# Patient Record
Sex: FEMALE | Race: WHITE | NOT HISPANIC OR LATINO | Employment: OTHER | URBAN - METROPOLITAN AREA
[De-identification: names, ages, dates, MRNs, and addresses within clinical notes are randomized per-mention and may not be internally consistent; named-entity substitution may affect disease eponyms.]

---

## 2017-01-04 ENCOUNTER — TELEPHONE (OUTPATIENT)
Dept: OBSTETRICS AND GYNECOLOGY | Facility: CLINIC | Age: 52
End: 2017-01-04

## 2017-01-04 NOTE — TELEPHONE ENCOUNTER
Please inform pt that her pap smear came back abnormal (ASC-US) and her HPV testing was positive. According to the guidelines and these results she needs to come in for a colposcopy. Please schedule with Dr. Mejias.     Thanks!

## 2017-01-17 ENCOUNTER — OFFICE VISIT (OUTPATIENT)
Dept: OBSTETRICS AND GYNECOLOGY | Facility: CLINIC | Age: 52
End: 2017-01-17
Payer: COMMERCIAL

## 2017-01-17 ENCOUNTER — APPOINTMENT (OUTPATIENT)
Dept: LAB | Facility: HOSPITAL | Age: 52
End: 2017-01-17
Attending: OBSTETRICS & GYNECOLOGY
Payer: COMMERCIAL

## 2017-01-17 VITALS
HEART RATE: 100 BPM | SYSTOLIC BLOOD PRESSURE: 133 MMHG | HEIGHT: 63 IN | BODY MASS INDEX: 25.82 KG/M2 | DIASTOLIC BLOOD PRESSURE: 96 MMHG | WEIGHT: 145.75 LBS

## 2017-01-17 DIAGNOSIS — R87.811 ASCUS WITH POSITIVE HIGH RISK HUMAN PAPILLOMAVIRUS OF VAGINA: Primary | ICD-10-CM

## 2017-01-17 DIAGNOSIS — N94.9 VAGINAL BURNING: ICD-10-CM

## 2017-01-17 DIAGNOSIS — R87.620 ASCUS WITH POSITIVE HIGH RISK HUMAN PAPILLOMAVIRUS OF VAGINA: Primary | ICD-10-CM

## 2017-01-17 PROCEDURE — 99213 OFFICE O/P EST LOW 20 MIN: CPT | Mod: 25,S$GLB,, | Performed by: OBSTETRICS & GYNECOLOGY

## 2017-01-17 PROCEDURE — 99999 PR PBB SHADOW E&M-EST. PATIENT-LVL III: CPT | Mod: PBBFAC,,, | Performed by: OBSTETRICS & GYNECOLOGY

## 2017-01-17 PROCEDURE — 57421 EXAM/BIOPSY OF VAG W/SCOPE: CPT | Mod: S$GLB,,, | Performed by: OBSTETRICS & GYNECOLOGY

## 2017-01-17 PROCEDURE — 88312 SPECIAL STAINS GROUP 1: CPT | Mod: 26,,, | Performed by: PATHOLOGY

## 2017-01-17 PROCEDURE — 88305 TISSUE EXAM BY PATHOLOGIST: CPT | Mod: 26,,, | Performed by: PATHOLOGY

## 2017-01-17 PROCEDURE — 88305 TISSUE EXAM BY PATHOLOGIST: CPT | Performed by: PATHOLOGY

## 2017-01-17 PROCEDURE — 3080F DIAST BP >= 90 MM HG: CPT | Mod: S$GLB,,, | Performed by: OBSTETRICS & GYNECOLOGY

## 2017-01-17 PROCEDURE — 1159F MED LIST DOCD IN RCRD: CPT | Mod: S$GLB,,, | Performed by: OBSTETRICS & GYNECOLOGY

## 2017-01-17 PROCEDURE — 3075F SYST BP GE 130 - 139MM HG: CPT | Mod: S$GLB,,, | Performed by: OBSTETRICS & GYNECOLOGY

## 2017-01-17 RX ORDER — DULOXETIN HYDROCHLORIDE 60 MG/1
60 CAPSULE, DELAYED RELEASE ORAL
Refills: 6 | COMMUNITY
Start: 2016-12-29 | End: 2017-07-18

## 2017-01-17 RX ORDER — HYDROCODONE BITARTRATE AND ACETAMINOPHEN 7.5; 325 MG/1; MG/1
TABLET ORAL
COMMUNITY
Start: 2017-01-14 | End: 2017-07-18

## 2017-01-17 RX ORDER — DICLOFENAC SODIUM 20 MG/G
SOLUTION TOPICAL
Refills: 2 | COMMUNITY
Start: 2017-01-09 | End: 2017-06-05 | Stop reason: SDUPTHER

## 2017-01-17 RX ORDER — TOPIRAMATE 200 MG/1
400 TABLET ORAL DAILY
COMMUNITY
Start: 2017-01-13 | End: 2023-09-18

## 2017-01-17 RX ORDER — TRAMADOL HYDROCHLORIDE 50 MG/1
50 TABLET ORAL EVERY 6 HOURS PRN
COMMUNITY
Start: 2017-01-16 | End: 2017-12-06

## 2017-01-18 NOTE — PROGRESS NOTES
COLPOSCOPY:    Faith Bernard is a 51 y.o. female   presents for colposcopy.  No LMP recorded (lmp unknown). Patient has had a hysterectomy..  Her most recent pap smear shows ASCUS with POSITIVE high risk HPV.      The abnormal test findings were discussed, as well as HPV infection, need for colposcopy and possible biopsies to determine the plan of care, treatments available, the minimal risk of bleeding and infection with colposcopy, and alternatives to colposcopy and she agrees to proceed.      UPT is negative    COLPOSCOPY EXAM:   TIME OUT PERFORMED.     visible lesion(s) at 9 o'clock    Biopsy was taken at 9 o'clock.  ECC was not performed    Hemostasis was adequate with application of silver nitrate.  The speculum was removed.  The patient did tolerate the procedure well.    All collected specimens sent to pathology for histologic analysis.    Post-colposcopy counseling:  The patient was instructed to manage post-colposcopy cramping with NSAIDs or Tylenol, or with a prescription per the medication card.  Avoid intercourse, douching, or tampons in the vagina for at least 2-3 days.  Expect a clumpy blackish discharge due to Monsel's solution application for several days.  Report heavy bleeding, worsening pain or pain that does not respond to above medications, or foul-smelling vaginal discharge. HPV vaccine recommended according to FDA age guidelines.  Importance of follow-up stressed.      Follow up based on colposcopy results.

## 2017-01-18 NOTE — PROGRESS NOTES
Subjective:       Patient ID: Faith Bernard is a 51 y.o. female.    Chief Complaint:  Colposcopy      History of Present Illness  HPI  Pt complains of vaginal burning that happens a few times a week.  Pt reports taking a vaginal temperature of 103 and using ice in vaginal to relieve pain    GYN & OB History  No LMP recorded (lmp unknown). Patient has had a hysterectomy.   Date of Last Pap: 2016    OB History    Para Term  AB SAB TAB Ectopic Multiple Living   3 1  1 1 1    2      # Outcome Date GA Lbr Cipriano/2nd Weight Sex Delivery Anes PTL Lv   3       CS-Unspec   Y   2 SAB            1       Vag-Spont   Y          Review of Systems  Review of Systems   Constitutional: Negative.    Respiratory: Negative.    Cardiovascular: Negative.    Gastrointestinal: Negative.    Genitourinary: Positive for vaginal pain.   Musculoskeletal: Negative.    Skin:  Negative.   Neurological: Negative.    Psychiatric/Behavioral: Negative.            Objective:    Physical Exam:   Constitutional: She is oriented to person, place, and time. She appears well-developed and well-nourished.    HENT:   Head: Normocephalic and atraumatic.    Eyes: EOM are normal.    Neck: Normal range of motion.    Cardiovascular: Normal rate.     Pulmonary/Chest: Effort normal.          Genitourinary:       There is no rash, tenderness, lesion or injury on the right labia. There is no rash, tenderness, lesion or injury on the left labia.           Musculoskeletal: Normal range of motion and moves all extremeties.       Neurological: She is alert and oriented to person, place, and time.    Skin: Skin is warm and dry.    Psychiatric: She has a normal mood and affect. Her behavior is normal. Judgment and thought content normal.          Assessment:        1. ASCUS with positive high risk human papillomavirus of vagina    2. Vaginal burning                Plan:      Will follow biopsy results  May be manifestation of  fibromyalgia

## 2017-01-19 ENCOUNTER — TELEPHONE (OUTPATIENT)
Dept: OBSTETRICS AND GYNECOLOGY | Facility: CLINIC | Age: 52
End: 2017-01-19

## 2017-01-20 NOTE — TELEPHONE ENCOUNTER
Spoke with patient and informed her about the negative result. I also told her that it was recommended to repeat her pap in a year. Patient voiced understanding.

## 2017-03-21 ENCOUNTER — TELEPHONE (OUTPATIENT)
Dept: OBSTETRICS AND GYNECOLOGY | Facility: CLINIC | Age: 52
End: 2017-03-21

## 2017-03-21 NOTE — TELEPHONE ENCOUNTER
----- Message from Ilene Barton sent at 3/21/2017  3:08 PM CDT -----  Contact: Faith  Patient is asking for Rx Vagifem 20 mg twice a week as the Rx given is too messy and also is not working. Asking to speak to nurse [phone]. Thanks!

## 2017-03-21 NOTE — TELEPHONE ENCOUNTER
She had asked you about the  Cream , but she hates it , would like to go back on the vagifem 10 mg was using 2 (20 mg) inserts twice a week . Was wanting to see if you would call this into [facility] and she will stop using the cream.

## 2017-03-23 ENCOUNTER — TELEPHONE (OUTPATIENT)
Dept: OBSTETRICS AND GYNECOLOGY | Facility: CLINIC | Age: 52
End: 2017-03-23

## 2017-03-23 RX ORDER — ESTRADIOL 10 UG/1
20 INSERT VAGINAL
Qty: 16 TABLET | Refills: 11 | Status: SHIPPED | OUTPATIENT
Start: 2017-03-23 | End: 2017-12-06 | Stop reason: ALTCHOICE

## 2017-03-23 NOTE — TELEPHONE ENCOUNTER
----- Message from Roverto Mccain sent at 3/22/2017  4:47 PM CDT -----  Contact: Patient  Patient called regarding Rx ([email or web address]) wanted to know if script has been sent to [facility]? Please call back to confirm at [phone].thanks,

## 2017-04-25 ENCOUNTER — TELEPHONE (OUTPATIENT)
Dept: OBSTETRICS AND GYNECOLOGY | Facility: CLINIC | Age: 52
End: 2017-04-25

## 2017-04-25 NOTE — TELEPHONE ENCOUNTER
----- Message from Nolvia Londono sent at 4/24/2017 12:19 PM CDT -----  Pt is requesting 3 month 90 supply of Estradiol call pharmacy  [phone]   [facility] [place], - [place] - [address]  [address] [place] [address]  Phone: [phone] Fax: [phone]

## 2017-06-05 RX ORDER — DICLOFENAC SODIUM 20 MG/G
SOLUTION TOPICAL
Qty: 1 BOTTLE | Refills: 5 | Status: SHIPPED | OUTPATIENT
Start: 2017-06-05 | End: 2017-07-18 | Stop reason: SDUPTHER

## 2017-07-18 ENCOUNTER — OFFICE VISIT (OUTPATIENT)
Dept: RHEUMATOLOGY | Facility: CLINIC | Age: 52
End: 2017-07-18
Payer: COMMERCIAL

## 2017-07-18 VITALS
WEIGHT: 144.31 LBS | DIASTOLIC BLOOD PRESSURE: 82 MMHG | SYSTOLIC BLOOD PRESSURE: 126 MMHG | BODY MASS INDEX: 25.57 KG/M2 | HEIGHT: 63 IN

## 2017-07-18 DIAGNOSIS — M79.7 FIBROMYALGIA: Primary | ICD-10-CM

## 2017-07-18 DIAGNOSIS — M15.9 OSTEOARTHRITIS OF MULTIPLE JOINTS, UNSPECIFIED OSTEOARTHRITIS TYPE: ICD-10-CM

## 2017-07-18 PROCEDURE — 99213 OFFICE O/P EST LOW 20 MIN: CPT | Mod: ,,, | Performed by: INTERNAL MEDICINE

## 2017-07-18 RX ORDER — CYCLOBENZAPRINE HCL 10 MG
10 TABLET ORAL 3 TIMES DAILY PRN
Qty: 30 TABLET | Refills: 2 | Status: SHIPPED | OUTPATIENT
Start: 2017-07-18 | End: 2017-07-28

## 2017-07-18 RX ORDER — VENLAFAXINE HYDROCHLORIDE 75 MG/1
150 CAPSULE, EXTENDED RELEASE ORAL
COMMUNITY
End: 2022-12-12

## 2017-07-18 RX ORDER — DICLOFENAC SODIUM 20 MG/G
SOLUTION TOPICAL
Qty: 1 BOTTLE | Refills: 5 | Status: SHIPPED | OUTPATIENT
Start: 2017-07-18 | End: 2017-10-31

## 2017-07-18 NOTE — PROGRESS NOTES
[facility]            PROGRESS NOTE      Subjective:       Patient ID:   NAME: Faith Bernard : 1965     52 y.o. female    Referring Doc: No ref. provider found  Other Physicians:    Chief Complaint:  Osteoarthritis      History of Present Illness:     Patient returns today for a regularly scheduled follow-up visit for Fibromyalgia and osteoarthritis. Patient's last visit with us in 2016.      The patient has been under the care of a pain management physician.  She is complaining of arthralgias on her right ankle , right knee, right shoulder and right wrist without swelling. Symptoms are relieved with Pennsaid.            ROS:   GEN:  No  fever, night sweats . weight is stable   + fatigue  SKIN: no rashes, no bruising, no ulcerations, no Raynaud's  HEENT: no HA's, No visual changes, no mucosal ulcers, no sicca symptoms,  CV:   no CP, SOB, PND, TRIANA, no orthopnea, no palpitations  PULM: normal with no SOB, cough, hemoptysis, sputum or pleuritic pain  GI:  no abdominal pain, nausea, vomiting, constipation, diarrhea, melanotic stools, BRBPR, hematemesis, no dysphagia  :   no dysuria  NEURO: no paresthesias, headaches, visual disturbances, muscle weakness  MUSCULOSKELETAL:no joint swelling, prolonged AM stiffness, + back pain, + muscle pain  Allergies:  Review of patient's allergies indicates:   Allergen Reactions    Cephalexin      Other reaction(s): Unknown    Codeine Itching     Other reaction(s): Unknown    Nsaids (non-steroidal anti-inflammatory drug)        Medications:    Current Outpatient Prescriptions:     aripiprazole (ABILIFY) 10 MG Tab, Take 10 mg by mouth once daily., Disp: , Rfl:     butalbital-acetaminop-caf-cod -36-30 mg Cap, 1-2 capsules 2 (two) times daily as needed (h/a). , Disp: , Rfl: 3    cholecalciferol, vitamin D3, 50,000 unit capsule, Take 50,000 Units by mouth every 7 days., Disp: , Rfl: 99    CYANOCOBALAMIN, VITAMIN B-12, (VITAMIN B-12 INJ),  Inject 1 mL as directed every 30 days. , Disp: , Rfl:     estradiol (ESTRACE) 2 MG tablet, Take 1 tablet (2 mg total) by mouth once daily., Disp: 30 tablet, Rfl: 11    estradiol (VAGIFEM) 10 mcg Tab, Place 2 tablets (20 mcg total) vaginally twice a week., Disp: 16 tablet, Rfl: 11    IRON, FERROUS SULFATE, ORAL, Take by mouth once daily. , Disp: , Rfl:     levocetirizine (XYZAL) 5 MG tablet, 5 mg once daily. , Disp: , Rfl:     levothyroxine (SYNTHROID) 88 MCG tablet, Take 88 mcg by mouth once daily., Disp: , Rfl:     linaclotide (LINZESS) 145 mcg Cap capsule, Take 145 mcg by mouth once daily., Disp: , Rfl:     oxycodone myristate 18 mg CSpT, Take 18 mg by mouth every 12 (twelve) hours., Disp: , Rfl:     oxycodone-acetaminophen (PERCOCET)  mg per tablet, Take 1 tablet by mouth every 8 (eight) hours as needed., Disp: , Rfl: 0    PENNSAID 20 mg/gram /actuation(2 %) sopm, APPLY 2 PUMpS ON AFFECTED KNEE TWICE DAILY AS NEEDED, Disp: 1 Bottle, Rfl: 5    rizatriptan (MAXALT) 10 MG tablet, Take 10 mg by mouth as needed for Migraine., Disp: , Rfl:     spironolactone (ALDACTONE) 50 MG tablet, Take 50 mg by mouth once daily., Disp: , Rfl:     tizanidine (ZANAFLEX) 4 MG tablet, Take 4 mg by mouth every evening. May take up to 12mg qhs, Disp: , Rfl: 4    topiramate (TOPAMAX) 200 MG Tab, 100 mg 4 (four) times daily. , Disp: , Rfl:     tramadol (ULTRAM) 50 mg tablet, Take 50 mg by mouth every 6 (six) hours as needed. , Disp: , Rfl:     trazodone (DESYREL) 100 MG tablet, Take 1 tablet (100 mg total) by mouth nightly as needed for Insomnia. (Patient taking differently: Take 150 mg by mouth every evening. ), Disp: 90 tablet, Rfl: 3    valsartan-hydrochlorothiazide (DIOVAN-HCT) 160-25 mg per tablet, Take 1 tablet by mouth once daily., Disp: , Rfl:     venlafaxine (EFFEXOR XR) 75 MG 24 hr capsule, Take 75 mg by mouth 3 (three) times daily., Disp: , Rfl:     clobetasol (TEMOVATE) 0.05 % cream, Apply topically once  "daily. Use daily for 10 days then go to twice weekly, Disp: 30 g, Rfl: 1    cyclobenzaprine (FLEXERIL) 10 MG tablet, Take 1 tablet (10 mg total) by mouth 3 (three) times daily as needed for Muscle spasms., Disp: 30 tablet, Rfl: 2    PMHx/PSHx Updates:      Objective:     Vitals:  Blood pressure 126/82, height 5' 3" (1.6 m), weight 65.5 kg (144 lb 4.8 oz).    Physical Examination:   GEN: no apparent distress, comfortable; AAOx3  SKIN: no rashes,no ulceration, no Raynaud's, no petechiae, no SQ nodules,  HEAD: normal  EYES: no pallor, no icterus,ENT:  ,no mucosal dryness or ulcerations  NECK: no masses, thyroid normal, trachea midline, no LAD/LN's, supple  CV: RRR with no murmur; l S1 and S2 reg. ,no gallop no rubs,   CHEST: Normal respiratory effort; CTAB; normal breath sounds; no wheeze or crackles  ABDOM: nontender and nondistended; soft; no masses; no rebound/guarding  MUSC/Skeletal: ROM normal; no crepitus; joints without synovitis,  no deformities  No joint swelling or tenderness of PIP, MCP, wrist, elbow, shoulder, or knee joints, widespread trigger points   EXTREM: no clubbing, cyanosis, no edema,normal  pulses   NEURO: grossly intact; motor WNL; AAOx3; , LYMPH: normal cervical, supraclavicular          Labs:   Lab Results   Component Value Date    WBC 8.90 10/04/2016    HGB 14.3 10/04/2016    HCT 43.1 10/04/2016    MCV 93 10/04/2016     10/04/2016    CMP  @LAST[facility](NA,K,CL,CO2,GLU,BUN,Creatinine,Calcium,PROT,Albumin,Bilitot,Alkphos,AST,ALT,CRP,ESR,RF,CCP,MARY JO,SSA,CPK,uric acid) )@  I have reviewed all available lab results and radiology reports.    Radiology/Diagnostic Studies:        Assessment/Plan:   (1) 52 y.o. female with diagnosis of Fibromyalgia and osteoarthritis.  She is under the care of the pain management specialist.  Patient requests prescription for Pennsaid liquid and Flexeril. She is taking Zanaflex but she will not take Zanaflex when she takes Flexeril 5-10 mg twice a day only when " needed.  She had recent blood work done with her primary care physician and we will request results be faxed to me              Discussion:     I have explained all of the above in detail and the patient understands all of the current recommendation(s). I have answered all questions to the best of my ability and to their complete satisfaction.       The patient is to continue with the current management plan         RTC in   4 months      Electronically signed by Murtaza Ramos MD

## 2017-10-31 RX ORDER — DICLOFENAC SODIUM 10 MG/G
GEL TOPICAL
Qty: 1 TUBE | Refills: 2 | Status: SHIPPED | OUTPATIENT
Start: 2017-10-31 | End: 2018-02-06 | Stop reason: SDUPTHER

## 2017-12-06 ENCOUNTER — OFFICE VISIT (OUTPATIENT)
Dept: OBSTETRICS AND GYNECOLOGY | Facility: CLINIC | Age: 52
End: 2017-12-06
Payer: COMMERCIAL

## 2017-12-06 VITALS
SYSTOLIC BLOOD PRESSURE: 124 MMHG | HEIGHT: 63 IN | WEIGHT: 140.44 LBS | DIASTOLIC BLOOD PRESSURE: 88 MMHG | BODY MASS INDEX: 24.88 KG/M2 | HEART RATE: 86 BPM

## 2017-12-06 DIAGNOSIS — N89.8 VAGINAL DRYNESS: Primary | ICD-10-CM

## 2017-12-06 DIAGNOSIS — N94.10 DYSPAREUNIA IN FEMALE: ICD-10-CM

## 2017-12-06 PROCEDURE — 99213 OFFICE O/P EST LOW 20 MIN: CPT | Mod: S$GLB,,, | Performed by: NURSE PRACTITIONER

## 2017-12-06 PROCEDURE — 99999 PR PBB SHADOW E&M-EST. PATIENT-LVL IV: CPT | Mod: PBBFAC,,, | Performed by: NURSE PRACTITIONER

## 2017-12-06 RX ORDER — DEXTROAMPHETAMINE SACCHARATE, AMPHETAMINE ASPARTATE, DEXTROAMPHETAMINE SULFATE AND AMPHETAMINE SULFATE 7.5; 7.5; 7.5; 7.5 MG/1; MG/1; MG/1; MG/1
TABLET ORAL
Refills: 0 | COMMUNITY
Start: 2017-10-23 | End: 2020-09-17 | Stop reason: CLARIF

## 2017-12-06 RX ORDER — LISDEXAMFETAMINE DIMESYLATE 70 MG/1
70 CAPSULE ORAL DAILY
Refills: 0 | COMMUNITY
Start: 2017-09-13 | End: 2023-10-10

## 2017-12-06 RX ORDER — CYCLOBENZAPRINE HCL 10 MG
10 TABLET ORAL 3 TIMES DAILY PRN
Refills: 2 | COMMUNITY
Start: 2017-09-14 | End: 2018-02-06

## 2017-12-06 RX ORDER — TRAZODONE HYDROCHLORIDE 150 MG/1
TABLET ORAL
Status: ON HOLD | COMMUNITY
Start: 2017-11-02 | End: 2020-09-07 | Stop reason: HOSPADM

## 2017-12-11 ENCOUNTER — TELEPHONE (OUTPATIENT)
Dept: OBSTETRICS AND GYNECOLOGY | Facility: CLINIC | Age: 52
End: 2017-12-11

## 2017-12-11 NOTE — TELEPHONE ENCOUNTER
Please call and inform pt that it is okay for her to use the estring vaginal insert and continue to take her estrace tablets.     Thanks!

## 2017-12-11 NOTE — TELEPHONE ENCOUNTER
Patient informed of results and recommendations as noted by LESTER Stone NP. Patient verbalized understanding.

## 2017-12-12 NOTE — PROGRESS NOTES
"Chief Complaint   Patient presents with    Painful [place]     did not know if it the same problem as last time or hormones, wants to try estring, wants blood work       History of Present Illness: Faith Bernard is a 52 y.o. female that presents today 12/6/2017 for   Pt here c/o vaginal dryness and painful intercourse. She has been using the vagifem vaginal tablets and does not feel as if they are working anymore. She states that in the past few weeks her and her  have tried to engage in intercourse and she feel like "there are razors inside" of her and that she is "being split open". She has looked into other forms of vaginal estrogen and would like to try the estring vaginal insert. Pt denies any other complaints or concerns at this time.         Chief Complaint   Patient presents with    Painful [place]     did not know if it the same problem as last time or hormones, wants to try estring, wants blood work         Past Medical History:   Diagnosis Date    Anxiety     Depression     Encounter for blood transfusion     Esophageal dysphagia     Fibromyalgia     Gastric bypass status for obesity     H/O dental abscess 4/14/2014    drained    Headache(784.0)     migraines.  Treated at [facility] (Dr. 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  Pt states took too many then realized her mistake    Polycystic ovaries     S/P JUHI-BSO     Thyroid disease     hypothyroidism       Past Surgical History:   Procedure Laterality Date    ABDOMINAL SURGERY      BREAST SURGERY  " 2004    Breast Reduction    CARDIAC CATHETERIZATION      COLONOSCOPY N/A 11/3/2015    Procedure: COLONOSCOPY;  Surgeon: Timothy Barber MD;  Location: [facility];  Service: Endoscopy;  Laterality: N/A;    DENTAL SURGERY      4 teeth removed    GASTRIC BYPASS      HERNIA REPAIR      HYSTERECTOMY      OVARIAN CYST REMOVAL      aprox 5 times    UPPER GASTROINTESTINAL ENDOSCOPY         Current Outpatient Prescriptions   Medication Sig Dispense Refill    aripiprazole (ABILIFY) 10 MG Tab Take 10 mg by mouth once daily.      butalbital-acetaminop-caf-cod -86-30 mg Cap 1-2 capsules 2 (two) times daily as needed (h/a).   3    cholecalciferol, vitamin D3, 50,000 unit capsule Take 50,000 Units by mouth every 7 days.  99    CYANOCOBALAMIN, VITAMIN B-12, (VITAMIN B-12 INJ) Inject 1 mL as directed every 30 days.       cyclobenzaprine (FLEXERIL) 10 MG tablet Take 10 mg by mouth 3 (three) times daily as needed.  2    dextroamphetamine-amphetamine 30 mg Tab TK 1/2 T PO TID  0    diclofenac sodium 1 % Gel 2-4 gm on affected joint bid-tid prn 1 Tube 2    estradiol (ESTRACE) 2 MG tablet Take 1 tablet (2 mg total) by mouth once daily. 30 tablet 11    levothyroxine (SYNTHROID) 88 MCG tablet Take 88 mcg by mouth once daily.      oxycodone myristate 18 mg CSpT Take 13.5 mg by mouth every 12 (twelve) hours.       oxycodone-acetaminophen (PERCOCET)  mg per tablet Take 1 tablet by mouth every 8 (eight) hours as needed.  0    rizatriptan (MAXALT) 10 MG tablet Take 10 mg by mouth as needed for Migraine.      tizanidine (ZANAFLEX) 4 MG tablet Take 4 mg by mouth every evening. May take up to 12mg qhs  4    topiramate (TOPAMAX) 200 MG Tab 100 mg 4 (four) times daily.       trazodone (DESYREL) 100 MG tablet Take 1 tablet (100 mg total) by mouth nightly as needed for Insomnia. (Patient taking differently: Take 150 mg by mouth every evening. ) 90 tablet 3    valsartan-hydrochlorothiazide (DIOVAN-HCT) 160-25 mg per  "tablet Take 1 tablet by mouth once daily.      venlafaxine (EFFEXOR XR) 75 MG 24 hr capsule Take 75 mg by mouth 3 (three) times daily.      VYVANSE 70 mg capsule Take 70 mg by mouth once daily.  0    clobetasol (TEMOVATE) 0.05 % cream Apply topically once daily. Use daily for 10 days then go to twice weekly 30 g 1    traZODone (DESYREL) 150 MG tablet        No current facility-administered medications for this visit.        Review of patient's allergies indicates:   Allergen Reactions    Cephalexin      Other reaction(s): Unknown    Codeine Itching     Other reaction(s): Unknown    Nsaids (non-steroidal anti-inflammatory drug)        Family History   Problem Relation Age of Onset    Anxiety disorder Mother     Depression Mother     Suicide Mother     Seizures Mother     Drug abuse Mother     Ovarian cancer Mother     Aortic aneurysm Father     Colon cancer Neg Hx     Breast cancer Neg Hx     Diabetes Neg Hx     Hypertension Neg Hx        Social History   Substance Use Topics    Smoking status: Never Smoker    Smokeless tobacco: Never Used    Alcohol use Yes      Comment: 2 per month       OB History    Para Term  AB Living   4 1 0 1 2 2   SAB TAB Ectopic Multiple Live Births   2 0 0 0 2      # Outcome Date GA Lbr Cipriano/2nd Weight Sex Delivery Anes PTL Lv   4 SAB            3       CS-Unspec   SERGIO   2 SAB            1       Vag-Spont   SERGIO          Review of Symptoms:  GENERAL: Denies weight gain or weight loss. Feeling well overall.   SKIN: Denies rash or lesions.   ABDOMEN: No abdominal pain, constipation, diarrhea, nausea, vomiting or rectal bleeding.   URINARY: No frequency, dysuria, hematuria, or burning on urination.    /88   Pulse 86   Ht 5' 3" (1.6 m)   Wt 63.7 kg (140 lb 6.9 oz)   LMP  (LMP Unknown)   Physical Exam:  APPEARANCE: Well nourished, well developed, in no acute distress.  SKIN: Normal skin turgor, no lesions.  RESPIRATORY: Normal respiratory " effort with no retractions or use of accessory muscles  PELVIC: Normal external female genitalia without lesions. Normal hair distribution. Adequate perineal body. Vagina dry with minimal rugae; without lesions or discharge. No significant cystocele or rectocele.     ASSESSMENT/PLAN:  Vaginal dryness    Dyspareunia in female    Other orders  -     Cancel: estradiol (ESTRING) 2 mg (7.5 mcg /24 hour) vaginal ring; Place 2 mg vaginally once.  Dispense: 1 each; Refill: 3  -     estradiol (ESTRING) 2 mg (7.5 mcg /24 hour) vaginal ring; Place 2 mg vaginally once.  Dispense: 1 each; Refill: 3        -Ordered estring for pt in place of vagifem vaginal tablets. Also instructed pt to use water-based lubricants with intercourse such as astroglide and not KY-jellies. Instructed pt to follow-up in 3 months if she is not seeing an improvement with the new medication. Pt verbalized understanding.     Follow-up:  RTC if symptoms worsen or do not improve  RTC as needed

## 2018-02-06 ENCOUNTER — OFFICE VISIT (OUTPATIENT)
Dept: OBSTETRICS AND GYNECOLOGY | Facility: CLINIC | Age: 53
End: 2018-02-06
Payer: COMMERCIAL

## 2018-02-06 VITALS
DIASTOLIC BLOOD PRESSURE: 76 MMHG | HEART RATE: 91 BPM | HEIGHT: 63 IN | BODY MASS INDEX: 24.72 KG/M2 | SYSTOLIC BLOOD PRESSURE: 110 MMHG | WEIGHT: 139.5 LBS

## 2018-02-06 DIAGNOSIS — N94.10 DYSPAREUNIA, FEMALE: ICD-10-CM

## 2018-02-06 DIAGNOSIS — Z01.419 ENCOUNTER FOR WELL WOMAN EXAM WITH ROUTINE GYNECOLOGICAL EXAM: Primary | ICD-10-CM

## 2018-02-06 PROCEDURE — 99999 PR PBB SHADOW E&M-EST. PATIENT-LVL III: CPT | Mod: PBBFAC,,, | Performed by: OBSTETRICS & GYNECOLOGY

## 2018-02-06 PROCEDURE — 99396 PREV VISIT EST AGE 40-64: CPT | Mod: S$GLB,,, | Performed by: OBSTETRICS & GYNECOLOGY

## 2018-02-06 PROCEDURE — 88175 CYTOPATH C/V AUTO FLUID REDO: CPT

## 2018-02-06 RX ORDER — OXYCODONE 13.5 MG/1
CAPSULE, EXTENDED RELEASE ORAL
Status: ON HOLD | COMMUNITY
Start: 2018-01-18 | End: 2020-09-07 | Stop reason: HOSPADM

## 2018-02-06 RX ORDER — ESTRADIOL 2 MG/1
SYSTEM VAGINAL
Refills: 3 | COMMUNITY
Start: 2017-12-07 | End: 2020-09-17 | Stop reason: CLARIF

## 2018-02-06 RX ORDER — RIZATRIPTAN BENZOATE 10 MG/1
TABLET, ORALLY DISINTEGRATING ORAL
Refills: 2 | COMMUNITY
Start: 2017-12-15 | End: 2023-09-18

## 2018-02-06 RX ORDER — DICLOFENAC SODIUM 20 MG/G
SOLUTION TOPICAL
COMMUNITY
Start: 2018-01-15 | End: 2020-09-17 | Stop reason: CLARIF

## 2018-02-06 RX ORDER — ESTRADIOL 2 MG/1
TABLET ORAL
Status: ON HOLD | COMMUNITY
Start: 2018-01-04 | End: 2020-09-07

## 2018-02-06 NOTE — PROGRESS NOTES
Subjective:       Patient ID: Faith eBrnard is a 52 y.o. female.    Chief Complaint:  Well Woman (pain in intercourse, pain since the last biopsy, )      History of Present Illness  HPI  Annual Exam-Postmenopausal  Patient presents for annual exam. The patient has no complaints today. The patient is sexually active. Patient reports that she experiences severe deep dyspareunia every time she has intercourse since colposcopy last year. GYN screening history: last pap: was abnormal: ASCUS with +HPV. The patient is taking hormone replacement therapy. Patient denies post-menopausal vaginal bleeding. The patient wears seatbelts: yes. The patient participates in regular exercise: not asked. Has the patient ever been transfused or tattooed?: not asked. The patient reports that there is not domestic violence in her life.    GYN & OB History  No LMP recorded (lmp unknown). Patient has had a hysterectomy.   Date of Last Pap: 2016    OB History    Para Term  AB Living   4 1 0 1 2 2   SAB TAB Ectopic Multiple Live Births   2 0 0 0 2      # Outcome Date GA Lbr Cipriano/2nd Weight Sex Delivery Anes PTL Lv   4 SAB            3       CS-Unspec   SERGIO   2 SAB            1       Vag-Spont   SERGIO          Review of Systems  Review of Systems   Constitutional: Negative for activity change, appetite change, chills, diaphoresis, fatigue, fever and unexpected weight change.   HENT: Negative for mouth sores and tinnitus.    Eyes: Negative for discharge and visual disturbance.   Respiratory: Negative for cough, shortness of breath and wheezing.    Cardiovascular: Negative for chest pain, palpitations and leg swelling.   Gastrointestinal: Negative for abdominal pain, bloating, blood in stool, constipation, diarrhea, nausea and vomiting.   Endocrine: Negative for diabetes, hair loss, hot flashes, hyperthyroidism and hypothyroidism.   Genitourinary: Positive for dyspareunia and vaginal pain. Negative for  decreased libido, dysuria, flank pain, frequency, genital sores, hematuria, menorrhagia, menstrual problem, pelvic pain, urgency, vaginal bleeding, vaginal discharge, dysmenorrhea, urinary incontinence, postcoital bleeding, postmenopausal bleeding and vaginal odor.   Musculoskeletal: Negative for back pain, joint swelling and myalgias.   Skin:  Negative for rash, no acne and hair changes.   Neurological: Negative for seizures, syncope, numbness and headaches.   Hematological: Negative for adenopathy. Does not bruise/bleed easily.   Psychiatric/Behavioral: Positive for depression. Negative for sleep disturbance. The patient is nervous/anxious.    Breast: Negative for breast mass, breast pain, nipple discharge and skin changes          Objective:    Physical Exam:   Constitutional: She is oriented to person, place, and time. She appears well-developed and well-nourished. No distress.    HENT:   Head: Normocephalic.    Eyes: Pupils are equal, round, and reactive to light.    Neck: Normal range of motion.    Cardiovascular: Normal rate.     Pulmonary/Chest: Effort normal.        Abdominal: Soft.     Genitourinary: Vagina normal. No vaginal discharge found.   Genitourinary Comments: Pap smear done of vaginal cuff. No lesions noted in vaginal canal and no tenderness with speculum exam. No palpable pelvic masses or tenderness.           Musculoskeletal: Normal range of motion.       Neurological: She is alert and oriented to person, place, and time.    Skin: Skin is warm and dry.           Assessment:        1. Encounter for well woman exam with routine gynecological exam    2. Dyspareunia, female                Plan:      Continue with Estring and Estrace pills  Mammogram  Patient has Premarin vaginal cream and may add to mix

## 2018-08-28 DIAGNOSIS — Z90.710 S/P TOTAL HYSTERECTOMY: Primary | ICD-10-CM

## 2018-08-29 RX ORDER — ESTRADIOL 2 MG/1
SYSTEM VAGINAL
Qty: 1 EACH | Refills: 3 | OUTPATIENT
Start: 2018-08-29

## 2018-08-29 NOTE — TELEPHONE ENCOUNTER
Please inform pt that her last well visit was done by Dr. Sánchez. Prescription should now be requested from him.     Thanks

## 2018-08-30 NOTE — TELEPHONE ENCOUNTER
Advised patient that Dr. Sánchez would be the provider to fill her prescriptions since she saw him in February. She said she no longer will be coming to [facility] for her healthcare.

## 2019-12-16 ENCOUNTER — OFFICE VISIT (OUTPATIENT)
Dept: URGENT CARE | Facility: CLINIC | Age: 54
End: 2019-12-16
Payer: COMMERCIAL

## 2019-12-16 VITALS
WEIGHT: 125 LBS | HEIGHT: 63 IN | DIASTOLIC BLOOD PRESSURE: 79 MMHG | OXYGEN SATURATION: 100 % | BODY MASS INDEX: 22.15 KG/M2 | RESPIRATION RATE: 16 BRPM | HEART RATE: 87 BPM | SYSTOLIC BLOOD PRESSURE: 127 MMHG | TEMPERATURE: 99 F

## 2019-12-16 DIAGNOSIS — S61.412A LACERATION OF LEFT HAND WITHOUT FOREIGN BODY, INITIAL ENCOUNTER: Primary | ICD-10-CM

## 2019-12-16 PROCEDURE — 99214 OFFICE O/P EST MOD 30 MIN: CPT | Mod: 25,S$GLB,, | Performed by: NURSE PRACTITIONER

## 2019-12-16 PROCEDURE — 12001 LACERATION REPAIR: ICD-10-PCS | Mod: S$GLB,,, | Performed by: NURSE PRACTITIONER

## 2019-12-16 PROCEDURE — 12001 RPR S/N/AX/GEN/TRNK 2.5CM/<: CPT | Mod: S$GLB,,, | Performed by: NURSE PRACTITIONER

## 2019-12-16 PROCEDURE — 99214 PR OFFICE/OUTPT VISIT, EST, LEVL IV, 30-39 MIN: ICD-10-PCS | Mod: 25,S$GLB,, | Performed by: NURSE PRACTITIONER

## 2019-12-17 NOTE — PATIENT INSTRUCTIONS
Laceration: All Closures  A laceration is a cut through the skin. This will usually require stitches (sutures) or staples if it is deep. Minor cuts may be treated with a surgical tape closure or skin glue.    Home care  · Your healthcare provider may prescribe an antibiotic. This is to help prevent infection. Follow all instructions for taking this medicine. Take the medicine every day until it is gone or you are told to stop. You should not have any left over.  · The healthcare provider may prescribe medicines for pain. Follow instructions for taking them.  · Follow the healthcare providers instructions on how to care for the cut.  · Keep the wound clean and dry. Do not get the wound wet until you are told it is okay to do so. If the area gets wet, gently pat it dry with a clean cloth. Replace the wet bandage with a dry one.  · If a bandage was applied and it becomes wet or dirty, replace it. Otherwise, leave it in place for the first 24 hours.  · Caring for sutures or staples: Once you no longer need to keep them dry, clean the wound daily. First, remove the bandage. Then wash the area gently with soap and warm water, or as directed by the health care provider. Use a wet cotton swab to loosen and remove any blood or crust that forms. After cleaning, apply a thin layer of antibiotic ointment if advised. Then put on a new bandage unless you are told not to.  · Caring for skin glue: Dont put apply liquid, ointment, or cream on the wound while the glue is in place. Avoid activities that cause heavy sweating. Protect the wound from sunlight. Do not scratch, rub, or pick at the adhesive film. Do not place tape directly over the film. The glue should peel off within 5 to 10 days.   · Caring for surgical tape: Keep the area dry. If it gets wet, blot it dry with a clean towel. Surgical tape usually falls off within 7 to 10 days. If it has not fallen off after 10 days, you can take it off yourself. Put mineral oil or  petroleum jelly on a cotton ball and gently rub the tape until it is removed.  · Once you can get the wound wet, you may shower as usual but do not soak the wound in water (no tub baths or swimming)  · Even with proper treatment, a wound infection may sometimes occur. Check the wound daily for signs of infection listed below.  Scalp wounds  During the first two days, you may carefully rinse your hair in the shower to remove blood, glass or dirt particles. After two days, you may shower and shampoo your hair normally. Do not soak your scalp in the tub or go swimming until the stitches or staples have been removed. Talk with your healthcare provider before applying any antibiotic ointment to the wound.  Mouth wounds  Eat soft foods to reduce pain. If the cut is inside of your mouth, clean by rinsing after each meal and at bedtime with a mixture of equal parts water and hydrogen peroxide (do not swallow!). Or, you can use a cotton swab to directly apply hydrogen peroxide onto the cut. Mouth wounds can be painful when eating. You may use an over-the-counter local numbing solution for pain relief. If this is not available, you may use any numbing solution intended for teething babies. You may apply this directly to the sores with a cotton-tip swab or with your finger.  Follow-up care  Follow up with your healthcare provider as advised. Ask your healthcare provider how long sutures should be left in place. Be sure to return for suture removal as directed. If dissolving stitches were used in the mouth, these should fall out or dissolve without the need for removal. If tape closures were used, remove them yourself when your provider recommends if they have not fallen off on their own. If skin glue was used, the film will wear off by itself.  When to seek medical advice  Call your healthcare provider right away if any of these occur:  · Wound bleeding not controlled by direct pressure  · Signs of infection, including  increasing pain in the wound, increasing wound redness or swelling, or pus or bad odor coming from the wound  · Fever of 100.4°F (38.ºC) or higher or as directed by your healthcare provider  · Stitches or staples come apart or fall out or surgical tape falls off before 7 days  · Wound edges re-open  · Wound changes colors  · Numbness around the wound   · Decreased movement around the injured area  Date Last Reviewed: 6/14/2015 © 2000-2017 [facility]. [address] [place] [address]. All rights reserved. This information is not intended as a substitute for professional medical care. Always follow your healthcare professional's instructions.        Hand Laceration: All Closures  A laceration is a cut through the skin. You have a cut on the hand. Deep cuts usually require stitches (sutures) or staples. Minor cuts may be closed with surgical tape or skin adhesive.   X-rays may be done if something may have entered the skin through the cut. Your may also be given a tetanus shot. This may be given if you are not updated on this vaccination and the object that cut you may carry tetanus.    Home care  · Your healthcare provider may prescribe an antibiotic. This is to help prevent infection. Follow all instructions for taking this medicine. Take the medicine every day until it is gone or you are told to stop. You should not have any left over.  · The healthcare provider may prescribe medicines for pain. Follow instructions for taking them.  · Follow the healthcare providers instructions on how to care for the cut.  · Keep the wound clean and dry. Do not get the wound wet until you are told it is okay to do so. If the bandage gets wet, remove it. Gently pat the wound dry with a clean cloth. Then put on a clean, dry bandage..  · To help prevent infection, wash your hands with soap and water before and after caring for the wound.   · Caring for sutures or staples: Once you no longer need to keep them  dry, clean the wound daily. First, remove the bandage. Then wash the area gently with soap and warm water, or as directed by the health care provider. Use a wet cotton swab to loosen and remove any blood or crust that forms. After cleaning, apply a thin layer of antibiotic ointment if advised. Then put on a new bandage unless you are told not to.  · Caring for skin glue: Dont put apply liquid, ointment, or cream on the wound while the glue is in place. Avoid activities that cause heavy sweating. Protect the wound from sunlight. Do not scratch, rub, or pick at the adhesive film. Do not place tape directly over the film. The glue should peel off within 5 to 10 days.   · Caring for surgical tape: Keep the area dry. If it gets wet, blot it dry with a clean towel. Surgical tape usually falls off within 7 to 10 days. If it has not fallen off after 10 days, you can take it off yourself. Put mineral oil or petroleum jelly on a cotton ball and gently rub the tape until it is removed.  · Once you can get the wound wet, you may shower as usual but do not soak the wound in water (no tub baths or swimming)  · Even with proper treatment, a wound infection may sometimes occur. Check the wound daily for signs of infection listed below.  Follow-up care  Follow up with your healthcare provider as advised. If you have stitches or staples, be sure to return as directed to have them removed.  When to seek medical advice  Call your healthcare provider right away if any of these occur:  · Wound bleeding not controlled by direct pressure  · Signs of infection, including increasing pain in the wound, increasing wound redness or swelling, or pus or bad odor coming from the wound  · Fever of 100.4°F (38.ºC) or as directed by your health care provider  · Stitches or staples come apart or fall out or surgical tape falls off before 7 days  · Wound edges re-open  · Wound changes colors  · Numbness or weakness in the affected hand   · Decreased  movement of the hand  Date Last Reviewed: 6/10/2015  © 0912-6956 [employer]. [address] [place] [address], [place] [address]. All rights reserved. This information is not intended as a substitute for professional medical care. Always follow your healthcare professional's instructions.

## 2019-12-17 NOTE — PROGRESS NOTES
"Subjective:       Patient ID: Faith Bernard is a 54 y.o. female.    Vitals:  height is 5' 3" (1.6 m) and weight is 56.7 kg (125 lb). Her temperature is 98.6 °F (37 °C). Her blood pressure is 127/79 and her pulse is 87. Her respiration is 16 and oxygen saturation is 100%.     Chief Complaint: Laceration    Faith Bernard is a 54 year old female presenting to the clinic with a laceration to the left hand. She states she was cutting vegetables and the knife slipped. She reports receiving a tetanus vaccine one month ago. She has full ROM of the digits.     Laceration    The incident occurred less than 1 hour ago. The laceration is located on the left hand. The laceration mechanism was a clean knife. Her tetanus status is UTD.       Constitution: Negative for chills, fatigue and fever.   HENT: Negative for congestion and sore throat.    Neck: Negative for painful lymph nodes.   Cardiovascular: Negative for chest pain and leg swelling.   Eyes: Negative for double vision and blurred vision.   Respiratory: Negative for cough and shortness of breath.    Gastrointestinal: Negative for nausea, vomiting and diarrhea.   Genitourinary: Negative for dysuria, frequency, urgency and history of kidney stones.   Musculoskeletal: Negative for joint pain, joint swelling, muscle cramps and muscle ache.   Skin: Positive for laceration. Negative for color change, pale, rash and bruising.   Allergic/Immunologic: Negative for seasonal allergies.   Neurological: Negative for dizziness, history of vertigo, light-headedness, passing out and headaches.   Hematologic/Lymphatic: Negative for swollen lymph nodes.   Psychiatric/Behavioral: Negative for nervous/anxious, sleep disturbance and depression. The patient is not nervous/anxious.        Objective:      Physical Exam   Constitutional: She is oriented to person, place, and time. She appears well-developed and well-nourished. She is cooperative.  Non-toxic appearance. She does not appear " ill. No distress.   HENT:   Head: Normocephalic and atraumatic.   Right Ear: Hearing, tympanic membrane, external ear and ear canal normal.   Left Ear: Hearing, tympanic membrane, external ear and ear canal normal.   Nose: Nose normal. No mucosal edema, rhinorrhea or nasal deformity. No epistaxis. Right sinus exhibits no maxillary sinus tenderness and no frontal sinus tenderness. Left sinus exhibits no maxillary sinus tenderness and no frontal sinus tenderness.   Mouth/Throat: Uvula is midline, oropharynx is clear and moist and mucous membranes are normal. No trismus in the jaw. Normal dentition. No uvula swelling. No posterior oropharyngeal erythema.   Eyes: Conjunctivae and lids are normal. Right eye exhibits no discharge. Left eye exhibits no discharge. No scleral icterus.   Neck: Trachea normal, normal range of motion, full passive range of motion without pain and phonation normal. Neck supple.   Cardiovascular: Normal rate, regular rhythm, normal heart sounds, intact distal pulses and normal pulses.   Pulmonary/Chest: Effort normal and breath sounds normal. No respiratory distress.   Abdominal: Soft. Normal appearance and bowel sounds are normal. She exhibits no distension, no pulsatile midline mass and no mass. There is no tenderness.   Musculoskeletal: Normal range of motion. She exhibits no edema or deformity.        Left hand: She exhibits laceration.   Left hand- palmar surface of the distal 5th metacarpal- 1 cm laceration, bleeding controlled. Full ROM of all digits with sensation intact to light touch.    Neurological: She is alert and oriented to person, place, and time. She exhibits normal muscle tone. Coordination normal.   Skin: Skin is warm, dry, not diaphoretic and not pale. Lacerations - upper ext.:  left hand  Psychiatric: She has a normal mood and affect. Her speech is normal and behavior is normal. Judgment and thought content normal. Cognition and memory are normal.   Nursing note and vitals  reviewed.        Assessment:       1. Laceration of left hand without foreign body, initial encounter        Plan:       Patient has a small laceration to the palm. The wound was cleaned with tap water and then repaired by me without complication. She is currently on doxycycline and UTD on tetanus. Discussed wound care and monitoring for signs of infection. Return in one week for suture removal. Do not suspect tendon injury since she has full ROM of all digits without difficulty.   Laceration of left hand without foreign body, initial encounter  -     Laceration Repair

## 2019-12-17 NOTE — PROCEDURES
Laceration Repair  Date/Time: 12/16/2019 7:05 PM  Performed by: Sarah Montiel NP  Authorized by: Sarah Montiel NP   Body area: upper extremity  Location details: left hand  Laceration length: 1 cm  Foreign bodies: no foreign bodies  Tendon involvement: none  Nerve involvement: none  Anesthesia: local infiltration    Anesthesia:  Local Anesthetic: lidocaine 1% without epinephrine  Anesthetic total: 3 mL  Preparation: Patient was prepped and draped in the usual sterile fashion.  Irrigation solution: tap water  Irrigation method: tap  Skin closure: 5-0 nylon  Number of sutures: 3  Technique: simple  Approximation: close  Approximation difficulty: simple  Patient tolerance: Patient tolerated the procedure well with no immediate complications

## 2019-12-31 ENCOUNTER — LAB VISIT (OUTPATIENT)
Dept: LAB | Facility: HOSPITAL | Age: 54
End: 2019-12-31
Attending: NURSE PRACTITIONER
Payer: COMMERCIAL

## 2019-12-31 DIAGNOSIS — G43.109 CLASSICAL MIGRAINE: Primary | ICD-10-CM

## 2019-12-31 LAB
BASOPHILS # BLD AUTO: 0.03 K/UL (ref 0–0.2)
BASOPHILS # BLD AUTO: 0.03 K/UL (ref 0–0.2)
BASOPHILS NFR BLD: 0.4 % (ref 0–1.9)
BASOPHILS NFR BLD: 0.4 % (ref 0–1.9)
DIFFERENTIAL METHOD: ABNORMAL
DIFFERENTIAL METHOD: ABNORMAL
EOSINOPHIL # BLD AUTO: 0.1 K/UL (ref 0–0.5)
EOSINOPHIL # BLD AUTO: 0.1 K/UL (ref 0–0.5)
EOSINOPHIL NFR BLD: 1.3 % (ref 0–8)
EOSINOPHIL NFR BLD: 1.3 % (ref 0–8)
ERYTHROCYTE [DISTWIDTH] IN BLOOD BY AUTOMATED COUNT: 18.1 % (ref 11.5–14.5)
ERYTHROCYTE [DISTWIDTH] IN BLOOD BY AUTOMATED COUNT: 18.1 % (ref 11.5–14.5)
HCT VFR BLD AUTO: 38.5 % (ref 37–48.5)
HCT VFR BLD AUTO: 38.5 % (ref 37–48.5)
HGB BLD-MCNC: 11.1 G/DL (ref 12–16)
HGB BLD-MCNC: 11.1 G/DL (ref 12–16)
IMM GRANULOCYTES # BLD AUTO: 0.01 K/UL (ref 0–0.04)
IMM GRANULOCYTES # BLD AUTO: 0.01 K/UL (ref 0–0.04)
IMM GRANULOCYTES NFR BLD AUTO: 0.1 % (ref 0–0.5)
IMM GRANULOCYTES NFR BLD AUTO: 0.1 % (ref 0–0.5)
LYMPHOCYTES # BLD AUTO: 2.5 K/UL (ref 1–4.8)
LYMPHOCYTES # BLD AUTO: 2.5 K/UL (ref 1–4.8)
LYMPHOCYTES NFR BLD: 36.6 % (ref 18–48)
LYMPHOCYTES NFR BLD: 36.6 % (ref 18–48)
MCH RBC QN AUTO: 23.2 PG (ref 27–31)
MCH RBC QN AUTO: 23.2 PG (ref 27–31)
MCHC RBC AUTO-ENTMCNC: 28.8 G/DL (ref 32–36)
MCHC RBC AUTO-ENTMCNC: 28.8 G/DL (ref 32–36)
MCV RBC AUTO: 81 FL (ref 82–98)
MCV RBC AUTO: 81 FL (ref 82–98)
MONOCYTES # BLD AUTO: 0.5 K/UL (ref 0.3–1)
MONOCYTES # BLD AUTO: 0.5 K/UL (ref 0.3–1)
MONOCYTES NFR BLD: 7.3 % (ref 4–15)
MONOCYTES NFR BLD: 7.3 % (ref 4–15)
NEUTROPHILS # BLD AUTO: 3.7 K/UL (ref 1.8–7.7)
NEUTROPHILS # BLD AUTO: 3.7 K/UL (ref 1.8–7.7)
NEUTROPHILS NFR BLD: 54.3 % (ref 38–73)
NEUTROPHILS NFR BLD: 54.3 % (ref 38–73)
NRBC BLD-RTO: 0 /100 WBC
NRBC BLD-RTO: 0 /100 WBC
PLATELET # BLD AUTO: 418 K/UL (ref 150–350)
PLATELET # BLD AUTO: 418 K/UL (ref 150–350)
PMV BLD AUTO: 9.7 FL (ref 9.2–12.9)
PMV BLD AUTO: 9.7 FL (ref 9.2–12.9)
RBC # BLD AUTO: 4.78 M/UL (ref 4–5.4)
RBC # BLD AUTO: 4.78 M/UL (ref 4–5.4)
WBC # BLD AUTO: 6.85 K/UL (ref 3.9–12.7)
WBC # BLD AUTO: 6.85 K/UL (ref 3.9–12.7)

## 2019-12-31 PROCEDURE — 80201 ASSAY OF TOPIRAMATE: CPT

## 2019-12-31 PROCEDURE — 85025 COMPLETE CBC W/AUTO DIFF WBC: CPT

## 2019-12-31 PROCEDURE — 36415 COLL VENOUS BLD VENIPUNCTURE: CPT

## 2020-01-02 LAB — TOPIRAMATE SERPL-MCNC: 8.5 UG/ML (ref 2–25)

## 2020-03-09 ENCOUNTER — HOSPITAL ENCOUNTER (EMERGENCY)
Facility: HOSPITAL | Age: 55
Discharge: HOME | End: 2020-03-09
Attending: EMERGENCY MEDICINE
Payer: COMMERCIAL

## 2020-03-09 VITALS
WEIGHT: 118 LBS | SYSTOLIC BLOOD PRESSURE: 129 MMHG | DIASTOLIC BLOOD PRESSURE: 82 MMHG | TEMPERATURE: 98 F | BODY MASS INDEX: 20.91 KG/M2 | OXYGEN SATURATION: 98 % | HEART RATE: 86 BPM | RESPIRATION RATE: 20 BRPM | HEIGHT: 63 IN

## 2020-03-09 DIAGNOSIS — S92.354A CLOSED NONDISPLACED FRACTURE OF FIFTH METATARSAL BONE OF RIGHT FOOT, INITIAL ENCOUNTER: ICD-10-CM

## 2020-03-09 DIAGNOSIS — S86.911A STRAIN OF RIGHT KNEE, INITIAL ENCOUNTER: Primary | ICD-10-CM

## 2020-03-09 PROCEDURE — 29515 APPLICATION SHORT LEG SPLINT: CPT

## 2020-03-09 PROCEDURE — 25000003 PHARM REV CODE 250: Performed by: EMERGENCY MEDICINE

## 2020-03-09 PROCEDURE — 99284 EMERGENCY DEPT VISIT MOD MDM: CPT | Mod: 25

## 2020-03-09 RX ORDER — HYDROCODONE BITARTRATE AND ACETAMINOPHEN 10; 325 MG/1; MG/1
1 TABLET ORAL
Status: COMPLETED | OUTPATIENT
Start: 2020-03-09 | End: 2020-03-09

## 2020-03-09 RX ADMIN — HYDROCODONE BITARTRATE AND ACETAMINOPHEN 1 TABLET: 10; 325 TABLET ORAL at 10:03

## 2020-03-09 NOTE — ED NOTES
R knee and foot pain reported.  States injured when slipped during the night. Reports increasing R knee and foot pain with ROM. R foot swollen and bruised at dorsal area.  R knee slight swelling reported.  Good knee and ankle ROM.

## 2020-03-09 NOTE — ED PROVIDER NOTES
Encounter Date: 3/9/2020       History     Chief Complaint   Patient presents with    Foot Pain     right foot pain and swelling twisted, states stepped on sock and slipped falling and twisted right foot    Knee Pain     right knee     55-year-old with history of neuropathy, osteoarthritis, depression, anxiety.  Patient presents emergency department with complaint of right foot, right ankle pain after she tripped and fell earlier this morning while getting out of bed.  Patient states that she stepped on a sock causing her to trip and twisted her ankle and foot.  Patient noticed a pop in her right foot and had pain and swelling since.  Also has had knee pain as well.  She denies any additional trauma.  Denies head or neck injury.  Denies any other musculoskeletal complaints.        Review of patient's allergies indicates:   Allergen Reactions    Cephalexin      Other reaction(s): Unknown    Codeine Itching     Other reaction(s): Unknown    Nsaids (non-steroidal anti-inflammatory drug)      Past Medical History:   Diagnosis Date    Anxiety     Depression     Encounter for blood transfusion     Esophageal dysphagia     Fibromyalgia     Gastric bypass status for obesity     H/O dental abscess 4/14/2014    drained    Headache(784.0)     migraines.  Treated at [facility] (Dr. 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  Pt states took too many then realized her mistake    Polycystic ovaries     S/P JUHI-BSO     Thyroid disease     hypothyroidism     Past Surgical History:   Procedure  Laterality Date    ABDOMINAL SURGERY      BREAST SURGERY  2004    Breast Reduction    CARDIAC CATHETERIZATION      COLONOSCOPY N/A 11/3/2015    Procedure: COLONOSCOPY;  Surgeon: Timothy Barber MD;  [facility];  Service: Endoscopy;  Laterality: N/A;    DENTAL SURGERY      4 teeth removed    GASTRIC BYPASS      HERNIA REPAIR      HYSTERECTOMY      OVARIAN CYST REMOVAL      aprox 5 times    ROTATOR CUFF REPAIR Left 01/2019    tennis elbow repair Left 01/2019    UPPER GASTROINTESTINAL ENDOSCOPY       Family History   Problem Relation Age of Onset    Anxiety disorder Mother     Depression Mother     Suicide Mother     Seizures Mother     Drug abuse Mother     Ovarian cancer Mother     Aortic aneurysm Father     Colon cancer Neg Hx     Breast cancer Neg Hx     Diabetes Neg Hx     Hypertension Neg Hx      Social History     Tobacco Use    Smoking status: Never Smoker    Smokeless tobacco: Never Used   Substance Use Topics    Alcohol use: Yes     Comment: 2 per month    Drug use: Yes     Types: Amphetamines, Barbituates, Benzodiazepines     Review of Systems   Constitutional: Negative for fever.   HENT: Negative for sore throat.    Respiratory: Negative for shortness of breath.    Cardiovascular: Negative for chest pain.   Gastrointestinal: Negative for nausea.   Genitourinary: Negative for dysuria.   Musculoskeletal: Positive for arthralgias (Right knee, right foot pain) and myalgias. Negative for back pain.   Skin: Negative for rash.   Neurological: Negative for dizziness, speech difficulty, weakness and numbness.   Hematological: Does not bruise/bleed easily.   Psychiatric/Behavioral: Negative for self-injury. The patient is not nervous/anxious.        Physical Exam     Initial Vitals [03/09/20 0824]   BP Pulse Resp Temp SpO2   104/63 82 18 98.4 °F (36.9 °C) 100 %      MAP       --         Physical Exam    Nursing note and vitals reviewed.  Constitutional: She appears well-developed and  well-nourished.   HENT:   Head: Normocephalic and atraumatic.   Nose: Nose normal.   Mouth/Throat: Oropharynx is clear and moist.   Eyes: Conjunctivae and EOM are normal. Pupils are equal, round, and reactive to light.   Neck: Normal range of motion. Neck supple. No thyromegaly present. No tracheal deviation present.   Cardiovascular: Normal rate, regular rhythm, normal heart sounds and intact distal pulses. Exam reveals no gallop and no friction rub.    No murmur heard.  Pulmonary/Chest: Breath sounds normal. No stridor. No respiratory distress.   Course bilateral breath sounds no adventitious sounds   Abdominal: Soft. Bowel sounds are normal. She exhibits no mass. There is no rebound and no guarding.   Musculoskeletal: She exhibits no edema.   Right knee:  With good range of motion.  Negative anterior, posterior drawer.  Negative Lachman's.  Positive mild stress varus and valgus maneuvers.  Diffusely tender to patellar and suprapatellar region.  No crepitus appreciated.    Right foot:  With diffuse palpable tenderness noted to the dorsum of the 3rd 4th 5th metatarsal region.  With mild ecchymosis to the lateral aspect of right 5th metatarsal.     Lymphadenopathy:     She has no cervical adenopathy.   Neurological: She is alert and oriented to person, place, and time. She has normal strength and normal reflexes. GCS score is 15. GCS eye subscore is 4. GCS verbal subscore is 5. GCS motor subscore is 6.   Skin: Skin is warm and dry. Capillary refill takes less than 2 seconds.   Psychiatric: She has a normal mood and affect.         ED Course   Procedures  Labs Reviewed - No data to display       Imaging Results          X-Ray Knee 3 View Right (Final result)  Result time 03/09/20 09:46:06    Final result by Arline Clarke MD (03/09/20 09:46:06)                 Impression:      Negative exam.      Electronically signed by: Arline Clarke MD  Date:    03/09/2020  Time:    09:46             Narrative:     EXAMINATION:  XR KNEE 3 VIEW RIGHT    CLINICAL HISTORY:  Pain in unspecified knee    FINDINGS:  Four views right knee show no fracture, dislocation, or destructive osseous lesion. Soft tissues are unremarkable.                               X-Ray Foot Complete Right (Final result)  Result time 03/09/20 09:48:48    Final result by Ronald Hsu MD (03/09/20 09:48:48)                 Impression:      Acute, comminuted, extra-articular fracture of 5th metatarsal which is minimally displaced.      Electronically signed by: Ronald Hsu MD  Date:    03/09/2020  Time:    09:48             Narrative:    EXAMINATION:  XR FOOT COMPLETE 3 VIEW RIGHT    CLINICAL HISTORY:  Pain, unspecified    COMPARISON:  None available    FINDINGS:  Acute comminuted fracture of the 5th metatarsal diaphysis, without intra-articular involvement.  Minimal fracture displacement.  There is soft tissue swelling about the lateral aspect of the foot.  No soft tissue gas or radiopaque foreign body.                               X-Ray Ankle Complete Right (Final result)  Result time 03/09/20 09:47:19    Final result by Ronald Hsu MD (03/09/20 09:47:19)                 Impression:      No acute osseous abnormality involving the right ankle.    Fifth metatarsal fracture.      Electronically signed by: Ronald Hsu MD  Date:    03/09/2020  Time:    09:47             Narrative:    EXAMINATION:  XR ANKLE COMPLETE 3 VIEW RIGHT    CLINICAL HISTORY:  Pain, unspecified    COMPARISON:  None available    FINDINGS:  No acute fracture or malalignment of the right ankle.  Joint spaces are maintained. Soft tissues are unremarkable.    Fifth metatarsal fracture is partially visualized and discussed on corresponding foot radiography.                                 Medical Decision Making:   Initial Assessment:   55-year-old female with complaint of right knee right ft pain after fall early this morning.  Differential Diagnosis:   Right knee strain,  ligamentous injury, patellar fracture, knee fusion.  Right foot metatarsal fracture, right foot strain, ballerina fracture, Lisfranc injury.  Clinical Tests:   Radiological Study: Ordered and Reviewed  ED Management:  Patient emergency department found with findings consistent with mild knee strain on examination.  Negative x-ray for fracture.  Patient in a knee immobilizer patient tolerated well.  On plain films of the foot patient found with a oblique metatarsal fracture.  Closed in nature.  Patient placed in a posterior splint.  At this time patient states she will follow up with her orthopedist specialist at [facility] sports medicine Dr. Qiu.  Patient has home pain medications and does not desirous to right hip pain script for today.  Patient currently in no acute distress.  Splint applied and evaluated a positive neurovascularly intact after splint application.  Patient tolerated well.                                 Clinical Impression:       ICD-10-CM ICD-9-CM   1. Strain of right knee, initial encounter S86.911A 844.9   2. Closed nondisplaced fracture of fifth metatarsal bone of right foot, initial encounter S92.354A 825.25             ED Disposition Condition    Discharge Stable        ED Prescriptions     None        Follow-up Information     Follow up With Specialties Details Why Contact Info    Matthew Sow MD Orthopedic Surgery Schedule an appointment as soon as possible for a visit  For recheck/continuing care [address] [facility] [id number]  [phone]                                       Mariano Escoto MD  03/09/20 [id number]

## 2020-04-20 ENCOUNTER — PATIENT MESSAGE (OUTPATIENT)
Dept: DERMATOLOGY | Facility: CLINIC | Age: 55
End: 2020-04-20

## 2020-09-06 ENCOUNTER — HOSPITAL ENCOUNTER (EMERGENCY)
Facility: HOSPITAL | Age: 55
Discharge: ANOTHER HOSPITAL | End: 2020-09-07
Attending: EMERGENCY MEDICINE
Payer: COMMERCIAL

## 2020-09-06 VITALS
RESPIRATION RATE: 20 BRPM | OXYGEN SATURATION: 99 % | BODY MASS INDEX: 23.04 KG/M2 | TEMPERATURE: 99 F | HEART RATE: 80 BPM | DIASTOLIC BLOOD PRESSURE: 78 MMHG | SYSTOLIC BLOOD PRESSURE: 111 MMHG | WEIGHT: 130 LBS | HEIGHT: 63 IN

## 2020-09-06 DIAGNOSIS — S82.872A CLOSED FRACTURE OF LEFT TIBIAL PLAFOND WITH FIBULA INVOLVEMENT, INITIAL ENCOUNTER: Primary | ICD-10-CM

## 2020-09-06 DIAGNOSIS — S82.832A CLOSED FRACTURE OF LEFT TIBIAL PLAFOND WITH FIBULA INVOLVEMENT, INITIAL ENCOUNTER: Primary | ICD-10-CM
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
## 2020-09-06 DIAGNOSIS — R52 PAIN: ICD-10-CM

## 2020-09-06 LAB
ALBUMIN SERPL BCP-MCNC: 4 G/DL (ref 3.5–5.2)
ALP SERPL-CCNC: 67 U/L (ref 55–135)
ALT SERPL W/O P-5'-P-CCNC: 21 U/L (ref 10–44)
ANION GAP SERPL CALC-SCNC: 7 MMOL/L (ref 8–16)
AST SERPL-CCNC: 19 U/L (ref 10–40)
BASOPHILS # BLD AUTO: 0.04 K/UL (ref 0–0.2)
BASOPHILS NFR BLD: 0.4 % (ref 0–1.9)
BILIRUB SERPL-MCNC: 0.5 MG/DL (ref 0.1–1)
BUN SERPL-MCNC: 10 MG/DL (ref 6–20)
CALCIUM SERPL-MCNC: 9.1 MG/DL (ref 8.7–10.5)
CHLORIDE SERPL-SCNC: 111 MMOL/L (ref 95–110)
CO2 SERPL-SCNC: 21 MMOL/L (ref 23–29)
CREAT SERPL-MCNC: 1 MG/DL (ref 0.5–1.4)
DIFFERENTIAL METHOD: ABNORMAL
EOSINOPHIL # BLD AUTO: 0.2 K/UL (ref 0–0.5)
EOSINOPHIL NFR BLD: 1.8 % (ref 0–8)
ERYTHROCYTE [DISTWIDTH] IN BLOOD BY AUTOMATED COUNT: 13.8 % (ref 11.5–14.5)
EST. GFR  (AFRICAN AMERICAN): >60 ML/MIN/1.73 M^2
EST. GFR  (NON AFRICAN AMERICAN): >60 ML/MIN/1.73 M^2
GLUCOSE SERPL-MCNC: 89 MG/DL (ref 70–110)
HCT VFR BLD AUTO: 50.7 % (ref 37–48.5)
HGB BLD-MCNC: 15.1 G/DL (ref 12–16)
IMM GRANULOCYTES # BLD AUTO: 0.02 K/UL (ref 0–0.04)
IMM GRANULOCYTES NFR BLD AUTO: 0.2 % (ref 0–0.5)
INR PPP: 1.5
LYMPHOCYTES # BLD AUTO: 2.7 K/UL (ref 1–4.8)
LYMPHOCYTES NFR BLD: 30.3 % (ref 18–48)
MCH RBC QN AUTO: 31.7 PG (ref 27–31)
MCHC RBC AUTO-ENTMCNC: 29.8 G/DL (ref 32–36)
MCV RBC AUTO: 107 FL (ref 82–98)
MONOCYTES # BLD AUTO: 0.6 K/UL (ref 0.3–1)
MONOCYTES NFR BLD: 6.6 % (ref 4–15)
NEUTROPHILS # BLD AUTO: 5.4 K/UL (ref 1.8–7.7)
NEUTROPHILS NFR BLD: 60.7 % (ref 38–73)
NRBC BLD-RTO: 0 /100 WBC
PLATELET # BLD AUTO: 102 K/UL (ref 150–350)
PMV BLD AUTO: 10.2 FL (ref 9.2–12.9)
POTASSIUM SERPL-SCNC: 4.3 MMOL/L (ref 3.5–5.1)
PROT SERPL-MCNC: 6.9 G/DL (ref 6–8.4)
PROTHROMBIN TIME: 17.2 SEC (ref 10.6–14.8)
RBC # BLD AUTO: 4.76 M/UL (ref 4–5.4)
SARS-COV-2 RDRP RESP QL NAA+PROBE: NEGATIVE
SODIUM SERPL-SCNC: 139 MMOL/L (ref 136–145)
WBC # BLD AUTO: 8.9 K/UL (ref 3.9–12.7)

## 2020-09-06 PROCEDURE — 93010 EKG 12-LEAD: ICD-10-PCS | Mod: ,,, | Performed by: INTERNAL MEDICINE

## 2020-09-06 PROCEDURE — 80053 COMPREHEN METABOLIC PANEL: CPT

## 2020-09-06 PROCEDURE — 93010 ELECTROCARDIOGRAM REPORT: CPT | Mod: ,,, | Performed by: INTERNAL MEDICINE

## 2020-09-06 PROCEDURE — 96374 THER/PROPH/DIAG INJ IV PUSH: CPT | Mod: 59

## 2020-09-06 PROCEDURE — 85610 PROTHROMBIN TIME: CPT

## 2020-09-06 PROCEDURE — 29515 APPLICATION SHORT LEG SPLINT: CPT | Mod: LT

## 2020-09-06 PROCEDURE — 96376 TX/PRO/DX INJ SAME DRUG ADON: CPT | Mod: 59

## 2020-09-06 PROCEDURE — 63600175 PHARM REV CODE 636 W HCPCS: Performed by: EMERGENCY MEDICINE

## 2020-09-06 PROCEDURE — U0002 COVID-19 LAB TEST NON-CDC: HCPCS

## 2020-09-06 PROCEDURE — 96375 TX/PRO/DX INJ NEW DRUG ADDON: CPT | Mod: 59

## 2020-09-06 PROCEDURE — 99285 EMERGENCY DEPT VISIT HI MDM: CPT | Mod: 25

## 2020-09-06 PROCEDURE — 85025 COMPLETE CBC W/AUTO DIFF WBC: CPT

## 2020-09-06 PROCEDURE — 93005 ELECTROCARDIOGRAM TRACING: CPT | Mod: 59 | Performed by: INTERNAL MEDICINE

## 2020-09-06 RX ORDER — HYDROMORPHONE HYDROCHLORIDE 1 MG/ML
0.5 INJECTION, SOLUTION INTRAMUSCULAR; INTRAVENOUS; SUBCUTANEOUS
Status: COMPLETED | OUTPATIENT
Start: 2020-09-06 | End: 2020-09-06

## 2020-09-06 RX ORDER — ONDANSETRON 2 MG/ML
4 INJECTION INTRAMUSCULAR; INTRAVENOUS
Status: COMPLETED | OUTPATIENT
Start: 2020-09-06 | End: 2020-09-06

## 2020-09-06 RX ORDER — HYDROMORPHONE HYDROCHLORIDE 1 MG/ML
1 INJECTION, SOLUTION INTRAMUSCULAR; INTRAVENOUS; SUBCUTANEOUS
Status: COMPLETED | OUTPATIENT
Start: 2020-09-06 | End: 2020-09-06

## 2020-09-06 RX ADMIN — HYDROMORPHONE HYDROCHLORIDE 0.5 MG: 1 INJECTION, SOLUTION INTRAMUSCULAR; INTRAVENOUS; SUBCUTANEOUS at 08:09

## 2020-09-06 RX ADMIN — HYDROMORPHONE HYDROCHLORIDE 0.5 MG: 1 INJECTION, SOLUTION INTRAMUSCULAR; INTRAVENOUS; SUBCUTANEOUS at 09:09

## 2020-09-06 RX ADMIN — HYDROMORPHONE HYDROCHLORIDE 1 MG: 1 INJECTION, SOLUTION INTRAMUSCULAR; INTRAVENOUS; SUBCUTANEOUS at 11:09

## 2020-09-06 RX ADMIN — ONDANSETRON 4 MG: 2 INJECTION INTRAMUSCULAR; INTRAVENOUS at 08:09

## 2020-09-07 ENCOUNTER — ANESTHESIA EVENT (OUTPATIENT)
Dept: SURGERY | Facility: HOSPITAL | Age: 55
End: 2020-09-07
Payer: COMMERCIAL

## 2020-09-07 ENCOUNTER — HOSPITAL ENCOUNTER (OUTPATIENT)
Facility: HOSPITAL | Age: 55
Discharge: HOME | End: 2020-09-10
Attending: EMERGENCY MEDICINE | Admitting: ORTHOPAEDIC SURGERY
Payer: COMMERCIAL

## 2020-09-07 ENCOUNTER — ANESTHESIA (OUTPATIENT)
Dept: SURGERY | Facility: HOSPITAL | Age: 55
End: 2020-09-07
Payer: COMMERCIAL

## 2020-09-07 DIAGNOSIS — K92.2 GASTROINTESTINAL HEMORRHAGE, UNSPECIFIED GASTROINTESTINAL HEMORRHAGE TYPE: ICD-10-CM

## 2020-09-07 DIAGNOSIS — I10 ESSENTIAL HYPERTENSION: ICD-10-CM

## 2020-09-07 DIAGNOSIS — S82.142A CLOSED FRACTURE OF LEFT TIBIAL PLATEAU, INITIAL ENCOUNTER: Primary | ICD-10-CM

## 2020-09-07 DIAGNOSIS — S82.62XA CLOSED DISPLACED FRACTURE OF LATERAL MALLEOLUS OF LEFT FIBULA, INITIAL ENCOUNTER: ICD-10-CM

## 2020-09-07 DIAGNOSIS — S82.872A: ICD-10-CM

## 2020-09-07 DIAGNOSIS — R07.9 CHEST PAIN, UNSPECIFIED TYPE: ICD-10-CM

## 2020-09-07 DIAGNOSIS — R53.1 WEAKNESS: ICD-10-CM

## 2020-09-07 DIAGNOSIS — S82.302A CLOSED FRACTURE OF DISTAL END OF LEFT TIBIA, UNSPECIFIED FRACTURE MORPHOLOGY, INITIAL ENCOUNTER: ICD-10-CM

## 2020-09-07 DIAGNOSIS — I47.21 TORSADES DE POINTES: ICD-10-CM

## 2020-09-07 LAB
ABO + RH BLD: NORMAL
BLD GP AB SCN CELLS X3 SERPL QL: NORMAL
ESTIMATED AVG GLUCOSE: 100 MG/DL (ref 68–131)
HBA1C MFR BLD HPLC: 5.1 % (ref 4–5.6)
MAGNESIUM SERPL-MCNC: 1.8 MG/DL (ref 1.6–2.6)
PHOSPHATE SERPL-MCNC: 2.5 MG/DL (ref 2.7–4.5)
PREALB SERPL-MCNC: 25 MG/DL (ref 20–43)
TRANSFERRIN SERPL-MCNC: 222 MG/DL (ref 200–375)

## 2020-09-07 PROCEDURE — 99285 EMERGENCY DEPT VISIT HI MDM: CPT | Mod: ,,, | Performed by: EMERGENCY MEDICINE

## 2020-09-07 PROCEDURE — C1713 ANCHOR/SCREW BN/BN,TIS/BN: HCPCS | Performed by: ORTHOPAEDIC SURGERY

## 2020-09-07 PROCEDURE — 94761 N-INVAS EAR/PLS OXIMETRY MLT: CPT

## 2020-09-07 PROCEDURE — 84466 ASSAY OF TRANSFERRIN: CPT

## 2020-09-07 PROCEDURE — 25000003 PHARM REV CODE 250: Performed by: STUDENT IN AN ORGANIZED HEALTH CARE EDUCATION/TRAINING PROGRAM

## 2020-09-07 PROCEDURE — 63600175 PHARM REV CODE 636 W HCPCS: Performed by: STUDENT IN AN ORGANIZED HEALTH CARE EDUCATION/TRAINING PROGRAM

## 2020-09-07 PROCEDURE — 27825 PR CLOSED RX WEIGHT BEAR DIST TIB,MANIP: ICD-10-PCS | Mod: 51,LT,, | Performed by: ORTHOPAEDIC SURGERY

## 2020-09-07 PROCEDURE — 71000033 HC RECOVERY, INTIAL HOUR: Performed by: ORTHOPAEDIC SURGERY

## 2020-09-07 PROCEDURE — 37000008 HC ANESTHESIA 1ST 15 MINUTES: Performed by: ORTHOPAEDIC SURGERY

## 2020-09-07 PROCEDURE — 96372 THER/PROPH/DIAG INJ SC/IM: CPT | Mod: 59

## 2020-09-07 PROCEDURE — 36415 COLL VENOUS BLD VENIPUNCTURE: CPT

## 2020-09-07 PROCEDURE — 63600175 PHARM REV CODE 636 W HCPCS: Performed by: NURSE ANESTHETIST, CERTIFIED REGISTERED

## 2020-09-07 PROCEDURE — 71000039 HC RECOVERY, EACH ADD'L HOUR: Performed by: ORTHOPAEDIC SURGERY

## 2020-09-07 PROCEDURE — 25000003 PHARM REV CODE 250: Performed by: NURSE ANESTHETIST, CERTIFIED REGISTERED

## 2020-09-07 PROCEDURE — 86901 BLOOD TYPING SEROLOGIC RH(D): CPT

## 2020-09-07 PROCEDURE — 99285 PR EMERGENCY DEPT VISIT,LEVEL V: ICD-10-PCS | Mod: ,,, | Performed by: EMERGENCY MEDICINE

## 2020-09-07 PROCEDURE — 63600175 PHARM REV CODE 636 W HCPCS

## 2020-09-07 PROCEDURE — 83036 HEMOGLOBIN GLYCOSYLATED A1C: CPT

## 2020-09-07 PROCEDURE — 99285 EMERGENCY DEPT VISIT HI MDM: CPT | Mod: 25

## 2020-09-07 PROCEDURE — S0028 INJECTION, FAMOTIDINE, 20 MG: HCPCS | Performed by: NURSE ANESTHETIST, CERTIFIED REGISTERED

## 2020-09-07 PROCEDURE — G0378 HOSPITAL OBSERVATION PER HR: HCPCS

## 2020-09-07 PROCEDURE — 84100 ASSAY OF PHOSPHORUS: CPT

## 2020-09-07 PROCEDURE — 84134 ASSAY OF PREALBUMIN: CPT

## 2020-09-07 PROCEDURE — 27825 TREAT LOWER LEG FRACTURE: CPT | Mod: 51,LT,, | Performed by: ORTHOPAEDIC SURGERY

## 2020-09-07 PROCEDURE — 96375 TX/PRO/DX INJ NEW DRUG ADDON: CPT | Mod: 59

## 2020-09-07 PROCEDURE — 29515 APPLICATION SHORT LEG SPLINT: CPT | Mod: LT

## 2020-09-07 PROCEDURE — 96374 THER/PROPH/DIAG INJ IV PUSH: CPT

## 2020-09-07 PROCEDURE — 37000009 HC ANESTHESIA EA ADD 15 MINS: Performed by: ORTHOPAEDIC SURGERY

## 2020-09-07 PROCEDURE — 96376 TX/PRO/DX INJ SAME DRUG ADON: CPT | Mod: 59

## 2020-09-07 PROCEDURE — 83735 ASSAY OF MAGNESIUM: CPT

## 2020-09-07 PROCEDURE — D9220A PRA ANESTHESIA: Mod: ANES,,, | Performed by: STUDENT IN AN ORGANIZED HEALTH CARE EDUCATION/TRAINING PROGRAM

## 2020-09-07 PROCEDURE — 27201423 OPTIME MED/SURG SUP & DEVICES STERILE SUPPLY: Performed by: ORTHOPAEDIC SURGERY

## 2020-09-07 PROCEDURE — 36000710: Performed by: ORTHOPAEDIC SURGERY

## 2020-09-07 PROCEDURE — D9220A PRA ANESTHESIA: ICD-10-PCS | Mod: CRNA,,, | Performed by: NURSE ANESTHETIST, CERTIFIED REGISTERED

## 2020-09-07 PROCEDURE — D9220A PRA ANESTHESIA: Mod: CRNA,,, | Performed by: NURSE ANESTHETIST, CERTIFIED REGISTERED

## 2020-09-07 PROCEDURE — 20690 PR APPLY BONE UNIPLANE,EXT FIX DEV: ICD-10-PCS | Mod: LT,,, | Performed by: ORTHOPAEDIC SURGERY

## 2020-09-07 PROCEDURE — 36000711: Performed by: ORTHOPAEDIC SURGERY

## 2020-09-07 PROCEDURE — D9220A PRA ANESTHESIA: ICD-10-PCS | Mod: ANES,,, | Performed by: STUDENT IN AN ORGANIZED HEALTH CARE EDUCATION/TRAINING PROGRAM

## 2020-09-07 PROCEDURE — 20690 APPL UNIPLN UNI EXT FIXJ SYS: CPT | Mod: LT,,, | Performed by: ORTHOPAEDIC SURGERY

## 2020-09-07 DEVICE — PIN TRNSFIXTN APEX 5/6X40X300M: Type: IMPLANTABLE DEVICE | Site: TIBIA | Status: FUNCTIONAL

## 2020-09-07 DEVICE — CLAMP PIN EXT FIXATOR 4/5/6MM: Type: IMPLANTABLE DEVICE | Site: TIBIA | Status: FUNCTIONAL

## 2020-09-07 DEVICE — PIN 40MM CONT THRD APEX 5X150: Type: IMPLANTABLE DEVICE | Site: TIBIA | Status: FUNCTIONAL

## 2020-09-07 RX ORDER — HYDROMORPHONE HYDROCHLORIDE 1 MG/ML
INJECTION, SOLUTION INTRAMUSCULAR; INTRAVENOUS; SUBCUTANEOUS
Status: COMPLETED
Start: 2020-09-07 | End: 2020-09-07

## 2020-09-07 RX ORDER — MUPIROCIN 20 MG/G
OINTMENT TOPICAL 2 TIMES DAILY
Qty: 22 G | Refills: 0 | Status: ON HOLD | OUTPATIENT
Start: 2020-09-07 | End: 2020-09-20 | Stop reason: HOSPADM

## 2020-09-07 RX ORDER — TALC
6 POWDER (GRAM) TOPICAL NIGHTLY PRN
Status: DISCONTINUED | OUTPATIENT
Start: 2020-09-07 | End: 2020-09-10 | Stop reason: HOSPADM

## 2020-09-07 RX ORDER — MUPIROCIN 20 MG/G
OINTMENT TOPICAL 2 TIMES DAILY
Status: DISCONTINUED | OUTPATIENT
Start: 2020-09-07 | End: 2020-09-10 | Stop reason: HOSPADM

## 2020-09-07 RX ORDER — POLYETHYLENE GLYCOL 3350 17 G/17G
17 POWDER, FOR SOLUTION ORAL DAILY
Status: DISCONTINUED | OUTPATIENT
Start: 2020-09-07 | End: 2020-09-10 | Stop reason: HOSPADM

## 2020-09-07 RX ORDER — VALSARTAN 160 MG/1
160 TABLET ORAL 2 TIMES DAILY
Status: DISCONTINUED | OUTPATIENT
Start: 2020-09-09 | End: 2020-09-07

## 2020-09-07 RX ORDER — ROCURONIUM BROMIDE 10 MG/ML
INJECTION, SOLUTION INTRAVENOUS
Status: DISCONTINUED | OUTPATIENT
Start: 2020-09-07 | End: 2020-09-07

## 2020-09-07 RX ORDER — HYDROCHLOROTHIAZIDE 25 MG/1
25 TABLET ORAL DAILY
Status: DISCONTINUED | OUTPATIENT
Start: 2020-09-09 | End: 2020-09-10 | Stop reason: HOSPADM

## 2020-09-07 RX ORDER — OXYCODONE HYDROCHLORIDE 5 MG/1
5 TABLET ORAL
Status: DISCONTINUED | OUTPATIENT
Start: 2020-09-07 | End: 2020-09-07

## 2020-09-07 RX ORDER — SUCCINYLCHOLINE CHLORIDE 20 MG/ML
INJECTION INTRAMUSCULAR; INTRAVENOUS
Status: DISCONTINUED | OUTPATIENT
Start: 2020-09-07 | End: 2020-09-07

## 2020-09-07 RX ORDER — FAMOTIDINE 10 MG/ML
INJECTION INTRAVENOUS
Status: DISCONTINUED | OUTPATIENT
Start: 2020-09-07 | End: 2020-09-07

## 2020-09-07 RX ORDER — MUPIROCIN 20 MG/G
OINTMENT TOPICAL
Status: CANCELLED | OUTPATIENT
Start: 2020-09-07

## 2020-09-07 RX ORDER — CLINDAMYCIN PHOSPHATE 900 MG/50ML
900 INJECTION, SOLUTION INTRAVENOUS
Status: DISCONTINUED | OUTPATIENT
Start: 2020-09-07 | End: 2020-09-07

## 2020-09-07 RX ORDER — HYDROMORPHONE HYDROCHLORIDE 1 MG/ML
INJECTION, SOLUTION INTRAMUSCULAR; INTRAVENOUS; SUBCUTANEOUS
Status: DISCONTINUED
Start: 2020-09-07 | End: 2020-09-07 | Stop reason: WASHOUT

## 2020-09-07 RX ORDER — DEXTROMETHORPHAN HYDROBROMIDE, GUAIFENESIN 5; 100 MG/5ML; MG/5ML
650 LIQUID ORAL EVERY 6 HOURS PRN
Qty: 120 TABLET | Refills: 0 | Status: SHIPPED | OUTPATIENT
Start: 2020-09-07 | End: 2020-12-10 | Stop reason: SDUPTHER

## 2020-09-07 RX ORDER — MORPHINE SULFATE 15 MG/1
15 TABLET ORAL EVERY 4 HOURS PRN
Status: DISCONTINUED | OUTPATIENT
Start: 2020-09-07 | End: 2020-09-09

## 2020-09-07 RX ORDER — VENLAFAXINE HYDROCHLORIDE 75 MG/1
150 CAPSULE, EXTENDED RELEASE ORAL 2 TIMES DAILY
Status: DISCONTINUED | OUTPATIENT
Start: 2020-09-07 | End: 2020-09-07

## 2020-09-07 RX ORDER — IBUPROFEN 400 MG/1
800 TABLET ORAL EVERY 6 HOURS
Status: DISCONTINUED | OUTPATIENT
Start: 2020-09-07 | End: 2020-09-07

## 2020-09-07 RX ORDER — HYDRALAZINE HYDROCHLORIDE 10 MG/1
10 TABLET, FILM COATED ORAL EVERY 6 HOURS PRN
Status: DISCONTINUED | OUTPATIENT
Start: 2020-09-07 | End: 2020-09-10 | Stop reason: HOSPADM

## 2020-09-07 RX ORDER — OXYCODONE HYDROCHLORIDE 5 MG/1
5 TABLET ORAL
Status: COMPLETED | OUTPATIENT
Start: 2020-09-07 | End: 2020-09-07

## 2020-09-07 RX ORDER — ARIPIPRAZOLE 5 MG/1
10 TABLET ORAL DAILY
Status: DISCONTINUED | OUTPATIENT
Start: 2020-09-07 | End: 2020-09-08

## 2020-09-07 RX ORDER — FENTANYL CITRATE 50 UG/ML
INJECTION, SOLUTION INTRAMUSCULAR; INTRAVENOUS
Status: DISCONTINUED | OUTPATIENT
Start: 2020-09-07 | End: 2020-09-07

## 2020-09-07 RX ORDER — TIZANIDINE 2 MG/1
4 TABLET ORAL NIGHTLY
Status: DISCONTINUED | OUTPATIENT
Start: 2020-09-07 | End: 2020-09-10 | Stop reason: HOSPADM

## 2020-09-07 RX ORDER — PHENYLEPHRINE HYDROCHLORIDE 10 MG/ML
INJECTION INTRAVENOUS
Status: DISCONTINUED | OUTPATIENT
Start: 2020-09-07 | End: 2020-09-07

## 2020-09-07 RX ORDER — SODIUM CHLORIDE 9 MG/ML
INJECTION, SOLUTION INTRAVENOUS CONTINUOUS
Status: CANCELLED | OUTPATIENT
Start: 2020-09-07

## 2020-09-07 RX ORDER — VENLAFAXINE HYDROCHLORIDE 75 MG/1
300 CAPSULE, EXTENDED RELEASE ORAL NIGHTLY
Status: DISCONTINUED | OUTPATIENT
Start: 2020-09-07 | End: 2020-09-10 | Stop reason: HOSPADM

## 2020-09-07 RX ORDER — VALSARTAN 160 MG/1
160 TABLET ORAL DAILY
Status: DISCONTINUED | OUTPATIENT
Start: 2020-09-08 | End: 2020-09-08

## 2020-09-07 RX ORDER — DIPHENHYDRAMINE HYDROCHLORIDE 50 MG/ML
INJECTION INTRAMUSCULAR; INTRAVENOUS
Status: DISCONTINUED | OUTPATIENT
Start: 2020-09-07 | End: 2020-09-07

## 2020-09-07 RX ORDER — EPHEDRINE SULFATE 50 MG/ML
INJECTION, SOLUTION INTRAVENOUS
Status: DISCONTINUED | OUTPATIENT
Start: 2020-09-07 | End: 2020-09-07

## 2020-09-07 RX ORDER — ONDANSETRON HYDROCHLORIDE 2 MG/ML
INJECTION, SOLUTION INTRAMUSCULAR; INTRAVENOUS
Status: DISCONTINUED | OUTPATIENT
Start: 2020-09-07 | End: 2020-09-07

## 2020-09-07 RX ORDER — KETAMINE HCL IN 0.9 % NACL 50 MG/5 ML
SYRINGE (ML) INTRAVENOUS
Status: DISCONTINUED | OUTPATIENT
Start: 2020-09-07 | End: 2020-09-07

## 2020-09-07 RX ORDER — MORPHINE SULFATE 4 MG/ML
4 INJECTION, SOLUTION INTRAMUSCULAR; INTRAVENOUS
Status: DISCONTINUED | OUTPATIENT
Start: 2020-09-07 | End: 2020-09-07

## 2020-09-07 RX ORDER — HYDROCODONE BITARTRATE AND ACETAMINOPHEN 5; 325 MG/1; MG/1
1 TABLET ORAL EVERY 4 HOURS PRN
Status: DISCONTINUED | OUTPATIENT
Start: 2020-09-07 | End: 2020-09-07

## 2020-09-07 RX ORDER — ENOXAPARIN SODIUM 100 MG/ML
40 INJECTION SUBCUTANEOUS EVERY 24 HOURS
Status: DISCONTINUED | OUTPATIENT
Start: 2020-09-07 | End: 2020-09-10 | Stop reason: HOSPADM

## 2020-09-07 RX ORDER — CLINDAMYCIN PHOSPHATE 900 MG/50ML
900 INJECTION, SOLUTION INTRAVENOUS
Status: COMPLETED | OUTPATIENT
Start: 2020-09-07 | End: 2020-09-08

## 2020-09-07 RX ORDER — DEXAMETHASONE SODIUM PHOSPHATE 4 MG/ML
INJECTION, SOLUTION INTRA-ARTICULAR; INTRALESIONAL; INTRAMUSCULAR; INTRAVENOUS; SOFT TISSUE
Status: DISCONTINUED | OUTPATIENT
Start: 2020-09-07 | End: 2020-09-07

## 2020-09-07 RX ORDER — AMOXICILLIN 250 MG
1 CAPSULE ORAL 2 TIMES DAILY
Status: DISCONTINUED | OUTPATIENT
Start: 2020-09-07 | End: 2020-09-10 | Stop reason: HOSPADM

## 2020-09-07 RX ORDER — ACETAMINOPHEN 325 MG/1
650 TABLET ORAL EVERY 6 HOURS
Status: DISCONTINUED | OUTPATIENT
Start: 2020-09-07 | End: 2020-09-07

## 2020-09-07 RX ORDER — MIDAZOLAM HYDROCHLORIDE 1 MG/ML
INJECTION INTRAMUSCULAR; INTRAVENOUS
Status: DISCONTINUED | OUTPATIENT
Start: 2020-09-07 | End: 2020-09-07

## 2020-09-07 RX ORDER — SODIUM CHLORIDE 0.9 % (FLUSH) 0.9 %
10 SYRINGE (ML) INJECTION
Status: DISCONTINUED | OUTPATIENT
Start: 2020-09-07 | End: 2020-09-10 | Stop reason: HOSPADM

## 2020-09-07 RX ORDER — CLINDAMYCIN PHOSPHATE 900 MG/50ML
900 INJECTION, SOLUTION INTRAVENOUS
Status: CANCELLED | OUTPATIENT
Start: 2020-09-07

## 2020-09-07 RX ORDER — PROPOFOL 10 MG/ML
VIAL (ML) INTRAVENOUS
Status: DISCONTINUED | OUTPATIENT
Start: 2020-09-07 | End: 2020-09-07

## 2020-09-07 RX ORDER — OXYCODONE HYDROCHLORIDE 5 MG/1
5-10 TABLET ORAL EVERY 6 HOURS PRN
Qty: 31 TABLET | Refills: 0 | Status: SHIPPED | OUTPATIENT
Start: 2020-09-07 | End: 2020-09-14 | Stop reason: SDUPTHER

## 2020-09-07 RX ORDER — SCOLOPAMINE TRANSDERMAL SYSTEM 1 MG/1
PATCH, EXTENDED RELEASE TRANSDERMAL
Status: DISCONTINUED | OUTPATIENT
Start: 2020-09-07 | End: 2020-09-07

## 2020-09-07 RX ORDER — HYDROMORPHONE HYDROCHLORIDE 1 MG/ML
0.2 INJECTION, SOLUTION INTRAMUSCULAR; INTRAVENOUS; SUBCUTANEOUS EVERY 5 MIN PRN
Status: DISCONTINUED | OUTPATIENT
Start: 2020-09-07 | End: 2020-09-07

## 2020-09-07 RX ORDER — ACETAMINOPHEN 500 MG
1000 TABLET ORAL EVERY 8 HOURS
Status: DISCONTINUED | OUTPATIENT
Start: 2020-09-07 | End: 2020-09-10 | Stop reason: HOSPADM

## 2020-09-07 RX ORDER — IPRATROPIUM BROMIDE AND ALBUTEROL SULFATE 2.5; .5 MG/3ML; MG/3ML
3 SOLUTION RESPIRATORY (INHALATION) EVERY 4 HOURS PRN
Status: DISCONTINUED | OUTPATIENT
Start: 2020-09-07 | End: 2020-09-10 | Stop reason: HOSPADM

## 2020-09-07 RX ORDER — TOPIRAMATE 25 MG/1
100 TABLET ORAL 4 TIMES DAILY
Status: DISCONTINUED | OUTPATIENT
Start: 2020-09-07 | End: 2020-09-08

## 2020-09-07 RX ORDER — ACETAMINOPHEN 500 MG
1000 TABLET ORAL
Status: COMPLETED | OUTPATIENT
Start: 2020-09-07 | End: 2020-09-07

## 2020-09-07 RX ORDER — BISACODYL 10 MG
10 SUPPOSITORY, RECTAL RECTAL DAILY PRN
Status: DISCONTINUED | OUTPATIENT
Start: 2020-09-07 | End: 2020-09-10 | Stop reason: HOSPADM

## 2020-09-07 RX ORDER — PREGABALIN 150 MG/1
150 CAPSULE ORAL NIGHTLY
Status: DISCONTINUED | OUTPATIENT
Start: 2020-09-07 | End: 2020-09-10 | Stop reason: HOSPADM

## 2020-09-07 RX ORDER — HYDROMORPHONE HYDROCHLORIDE 1 MG/ML
0.5 INJECTION, SOLUTION INTRAMUSCULAR; INTRAVENOUS; SUBCUTANEOUS
Status: DISCONTINUED | OUTPATIENT
Start: 2020-09-07 | End: 2020-09-09

## 2020-09-07 RX ORDER — DOCUSATE SODIUM 100 MG/1
100 CAPSULE, LIQUID FILLED ORAL 2 TIMES DAILY
Qty: 30 CAPSULE | Refills: 0 | Status: SHIPPED | OUTPATIENT
Start: 2020-09-07 | End: 2020-10-01 | Stop reason: SDUPTHER

## 2020-09-07 RX ORDER — LIDOCAINE HCL/PF 100 MG/5ML
SYRINGE (ML) INTRAVENOUS
Status: DISCONTINUED | OUTPATIENT
Start: 2020-09-07 | End: 2020-09-07

## 2020-09-07 RX ORDER — CEFAZOLIN SODIUM 1 G/3ML
2 INJECTION, POWDER, FOR SOLUTION INTRAMUSCULAR; INTRAVENOUS
Status: CANCELLED | OUTPATIENT
Start: 2020-09-07

## 2020-09-07 RX ORDER — LEVOTHYROXINE SODIUM 88 UG/1
88 TABLET ORAL DAILY
Status: DISCONTINUED | OUTPATIENT
Start: 2020-09-07 | End: 2020-09-08

## 2020-09-07 RX ORDER — ONDANSETRON 8 MG/1
8 TABLET, ORALLY DISINTEGRATING ORAL EVERY 8 HOURS PRN
Status: CANCELLED | OUTPATIENT
Start: 2020-09-07

## 2020-09-07 RX ORDER — HYDROMORPHONE HYDROCHLORIDE 1 MG/ML
0.5 INJECTION, SOLUTION INTRAMUSCULAR; INTRAVENOUS; SUBCUTANEOUS
Status: COMPLETED | OUTPATIENT
Start: 2020-09-07 | End: 2020-09-07

## 2020-09-07 RX ORDER — VENLAFAXINE HYDROCHLORIDE 75 MG/1
300 CAPSULE, EXTENDED RELEASE ORAL 2 TIMES DAILY
Status: DISCONTINUED | OUTPATIENT
Start: 2020-09-08 | End: 2020-09-07

## 2020-09-07 RX ADMIN — DOCUSATE SODIUM 50MG AND SENNOSIDES 8.6MG 1 TABLET: 8.6; 5 TABLET, FILM COATED ORAL at 09:09

## 2020-09-07 RX ADMIN — SODIUM CHLORIDE, SODIUM GLUCONATE, SODIUM ACETATE, POTASSIUM CHLORIDE, MAGNESIUM CHLORIDE, SODIUM PHOSPHATE, DIBASIC, AND POTASSIUM PHOSPHATE: .53; .5; .37; .037; .03; .012; .00082 INJECTION, SOLUTION INTRAVENOUS at 10:09

## 2020-09-07 RX ADMIN — MORPHINE SULFATE 15 MG: 15 TABLET ORAL at 09:09

## 2020-09-07 RX ADMIN — MUPIROCIN: 20 OINTMENT TOPICAL at 09:09

## 2020-09-07 RX ADMIN — HYDROMORPHONE HYDROCHLORIDE 0.2 MG: 1 INJECTION, SOLUTION INTRAMUSCULAR; INTRAVENOUS; SUBCUTANEOUS at 07:09

## 2020-09-07 RX ADMIN — POLYETHYLENE GLYCOL 3350 17 G: 17 POWDER, FOR SOLUTION ORAL at 12:09

## 2020-09-07 RX ADMIN — ACETAMINOPHEN 1000 MG: 500 TABLET ORAL at 09:09

## 2020-09-07 RX ADMIN — HYDROCODONE BITARTRATE AND ACETAMINOPHEN 1 TABLET: 5; 325 TABLET ORAL at 08:09

## 2020-09-07 RX ADMIN — ROCURONIUM BROMIDE 40 MG: 10 INJECTION, SOLUTION INTRAVENOUS at 10:09

## 2020-09-07 RX ADMIN — DOCUSATE SODIUM 50MG AND SENNOSIDES 8.6MG 1 TABLET: 8.6; 5 TABLET, FILM COATED ORAL at 12:09

## 2020-09-07 RX ADMIN — SUCCINYLCHOLINE CHLORIDE 100 MG: 20 INJECTION, SOLUTION INTRAMUSCULAR; INTRAVENOUS at 10:09

## 2020-09-07 RX ADMIN — HYDROMORPHONE HYDROCHLORIDE 0.2 MG: 1 INJECTION, SOLUTION INTRAMUSCULAR; INTRAVENOUS; SUBCUTANEOUS at 12:09

## 2020-09-07 RX ADMIN — EPHEDRINE SULFATE 5 MG: 50 INJECTION INTRAVENOUS at 10:09

## 2020-09-07 RX ADMIN — TIZANIDINE 4 MG: 2 TABLET ORAL at 09:09

## 2020-09-07 RX ADMIN — LIDOCAINE HYDROCHLORIDE 100 MG: 20 INJECTION, SOLUTION INTRAVENOUS at 10:09

## 2020-09-07 RX ADMIN — DEXAMETHASONE SODIUM PHOSPHATE 4 MG: 4 INJECTION, SOLUTION INTRAMUSCULAR; INTRAVENOUS at 11:09

## 2020-09-07 RX ADMIN — TOPIRAMATE 100 MG: 25 TABLET, FILM COATED ORAL at 01:09

## 2020-09-07 RX ADMIN — PREGABALIN 150 MG: 150 CAPSULE ORAL at 09:09

## 2020-09-07 RX ADMIN — MORPHINE SULFATE 4 MG: 4 INJECTION INTRAVENOUS at 04:09

## 2020-09-07 RX ADMIN — CLINDAMYCIN IN 5 PERCENT DEXTROSE 900 MG: 18 INJECTION, SOLUTION INTRAVENOUS at 01:09

## 2020-09-07 RX ADMIN — PHENYLEPHRINE HYDROCHLORIDE 100 MCG: 10 INJECTION INTRAVENOUS at 10:09

## 2020-09-07 RX ADMIN — OXYCODONE 5 MG: 5 TABLET ORAL at 02:09

## 2020-09-07 RX ADMIN — ARIPIPRAZOLE 10 MG: 5 TABLET ORAL at 01:09

## 2020-09-07 RX ADMIN — ROCURONIUM BROMIDE 10 MG: 10 INJECTION, SOLUTION INTRAVENOUS at 10:09

## 2020-09-07 RX ADMIN — MORPHINE SULFATE 4 MG: 4 INJECTION INTRAVENOUS at 07:09

## 2020-09-07 RX ADMIN — ACETAMINOPHEN 1000 MG: 500 TABLET ORAL at 01:09

## 2020-09-07 RX ADMIN — MUPIROCIN: 20 OINTMENT TOPICAL at 12:09

## 2020-09-07 RX ADMIN — SUGAMMADEX 200 MG: 100 INJECTION, SOLUTION INTRAVENOUS at 11:09

## 2020-09-07 RX ADMIN — HYDROCODONE BITARTRATE AND ACETAMINOPHEN 1 TABLET: 5; 325 TABLET ORAL at 03:09

## 2020-09-07 RX ADMIN — SCOPALAMINE 1 PATCH: 1 PATCH, EXTENDED RELEASE TRANSDERMAL at 10:09

## 2020-09-07 RX ADMIN — MIDAZOLAM HYDROCHLORIDE 2 MG: 1 INJECTION, SOLUTION INTRAMUSCULAR; INTRAVENOUS at 10:09

## 2020-09-07 RX ADMIN — LEVOTHYROXINE SODIUM 88 MCG: 88 TABLET ORAL at 01:09

## 2020-09-07 RX ADMIN — Medication 10 MG: at 11:09

## 2020-09-07 RX ADMIN — TOPIRAMATE 100 MG: 25 TABLET, FILM COATED ORAL at 09:09

## 2020-09-07 RX ADMIN — HYDROMORPHONE HYDROCHLORIDE 0.5 MG: 1 INJECTION, SOLUTION INTRAMUSCULAR; INTRAVENOUS; SUBCUTANEOUS at 01:09

## 2020-09-07 RX ADMIN — Medication 40 MG: at 10:09

## 2020-09-07 RX ADMIN — VENLAFAXINE HYDROCHLORIDE 300 MG: 75 CAPSULE, EXTENDED RELEASE ORAL at 09:09

## 2020-09-07 RX ADMIN — FAMOTIDINE 20 MG: 10 INJECTION INTRAVENOUS at 11:09

## 2020-09-07 RX ADMIN — ENOXAPARIN SODIUM 40 MG: 40 INJECTION SUBCUTANEOUS at 04:09

## 2020-09-07 RX ADMIN — HYDROMORPHONE HYDROCHLORIDE 0.5 MG: 1 INJECTION, SOLUTION INTRAMUSCULAR; INTRAVENOUS; SUBCUTANEOUS at 11:09

## 2020-09-07 RX ADMIN — MORPHINE SULFATE 15 MG: 15 TABLET ORAL at 05:09

## 2020-09-07 RX ADMIN — CLINDAMYCIN IN 5 PERCENT DEXTROSE 900 MG: 18 INJECTION, SOLUTION INTRAVENOUS at 09:09

## 2020-09-07 RX ADMIN — FENTANYL CITRATE 75 MCG: 50 INJECTION, SOLUTION INTRAMUSCULAR; INTRAVENOUS at 10:09

## 2020-09-07 RX ADMIN — VANCOMYCIN HYDROCHLORIDE 1 G: 1 INJECTION, POWDER, LYOPHILIZED, FOR SOLUTION INTRAVENOUS at 10:09

## 2020-09-07 RX ADMIN — FENTANYL CITRATE 25 MCG: 50 INJECTION, SOLUTION INTRAMUSCULAR; INTRAVENOUS at 10:09

## 2020-09-07 RX ADMIN — DIPHENHYDRAMINE HYDROCHLORIDE 12.5 MG: 50 INJECTION INTRAMUSCULAR; INTRAVENOUS at 11:09

## 2020-09-07 RX ADMIN — PROPOFOL 120 MG: 10 INJECTION, EMULSION INTRAVENOUS at 10:09

## 2020-09-07 RX ADMIN — TOPIRAMATE 100 MG: 25 TABLET, FILM COATED ORAL at 04:09

## 2020-09-07 RX ADMIN — ONDANSETRON 4 MG: 2 INJECTION, SOLUTION INTRAMUSCULAR; INTRAVENOUS at 11:09

## 2020-09-07 NOTE — H&P
Patient Name: Faith Bernard  MRN: [id number]  Admission Date: 9/7/2020  Hospital Length of Stay: 0 days  Attending Provider: Lizzette Eldridge, *  Primary Care Provider: Rah Canela MD    Patient information was obtained from patient and ER records.     Inpatient consult to Orthopedic Surgery  Consult performed by: Maureen Foreman MD  Consult ordered by: Cyndi Meyer MD        Subjective:     Principal Problem:Closed fracture of left distal tibia    Chief Complaint:   Chief Complaint   Patient presents with    Leg Pain     Pt swung from rope and landed flat on feet, left tib fib fx. Transfer from [facility]        HPI: 55-year-old female past medical history of sepsis due to P acnes, MRSA, hypertension, migraines, thyroid disorder, arthritis, chronic back pain (pain clinic, chronic opioid use), and medical marijuana use presents as a transfer for evaluation of left P1 fracture.  Patient states she was swinging on a 30 ft high rope swing today when she lost her  and fell down to the ground onto her left ankle.  She reports immediate left ankle pain.  Noted deformity.  Was unable to walk after the incident secondary to pain and deformity.  Denies any other other than rope burn 2 bilateral palms and under her right axilla.  Denies taking blood thinners.    Past Medical History:   Diagnosis Date    Anxiety     Depression     Encounter for blood transfusion     Esophageal dysphagia     Fibromyalgia     Gastric bypass status for obesity     H/O dental abscess 4/14/2014    drained    Headache(784.0)     migraines.  Treated at [facility] (Dr. 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  Pt states took too many then realized her mistake    Polycystic ovaries     S/P JUHI-BSO     Thyroid disease     hypothyroidism       Past Surgical History:   Procedure Laterality Date    ABDOMINAL SURGERY      BREAST SURGERY  2004    Breast Reduction    CARDIAC CATHETERIZATION      COLONOSCOPY N/A 11/3/2015    Procedure: COLONOSCOPY;  Surgeon: Timothy Barber MD;  Location: [facility];  Service: Endoscopy;  Laterality: N/A;    DENTAL SURGERY      4 teeth removed    GASTRIC BYPASS      HERNIA REPAIR      HYSTERECTOMY      OVARIAN CYST REMOVAL      aprox 5 times    ROTATOR CUFF REPAIR Left 01/2019    tennis elbow repair Left 01/2019    UPPER GASTROINTESTINAL ENDOSCOPY         Review of patient's allergies indicates:   Allergen Reactions    Cephalexin      Other reaction(s): Unknown    Codeine Itching     Other reaction(s): Unknown    Nsaids (non-steroidal anti-inflammatory drug)        No current facility-administered medications for this encounter.      Current Outpatient Medications   Medication Sig    aripiprazole (ABILIFY) 10 MG Tab Take 10 mg by mouth once daily.    butalbital-acetaminop-caf-cod -83-30 mg Cap 1-2 capsules 2 (two) times daily as needed (h/a).     cholecalciferol, vitamin D3, 50,000 unit capsule Take 50,000 Units by mouth every 7 days.    CYANOCOBALAMIN, VITAMIN B-12, (VITAMIN B-12 INJ) Inject 1 mL as directed every 30 days.     dextroamphetamine-amphetamine 30 mg Tab TK 1/2 T PO TID    estradiol (ESTRACE) 2 MG tablet     ESTRING 2 mg (7.5 mcg /24 hour) vaginal ring place 2 mg vaginally once    levothyroxine (SYNTHROID) 88 MCG tablet Take 88 mcg by mouth once daily.    oxycodone-acetaminophen (PERCOCET)  mg per tablet Take 1 tablet by mouth every 8 (eight) hours as needed.    PENNSAID 20 mg/gram /actuation(2 %) sopm     rizatriptan (MAXALT-MLT) 10 MG  disintegrating tablet DISSOLVE ONE TABLET BY MOUTH allow TO dissolve ONCE daily AS NEEDED    tizanidine (ZANAFLEX) 4 MG tablet Take 4 mg by mouth every evening. May take up to 12mg qhs    topiramate (TOPAMAX) 200 MG Tab 100 mg 4 (four) times daily.     traZODone (DESYREL) 150 MG tablet     valsartan-hydrochlorothiazide (DIOVAN-HCT) 160-25 mg per tablet Take 1 tablet by mouth once daily.    venlafaxine (EFFEXOR XR) 75 MG 24 hr capsule Take 150 mg by mouth 2 (two) times daily.     VYVANSE 70 mg capsule Take 70 mg by mouth once daily.    XTAMPZA ER 13.5 mg CSpT      Family History     Problem Relation (Age of Onset)    Anxiety disorder Mother    Aortic aneurysm Father    Depression Mother    Drug abuse Mother    Ovarian cancer Mother    Seizures Mother    Suicide Mother        Tobacco Use    Smoking status: Never Smoker    Smokeless tobacco: Never Used   Substance and Sexual Activity    Alcohol use: Yes     Comment: 2 per month    Drug use: Yes     Types: Amphetamines, Barbituates, Benzodiazepines    Sexual activity: Yes     Partners: Male     Birth control/protection: Surgical, None     ROS  Objective:     Vital Signs (Most Recent):  Temp: 98.7 °F (37.1 °C) (09/07/20 0118)  Pulse: 75 (09/07/20 0211)  Resp: 20 (09/07/20 0211)  BP: 106/67 (09/07/20 0211)  SpO2: 99 % (09/07/20 0211) Vital Signs (24h Range):  Temp:  [98.6 °F (37 °C)-98.7 °F (37.1 °C)] 98.7 °F (37.1 °C)  Pulse:  [75-80] 75  Resp:  [16-22] 20  SpO2:  [99 %-100 %] 99 %  BP: (106-156)/(58-96) 106/67           There is no height or weight on file to calculate BMI.    No intake or output data in the 24 hours ending 09/07/20 0217    Ortho/SPM Exam   General:  Patient alert and oriented x4, in no acute distress, vital signs within normal limits     MSK exam:  Bilateral upper extremities are atraumatic aside from superficial blistering to bilateral palms and under the right axilla consistent with weight-bearing.  No tenderness to palpation, no palpable  deformity.  Full painless range of motion.  Motor and sensory intact nerve distributions.  Radial +, fingers warm and well perfused    Right lower extremity:  Normal inspection, no tenderness to palpation, full and painless range of motion, motor and sensory intact in all nerve distributions, 2+dorsalis pedis pulse    Left lower extremity exam.  A short-leg Orthoglass splint is placed splint is removed and the ankle was inspected.  There are no wounds to suggest open fracture.  There is a moderate amount swelling.  Patient has painful range of motion of her ankle but is able to wiggle her toes without pain.  She does wiggle all 5 toes.  She has a 2+ dorsalis pedis pulse.  No pain at the knee or hip      Significant Labs:   BMP:   Recent Labs   Lab 09/06/20 2038   GLU 89      K 4.3   *   CO2 21*   BUN 10   CREATININE 1.0   CALCIUM 9.1     CBC:   Recent Labs   Lab 09/06/20 2038   WBC 8.90   HGB 15.1   HCT 50.7*   *     CMP:   Recent Labs   Lab 09/06/20 2038      K 4.3   *   CO2 21*   GLU 89   BUN 10   CREATININE 1.0   CALCIUM 9.1   PROT 6.9   ALBUMIN 4.0   BILITOT 0.5   ALKPHOS 67   AST 19   ALT 21   ANIONGAP 7*   EGFRNONAA >60.0     All pertinent labs within the past 24 hours have been reviewed.    Significant Imaging: I have reviewed and interpreted all pertinent imaging results/findings.  Radiographs of the left ankle show a comminuted distal tibia fracture with intra-articular involvement    Assessment/Plan:     * Closed fracture of left distal tibia  54yo female with history of sepsis and chronic opioid use for low back pain status post a 30 ft fall with closed, left pilon fracture.  Clinically, the patient is neurovascularly intact with no concern for compartment syndrome.  She has moderate swelling at the ankle.  We removed the Orthoglass splint because she was in dorsiflexion and placed her into a new short-leg splint with better positioning.  Patient was counseled that  surgery is recommended for this type of injury.  We described to her both external fixation as well as definitive fixation techniques, and consented for both. LLE was marked. Will admit to orthopedic surgery service.      Plan:  OR: pending operative fixation of left pilon fracture  Pain control: multimodal  WBS: NWB in short leg splint  DVT ppx: lvx 30mg qhs  PT/OT: to be consulted after surgery  Labs: Hb 15, covid negative  Dispo: pending surgery  F/u: pending          Thank you for your consult. I will follow-up with patient. Please contact us if you have any additional questions.

## 2020-09-07 NOTE — HPI
55-year-old female past medical history of sepsis due to P acnes, MRSA, hypertension, migraines, thyroid disorder, arthritis, chronic back pain (pain clinic, chronic opioid use), and medical marijuana use presents as a transfer for evaluation of left P1 fracture.  Patient states she was swinging on a 30 ft high rope swing today when she lost her  and fell down to the ground onto her left ankle.  She reports immediate left ankle pain.  Noted deformity.  Was unable to walk after the incident secondary to pain and deformity.  Denies any other other than rope burn 2 bilateral palms and under her right axilla.  Denies taking blood thinners.

## 2020-09-07 NOTE — TRANSFER OF CARE
"Anesthesia Transfer of Care Note    Patient: Faith Bernard    Procedure(s) Performed: Procedure(s) (LRB):  APPLICATION, EXTERNAL FIXATION DEVICE, TIBIA (Left)    Patient location: PACU    Anesthesia Type: general    Transport from OR: Transported from OR on 6-10 L/min O2 by face mask with adequate spontaneous ventilation    Post pain: adequate analgesia    Post assessment: no apparent anesthetic complications and tolerated procedure well    Post vital signs: stable    Level of consciousness: awake, alert and oriented    Nausea/Vomiting: no nausea/vomiting    Complications: none    Transfer of care protocol was followed      Last vitals: 09/07/2020 1141  Visit Vitals  /68   Pulse 77   Temp 97.6   Resp 20   Ht 5' 3" (1.6 m)   Wt 59.7 kg (131 lb 10.9 oz)   LMP  (LMP Unknown)   SpO2 100%   BMI 23.33 kg/m²     "

## 2020-09-07 NOTE — FIRST PROVIDER EVALUATION
Medical screening exam completed.  I have conducted a focused provider triage encounter, findings are as follows:    Brief history of present illness:  Fell from a rope swing feel 40 ft feet flat Has left ankle pain and left lower leg pain    There were no vitals filed for this visit.    Pertinent physical exam: left ankle painful and swollen     Brief workup plan:  X-ray and pain management   Preliminary workup initiated; this workup will be continued and followed by the physician or advanced practice provider that is assigned to the patient when roomed.

## 2020-09-07 NOTE — OP NOTE
Operative Note       Surgery Date: 9/7/2020     Surgeon(s) and Role:     * Sujata Londono MD - Primary     * Jonathan Chapman MD - Resident - Assisting     * Elton Pool MD - Resident - Assisting    Pre-op Diagnosis:  Closed fracture of distal end of left tibia, unspecified fracture morphology, initial encounter [S82.302A]    Post-op Diagnosis: Post-Op Diagnosis Codes:     * Closed fracture of distal end of left tibia, unspecified fracture morphology, initial encounter [S82.302A]    Procedure:        1.  Spanning External Fixation of Left Pilon Fracture  2.  Closed Reduction of Left Pilon Fracture    Anesthesia: General    Procedure in Detail/Findings:  DOS:               9/7/20     Preop Dx:          1.  Left pilon fracture  2.  Left distal fibula fracture     Postop Dx:       1.  Left pilon fracture  2.  Left distal fibula fracture     Procedure:      1.  Spanning External Fixation of Left Pilon Fracture                          2.  Closed Reduction of Left Pilon Fracture     Surgeon:         Sujata Londono MD     Asst:                Clayton Cedeño     Anesthesia:     General     EBL:                Minimal     IVF:                  Crystalloid     Implants:         Hopkins ex fix     Specimens:     None     Findings:         Stable alignment     Dispo:              To PACU awake/stable     Indications for Procedure:     55 year old Female sustained left pilon fracture.  Options discussed.  Decision made for ankle spanning ex fix to allow soft tissues to rest prior to definitive fixation.  Risks discussed with patient.  She agreed to proceed.     Procedure in Detail:     Patient was marked in the preoperative holding area.  Patient was wheeled into the operating room and placed on the OR table in supine position.  General anesthesia was induced and preoperative antibiotics were administered. The left lower extremity was prepped sterile.   A timeout was undertaken to confirm patient, side,  site, surgery, surgeon, and administration of preoperative antibiotics.  All agreed, and we proceeded.     Through 2 small stab incisions, 2 drill holes were placed in the tibia proximal to the fractures.  2 half Staci pins were placed.  A small incision was made on the medial heel.  A centrally threaded Staci pin was placed in the calcaneus.  Position was confirmed under fluoroscopy.  The fractures were closed reduced under fluoro, and a medial and lateral bar construct placed to form a Delta frame.  All clamps were tightened.  The pins were covered with Hibiclens soaked scrub sponges.       All instrument and sponge counts were reported correct at the end of the case.  The patient was awakened and taken to the recovery room in stable condition.  There were no complications.   Definitive fixation will be performed when soft tissue swelling resolves.  Aggressive ice and elevation.  NWB LLE.  No indication for DVT PPx at this point.    Estimated Blood Loss: * No values recorded between 9/7/2020 11:00 AM and 9/7/2020 11:36 AM *           Specimens (From admission, onward)    None        Implants:   Implant Name Type Inv. Item Serial No.  Lot No. LRB No. Used Action   PIN TRNSFIXTN APEX [id number] - [id number]  PIN TRNSFIXTN APEX [id number]  [employer] LAM.  Left 1 Implanted   PIN 40MM CONT THRD APEX [id number] - [id number]  PIN 40MM CONT THRD APEX [id number]  MAINE [id number]  Left 2 Implanted   blunt pin 5 x 120      Left 1 Implanted              Disposition: PACU - hemodynamically stable.           Condition: Good    Attestation:  I was present and scrubbed for the key portions of the procedure.           Discharge Note    Admit Date: 9/7/2020    Attending Physician: Sujata Londono MD     Discharge Physician: Sujata Londono MD    Final Diagnosis: Post-Op Diagnosis Codes:     * Closed fracture of distal end of left tibia, unspecified fracture morphology, initial encounter [S82.302A]    Disposition:  Home or Self Care    Patient Instructions:   Current Discharge Medication List      START taking these medications    Details   acetaminophen (TYLENOL) 650 MG TbSR Take 1 tablet (650 mg total) by mouth every 6 (six) hours as needed (pain).  Qty: 120 tablet, Refills: 0      docusate sodium (COLACE) 100 MG capsule Take 1 capsule (100 mg total) by mouth 2 (two) times daily.  Qty: 30 capsule, Refills: 0      mupirocin (BACTROBAN) 2 % ointment Apply by nasal route 2 (two) times daily. for 7 days  Qty: 22 g, Refills: 0      oxyCODONE (ROXICODONE) 5 MG immediate release tablet Take 1-2 tablets (5-10 mg total) by mouth every 6 (six) hours as needed for Pain.  Qty: 31 tablet, Refills: 0    Comments: Quantity prescribed more than 7 day supply? Yes, quantity medically necessary         CONTINUE these medications which have NOT CHANGED    Details   aripiprazole (ABILIFY) 10 MG Tab Take 10 mg by mouth once daily.      cholecalciferol, vitamin D3, 50,000 unit capsule Take 50,000 Units by mouth every 7 days.  Refills: 99      CYANOCOBALAMIN, VITAMIN B-12, (VITAMIN B-12 INJ) Inject 1 mL as directed every 30 days.       dextroamphetamine-amphetamine 30 mg Tab TK 1/2 T PO TID  Refills: 0      ESTRING 2 mg (7.5 mcg /24 hour) vaginal ring place 2 mg vaginally once  Refills: 3      levothyroxine (SYNTHROID) 88 MCG tablet Take 88 mcg by mouth once daily.      PENNSAID 20 mg/gram /actuation(2 %) sopm       rizatriptan (MAXALT-MLT) 10 MG disintegrating tablet DISSOLVE ONE TABLET BY MOUTH allow TO dissolve ONCE daily AS NEEDED  Refills: 2      tizanidine (ZANAFLEX) 4 MG tablet Take 4 mg by mouth every evening. May take up to 12mg qhs  Refills: 4      topiramate (TOPAMAX) 200 MG Tab 100 mg 4 (four) times daily.       valsartan-hydrochlorothiazide (DIOVAN-HCT) 160-25 mg per tablet Take 1 tablet by mouth once daily.      venlafaxine (EFFEXOR XR) 75 MG 24 hr capsule Take 150 mg by mouth 2 (two) times daily.       VYVANSE 70 mg capsule Take 70  "mg by mouth once daily.  Refills: 0         STOP taking these medications       butalbital-acetaminop-caf-cod -89-30 mg Cap Comments:   Reason for Stopping:         estradiol (ESTRACE) 2 MG tablet Comments:   Reason for Stopping:         oxycodone-acetaminophen (PERCOCET)  mg per tablet Comments:   Reason for Stopping:         traZODone (DESYREL) 150 MG tablet Comments:   Reason for Stopping:         XTAMPZA ER 13.5 mg CSpT Comments:   Reason for Stopping:               Discharge Procedure Orders (must include Diet, Follow-up, Activity)   Discharge Procedure Orders (must include Diet, Follow-up, Activity)   COMMODE FOR HOME USE     Order Specific Question Answer Comments   Type: Standard    Height: 5' 3" (1.6 m)    Weight: 59.7 kg (131 lb 10.9 oz)    Length of need (1-99 months): 1      CRUTCHES FOR HOME USE     Order Specific Question Answer Comments   Type: Axillary    Height: 5' 3" (1.6 m)    Weight: 59.7 kg (131 lb 10.9 oz)    Length of need (1-99 months): 99      WHEELCHAIR FOR HOME USE     Order Specific Question Answer Comments   Hours in W/C per day: 24    Type of Wheelchair: Standard    Size(Width): 18"(STD adult)    Leg Support: Elevating leg rests    Arm Height: Detachable    Lap Belt: Velcro    Cushion: Foam    Justification for cushion: Prevent pressure ulcers    Height: 5' 3" (1.6 m)    Weight: 59.7 kg (131 lb 10.9 oz)    Length of need (1-99 months): 99    Please check all that apply: Caregiver is capable and willing to operate wheelchair safely.      WALKER FOR HOME USE     Order Specific Question Answer Comments   Type of Walker: Adult (5'4"-6'6")    With wheels? Yes    Height: 5' 3" (1.6 m)    Weight: 59.7 kg (131 lb 10.9 oz)    Length of need (1-99 months): 1    Please check all that apply: Walker will be used for gait training.      TRANSFER TUB BENCH FOR HOME USE     Order Specific Question Answer Comments   Type of Transfer Tub Bench: Unpadded    Height: 5' 3" (1.6 m)    Weight: " 59.7 kg (131 lb 10.9 oz)    Length of need (1-99 months): 1      Call MD for:  temperature >100.4     Call MD for:  persistent nausea and vomiting or diarrhea     Call MD for:  severe uncontrolled pain     Call MD for:  redness, tenderness, or signs of infection (pain, swelling, redness, odor or green/yellow discharge around incision site)     Call MD for:  difficulty breathing or increased cough     Call MD for:  severe persistent headache     Call MD for:  worsening rash     Call MD for:  persistent dizziness, light-headedness, or visual disturbances     Call MD for:  increased confusion or weakness     Sponge bath only until clinic visit     Keep surgical extremity elevated     Ice to affected area     Change dressing (specify)   Order Comments: PIN SITE CARE TWICE DAILY MIX 50% PEROXIDE WITH 50% NORMAL SALINE AND CLEAN ALL PIN SITES WITH GAUZE SOAKED IN SOLUTION. WRAP PIN SITES WITH DRY KERLEX.     Weight bearing restrictions (specify):   Order Comments: JAYDE HOGAN        Discharge Date: No discharge date for patient encounter.

## 2020-09-07 NOTE — OP NOTE
OPERATIVE NOTE     DOS:               9/7/20     Preop Dx:          1.  Left pilon fracture  2.  Left distal fibula fracture     Postop Dx:       1.  Left pilon fracture  2.  Left distal fibula fracture     Procedure:      1.  Spanning external fixation of left pilon fracture                          2.  Closed treatment of left pilon fracture with manipulation under anesthesia     Surgeon:         Sujata Londono MD     Asst:                Clayton Cedeño     Anesthesia:     General     EBL:                Minimal     IVF:                  Crystalloid     Implants:         Seda ex fix     Specimens:     None     Findings:         Stable alignment     Dispo:              To PACU awake/stable     Indications for Procedure:     55 year old Female sustained left pilon fracture.  Options discussed.  Decision made for ankle spanning ex fix to allow soft tissues to rest prior to definitive fixation.  Risks discussed with patient.  She agreed to proceed.     Procedure in Detail:     Patient was marked in the preoperative holding area.  Patient was wheeled into the operating room and placed on the OR table in supine position.  General anesthesia was induced and preoperative antibiotics were administered. The left lower extremity was prepped sterile.   A timeout was undertaken to confirm patient, side, site, surgery, surgeon, and administration of preoperative antibiotics.  All agreed, and we proceeded.     Through 2 small stab incisions, 2 drill holes were placed in the tibia proximal to the fractures.  2 half Staci pins were placed.  A small incision was made on the medial heel.  A centrally threaded Staci pin was placed in the calcaneus.  Position was confirmed under fluoroscopy.  The fractures were closed reduced under fluoro, and a medial and lateral bar construct placed to form a Delta frame.  All clamps were tightened.  The pins were covered with Hibiclens soaked scrub sponges.       All instrument and  sponge counts were reported correct at the end of the case.  The patient was awakened and taken to the recovery room in stable condition.  There were no complications.   Definitive fixation will be performed when soft tissue swelling resolves.  Aggressive ice and elevation.  NWB LLE.  No indication for DVT PPx at this point.

## 2020-09-07 NOTE — ANESTHESIA POSTPROCEDURE EVALUATION
Anesthesia Post Evaluation    Patient: Faith Bernard    Procedure(s) Performed: Procedure(s) (LRB):  APPLICATION, EXTERNAL FIXATION DEVICE, TIBIA (Left)    Final Anesthesia Type: general    Patient location during evaluation: PACU  Patient participation: Yes- Able to Participate  Level of consciousness: awake and alert, awake and oriented  Post-procedure vital signs: reviewed and stable  Pain management: adequate  Airway patency: patent    PONV status at discharge: No PONV  Anesthetic complications: no      Cardiovascular status: blood pressure returned to baseline, hemodynamically stable and stable  Respiratory status: unassisted, spontaneous ventilation and room air  Hydration status: euvolemic  Follow-up not needed.          Vitals Value Taken Time   /79 09/07/20 1201   Temp 37.2 °C (99 °F) 09/07/20 1141   Pulse 70 09/07/20 1204   Resp 16 09/07/20 1204   SpO2 100 % 09/07/20 1204   Vitals shown include unvalidated device data.      No case tracking events are documented in the log.      Pain/Ricarda Score: Pain Rating Prior to Med Admin: 0 (9/7/2020 11:45 AM)  Pain Rating Post Med Admin: 0 (9/7/2020 11:45 AM)  Ricarda Score: 8 (9/7/2020 11:45 AM)

## 2020-09-07 NOTE — NURSING TRANSFER
Nursing Transfer Note      9/7/2020     Transfer To: [id number]    Transfer via stretcher    Transfer with     Transported by transport    Medicines sent: no    Chart send with patient: Yes    Notified: texted family

## 2020-09-07 NOTE — ANESTHESIA PREPROCEDURE EVALUATION
"                                                                                                             09/07/2020  Faith Bernard is a 55 y.o., female.  Pre-operative evaluation for Procedure(s) (LRB):  APPLICATION, EXTERNAL FIXATION DEVICE, LARGE, TIBIA - large c-arm - bone foam - slider table (Left)    Faith Bernard is a 55 y.o. female   HPI per ortho: " 55-year-old female past medical history of sepsis due to P acnes, MRSA, hypertension, migraines, thyroid disorder, arthritis, chronic back pain (pain clinic, chronic opioid use), and medical marijuana use presents as a transfer for evaluation of left P1 fracture.  Patient states she was swinging on a 30 ft high rope swing today when she lost her  and fell down to the ground onto her left ankle.  She reports immediate left ankle pain.  Noted deformity.  Was unable to walk after the incident secondary to pain and deformity.  Denies any other other than rope burn 2 bilateral palms and under her right axilla.  Denies taking blood thinners."  Patient Active Problem List   Diagnosis    Migraine headache    GI bleed    Overdose    Essential hypertension    Weakness    Torsades de pointes    Anxiety    Ileus    Constipation    Bipolar 1 disorder    Hypothyroidism    Acute hepatitis    Chest pain    Dysphagia    Encounter for well woman exam with routine gynecological exam    Dyspareunia, female    Closed fracture of left distal tibia    Closed left pilon fracture       Review of patient's allergies indicates:   Allergen Reactions    Cephalexin      Other reaction(s): Unknown    Codeine Itching     Other reaction(s): Unknown    Nsaids (non-steroidal anti-inflammatory drug)        No current facility-administered medications on file prior to encounter.      Current Outpatient Medications on File Prior to Encounter   Medication Sig Dispense Refill    aripiprazole (ABILIFY) 10 MG Tab Take 10 mg by mouth once daily.      " butalbital-acetaminop-caf-cod -29-30 mg Cap 1-2 capsules 2 (two) times daily as needed (h/a).   3    cholecalciferol, vitamin D3, 50,000 unit capsule Take 50,000 Units by mouth every 7 days.  99    CYANOCOBALAMIN, VITAMIN B-12, (VITAMIN B-12 INJ) Inject 1 mL as directed every 30 days.       dextroamphetamine-amphetamine 30 mg Tab TK 1/2 T PO TID  0    estradiol (ESTRACE) 2 MG tablet       ESTRING 2 mg (7.5 mcg /24 hour) vaginal ring place 2 mg vaginally once  3    levothyroxine (SYNTHROID) 88 MCG tablet Take 88 mcg by mouth once daily.      oxycodone-acetaminophen (PERCOCET)  mg per tablet Take 1 tablet by mouth every 8 (eight) hours as needed.  0    PENNSAID 20 mg/gram /actuation(2 %) sopm       rizatriptan (MAXALT-MLT) 10 MG disintegrating tablet DISSOLVE ONE TABLET BY MOUTH allow TO dissolve ONCE daily AS NEEDED  2    tizanidine (ZANAFLEX) 4 MG tablet Take 4 mg by mouth every evening. May take up to 12mg qhs  4    topiramate (TOPAMAX) 200 MG Tab 100 mg 4 (four) times daily.       traZODone (DESYREL) 150 MG tablet       valsartan-hydrochlorothiazide (DIOVAN-HCT) 160-25 mg per tablet Take 1 tablet by mouth once daily.      venlafaxine (EFFEXOR XR) 75 MG 24 hr capsule Take 150 mg by mouth 2 (two) times daily.       VYVANSE 70 mg capsule Take 70 mg by mouth once daily.  0    XTAMPZA ER 13.5 mg CSpT          Past Surgical History:   Procedure Laterality Date    ABDOMINAL SURGERY      BREAST SURGERY  2004    Breast Reduction    CARDIAC CATHETERIZATION      COLONOSCOPY N/A 11/3/2015    Procedure: COLONOSCOPY;  Surgeon: Timothy Barber MD;  Location: [facility];  Service: Endoscopy;  Laterality: N/A;    DENTAL SURGERY      4 teeth removed    GASTRIC BYPASS      HERNIA REPAIR      HYSTERECTOMY      OVARIAN CYST REMOVAL      aprox 5 times    ROTATOR CUFF REPAIR Left 01/2019    tennis elbow repair Left 01/2019    UPPER GASTROINTESTINAL ENDOSCOPY         Social History     Socioeconomic  History    Marital status:      Spouse name: Not on file    Number of children: Not on file    Years of education: Not on file    Highest education level: Not on file   Occupational History    Not on file   Social Needs    Financial resource strain: Not on file    Food insecurity     Worry: Not on file     Inability: Not on file    Transportation needs     Medical: Not on file     Non-medical: Not on file   Tobacco Use    Smoking status: Never Smoker    Smokeless tobacco: Never Used   Substance and Sexual Activity    Alcohol use: Yes     Comment: 2 per month    Drug use: Yes     Types: Amphetamines, Barbituates, Benzodiazepines    Sexual activity: Yes     Partners: Male     Birth control/protection: Surgical, None   Lifestyle    Physical activity     Days per week: Not on file     Minutes per session: Not on file    Stress: Not on file   Relationships    Social connections     Talks on phone: Not on file     Gets together: Not on file     Attends [employer] service: Not on file     Active member of club or organization: Not on file     Attends meetings of clubs or organizations: Not on file     Relationship status: Not on file   Other Topics Concern    Not on file   Social History Narrative    Not on file         CBC:   Recent Labs     20   WBC 8.90   RBC 4.76   HGB 15.1   HCT 50.7*   *   *   MCH 31.7*   MCHC 29.8*       CMP:   Recent Labs     20  0504     --    K 4.3  --    *  --    CO2 21*  --    BUN 10  --    CREATININE 1.0  --    GLU 89  --    MG  --  1.8   PHOS  --  2.5*   CALCIUM 9.1  --    ALBUMIN 4.0  --    PROT 6.9  --    ALKPHOS 67  --    ALT 21  --    AST 19  --    BILITOT 0.5  --        INR  Recent Labs     20   INR 1.5           Diagnostic Studies:      EKD Echo:  Results for orders placed or performed during the hospital encounter of 06/19/15   2D echo with color flow doppler   Result Value Ref Range     QEF 55 55 - 65    Mitral Valve Regurgitation TRIVIAL          Anesthesia Evaluation    I have reviewed the Patient Summary Reports.    I have reviewed the Nursing Notes. I have reviewed the NPO Status.   I have reviewed the Medications.     Review of Systems  Cardiovascular:   Hypertension    Hepatic/GI:   Liver Disease, Hepatitis    Musculoskeletal:   Arthritis     Neurological:   Neuromuscular Disease, Headaches    Endocrine:   Hypothyroidism    Psych:   Psychiatric History          Physical Exam  General:  Well nourished    Airway/Jaw/Neck:  Airway Findings: Mouth Opening: Normal General Airway Assessment: Adult  Mallampati: II  TM Distance: Normal, at least 6 cm            Mental Status:  Mental Status Findings:  Cooperative, Alert and Oriented         Anesthesia Plan  Type of Anesthesia, risks & benefits discussed:  Anesthesia Type:  general  Patient's Preference:   Intra-op Monitoring Plan:   Intra-op Monitoring Plan Comments:   Post Op Pain Control Plan: multimodal analgesia  Post Op Pain Control Plan Comments:   Induction:   IV  Beta Blocker:  Patient is not currently on a Beta-Blocker (No further documentation required).       Informed Consent: Patient understands risks and agrees with Anesthesia plan.  Questions answered. Anesthesia consent signed with patient.  ASA Score: 3     Day of Surgery Review of History & Physical: I have interviewed and examined the patient. I have reviewed the patient's H&P dated:            Ready For Surgery From Anesthesia Perspective.

## 2020-09-07 NOTE — SUBJECTIVE & OBJECTIVE
Past Medical History:   Diagnosis Date    Anxiety     Depression     Encounter for blood transfusion     Esophageal dysphagia     Fibromyalgia     Gastric bypass status for obesity     H/O dental abscess 4/14/2014    drained    Headache(784.0)     migraines.  Treated at [facility] (Dr. 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  Pt states took too many then realized her mistake    Polycystic ovaries     S/P JUHI-BSO     Thyroid disease     hypothyroidism       Past Surgical History:   Procedure Laterality Date    ABDOMINAL SURGERY      BREAST SURGERY  2004    Breast Reduction    CARDIAC CATHETERIZATION      COLONOSCOPY N/A 11/3/2015    Procedure: COLONOSCOPY;  Surgeon: Timothy Barber MD;  Location: [facility];  Service: Endoscopy;  Laterality: N/A;    DENTAL SURGERY      4 teeth removed    GASTRIC BYPASS      HERNIA REPAIR      HYSTERECTOMY      OVARIAN CYST REMOVAL      aprox 5 times    ROTATOR CUFF REPAIR Left 01/2019    tennis elbow repair Left 01/2019    UPPER GASTROINTESTINAL ENDOSCOPY         Review of patient's allergies indicates:   Allergen Reactions    Cephalexin      Other reaction(s): Unknown    Codeine Itching     Other reaction(s): Unknown    Nsaids (non-steroidal anti-inflammatory drug)        No current facility-administered medications for this encounter.      Current Outpatient Medications   Medication Sig    aripiprazole (ABILIFY) 10 MG Tab Take 10 mg by mouth once daily.    butalbital-acetaminop-caf-cod -84-30 mg Cap 1-2 capsules 2 (two) times  daily as needed (h/a).     cholecalciferol, vitamin D3, 50,000 unit capsule Take 50,000 Units by mouth every 7 days.    CYANOCOBALAMIN, VITAMIN B-12, (VITAMIN B-12 INJ) Inject 1 mL as directed every 30 days.     dextroamphetamine-amphetamine 30 mg Tab TK 1/2 T PO TID    estradiol (ESTRACE) 2 MG tablet     ESTRING 2 mg (7.5 mcg /24 hour) vaginal ring place 2 mg vaginally once    levothyroxine (SYNTHROID) 88 MCG tablet Take 88 mcg by mouth once daily.    oxycodone-acetaminophen (PERCOCET)  mg per tablet Take 1 tablet by mouth every 8 (eight) hours as needed.    PENNSAID 20 mg/gram /actuation(2 %) sopm     rizatriptan (MAXALT-MLT) 10 MG disintegrating tablet DISSOLVE ONE TABLET BY MOUTH allow TO dissolve ONCE daily AS NEEDED    tizanidine (ZANAFLEX) 4 MG tablet Take 4 mg by mouth every evening. May take up to 12mg qhs    topiramate (TOPAMAX) 200 MG Tab 100 mg 4 (four) times daily.     traZODone (DESYREL) 150 MG tablet     valsartan-hydrochlorothiazide (DIOVAN-HCT) 160-25 mg per tablet Take 1 tablet by mouth once daily.    venlafaxine (EFFEXOR XR) 75 MG 24 hr capsule Take 150 mg by mouth 2 (two) times daily.     VYVANSE 70 mg capsule Take 70 mg by mouth once daily.    XTAMPZA ER 13.5 mg CSpT      Family History     Problem Relation (Age of Onset)    Anxiety disorder Mother    Aortic aneurysm Father    Depression Mother    Drug abuse Mother    Ovarian cancer Mother    Seizures Mother    Suicide Mother        Tobacco Use    Smoking status: Never Smoker    Smokeless tobacco: Never Used   Substance and Sexual Activity    Alcohol use: Yes     Comment: 2 per month    Drug use: Yes     Types: Amphetamines, Barbituates, Benzodiazepines    Sexual activity: Yes     Partners: Male     Birth control/protection: Surgical, None     ROS  Objective:     Vital Signs (Most Recent):  Temp: 98.7 °F (37.1 °C) (09/07/20 0118)  Pulse: 75 (09/07/20 0211)  Resp: 20 (09/07/20 0211)  BP: 106/67 (09/07/20 0211)  SpO2: 99  % (09/07/20 0211) Vital Signs (24h Range):  Temp:  [98.6 °F (37 °C)-98.7 °F (37.1 °C)] 98.7 °F (37.1 °C)  Pulse:  [75-80] 75  Resp:  [16-22] 20  SpO2:  [99 %-100 %] 99 %  BP: (106-156)/(58-96) 106/67           There is no height or weight on file to calculate BMI.    No intake or output data in the 24 hours ending 09/07/20 0217    Ortho/SPM Exam   General:  Patient alert and oriented x4, in no acute distress, vital signs within normal limits     MSK exam:  Bilateral upper extremities are atraumatic aside from superficial blistering to bilateral palms and under the right axilla consistent with weight-bearing.  No tenderness to palpation, no palpable deformity.  Full painless range of motion.  Motor and sensory intact nerve distributions.  Radial +, fingers warm and well perfused    Right lower extremity:  Normal inspection, no tenderness to palpation, full and painless range of motion, motor and sensory intact in all nerve distributions, 2+dorsalis pedis pulse    Left lower extremity exam.  A short-leg Orthoglass splint is placed splint is removed and the ankle was inspected.  There are no wounds to suggest open fracture.  There is a moderate amount swelling.  Patient has painful range of motion of her ankle but is able to wiggle her toes without pain.  She does wiggle all 5 toes.  She has a 2+ dorsalis pedis pulse.  No pain at the knee or hip      Significant Labs:   BMP:   Recent Labs   Lab 09/06/20 2038   GLU 89      K 4.3   *   CO2 21*   BUN 10   CREATININE 1.0   CALCIUM 9.1     CBC:   Recent Labs   Lab 09/06/20 2038   WBC 8.90   HGB 15.1   HCT 50.7*   *     CMP:   Recent Labs   Lab 09/06/20 2038      K 4.3   *   CO2 21*   GLU 89   BUN 10   CREATININE 1.0   CALCIUM 9.1   PROT 6.9   ALBUMIN 4.0   BILITOT 0.5   ALKPHOS 67   AST 19   ALT 21   ANIONGAP 7*   EGFRNONAA >60.0     All pertinent labs within the past 24 hours have been reviewed.    Significant Imaging: I have reviewed and  interpreted all pertinent imaging results/findings.  Radiographs of the left ankle show a comminuted distal tibia fracture with intra-articular involvement

## 2020-09-07 NOTE — ED PROVIDER NOTES
Encounter Date: 9/6/2020       History     Chief Complaint   Patient presents with    Fall     left ankle     55-year-old female reports that she fell off of a rope swing, patient reports she fell approximately 15 ft and landed on her left leg patient has pain to the left leg with obvious deformity, patient reports no other injuries she reports no loss of consciousness.        Review of patient's allergies indicates:   Allergen Reactions    Cephalexin      Other reaction(s): Unknown    Codeine Itching     Other reaction(s): Unknown    Nsaids (non-steroidal anti-inflammatory drug)      Past Medical History:   Diagnosis Date    Anxiety     Depression     Encounter for blood transfusion     Esophageal dysphagia     Fibromyalgia     Gastric bypass status for obesity     H/O dental abscess 4/14/2014    drained    Headache(784.0)     migraines.  Treated at [facility] (Dr. 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  Pt states took too many then realized her mistake    Polycystic ovaries     S/P JUHI-BSO     Thyroid disease     hypothyroidism     Past Surgical History:   Procedure Laterality Date    ABDOMINAL SURGERY      BREAST SURGERY  2004    Breast Reduction    CARDIAC CATHETERIZATION      COLONOSCOPY N/A 11/3/2015    Procedure: COLONOSCOPY;  Surgeon: Timothy Barber MD;  Location: [facility];  Service: Endoscopy;  Laterality: N/A;    DENTAL SURGERY      4 teeth removed    GASTRIC BYPASS      HERNIA REPAIR      HYSTERECTOMY      OVARIAN CYST REMOVAL      aprox 5 times    ROTATOR  CUFF REPAIR Left 01/2019    tennis elbow repair Left 01/2019    UPPER GASTROINTESTINAL ENDOSCOPY       Family History   Problem Relation Age of Onset    Anxiety disorder Mother     Depression Mother     Suicide Mother     Seizures Mother     Drug abuse Mother     Ovarian cancer Mother     Aortic aneurysm Father     Colon cancer Neg Hx     Breast cancer Neg Hx     Diabetes Neg Hx     Hypertension Neg Hx      Social History     Tobacco Use    Smoking status: Never Smoker    Smokeless tobacco: Never Used   Substance Use Topics    Alcohol use: Yes     Comment: 2 per month    Drug use: Yes     Types: Amphetamines, Barbituates, Benzodiazepines     Review of Systems   Constitutional: Negative for fever.   HENT: Negative for congestion, rhinorrhea, sore throat and trouble swallowing.    Eyes: Negative for visual disturbance.   Respiratory: Negative for cough, chest tightness, shortness of breath and wheezing.    Cardiovascular: Negative for chest pain, palpitations and leg swelling.   Gastrointestinal: Negative for abdominal distention, abdominal pain, constipation, diarrhea, nausea and vomiting.   Genitourinary: Negative for difficulty urinating, dysuria, flank pain and frequency.   Musculoskeletal: Negative for back pain, joint swelling and neck pain.        Left lower leg injury   Skin: Negative for color change and rash.   Neurological: Negative for dizziness, syncope, speech difficulty, weakness, numbness and headaches.   All other systems reviewed and are negative.      Physical Exam     Initial Vitals [09/06/20 1925]   BP Pulse Resp Temp SpO2   (!) 156/96 76 16 98.6 °F (37 °C) 99 %      MAP       --         Physical Exam    Nursing note and vitals reviewed.  Constitutional: She appears well-developed and well-nourished. She is not diaphoretic. No distress.   HENT:   Head: Normocephalic and atraumatic.   Right Ear: External ear normal.   Left Ear: External ear normal.   Nose: Nose normal.    Mouth/Throat: Oropharynx is clear and moist. No oropharyngeal exudate.   Eyes: Conjunctivae and EOM are normal. Pupils are equal, round, and reactive to light. Right eye exhibits no discharge. Left eye exhibits no discharge. No scleral icterus.   Neck: Normal range of motion. Neck supple. No thyromegaly present. No tracheal deviation present. No JVD present.   Cardiovascular: Normal rate, regular rhythm, normal heart sounds and intact distal pulses. Exam reveals no gallop and no friction rub.    No murmur heard.  Pulmonary/Chest: Breath sounds normal. No stridor. No respiratory distress. She has no wheezes. She has no rhonchi. She has no rales. She exhibits no tenderness.   Abdominal: Soft. Bowel sounds are normal. She exhibits no distension and no mass. There is no abdominal tenderness. There is no rebound and no guarding.   Musculoskeletal: Normal range of motion. Tenderness present. No edema.      Comments: Obvious tenderness noted to the distal left foot, obvious deformity noted patient has intact dorsalis pedis and posterior tibial pulses to Doppler patient has intact motion of the toes   Lymphadenopathy:     She has no cervical adenopathy.   Neurological: She is alert and oriented to person, place, and time. She has normal strength. She displays normal reflexes. No cranial nerve deficit or sensory deficit.   Skin: Skin is warm and dry. No rash and no abscess noted. No erythema. No pallor.         ED Course   Orthopedic Injury    Date/Time: 9/6/2020 9:04 PM  Performed by: Manfred Solis MD  Authorized by: Manfred Solis MD     Location procedure was performed:  [facility] EMERGENCY DEPARTMENT  Consent Done?:  Not Needed  Injury:     Injury location:  Lower leg    Location details:  Left lower leg    Injury type:  Fracture    Fracture type: tibial plafond      Fracture type: tibial plafond        Pre-procedure assessment:     Neurovascular status: Neurovascularly intact      Distal perfusion: normal       Neurological function: normal      Range of motion: reduced        Selections made in this section will also lock the Injury type section above.:     Manipulation performed?: No      Immobilization:  Splint    Splint type:  Ankle stirrup    Supplies used:  Ortho-Glass  Post-procedure assessment:     Neurovascular status: Neurovascularly intact      Distal perfusion: normal      Neurological function: normal      Range of motion: splinted      Patient tolerance:  Patient tolerated the procedure well with no immediate complications      Labs Reviewed   CBC W/ AUTO DIFFERENTIAL - Abnormal; Notable for the following components:       Result Value    Hematocrit 50.7 (*)     Mean Corpuscular Volume 107 (*)     Mean Corpuscular Hemoglobin 31.7 (*)     Mean Corpuscular Hemoglobin Conc 29.8 (*)     Platelets 102 (*)     All other components within normal limits   COMPREHENSIVE METABOLIC PANEL   PROTIME-INR   SARS-COV-2 RNA AMPLIFICATION, QUAL          Imaging Results          X-Ray Chest AP Portable (In process)                X-Ray Foot Complete Left (Final result)  Result time 09/06/20 19:53:24    Final result by Faith Deras MD (09/06/20 19:53:24)                 Narrative:    PROCEDURE:    XR FOOT COMPLETE 3 VIEW LEFT  dated  9/6/2020 7:49 PM    CLINICAL HISTORY:   Female 55 years of age.   Pain after jumping off  of swing into shallow water.    TECHNIQUE:  3 views left foot    PREVIOUS STUDIES:  Ankle radiography performed around the same time.    FINDINGS:    Bones are normally mineralized. The bones of the foot are intact and  the joint spaces are normal. Alignment is normal. Fracture of the  ankle is reported separately.    IMPRESSION:    Normal foot. (Abnormal ankle, reported separately.)    Electronically Signed by Faith Deras on 9/6/2020 8:24 PM                             X-Ray Ankle Complete Left (Final result)  Result time 09/06/20 19:53:24    Final result by Faith Deras MD (09/06/20 19:53:24)                  Narrative:    PROCEDURE:    XR TIBIA FIBULA 2 VIEW LEFT, XR ANKLE COMPLETE 3 VIEW  LEFT  dated  9/6/2020 7:47 PM    CLINICAL HISTORY:   Female 55 years of age. Pain after jumping from  rope swing.    TECHNIQUE:  2 views left tibia/fibula. 3 views left ankle.    PREVIOUS STUDIES:  None Available    FINDINGS:    There is a comminuted and impacted intra-articular fracture of the  distal tibia. Distal thigh and are ventrally angulated by 40 degrees.  There is transverse fracture of the fibula at the same level which is  at the syndesmosis. The distal fragment is ventrally angulated by 30  degrees. The distal tibial fragments and the fibula fragment remains  normally aligned with the talus. The talus and calcaneus are intact.  Soft tissue is thickened about the ankle.      IMPRESSION:    Comminuted intra-articular angulated fracture of the distal tibia, and  angulated fracture of the distal fibula. No dislocation.    Electronically Signed by Faith Deras on 9/6/2020 8:23 PM                             X-Ray Tibia Fibula 2 View Left (Final result)  Result time 09/06/20 19:52:24    Final result by Faith Deras MD (09/06/20 19:52:24)                 Narrative:    PROCEDURE:    XR TIBIA FIBULA 2 VIEW LEFT, XR ANKLE COMPLETE 3 VIEW  LEFT  dated  9/6/2020 7:47 PM    CLINICAL HISTORY:   Female 55 years of age. Pain after jumping from  rope swing.    TECHNIQUE:  2 views left tibia/fibula. 3 views left ankle.    PREVIOUS STUDIES:  None Available    FINDINGS:    There is a comminuted and impacted intra-articular fracture of the  distal tibia. Distal thigh and are ventrally angulated by 40 degrees.  There is transverse fracture of the fibula at the same level which is  at the syndesmosis. The distal fragment is ventrally angulated by 30  degrees. The distal tibial fragments and the fibula fragment remains  normally aligned with the talus. The talus and calcaneus are intact.  Soft tissue is thickened about the  ankle.      IMPRESSION:    Comminuted intra-articular angulated fracture of the distal tibia, and  angulated fracture of the distal fibula. No dislocation.    Electronically Signed by Faith Deras on 9/6/2020 8:23 PM                               Medical Decision Making:   History:   Old Medical Records: I decided to obtain old medical records.  Initial Assessment:   Emergent evaluation of a 55-year-old female presenting with left ankle deformity, I discussed the case with Dr. David of Orthopedic surgery and he reports that since patient has a Pilon fracture she needs to be transferred to a facility with specialized orthopedic services [place] [facility] [place] facility.  Differential Diagnosis:   CT scan of the ankle done.  Patient's pain treated in the emergency department.  Splint placed.  Extremities neurovascularly intact after splint placement.  Patient transfer to [facility] as accepted by orthopedic surgeon for definitive care  Clinical Tests:   Radiological Study: Reviewed              Attending Attestation:             Attending ED Notes:   I spoke with Dr. [place]o at [facility] for [place] he requests that patient receive a CT of her left lower leg before transport.  Patient will be placed in a splint and will be transferred by ambulance after CT scan.    Patient turned over to Dr Meade at 9pm                        Clinical Impression:       ICD-10-CM ICD-9-CM   1. Closed fracture of left tibial plafond with fibula involvement, initial encounter  S82.872A 824.8    S82.832A    2. Pain  R52 780.96   3. Fall  W19.XXXA E888.9             ED Disposition Condition    Transfer to Another Facility Stable                          Manfred Solis MD  09/06/20 2105       Elisa Lewis MD  09/06/20 2306

## 2020-09-07 NOTE — ED PROVIDER NOTES
Encounter Date: 9/6/2020       History     Chief Complaint   Patient presents with    Leg Pain     Pt swung from rope and landed flat on feet, left tib fib fx. Transfer from [facility]   55yoF h/o anxiety, depression, gastric bypass as a transfer from  for a Left comminuted linear fracture involving the distal tibial plateau, medial malleolus, anterior aspect of the distal tibia as well as nondisplaced linear fracture along the posterior corner of the distal tibia. The patient was swinging on a rope at 30 feet when she slid down the rope and landed on her foot. She noticed that her ankle was deformed. She noted some pain to her fingers from sliding down the rope and some back pain. The pt has chronic back pain and states this is the same as her chronic pain. Denies any other pain. The pt was given dilaudid last at 10pm and is complaining of L leg pain.       Review of patient's allergies indicates:   Allergen Reactions    Cephalexin      Other reaction(s): Unknown    Codeine Itching     Other reaction(s): Unknown    Nsaids (non-steroidal anti-inflammatory drug)      Past Medical History:   Diagnosis Date    Anxiety     Depression     Encounter for blood transfusion     Esophageal dysphagia     Fibromyalgia     Gastric bypass status for obesity     H/O dental abscess 4/14/2014    drained    Headache(784.0)     migraines.  Treated at [facility] (Dr. 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  Pt states took too many then realized her mistake    Polycystic ovaries     S/P JUHI-BSO      Thyroid disease     hypothyroidism     Past Surgical History:   Procedure Laterality Date    ABDOMINAL SURGERY      BREAST SURGERY  2004    Breast Reduction    CARDIAC CATHETERIZATION      COLONOSCOPY N/A 11/3/2015    Procedure: COLONOSCOPY;  Surgeon: Timothy Barber MD;  Location: [facility];  Service: Endoscopy;  Laterality: N/A;    DENTAL SURGERY      4 teeth removed    GASTRIC BYPASS      HERNIA REPAIR      HYSTERECTOMY      OVARIAN CYST REMOVAL      aprox 5 times    ROTATOR CUFF REPAIR Left 01/2019    tennis elbow repair Left 01/2019    UPPER GASTROINTESTINAL ENDOSCOPY       Family History   Problem Relation Age of Onset    Anxiety disorder Mother     Depression Mother     Suicide Mother     Seizures Mother     Drug abuse Mother     Ovarian cancer Mother     Aortic aneurysm Father     Colon cancer Neg Hx     Breast cancer Neg Hx     Diabetes Neg Hx     Hypertension Neg Hx      Social History     Tobacco Use    Smoking status: Never Smoker    Smokeless tobacco: Never Used   Substance Use Topics    Alcohol use: Yes     Comment: 2 per month    Drug use: Yes     Types: Amphetamines, Barbituates, Benzodiazepines     Review of Systems   Constitutional: Negative for chills, fatigue and fever.   HENT: Negative for sore throat.    Respiratory: Negative for shortness of breath.    Cardiovascular: Negative for chest pain.   Gastrointestinal: Negative for nausea.   Genitourinary: Negative for dysuria.   Musculoskeletal: Positive for arthralgias, joint swelling and myalgias. Negative for back pain.   Skin: Negative for rash.   Neurological: Negative for weakness.   Hematological: Does not bruise/bleed easily.       Physical Exam     Initial Vitals [09/07/20 0037]   BP Pulse Resp Temp SpO2   (!) 114/58 75 16 -- 99 %      MAP       --         Physical Exam    Constitutional: She appears well-developed and well-nourished.   Uncomfortable appearing   HENT:   Head: Normocephalic and atraumatic.    Eyes: EOM are normal. Pupils are equal, round, and reactive to light.   Neck: Normal range of motion.   Cardiovascular: Normal rate and normal heart sounds.   No murmur heard.  Pulmonary/Chest: Breath sounds normal. No respiratory distress.   Abdominal: Soft. Bowel sounds are normal. She exhibits no distension. There is no abdominal tenderness.   Musculoskeletal:      Comments: L leg is splinted. She has 2+ DP pulses bilaterally. Temperature of feet are symmetric. She is able to wiggle all of her toes bilaterally. Normal sensation to light touch. Non-tender over the RLE, RUE or LUE. Pt has tenderness to her fingertips on bilateral hands. No deformity or swelling. Pt has no midline spinal tenderness on exam. No step offs.    Neurological: She is alert and oriented to person, place, and time. She has normal strength.   Skin: Skin is warm and dry. Capillary refill takes less than 2 seconds.   Finger tips are wrapped in tegaderm.        MDM PGY4   55yoF presenting with fractures to the LLE after a fall from rope swing.   VS are wnl. On exam the pt has good sensation, neurovascularly intact. Cannot assess cap refill due to black nail polish. No other deformities noted.     Orthopedic surgery consulted. Imaging and labs done at OSH including CT scan.     Ortho to admit.     Multiple doses of pain control given including 2 rounds of dilaudid, tylenol and oxycodone    Cyndi Meyer   Emergency Medicine PGY4  1:44 AM    ED Course   Procedures  Labs Reviewed - No data to display       Imaging Results    None                                          Clinical Impression:       ICD-10-CM ICD-9-CM   1. Closed fracture of left tibial plateau, initial encounter  S82.142A 823.00   2. Closed displaced fracture of lateral malleolus of left fibula, initial encounter  S82.62XA 824.2                                Cyndi Meyer MD  Resident  09/07/20 0145       Cyndi Meyer MD  Resident  09/07/20 0212

## 2020-09-07 NOTE — PLAN OF CARE
Problem: Fall Injury Risk  Goal: Absence of Fall and Fall-Related Injury  Outcome: Ongoing, Progressing     Problem: Adult Inpatient Plan of Care  Goal: Plan of Care Review  Outcome: Ongoing, Progressing     Problem: Adult Inpatient Plan of Care  Goal: Optimal Comfort and Wellbeing  Outcome: Ongoing, Progressing    POC reviewed with pt, pt verbalized understanding. Pt is AAOx4. VSS. No falls or injury noted this shift. Pain being monitored and controlled with PRN and scheduled meds. Frequent rounds made for pt safety and care. Bed in lowest position. Side rails upx2. Call light within reach. Will continue to monitor.

## 2020-09-07 NOTE — ED NOTES
Pt presents to ED after falling 30 ft from swing. [place] and tried to brace her fall. Pt states that she fell flat on both of her feet and scraped the palms of both hands. Pts LLE has moderate swelling, within limited ROM to ankle. Pt able to bare weight on LLE, but causes extreme pain.

## 2020-09-07 NOTE — CONSULTS
[facility]  Orthopedics  Consult Note    Patient Name: Faith Bernard  MRN: [id number]  Admission Date: 9/7/2020  Hospital Length of Stay: 0 days  Attending Provider: Lizzette Eldridge, *  Primary Care Provider: Rah Canela MD    Patient information was obtained from patient and ER records.     Inpatient consult to Orthopedic Surgery  Consult performed by: Maureen Foreman MD  Consult ordered by: Cyndi Meyer MD        Subjective:     Principal Problem:Closed fracture of left distal tibia    Chief Complaint:   Chief Complaint   Patient presents with    Leg Pain     Pt swung from rope and landed flat on feet, left tib fib fx. Transfer from [place]        HPI: 55-year-old female past medical history of sepsis due to P acnes, MRSA, hypertension, migraines, thyroid disorder, arthritis, chronic back pain (pain clinic, chronic opioid use), and medical marijuana use presents as a transfer for evaluation of left P1 fracture.  Patient states she was swinging on a 30 ft high rope swing today when she lost her  and fell down to the ground onto her left ankle.  She reports immediate left ankle pain.  Noted deformity.  Was unable to walk after the incident secondary to pain and deformity.  Denies any other other than rope burn 2 bilateral palms and under her right axilla.  Denies taking blood thinners.    Past Medical History:   Diagnosis Date    Anxiety     Depression     Encounter for blood transfusion     Esophageal dysphagia     Fibromyalgia     Gastric bypass status for obesity     H/O dental abscess 4/14/2014    drained    Headache(784.0)     migraines.  Treated at [facility] (Dr. 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  Pt states took too many then realized her mistake    Polycystic ovaries     S/P JUHI-BSO     Thyroid disease     hypothyroidism       Past Surgical History:   Procedure Laterality Date    ABDOMINAL SURGERY      BREAST SURGERY  2004    Breast Reduction    CARDIAC CATHETERIZATION      COLONOSCOPY N/A 11/3/2015    Procedure: COLONOSCOPY;  Surgeon: Timothy Barber MD;  [facility];  Service: Endoscopy;  Laterality: N/A;    DENTAL SURGERY      4 teeth removed    GASTRIC BYPASS      HERNIA REPAIR      HYSTERECTOMY      OVARIAN CYST REMOVAL      aprox 5 times    ROTATOR CUFF REPAIR Left 01/2019    tennis elbow repair Left 01/2019    UPPER GASTROINTESTINAL ENDOSCOPY         Review of patient's allergies indicates:   Allergen Reactions    Cephalexin      Other reaction(s): Unknown    Codeine Itching     Other reaction(s): Unknown    Nsaids (non-steroidal anti-inflammatory drug)        No current facility-administered medications for this encounter.      Current Outpatient Medications   Medication Sig    aripiprazole (ABILIFY) 10 MG Tab Take 10 mg by mouth once daily.    butalbital-acetaminop-caf-cod -69-30 mg Cap 1-2 capsules 2 (two) times daily as needed (h/a).     cholecalciferol, vitamin D3, 50,000 unit capsule Take 50,000 Units by mouth every 7 days.    CYANOCOBALAMIN, VITAMIN B-12, (VITAMIN B-12 INJ) Inject 1 mL as directed every 30 days.     dextroamphetamine-amphetamine 30 mg Tab TK 1/2 T PO TID    estradiol (ESTRACE) 2 MG tablet     ESTRING 2 mg (7.5 mcg /24 hour) vaginal ring place 2 mg vaginally once    levothyroxine (SYNTHROID) 88 MCG tablet Take 88 mcg by mouth once daily.    oxycodone-acetaminophen (PERCOCET)  mg per tablet Take 1 tablet by mouth every 8 (eight) hours as needed.    PENNSAID 20  mg/gram /actuation(2 %) sopm     rizatriptan (MAXALT-MLT) 10 MG disintegrating tablet DISSOLVE ONE TABLET BY MOUTH allow TO dissolve ONCE daily AS NEEDED    tizanidine (ZANAFLEX) 4 MG tablet Take 4 mg by mouth every evening. May take up to 12mg qhs    topiramate (TOPAMAX) 200 MG Tab 100 mg 4 (four) times daily.     traZODone (DESYREL) 150 MG tablet     valsartan-hydrochlorothiazide (DIOVAN-HCT) 160-25 mg per tablet Take 1 tablet by mouth once daily.    venlafaxine (EFFEXOR XR) 75 MG 24 hr capsule Take 150 mg by mouth 2 (two) times daily.     VYVANSE 70 mg capsule Take 70 mg by mouth once daily.    XTAMPZA ER 13.5 mg CSpT      Family History     Problem Relation (Age of Onset)    Anxiety disorder Mother    Aortic aneurysm Father    Depression Mother    Drug abuse Mother    Ovarian cancer Mother    Seizures Mother    Suicide Mother        Tobacco Use    Smoking status: Never Smoker    Smokeless tobacco: Never Used   Substance and Sexual Activity    Alcohol use: Yes     Comment: 2 per month    Drug use: Yes     Types: Amphetamines, Barbituates, Benzodiazepines    Sexual activity: Yes     Partners: Male     Birth control/protection: Surgical, None     ROS  Objective:     Vital Signs (Most Recent):  Temp: 98.7 °F (37.1 °C) (09/07/20 0118)  Pulse: 75 (09/07/20 0211)  Resp: 20 (09/07/20 0211)  BP: 106/67 (09/07/20 0211)  SpO2: 99 % (09/07/20 0211) Vital Signs (24h Range):  Temp:  [98.6 °F (37 °C)-98.7 °F (37.1 °C)] 98.7 °F (37.1 °C)  Pulse:  [75-80] 75  Resp:  [16-22] 20  SpO2:  [99 %-100 %] 99 %  BP: (106-156)/(58-96) 106/67           There is no height or weight on file to calculate BMI.    No intake or output data in the 24 hours ending 09/07/20 0217    Ortho/SPM Exam   General:  Patient alert and oriented x4, in no acute distress, vital signs within normal limits     MSK exam:  Bilateral upper extremities are atraumatic aside from superficial blistering to bilateral palms and under the right axilla  consistent with weight-bearing.  No tenderness to palpation, no palpable deformity.  Full painless range of motion.  Motor and sensory intact nerve distributions.  Radial +, fingers warm and well perfused    Right lower extremity:  Normal inspection, no tenderness to palpation, full and painless range of motion, motor and sensory intact in all nerve distributions, 2+dorsalis pedis pulse    Left lower extremity exam.  A short-leg Orthoglass splint is placed splint is removed and the ankle was inspected.  There are no wounds to suggest open fracture.  There is a moderate amount swelling.  Patient has painful range of motion of her ankle but is able to wiggle her toes without pain.  She does wiggle all 5 toes.  She has a 2+ dorsalis pedis pulse.  No pain at the knee or hip      Significant Labs:   BMP:   Recent Labs   Lab 09/06/20 2038   GLU 89      K 4.3   *   CO2 21*   BUN 10   CREATININE 1.0   CALCIUM 9.1     CBC:   Recent Labs   Lab 09/06/20 2038   WBC 8.90   HGB 15.1   HCT 50.7*   *     CMP:   Recent Labs   Lab 09/06/20 2038      K 4.3   *   CO2 21*   GLU 89   BUN 10   CREATININE 1.0   CALCIUM 9.1   PROT 6.9   ALBUMIN 4.0   BILITOT 0.5   ALKPHOS 67   AST 19   ALT 21   ANIONGAP 7*   EGFRNONAA >60.0     All pertinent labs within the past 24 hours have been reviewed.    Significant Imaging: I have reviewed and interpreted all pertinent imaging results/findings.  Radiographs of the left ankle show a comminuted distal tibia fracture with intra-articular involvement    Assessment/Plan:     * Closed fracture of left distal tibia  54yo female with history of sepsis and chronic opioid use for low back pain status post a 30 ft fall with closed, left pilon fracture.  Clinically, the patient is neurovascularly intact with no concern for compartment syndrome.  She has moderate swelling at the ankle.  We removed the Orthoglass splint because she was in dorsiflexion and placed her into a new  short-leg splint with better positioning.  Patient was counseled that surgery is recommended for this type of injury.  We described to her both external fixation as well as definitive fixation techniques, and consented for both. LLE was marked. Will admit to orthopedic surgery service.      Plan:  OR: pending operative fixation of left pilon fracture  Pain control: multimodal  WBS: NWB in short leg splint  DVT ppx: lvx 30mg qhs  PT/OT: to be consulted after surgery  Labs: Hb 15, covid negative  Dispo: pending surgery  F/u: pending          Thank you for your consult. I will follow-up with patient. Please contact us if you have any additional questions.    Maureen Foreman MD  Orthopedics  [facility]

## 2020-09-07 NOTE — PLAN OF CARE
[facility]    HOME HEALTH ORDERS  FACE TO FACE ENCOUNTER    Patient Name: Faith Bernard  YOB: 1965    PCP: Rah Canela MD   PCP Address: [address] / [place] [address]  PCP Phone Number: [phone]  PCP Fax: [phone]    Encounter Date: 09/07/2020    Admit to Home Health    Diagnoses:  Active Hospital Problems    Diagnosis  POA    *Closed fracture of left distal tibia [S82.302A]  Yes    Closed left pilon fracture [S82.872A]  Yes      Resolved Hospital Problems   No resolved problems to display.       No future appointments.        I have seen and examined this patient face to face today. My clinical findings that support the need for the home health skilled services and home bound status are the following:  Weakness/numbness causing balance and gait disturbance due to Surgery making it taxing to leave home.    Allergies:  Review of patient's allergies indicates:   Allergen Reactions    Cephalexin      Other reaction(s): Unknown    Codeine Itching     Other reaction(s): Unknown    Nsaids (non-steroidal anti-inflammatory drug)        Diet: regular diet    Activities: NWB LLE    Nursing:   SN to complete comprehensive assessment including routine vital signs. Instruct on disease process and s/s of complications to report to MD. Review/verify medication list sent home with the patient at time of discharge  and instruct patient/caregiver as needed. Frequency may be adjusted depending on start of care date.    Notify MD if SBP > 160 or < 90; DBP > 90 or < 50; HR > 120 or < 50; Temp > 101; Other:         CONSULTS:    Physical Therapy to evaluate and treat. Evaluate for home safety and equipment needs; Establish/upgrade home exercise program. Perform / instruct on therapeutic exercises, gait training, transfer training, and Range of Motion.  Occupational Therapy to evaluate and treat. Evaluate home environment for safety and equipment needs. Perform/Instruct  on transfers, ADL training, ROM, and therapeutic exercises.  Aide to provide assistance with personal care, ADLs, and vital signs.  NWB LLE    MISCELLANEOUS CARE:  Routine Skin for Bedridden Patients: Instruct patient/caregiver to apply moisture barrier cream to all skin folds and wet areas in perineal area daily and after baths and all bowel movements.    WOUND CARE ORDERS    PIN SITE CARE TWICE DAILY MIX 50% PEROXIDE WITH 50% NORMAL SALINE AND CLEAN ALL PIN SITES WITH GAUZE SOAKED IN SOLUTION. WRAP PIN SITES WITH DRY KERLEX.        Medications: Review discharge medications with patient and family and provide education.      Current Discharge Medication List      CONTINUE these medications which have NOT CHANGED    Details   aripiprazole (ABILIFY) 10 MG Tab Take 10 mg by mouth once daily.      cholecalciferol, vitamin D3, 50,000 unit capsule Take 50,000 Units by mouth every 7 days.  Refills: 99      CYANOCOBALAMIN, VITAMIN B-12, (VITAMIN B-12 INJ) Inject 1 mL as directed every 30 days.       dextroamphetamine-amphetamine 30 mg Tab TK 1/2 T PO TID  Refills: 0      ESTRING 2 mg (7.5 mcg /24 hour) vaginal ring place 2 mg vaginally once  Refills: 3      levothyroxine (SYNTHROID) 88 MCG tablet Take 88 mcg by mouth once daily.      PENNSAID 20 mg/gram /actuation(2 %) sopm       rizatriptan (MAXALT-MLT) 10 MG disintegrating tablet DISSOLVE ONE TABLET BY MOUTH allow TO dissolve ONCE daily AS NEEDED  Refills: 2      tizanidine (ZANAFLEX) 4 MG tablet Take 4 mg by mouth every evening. May take up to 12mg qhs  Refills: 4      topiramate (TOPAMAX) 200 MG Tab 100 mg 4 (four) times daily.       traZODone (DESYREL) 150 MG tablet       valsartan-hydrochlorothiazide (DIOVAN-HCT) 160-25 mg per tablet Take 1 tablet by mouth once daily.      venlafaxine (EFFEXOR XR) 75 MG 24 hr capsule Take 150 mg by mouth 2 (two) times daily.       VYVANSE 70 mg capsule Take 70 mg by mouth once daily.  Refills: 0      XTAMPZA ER 13.5 mg CSpT           STOP taking these medications       butalbital-acetaminop-caf-cod -27-30 mg Cap Comments:   Reason for Stopping:         estradiol (ESTRACE) 2 MG tablet Comments:   Reason for Stopping:         oxycodone-acetaminophen (PERCOCET)  mg per tablet Comments:   Reason for Stopping:               I certify that this patient is confined to her home and needs intermittent skilled nursing care, physical therapy and occupational therapy.

## 2020-09-07 NOTE — ASSESSMENT & PLAN NOTE
56yo female with history of sepsis and chronic opioid use for low back pain status post a 30 ft fall with closed, left pilon fracture.  Clinically, the patient is neurovascularly intact with no concern for compartment syndrome.  She has moderate swelling at the ankle.  We removed the Orthoglass splint because she was in dorsiflexion and placed her into a new short-leg splint with better positioning.  Patient was counseled that surgery is recommended for this type of injury.  We described to her both external fixation as well as definitive fixation techniques, and consented for both. LLE was marked. Will admit to orthopedic surgery service.      Plan:  OR: pending operative fixation of left pilon fracture  Pain control: multimodal  WBS: NWB in short leg splint  DVT ppx: lvx 30mg qhs  PT/OT: to be consulted after surgery  Labs: Hb 15, covid negative  Dispo: pending surgery  F/u: pending

## 2020-09-07 NOTE — ANESTHESIA PROCEDURE NOTES
Intubation  Performed by: Claudia Lee CRNA  Authorized by: Ross Ortiz MD     Intubation:     Induction:  Intravenous    Intubated:  Postinduction    Mask Ventilation:  N/a    Attempts:  1    Attempted By:  CRNA    Method of Intubation:  Direct    Blade:  Khan 2    Laryngeal View Grade: Grade IIA - cords partially seen      Difficult Airway Encountered?: No      Complications:  None    Airway Device:  Oral endotracheal tube    Airway Device Size:  7.0    Style/Cuff Inflation:  Cuffed (inflated to minimal occlusive pressure)    Tube secured:  21    Secured at:  The lips    Placement Verified By:  Capnometry    Complicating Factors:  None

## 2020-09-07 NOTE — BRIEF OP NOTE
[facility]  Brief Operative Note    SUMMARY     Surgery Date: 9/7/2020     Surgeon(s) and Role:     * Sujata Londono MD - Primary     * Jonathan Chapman MD - Resident - Assisting     * Elton Pool MD - Resident - Assisting        Pre-op Diagnosis:  Closed fracture of distal end of left tibia, unspecified fracture morphology, initial encounter [S82.302A]    Post-op Diagnosis:  Post-Op Diagnosis Codes:     * Closed fracture of distal end of left tibia, unspecified fracture morphology, initial encounter [S82.302A]    Procedure(s) (LRB):  APPLICATION, EXTERNAL FIXATION DEVICE, TIBIA (Left)    Anesthesia: General    Description of Procedure: see op note        Estimated Blood Loss has been documented.         Specimens:   Specimen (12h ago, onward)    None          [id number]

## 2020-09-08 PROCEDURE — 97165 OT EVAL LOW COMPLEX 30 MIN: CPT

## 2020-09-08 PROCEDURE — G0378 HOSPITAL OBSERVATION PER HR: HCPCS

## 2020-09-08 PROCEDURE — 96372 THER/PROPH/DIAG INJ SC/IM: CPT

## 2020-09-08 PROCEDURE — 94761 N-INVAS EAR/PLS OXIMETRY MLT: CPT

## 2020-09-08 PROCEDURE — 97535 SELF CARE MNGMENT TRAINING: CPT

## 2020-09-08 PROCEDURE — 97161 PT EVAL LOW COMPLEX 20 MIN: CPT

## 2020-09-08 PROCEDURE — 94799 UNLISTED PULMONARY SVC/PX: CPT

## 2020-09-08 PROCEDURE — 97530 THERAPEUTIC ACTIVITIES: CPT

## 2020-09-08 PROCEDURE — 25000003 PHARM REV CODE 250: Performed by: STUDENT IN AN ORGANIZED HEALTH CARE EDUCATION/TRAINING PROGRAM

## 2020-09-08 PROCEDURE — 99900035 HC TECH TIME PER 15 MIN (STAT)

## 2020-09-08 PROCEDURE — 96376 TX/PRO/DX INJ SAME DRUG ADON: CPT

## 2020-09-08 PROCEDURE — 63600175 PHARM REV CODE 636 W HCPCS: Performed by: STUDENT IN AN ORGANIZED HEALTH CARE EDUCATION/TRAINING PROGRAM

## 2020-09-08 RX ORDER — ARIPIPRAZOLE 5 MG/1
15 TABLET ORAL DAILY
Status: DISCONTINUED | OUTPATIENT
Start: 2020-09-08 | End: 2020-09-10 | Stop reason: HOSPADM

## 2020-09-08 RX ORDER — LEVOTHYROXINE SODIUM 100 UG/1
100 TABLET ORAL DAILY
Status: DISCONTINUED | OUTPATIENT
Start: 2020-09-08 | End: 2020-09-10 | Stop reason: HOSPADM

## 2020-09-08 RX ORDER — LEVOTHYROXINE SODIUM 100 UG/1
100 TABLET ORAL DAILY
Status: DISCONTINUED | OUTPATIENT
Start: 2020-09-09 | End: 2020-09-08

## 2020-09-08 RX ORDER — PROPRANOLOL HYDROCHLORIDE 20 MG/1
60 TABLET ORAL 2 TIMES DAILY
Status: DISCONTINUED | OUTPATIENT
Start: 2020-09-08 | End: 2020-09-10 | Stop reason: HOSPADM

## 2020-09-08 RX ORDER — TOPIRAMATE 200 MG/1
400 TABLET ORAL 4 TIMES DAILY
Status: DISCONTINUED | OUTPATIENT
Start: 2020-09-08 | End: 2020-09-08

## 2020-09-08 RX ORDER — TOPIRAMATE 200 MG/1
400 TABLET ORAL DAILY
Status: DISCONTINUED | OUTPATIENT
Start: 2020-09-08 | End: 2020-09-10 | Stop reason: HOSPADM

## 2020-09-08 RX ORDER — ARIPIPRAZOLE 5 MG/1
15 TABLET ORAL DAILY
Status: DISCONTINUED | OUTPATIENT
Start: 2020-09-09 | End: 2020-09-08

## 2020-09-08 RX ADMIN — DOCUSATE SODIUM 50MG AND SENNOSIDES 8.6MG 1 TABLET: 8.6; 5 TABLET, FILM COATED ORAL at 09:09

## 2020-09-08 RX ADMIN — POLYETHYLENE GLYCOL 3350 17 G: 17 POWDER, FOR SOLUTION ORAL at 09:09

## 2020-09-08 RX ADMIN — TIZANIDINE 4 MG: 2 TABLET ORAL at 08:09

## 2020-09-08 RX ADMIN — HYDROMORPHONE HYDROCHLORIDE 0.5 MG: 1 INJECTION, SOLUTION INTRAMUSCULAR; INTRAVENOUS; SUBCUTANEOUS at 07:09

## 2020-09-08 RX ADMIN — PROPRANOLOL HYDROCHLORIDE 60 MG: 20 TABLET ORAL at 08:09

## 2020-09-08 RX ADMIN — MUPIROCIN: 20 OINTMENT TOPICAL at 08:09

## 2020-09-08 RX ADMIN — MUPIROCIN: 20 OINTMENT TOPICAL at 09:09

## 2020-09-08 RX ADMIN — HYDROMORPHONE HYDROCHLORIDE 0.5 MG: 1 INJECTION, SOLUTION INTRAMUSCULAR; INTRAVENOUS; SUBCUTANEOUS at 10:09

## 2020-09-08 RX ADMIN — PREGABALIN 150 MG: 150 CAPSULE ORAL at 08:09

## 2020-09-08 RX ADMIN — SODIUM CHLORIDE 1000 ML: 0.9 INJECTION, SOLUTION INTRAVENOUS at 04:09

## 2020-09-08 RX ADMIN — MORPHINE SULFATE 15 MG: 15 TABLET ORAL at 01:09

## 2020-09-08 RX ADMIN — MORPHINE SULFATE 15 MG: 15 TABLET ORAL at 12:09

## 2020-09-08 RX ADMIN — TOPIRAMATE 400 MG: 200 TABLET, FILM COATED ORAL at 12:09

## 2020-09-08 RX ADMIN — ENOXAPARIN SODIUM 40 MG: 40 INJECTION SUBCUTANEOUS at 05:09

## 2020-09-08 RX ADMIN — MORPHINE SULFATE 15 MG: 15 TABLET ORAL at 10:09

## 2020-09-08 RX ADMIN — PROPRANOLOL HYDROCHLORIDE 60 MG: 20 TABLET ORAL at 12:09

## 2020-09-08 RX ADMIN — HYDROMORPHONE HYDROCHLORIDE 0.5 MG: 1 INJECTION, SOLUTION INTRAMUSCULAR; INTRAVENOUS; SUBCUTANEOUS at 08:09

## 2020-09-08 RX ADMIN — CLINDAMYCIN IN 5 PERCENT DEXTROSE 900 MG: 18 INJECTION, SOLUTION INTRAVENOUS at 01:09

## 2020-09-08 RX ADMIN — HYDROMORPHONE HYDROCHLORIDE 0.5 MG: 1 INJECTION, SOLUTION INTRAMUSCULAR; INTRAVENOUS; SUBCUTANEOUS at 01:09

## 2020-09-08 RX ADMIN — CLINDAMYCIN IN 5 PERCENT DEXTROSE 900 MG: 18 INJECTION, SOLUTION INTRAVENOUS at 04:09

## 2020-09-08 RX ADMIN — ACETAMINOPHEN 1000 MG: 500 TABLET ORAL at 05:09

## 2020-09-08 RX ADMIN — LEVOTHYROXINE SODIUM 100 MCG: 100 TABLET ORAL at 11:09

## 2020-09-08 RX ADMIN — ACETAMINOPHEN 1000 MG: 500 TABLET ORAL at 10:09

## 2020-09-08 RX ADMIN — ARIPIPRAZOLE 15 MG: 5 TABLET ORAL at 11:09

## 2020-09-08 RX ADMIN — VENLAFAXINE HYDROCHLORIDE 300 MG: 75 CAPSULE, EXTENDED RELEASE ORAL at 08:09

## 2020-09-08 RX ADMIN — MORPHINE SULFATE 15 MG: 15 TABLET ORAL at 06:09

## 2020-09-08 RX ADMIN — HYDROMORPHONE HYDROCHLORIDE 0.5 MG: 1 INJECTION, SOLUTION INTRAMUSCULAR; INTRAVENOUS; SUBCUTANEOUS at 04:09

## 2020-09-08 RX ADMIN — MORPHINE SULFATE 15 MG: 15 TABLET ORAL at 09:09

## 2020-09-08 RX ADMIN — MORPHINE SULFATE 15 MG: 15 TABLET ORAL at 05:09

## 2020-09-08 RX ADMIN — DOCUSATE SODIUM 50MG AND SENNOSIDES 8.6MG 1 TABLET: 8.6; 5 TABLET, FILM COATED ORAL at 08:09

## 2020-09-08 RX ADMIN — ACETAMINOPHEN 1000 MG: 500 TABLET ORAL at 01:09

## 2020-09-08 NOTE — SUBJECTIVE & OBJECTIVE
"Principal Problem:Closed fracture of left distal tibia    Principal Orthopedic Problem: Same    Interval History: Patient seen and examined at bedside.  No acute events overnight.  Pain controlled.  Elevated and in ex fix.      Review of patient's allergies indicates:   Allergen Reactions    Cephalexin      Other reaction(s): Unknown    Codeine Itching     Other reaction(s): Unknown    Nsaids (non-steroidal anti-inflammatory drug)        Current Facility-Administered Medications   Medication    acetaminophen tablet 1,000 mg    albuterol-ipratropium 2.5 mg-0.5 mg/3 mL nebulizer solution 3 mL    ARIPiprazole tablet 10 mg    bisacodyL suppository 10 mg    clindamycin in D5W 900 mg/50 mL IVPB 900 mg    enoxaparin injection 40 mg    hydrALAZINE tablet 10 mg    [START ON 9/9/2020] hydroCHLOROthiazide tablet 25 mg    HYDROmorphone injection 0.5 mg    levothyroxine tablet 88 mcg    melatonin tablet 6 mg    morphine tablet 15 mg    mupirocin 2 % ointment    polyethylene glycol packet 17 g    pregabalin capsule 150 mg    senna-docusate 8.6-50 mg per tablet 1 tablet    sodium chloride 0.9% flush 10 mL    sodium chloride 0.9% flush 10 mL    tiZANidine tablet 4 mg    topiramate tablet 100 mg    valsartan tablet 160 mg    venlafaxine 24 hr capsule 300 mg     Objective:     Vital Signs (Most Recent):  Temp: 98.5 °F (36.9 °C) (09/08/20 0447)  Pulse: 84 (09/08/20 0447)  Resp: 18 (09/08/20 0721)  BP: 108/67 (09/08/20 0447)  SpO2: 95 % (09/08/20 0447) Vital Signs (24h Range):  Temp:  [97.9 °F (36.6 °C)-99.4 °F (37.4 °C)] 98.5 °F (36.9 °C)  Pulse:  [70-97] 84  Resp:  [14-20] 18  SpO2:  [95 %-100 %] 95 %  BP: (108-120)/(59-79) 108/67     Weight: 59.7 kg (131 lb 10.9 oz)  Height: 5' 3" (160 cm)  Body mass index is 23.33 kg/m².      Intake/Output Summary (Last 24 hours) at 9/8/2020 0828  Last data filed at 9/8/2020 0000  Gross per 24 hour   Intake 500 ml   Output 350 ml   Net 150 ml       Ortho/SPM Exam "     Physical Exam:  NAD, A/O x 3.  Ex fix in place   NVI with some paresthesias in first web space      Significant Labs: All pertinent labs within the past 24 hours have been reviewed.    Significant Imaging: I have reviewed and interpreted all pertinent imaging results/findings.

## 2020-09-08 NOTE — PROGRESS NOTES
HISTORY OF PRESENT ILLNESS  Geoffrey Ngo is a 70 y.o. male. HPI       Here in annual f/u htn prediabetes, hx prostate cancer and medicare wellness. Since last visit had UHR with mesh -surgery went well  Due for prevnar 13  Some skin lesion on the nose-new    Last OV:  Had hand sx last yr-dupuytrens and calcium/osteophyte left hand  Donates blood q8 weks-140/80 bp at donation center  Weight down 11 lbs-- now, doing a lot of physical work       Patient Active Problem List    Diagnosis Date Noted    Advanced care planning/counseling discussion 11/04/2015    Prediabetes 10/30/2013    Essential hypertension, benign 04/29/2010    Obesity 04/29/2010    Prostate cancer ([facility] [address] 79/43/9366    Umbilical hernia 39/83/8823     Current Outpatient Prescriptions   Medication Sig Dispense Refill    losartan (COZAAR) 100 mg tablet TAKE 1 TABLET BY MOUTH EVERY DAY 90 Tab 1    docusate sodium (COLACE) 100 mg capsule TAKE ONE CAPSULE BY MOUTH TWICE A DAY 30 Cap 11    oxyCODONE-acetaminophen (PERCOCET) 5-325 mg per tablet Take 1-2 Tabs by mouth every four (4) hours as needed for Pain. Max Daily Amount: 12 Tabs. 50 Tab 0    promethazine (PHENERGAN) 12.5 mg tablet Take 1 Tab by mouth every six (6) hours as needed for Nausea. 20 Tab 0    ibuprofen (MOTRIN) 200 mg tablet Take 400 mg by mouth as needed for Pain.        No Known Allergies   Lab Results  Component Value Date/Time   Hemoglobin A1c 5.8 (H) 10/28/2016 08:54 AM   Hemoglobin A1c 6.2 (H) 11/04/2015 10:07 AM   Hemoglobin A1c 6.2 (H) 11/04/2014 07:44 AM   Glucose 89 05/18/2017 04:22 PM   LDL, calculated 84 10/28/2016 08:54 AM   Creatinine 1.00 05/18/2017 04:22 PM      Lab Results  Component Value Date/Time   Cholesterol, total 154 10/28/2016 08:54 AM   HDL Cholesterol 60 10/28/2016 08:54 AM   LDL, calculated 84 10/28/2016 08:54 AM   Triglyceride 49 10/28/2016 08:54 AM     Lab Results  Component Value Date/Time   GFR est non-AA 73 05/18/2017 04:22 PM   GFR est AA 84 [facility]  Orthopedics  Progress Note    Patient Name: Faith Bernard  MRN: [id number]  Admission Date: 9/7/2020  Hospital Length of Stay: 0 days  Attending Provider: Sujata Londono MD  Primary Care Provider: Rah Canela MD  Follow-up For: Procedure(s) (LRB):  APPLICATION, EXTERNAL FIXATION DEVICE, TIBIA (Left)    Post-Operative Day: 1 Day Post-Op  Subjective:     Principal Problem:Closed fracture of left distal tibia    Principal Orthopedic Problem: Same    Interval History: Patient seen and examined at bedside.  No acute events overnight.  Pain controlled.  Elevated and in ex fix.      Review of patient's allergies indicates:   Allergen Reactions    Cephalexin      Other reaction(s): Unknown    Codeine Itching     Other reaction(s): Unknown    Nsaids (non-steroidal anti-inflammatory drug)        Current Facility-Administered Medications   Medication    acetaminophen tablet 1,000 mg    albuterol-ipratropium 2.5 mg-0.5 mg/3 mL nebulizer solution 3 mL    ARIPiprazole tablet 10 mg    bisacodyL suppository 10 mg    clindamycin in D5W 900 mg/50 mL IVPB 900 mg    enoxaparin injection 40 mg    hydrALAZINE tablet 10 mg    [START ON 9/9/2020] hydroCHLOROthiazide tablet 25 mg    HYDROmorphone injection 0.5 mg    levothyroxine tablet 88 mcg    melatonin tablet 6 mg    morphine tablet 15 mg    mupirocin 2 % ointment    polyethylene glycol packet 17 g    pregabalin capsule 150 mg    senna-docusate 8.6-50 mg per tablet 1 tablet    sodium chloride 0.9% flush 10 mL    sodium chloride 0.9% flush 10 mL    tiZANidine tablet 4 mg    topiramate tablet 100 mg    valsartan tablet 160 mg    venlafaxine 24 hr capsule 300 mg     Objective:     Vital Signs (Most Recent):  Temp: 98.5 °F (36.9 °C) (09/08/20 0447)  Pulse: 84 (09/08/20 0447)  Resp: 18 (09/08/20 0721)  BP: 108/67 (09/08/20 0447)  SpO2: 95 % (09/08/20 0447) Vital Signs (24h Range):  Temp:  [97.9 °F (36.6 °C)-99.4 °F (37.4 °C)]  "98.5 °F (36.9 °C)  Pulse:  [70-97] 84  Resp:  [14-20] 18  SpO2:  [95 %-100 %] 95 %  BP: (108-120)/(59-79) 108/67     Weight: 59.7 kg (131 lb 10.9 oz)  Height: 5' 3" (160 cm)  Body mass index is 23.33 kg/m².      Intake/Output Summary (Last 24 hours) at 9/8/2020 0828  Last data filed at 9/8/2020 0000  Gross per 24 hour   Intake 500 ml   Output 350 ml   Net 150 ml       Ortho/SPM Exam     Physical Exam:  NAD, A/O x 3.  Ex fix in place   NVI with some paresthesias in first web space      Significant Labs: All pertinent labs within the past 24 hours have been reviewed.    Significant Imaging: I have reviewed and interpreted all pertinent imaging results/findings.    Assessment/Plan:     * Closed fracture of left distal tibia  Faith Bernard is a 55 y.o. female POD 1 s/p L Pilon ex fix.       - Weight bearing status: NWB  - Pain control: multimodal  - Antibiotics: yessica op ancef  - DVT Prophylaxis: Lovenox , SCD's at all times when not ambulating.  - PT/OT  - Dispo: f/u PT recs and likely discharge home early this week with plans for definitive fixation when soft tissues allow                   Matthew Dior MD  Orthopedics  [facility]  " 05/18/2017 04:22 PM   Creatinine 1.00 05/18/2017 04:22 PM   BUN 19 05/18/2017 04:22 PM   Sodium 140 05/18/2017 04:22 PM   Potassium 4.7 05/18/2017 04:22 PM   Chloride 101 05/18/2017 04:22 PM   CO2 25 05/18/2017 04:22 PM        ROS    Physical Exam   Constitutional: He appears well-developed and well-nourished. No distress. Appears stated age, obese   HENT:   Head: Normocephalic. Mouth/Throat: Oropharynx is clear and moist.   Papular lesions on nose   Eyes: Pupils are equal, round, and reactive to light. Neck: Normal range of motion. Neck supple. No JVD present. No tracheal deviation present. No thyromegaly present. Cardiovascular: Normal rate, regular rhythm and normal heart sounds. Exam reveals no gallop and no friction rub. No murmur heard. Pulmonary/Chest: Effort normal and breath sounds normal. No respiratory distress. He has no wheezes. He has no rales. He exhibits no tenderness. Abdominal: Soft. He exhibits no distension and no mass. There is no tenderness. There is no rebound and no guarding. Genitourinary: Prostate normal. Rectal exam shows guaiac negative stool. No penile tenderness. Musculoskeletal: He exhibits no edema. Lymphadenopathy:     He has no cervical adenopathy. Neurological: He is alert. Skin: Skin is warm. No rash noted. No erythema. No pallor. Psychiatric: He has a normal mood and affect. Nursing note and vitals reviewed. ASSESSMENT and PLAN  Diagnoses and all orders for this visit:    1. Prediabetes  -     CBC W/O DIFF  -     HEMOGLOBIN A1C WITH EAG  -     CBC W/O DIFF  -     METABOLIC PANEL, COMPREHENSIVE  -     HEMOGLOBIN A1C WITH EAG    2. Essential hypertension, benign  -     CBC W/O DIFF  -     METABOLIC PANEL, COMPREHENSIVE  -     LIPID PANEL  -     CBC W/O DIFF  -     METABOLIC PANEL, COMPREHENSIVE  -     LIPID PANEL    3. Obesity (BMI 30.0-34.9)    4. Elevated glucose  -     HEMOGLOBIN A1C WITH EAG  -     HEMOGLOBIN A1C WITH EAG    5.  Skin lesions  - REFERRAL TO DERMATOLOGY    6. Encounter for immunization  -     Pneumococcal conjugate 13 valent, IM (PREVNAR-13)    7. Medicare annual wellness visit, subsequent      Follow-up Disposition:  Return in about 1 year (around 5/21/2019) for cpe. This is the Subsequent Medicare Annual Wellness Exam, performed 12 months or more after the Initial AWV or the last Subsequent AWV    I have reviewed the patient's medical history in detail and updated the computerized patient record. History     Past Medical History:   Diagnosis Date    Arthritis     Cancer [facility]) 2004    prostate    Hypertension       Past Surgical History:   Procedure Laterality Date    HX CATARACT REMOVAL      bilateral    HX COLONOSCOPY  10/29/2012    Repeat in 10 years    HX CYST REMOVAL      Hands and Pilonidal cyst removal (8 from spine)    HX HERNIA REPAIR  90/86/1390    Umbilical hernia repair    HX PROSTATECTOMY  2003     Current Outpatient Prescriptions   Medication Sig Dispense Refill    losartan (COZAAR) 100 mg tablet TAKE 1 TABLET BY MOUTH EVERY DAY 90 Tab 1    docusate sodium (COLACE) 100 mg capsule TAKE ONE CAPSULE BY MOUTH TWICE A DAY 30 Cap 11    oxyCODONE-acetaminophen (PERCOCET) 5-325 mg per tablet Take 1-2 Tabs by mouth every four (4) hours as needed for Pain. Max Daily Amount: 12 Tabs. 50 Tab 0    promethazine (PHENERGAN) 12.5 mg tablet Take 1 Tab by mouth every six (6) hours as needed for Nausea. 20 Tab 0    ibuprofen (MOTRIN) 200 mg tablet Take 400 mg by mouth as needed for Pain.        No Known Allergies  Family History   Problem Relation Age of Onset    High Cholesterol Mother     Stroke Father     Alcohol abuse Father     Other Father      tobacco abuse    No Known Problems Sister     Cancer Brother      Skin CA    Other Son      Herpes in the eyes (while in [employer])    Cancer Daughter 28     Ovarian CA     Social History   Substance Use Topics    Smoking status: Never Smoker    Smokeless tobacco: Never Used      Comment: 3 months at 14years old/cigar use (occasionally 24 yrs old)    Alcohol use 2.4 oz/week     4 Glasses of wine per week     Patient Active Problem List   Diagnosis Code    Essential hypertension, benign I10    Obesity E66.9    Prostate cancer ([facility] [address] K86    Umbilical hernia C13.6    Prediabetes R73.03    Advanced care planning/counseling discussion Z71.89       Depression Risk Factor Screening:     PHQ over the last two weeks 5/21/2018   Little interest or pleasure in doing things Not at all   Feeling down, depressed or hopeless Not at all   Total Score PHQ 2 0     Alcohol Risk Factor Screening: You do not drink alcohol or very rarely. Functional Ability and Level of Safety:   Hearing Loss  Hearing is good. Activities of Daily Living  The home contains: no safety equipment. Patient does total self care    Fall Risk  Fall Risk Assessment, last 12 mths 5/21/2018   Able to walk? Yes   Fall in past 12 months? No       Abuse Screen  Patient is not abused    Cognitive Screening   Evaluation of Cognitive Function:  Has your family/caregiver stated any concerns about your memory: no  Normal    Patient Care Team   Patient Care Team:  Renee Rolle MD as PCP - Moisés Lao RN as Nurse Shahram Ruffin MD (General Surgery)  Lesley Hector MD (Ophthalmology)  Ernesto Mcclure DDS (Dental General Practice)  Jovanna Pagan MD as Surgeon (General Surgery)    Assessment/Plan   Education and counseling provided:  Are appropriate based on today's review and evaluation  End-of-Life planning (with patient's consent)  prevnar 13 today, advised tdap and shingrix    Diagnoses and all orders for this visit:    1. Prediabetes  -     CBC W/O DIFF  -     HEMOGLOBIN A1C WITH EAG  -     CBC W/O DIFF  -     METABOLIC PANEL, COMPREHENSIVE  -     HEMOGLOBIN A1C WITH EAG   Advised weight reduction    2.  Essential hypertension, benign  -     CBC W/O DIFF  -     METABOLIC PANEL, COMPREHENSIVE  -     LIPID PANEL  -     CBC W/O DIFF  -     METABOLIC PANEL, COMPREHENSIVE  -     LIPID PANEL   controlled  3. Obesity (BMI 30.0-34.9)    4. Elevated glucose  -     HEMOGLOBIN A1C WITH EAG  -     HEMOGLOBIN A1C WITH EAG  5.  Skin lesions on nose   Refer to DERM MD ? bx      Health Maintenance Due   Topic Date Due    ZOSTER VACCINE AGE 60>  08/16/2000    GLAUCOMA SCREENING Q2Y  10/16/2005    Pneumococcal 65+ High/Highest Risk (2 of 2 - PCV13) 10/30/2014    MEDICARE YEARLY EXAM  05/02/2018

## 2020-09-08 NOTE — PT/OT/SLP EVAL
Physical Therapy Evaluation    Patient Name:  Faith Bernard   MRN:  [id number]    Recommendations:     Discharge Recommendations:  home health PT   Discharge Equipment Recommendations: walker, rolling, wheelchair, bedside commode(will need w/c w/ elevating leg rests (18 inch; lightweight))   Barriers to discharge: Decreased caregiver support and  works and no assistance during the day    Assessment:     Faith Bernard is a 55 y.o. female admitted with a medical diagnosis of Closed fracture of left distal tibia.  She presents with the following impairments/functional limitations:  weakness, impaired endurance, impaired functional mobilty, gait instability, decreased lower extremity function, pain, impaired balance, impaired skin . Patient w/ external fixator LLE and is NWB. Per chart, will progress to definitive fixation when soft tissue allows. Additionally, she has injuries to her fingers from the same incident. She transferred to sit EOB w/ min assistance, unable to stand or walk d/t pain LLE; worse w/ dependent positioning..    Rehab Prognosis: Good; patient would benefit from acute skilled PT services to address these deficits and reach maximum level of function.    Recent Surgery: Procedure(s) (LRB):  APPLICATION, EXTERNAL FIXATION DEVICE, TIBIA (Left) 1 Day Post-Op    Plan:     During this hospitalization, patient to be seen 4 x/week to address the identified rehab impairments via gait training, therapeutic activities, therapeutic exercises and progress toward the following goals:    · Plan of Care Expires:       Subjective     Chief Complaint: pain, can't walk  Patient/Family Comments/goals: decrease pain; progress to next surgery  Pain/Comfort:  · Pain Rating 1: 7/10  · Location - Side 1: Left  · Location 1: leg  · Pain Addressed 1: Reposition, Distraction, Cessation of Activity, Pre-medicate for activity  · Pain Rating Post-Intervention 1: 7/10    Patients cultural, spiritual, Mosque  conflicts given the current situation:      Living Environment:  Patient lives w/ spouse who works outside of the home; one BAR. She has some DME from her mother, but did not use any.   Prior to admission, patients level of function was independent, active.  Equipment used at home: none.  DME owned (not currently used): shower chair.  Upon discharge, patient will have assistance from uncertain; intermittently from spouse.    Objective:     Communicated with nurse prior to session.  Patient found HOB elevated; LLE elevated and ice in use with PureWick, telemetry, peripheral IV  upon PT entry to room.    General Precautions: Standard, fall   Orthopedic Precautions:LLE non weight bearing   Braces: (external fixator in place LLE)     Exams:  · Cognitive Exam:  Patient is oriented to Person, Place, Time and Situation  · Gross Motor Coordination:  WFL  · Skin Integrity/Edema:      · -       LLE w/ ex-fix lower LE (distal tibia/ankle fx) ; fingers of B hands w/ injuries and covered w/ clear tegraderm type dressing (from sliding off a rope swing)  · RLE ROM: WFL  · RLE Strength: WFL  · LLE ROM: hip and knee grossly WFL (knee flexion may be limited by ex fix lowerLE) ankle NT d/t immobilized ex fix  · LLE Strength: NT due to pain; HF and knee grossly WFL    Functional Mobility:  · Bed Mobility:     · Supine to Sit: minimum assistance  · Sit to Supine: minimum assistance  · Transfers:  Not performed d/t pain worse w/ sitting EOB  · Gait: unable d/t pain. Patient is NWB LLE and has ex-fix    Therapeutic Activities and Exercises:   patient and spouse educated on PT POC;   Patient performs LLE QS, GS; performs RLE AP. x10 each.  Educated on positioning, elevation, DME recs: w/c and RW    AM-PAC 6 CLICK MOBILITY  Total Score:11     Patient left HOB elevated with all lines intact and call button in reach.    GOALS:   Multidisciplinary Problems     Physical Therapy Goals        Problem: Physical Therapy Goal    Goal Priority  Disciplines Outcome Goal Variances Interventions   Physical Therapy Goal     PT, PT/OT Ongoing, Progressing     Description: Goals to be met by: 20     Patient will increase functional independence with mobility by performin. Supine to sit with Stand-by Assistance   2. Sit to supine with Stand-by Assistance  3. Sit to stand transfer with Stand-by Assistance with RW and LLE NWB  4. Bed to chair transfer with Stand-by Assistance using Rolling Walker and LLE NWB  5. Gait  x 20 feet with Stand-by Assistance using Rolling Walker. and LLE NWB  6. Ascend/Descend 4 inch curb step with Minimal Assistance using Rolling Walker.and LLE NWB  7. Lower extremity exercise program x15 reps per handout, with assistance as needed                     History:     Past Medical History:   Diagnosis Date    Anxiety     Depression     Encounter for blood transfusion     Esophageal dysphagia     Fibromyalgia     Gastric bypass status for obesity     H/O dental abscess 2014    drained    Headache(784.0)     migraines.  [facility] (Dr. 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  Pt states took too many then realized her mistake    Polycystic ovaries     S/P JUHI-BSO     Thyroid disease     hypothyroidism       Past Surgical History:   Procedure Laterality Date    ABDOMINAL SURGERY      BREAST SURGERY      Breast Reduction    CARDIAC CATHETERIZATION      COLONOSCOPY N/A 11/3/2015    Procedure: COLONOSCOPY;  Surgeon: Timothy Barber MD;  [facility];  Service: Endoscopy;   Laterality: N/A;    DENTAL SURGERY      4 teeth removed    GASTRIC BYPASS      HERNIA REPAIR      HYSTERECTOMY      OVARIAN CYST REMOVAL      aprox 5 times    ROTATOR CUFF REPAIR Left 01/2019    tennis elbow repair Left 01/2019    UPPER GASTROINTESTINAL ENDOSCOPY         Time Tracking:     PT Received On: 09/08/20  PT Start Time: 1039     PT Stop Time: 1102  PT Total Time (min): 23 min     Billable Minutes: Evaluation 10 and Therapeutic Activity 10  Co ghanshyam w/ OT    Charo Monaco, PT  09/08/2020

## 2020-09-08 NOTE — PLAN OF CARE
Patient lives with spouse and mother. Has BSC in home. Her plan is to dc with  services.  to drive her home post hospitalization.       09/08/20 1434   Discharge Assessment   Assessment Type Discharge Planning Assessment   Confirmed/corrected address and phone number on facesheet? Yes   Assessment information obtained from? Patient;Caregiver  (spouse)   Communicated expected length of stay with patient/caregiver yes   Prior to hospitilization cognitive status: Alert/Oriented   Prior to hospitalization functional status: Independent   Current cognitive status: Alert/Oriented   Current Functional Status: Assistive Equipment;Needs Assistance   Lives With spouse;parent(s)  (mother)   Able to Return to Prior Arrangements yes   Is patient able to care for self after discharge? Unable to determine at this time (comments)   Who are your caregiver(s) and their phone number(s)? Ronald Bernard spouse [phone]   Patient's perception of discharge disposition home health   Readmission Within the Last 30 Days no previous admission in last 30 days   Patient currently being followed by outpatient case management? No   Patient currently receives any other outside agency services? No   Equipment Currently Used at Home bedside commode   Do you have any problems affording any of your prescribed medications? No   Is the patient taking medications as prescribed? yes   Does the patient have transportation home? Yes   Transportation Anticipated family or friend will provide   Dialysis Name and Scheduled days n/a   Does the patient receive services at the [facility]? No   Discharge Plan A Home Health;Home with family   Discharge Plan B Skilled Nursing Facility   DME Needed Upon Discharge  walker, rolling   Patient/Family in Agreement with Plan yes

## 2020-09-08 NOTE — PLAN OF CARE
Problem: Physical Therapy Goal  Goal: Physical Therapy Goal  Description: Goals to be met by: 20     Patient will increase functional independence with mobility by performin. Supine to sit with Stand-by Assistance   2. Sit to supine with Stand-by Assistance  3. Sit to stand transfer with Stand-by Assistance with RW and LLE NWB  4. Bed to chair transfer with Stand-by Assistance using Rolling Walker and LLE NWB  5. Gait  x 20 feet with Stand-by Assistance using Rolling Walker. and LLE NWB  6. Ascend/Descend 4 inch curb step with Minimal Assistance using Rolling Walker.and LLE NWB  7. Lower extremity exercise program x15 reps per handout, with assistance as needed    PT ghanshyam performed Charo Monaco, PT 2020

## 2020-09-08 NOTE — ASSESSMENT & PLAN NOTE
Faith Bernard is a 55 y.o. female POD 1 s/p L Pilon ex fix.       - Weight bearing status: NWB  - Pain control: multimodal  - Antibiotics: yessica op ancef  - DVT Prophylaxis: Lovenox , SCD's at all times when not ambulating.  - PT/OT  - Dispo: f/u PT recs and likely discharge home early this week with plans for definitive fixation when soft tissues allow

## 2020-09-08 NOTE — PLAN OF CARE
Problem: Occupational Therapy Goal  Goal: Occupational Therapy Goal  Description: Goals to be met by: 9/22/20     Patient will increase functional independence with ADLs by performing:    UE Dressing with [place].  LE Dressing with Modified [place] and Assistive Devices as needed.  Grooming while standing at sink with Modified [place].  Toileting from bedside commode with Modified [place] for hygiene and clothing management.   Bathing from  shower chair/bench with Modified [place].  Toilet transfer to bedside commode with Modified [place].  Increased functional strength to WFL for B UE.  Upper extremity exercise program x15 reps per handout, with assistance as needed.    Outcome: Ongoing, Progressing

## 2020-09-08 NOTE — PT/OT/SLP EVAL
Occupational Therapy   Evaluation    Name: Faith Bernard  MRN: [id number]  Admitting Diagnosis:  Closed fracture of left distal tibia 1 Day Post-Op    Recommendations:     Discharge Recommendations: home health OT  Discharge Equipment Recommendations:  walker, rolling  Barriers to discharge:  None    Assessment:     Faith Bernard is a 55 y.o. female with a medical diagnosis of Closed fracture of left distal tibia.  She was able to perform supine/sit T/F c min A.  Unable to stand 2* pain.  B UE are WFL.  Able to perform bathing task c mod A in supine.  Educated pt on NWB status. Performance deficits affecting function: impaired self care skills, impaired functional mobilty, orthopedic precautions.      Rehab Prognosis: Good; patient would benefit from acute skilled OT services to address these deficits and reach maximum level of function.       Plan:     Patient to be seen 5 x/week to address the above listed problems via self-care/home management, therapeutic activities, therapeutic exercises  · Plan of Care Expires: 09/22/20  · Plan of Care Reviewed with: patient, spouse    Subjective     Chief Complaint: Pt is s/p ex-fix L tibia and is NWB L LE  Patient/Family Comments/goals: To get better.    Occupational Profile:  Living Environment: Pt lives in a one story house c [address] [facility] and has a tub/shower combo c a shower chair in it and a hand held shower head.  Previous level of function: I PTA  Equipment Used at Home:  bedside commode, shower chair  Assistance upon Discharge:     Pain/Comfort:  · Pain Rating 1: 7/10    Patients cultural, spiritual, Synagogue conflicts given the current situation: no    Objective:     Communicated with: RN prior to session.  Patient found supine with PureWick, peripheral IV, telemetry upon OT entry to room.    General Precautions: Standard, fall   Orthopedic Precautions:LLE non weight bearing   Braces: (Ex-fix)     Occupational Performance:    Bed Mobility:    · Patient  completed Supine to Sit with stand by assistance  · Patient completed Sit to Supine with minimum assistance    Functional Mobility/Transfers:    · Functional Mobility: Unable to stand d/t pain.    Activities of Daily Living:  · Bathing: moderate assistance while supine to wash off UB.      Physical Exam:  Upper Extremity Range of Motion:     -       Right Upper Extremity: WFL  -       Left Upper Extremity: WFL  Upper Extremity Strength:    -       Right Upper Extremity: WFL  -       Left Upper Extremity: WFL   Unable to form a composite fist c B hand 2* rope burns.    AMPA 6 Click ADL:  AMPAC Total Score: 14    Education:    Patient left supine with all lines intact, call button in reach, RN notified and  present    GOALS:   Multidisciplinary Problems     Occupational Therapy Goals        Problem: Occupational Therapy Goal    Goal Priority Disciplines Outcome Interventions   Occupational Therapy Goal     OT, PT/OT Ongoing, Progressing    Description: Goals to be met by: 9/22/20     Patient will increase functional independence with ADLs by performing:    UE Dressing with [place].  LE Dressing with Modified [place] and Assistive Devices as needed.  Grooming while standing at sink with Modified [place].  Toileting from bedside commode with Modified [place] for hygiene and clothing management.   Bathing from  shower chair/bench with Modified [place].  Toilet transfer to bedside commode with Modified [place].  Increased functional strength to WFL for B UE.  Upper extremity exercise program x15 reps per handout, with assistance as needed.                     History:     Past Medical History:   Diagnosis Date    Anxiety     Depression     Encounter for blood transfusion     Esophageal dysphagia     Fibromyalgia     Gastric bypass status for obesity     H/O dental abscess 4/14/2014    drained    Headache(784.0)     migraines.  Treated at [facility] (Dr. 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  Pt states took too many then realized her mistake    Polycystic ovaries     S/P JUHI-BSO     Thyroid disease     hypothyroidism       Past Surgical History:   Procedure Laterality Date    ABDOMINAL SURGERY      BREAST SURGERY  2004    Breast Reduction    CARDIAC CATHETERIZATION      COLONOSCOPY N/A 11/3/2015    Procedure: COLONOSCOPY;  Surgeon: Timothy Barber MD;  Location: [facility];  Service: Endoscopy;  Laterality: N/A;    DENTAL SURGERY      4 teeth removed    GASTRIC BYPASS      HERNIA REPAIR      HYSTERECTOMY      OVARIAN CYST REMOVAL      aprox 5 times    ROTATOR CUFF REPAIR Left 01/2019    tennis elbow repair Left 01/2019    UPPER GASTROINTESTINAL ENDOSCOPY         Time Tracking:     OT Date of Treatment: 09/08/20  OT Start Time: 1035  OT Stop Time: 1100  OT Total Time (min): 25 min    Billable Minutes:Evaluation 15  Self Care/Home Management 10    FABIAN Powell  9/8/2020

## 2020-09-09 PROCEDURE — 25000003 PHARM REV CODE 250: Performed by: STUDENT IN AN ORGANIZED HEALTH CARE EDUCATION/TRAINING PROGRAM

## 2020-09-09 PROCEDURE — 63600175 PHARM REV CODE 636 W HCPCS: Performed by: STUDENT IN AN ORGANIZED HEALTH CARE EDUCATION/TRAINING PROGRAM

## 2020-09-09 PROCEDURE — 97535 SELF CARE MNGMENT TRAINING: CPT

## 2020-09-09 PROCEDURE — 96372 THER/PROPH/DIAG INJ SC/IM: CPT

## 2020-09-09 PROCEDURE — G0378 HOSPITAL OBSERVATION PER HR: HCPCS

## 2020-09-09 PROCEDURE — 97116 GAIT TRAINING THERAPY: CPT | Mod: CQ

## 2020-09-09 PROCEDURE — 96376 TX/PRO/DX INJ SAME DRUG ADON: CPT

## 2020-09-09 PROCEDURE — 94761 N-INVAS EAR/PLS OXIMETRY MLT: CPT

## 2020-09-09 PROCEDURE — 99900035 HC TECH TIME PER 15 MIN (STAT)

## 2020-09-09 RX ORDER — METHOCARBAMOL 750 MG/1
1500 TABLET, FILM COATED ORAL 3 TIMES DAILY
Status: DISCONTINUED | OUTPATIENT
Start: 2020-09-09 | End: 2020-09-10 | Stop reason: HOSPADM

## 2020-09-09 RX ORDER — OXYCODONE HYDROCHLORIDE 10 MG/1
10 TABLET ORAL EVERY 4 HOURS PRN
Status: DISCONTINUED | OUTPATIENT
Start: 2020-09-09 | End: 2020-09-10 | Stop reason: HOSPADM

## 2020-09-09 RX ORDER — OXYCODONE HYDROCHLORIDE 5 MG/1
5 TABLET ORAL EVERY 4 HOURS PRN
Status: DISCONTINUED | OUTPATIENT
Start: 2020-09-09 | End: 2020-09-10 | Stop reason: HOSPADM

## 2020-09-09 RX ORDER — MORPHINE SULFATE 2 MG/ML
2 INJECTION, SOLUTION INTRAMUSCULAR; INTRAVENOUS
Status: DISCONTINUED | OUTPATIENT
Start: 2020-09-09 | End: 2020-09-10 | Stop reason: HOSPADM

## 2020-09-09 RX ADMIN — OXYCODONE HYDROCHLORIDE 15 MG: 10 TABLET ORAL at 09:09

## 2020-09-09 RX ADMIN — MORPHINE SULFATE 15 MG: 15 TABLET ORAL at 08:09

## 2020-09-09 RX ADMIN — PROPRANOLOL HYDROCHLORIDE 60 MG: 20 TABLET ORAL at 08:09

## 2020-09-09 RX ADMIN — HYDROMORPHONE HYDROCHLORIDE 0.5 MG: 1 INJECTION, SOLUTION INTRAMUSCULAR; INTRAVENOUS; SUBCUTANEOUS at 05:09

## 2020-09-09 RX ADMIN — POLYETHYLENE GLYCOL 3350 17 G: 17 POWDER, FOR SOLUTION ORAL at 08:09

## 2020-09-09 RX ADMIN — SODIUM CHLORIDE 1000 ML: 0.9 INJECTION, SOLUTION INTRAVENOUS at 10:09

## 2020-09-09 RX ADMIN — MORPHINE SULFATE 2 MG: 2 INJECTION, SOLUTION INTRAMUSCULAR; INTRAVENOUS at 11:09

## 2020-09-09 RX ADMIN — LEVOTHYROXINE SODIUM 100 MCG: 100 TABLET ORAL at 08:09

## 2020-09-09 RX ADMIN — ACETAMINOPHEN 1000 MG: 500 TABLET ORAL at 05:09

## 2020-09-09 RX ADMIN — OXYCODONE HYDROCHLORIDE 15 MG: 10 TABLET ORAL at 05:09

## 2020-09-09 RX ADMIN — OXYCODONE HYDROCHLORIDE 15 MG: 10 TABLET ORAL at 01:09

## 2020-09-09 RX ADMIN — MORPHINE SULFATE 15 MG: 15 TABLET ORAL at 03:09

## 2020-09-09 RX ADMIN — ARIPIPRAZOLE 15 MG: 5 TABLET ORAL at 08:09

## 2020-09-09 RX ADMIN — DOCUSATE SODIUM 50MG AND SENNOSIDES 8.6MG 1 TABLET: 8.6; 5 TABLET, FILM COATED ORAL at 08:09

## 2020-09-09 RX ADMIN — METHOCARBAMOL 1500 MG: 750 TABLET ORAL at 09:09

## 2020-09-09 RX ADMIN — MUPIROCIN: 20 OINTMENT TOPICAL at 09:09

## 2020-09-09 RX ADMIN — MUPIROCIN: 20 OINTMENT TOPICAL at 08:09

## 2020-09-09 RX ADMIN — ENOXAPARIN SODIUM 40 MG: 40 INJECTION SUBCUTANEOUS at 05:09

## 2020-09-09 RX ADMIN — TOPIRAMATE 400 MG: 200 TABLET, FILM COATED ORAL at 08:09

## 2020-09-09 RX ADMIN — ACETAMINOPHEN 1000 MG: 500 TABLET ORAL at 09:09

## 2020-09-09 RX ADMIN — ACETAMINOPHEN 1000 MG: 500 TABLET ORAL at 01:09

## 2020-09-09 NOTE — PLAN OF CARE
Problem: Occupational Therapy Goal  Goal: Occupational Therapy Goal  Description: Goals to be met by: 9/22/20     Patient will increase functional independence with ADLs by performing:    UE Dressing with [place].  LE Dressing with Modified [place] and Assistive Devices as needed.  Grooming while standing at sink with Modified [place].  Toileting from bedside commode with Modified [place] for hygiene and clothing management.   Bathing from  shower chair/bench with Modified [place].  Toilet transfer to bedside commode with Modified [place].  Increased functional strength to WFL for B UE.  Upper extremity exercise program x15 reps per handout, with assistance as needed.    Outcome: Ongoing, Progressing     Russ Woo OTR/L  9/9/2020

## 2020-09-09 NOTE — ASSESSMENT & PLAN NOTE
Faith Bernard is a 55 y.o. female POD 2 s/p L Pilon ex fix.       - Weight bearing status: NWB  - Pain control: multimodal  - Antibiotics: yessica op ancef  - DVT Prophylaxis: Lovenox , SCD's at all times when not ambulating.  - PT/OT  - Dispo: f/u PT recs and likely discharge home early this week with plans for definitive fixation when soft tissues allow

## 2020-09-09 NOTE — PLAN OF CARE
Problem: Physical Therapy Goal  Goal: Physical Therapy Goal  Description: Goals to be met by: 20     Patient will increase functional independence with mobility by performin. Supine to sit with Stand-by Assistance   2. Sit to supine with Stand-by Assistance  3. Sit to stand transfer with Stand-by Assistance with RW and LLE NWB  4. Bed to chair transfer with Stand-by Assistance using Rolling Walker and LLE NWB  5. Gait  x 20 feet with Stand-by Assistance using Rolling Walker. and LLE NWB  6. Ascend/Descend 4 inch curb step with Minimal Assistance using Rolling Walker.and LLE NWB  7. Lower extremity exercise program x15 reps per handout, with assistance as needed    Outcome: Ongoing, Progressing

## 2020-09-09 NOTE — PT/OT/SLP PROGRESS
"Physical Therapy Treatment    Patient Name:  Faith Bernard   MRN:  [id number]    Recommendations:     Discharge Recommendations:  home health PT   Discharge Equipment Recommendations: wheelchair   Barriers to discharge: None    Assessment:     Faith Bernard is a 55 y.o. female admitted with a medical diagnosis of Closed fracture of left distal tibia.  She presents with the following impairments/functional limitations:  weakness, impaired endurance, impaired self care skills, impaired functional mobilty, gait instability, decreased ROM, orthopedic precautions. Good progress today with therapy as evidenced by ability to ambulate in room maintaining NWB LLE with good safety and tech.     Rehab Prognosis: Good; patient would benefit from acute skilled PT services to address these deficits and reach maximum level of function.    Recent Surgery: Procedure(s) (LRB):  APPLICATION, EXTERNAL FIXATION DEVICE, TIBIA (Left) 2 Days Post-Op    Plan:     During this hospitalization, patient to be seen 4 x/week to address the identified rehab impairments via gait training, therapeutic activities, therapeutic exercises and progress toward the following goals:    · Plan of Care Expires:  10/08/20    Subjective     Chief Complaint: none  Patient/Family Comments/goals: "I feel pretty good".   Pain/Comfort:  · Pain Rating 1: 0/10      Objective:     Communicated with nurse prior to session.  Patient found supine with peripheral IV, telemetry upon PT entry to room.     General Precautions: Standard, fall   Orthopedic Precautions:LLE non weight bearing   Braces:       Functional Mobility:  · Bed Mobility:     · Supine to Sit: supervision  · Transfers:     · Sit to Stand:  contact guard assistance with rolling walker  · Gait: 18 ft with RW with CGA and NWB LLE.       AM-PAC 6 CLICK MOBILITY  Turning over in bed (including adjusting bedclothes, sheets and blankets)?: 3  Sitting down on and standing up from a chair with arms (e.g., " wheelchair, bedside commode, etc.): 3  Moving from lying on back to sitting on the side of the bed?: 3  Moving to and from a bed to a chair (including a wheelchair)?: 3  Need to walk in hospital room?: 3  Climbing 3-5 steps with a railing?: 1  Basic Mobility Total Score: 16       Therapeutic Activities and Exercises:   white board updated  Good mobility safety and able to maintain NWB LLE.     Patient left up in chair with all lines intact, call button in reach and nurse notified..    GOALS:   Multidisciplinary Problems     Physical Therapy Goals        Problem: Physical Therapy Goal    Goal Priority Disciplines Outcome Goal Variances Interventions   Physical Therapy Goal     PT, PT/OT Ongoing, Progressing     Description: Goals to be met by: 20     Patient will increase functional independence with mobility by performin. Supine to sit with Stand-by Assistance   2. Sit to supine with Stand-by Assistance  3. Sit to stand transfer with Stand-by Assistance with RW and LLE NWB  4. Bed to chair transfer with Stand-by Assistance using Rolling Walker and LLE NWB  5. Gait  x 20 feet with Stand-by Assistance using Rolling Walker. and LLE NWB  6. Ascend/Descend 4 inch curb step with Minimal Assistance using Rolling Walker.and LLE NWB  7. Lower extremity exercise program x15 reps per handout, with assistance as needed                     Time Tracking:     PT Received On: 20  PT Start Time: 1124     PT Stop Time: 1135  PT Total Time (min): 11 min     Billable Minutes: Gait Training 11    Treatment Type: Treatment  PT/PTA: PTA     PTA Visit Number: 1     Trang Calderon PTA  2020

## 2020-09-09 NOTE — PT/OT/SLP PROGRESS
Occupational Therapy   Treatment    Name: Faith Bernard  MRN: [id number]  Admitting Diagnosis:  Closed fracture of left distal tibia  2 Days Post-Op    Recommendations:     Discharge Recommendations: home health PT  Discharge Equipment Recommendations:  wheelchair  Barriers to discharge:  None    Assessment:     Faith Bernard is a 55 y.o. female with a medical diagnosis of Closed fracture of left distal tibia.  She presents with impairments listed below. Pt did well to tolerate and particiapte in the session. Pt is progressing towards goals and frequency decreased 2/2 requiring less assistance at this time. Pt displayed global deconditioning requiring increased assist for ADLs and mobility at this time. Pt would benefit from skilled OT services to improve independence and overall occupational functioning.     Performance deficits affecting function are weakness, impaired endurance, impaired self care skills, impaired functional mobilty, gait instability, decreased lower extremity function, decreased ROM, orthopedic precautions, impaired skin.     Rehab Prognosis:  Good; patient would benefit from acute skilled OT services to address these deficits and reach maximum level of function.       Plan:     Patient to be seen 3 x/week to address the above listed problems via self-care/home management, therapeutic activities, therapeutic exercises  · Plan of Care Expires: 09/22/20  · Plan of Care Reviewed with: patient    Subjective     Pain/Comfort:  · Pain Rating 1: 7/10  · Location - Side 1: Left  · Location - Orientation 1: generalized  · Location 1: leg  · Pain Addressed 1: Reposition, Distraction  · Pain Rating Post-Intervention 1: 7/10    Objective:     Communicated with: RN prior to session.  Patient found HOB elevated with peripheral IV, telemetry upon OT entry to room.    General Precautions: Standard, fall   Orthopedic Precautions:LLE non weight bearing   Braces: (external fixator)     Occupational  Performance:     Bed Mobility:    · Patient completed Scooting/Bridging with stand by assistance  · Patient completed Supine to Sit with stand by assistance     Functional Mobility/Transfers:  · Patient completed Sit <> Stand Transfer with stand by assistance  with  rolling walker   · Patient completed Bed <> Chair Transfer using Step Transfer technique with stand by assistance with rolling walker  · Functional Mobility: Pt ambulated ~5 ft at sba w/ RW.     Activities of Daily Living:  · Upper Body Dressing: minimum assistance adjusted gown      AMPAC 6 Click ADL: 22    Treatment & Education:  Pt educated on POC LBD techniques, safety w/ ADLs, safety w/ oob activities, DME, and overall coordination of care.    Patient left up in chair with all lines intact, call button in reach and RN presentEducation:      GOALS:   Multidisciplinary Problems     Occupational Therapy Goals        Problem: Occupational Therapy Goal    Goal Priority Disciplines Outcome Interventions   Occupational Therapy Goal     OT, PT/OT Ongoing, Progressing    Description: Goals to be met by: 9/22/20     Patient will increase functional independence with ADLs by performing:    UE Dressing with [place].  LE Dressing with Modified [place] and Assistive Devices as needed.  Grooming while standing at sink with Modified [place].  Toileting from bedside commode with Modified [place] for hygiene and clothing management.   Bathing from  shower chair/bench with Modified [place].  Toilet transfer to bedside commode with Modified [place].  Increased functional strength to WFL for B UE.  Upper extremity exercise program x15 reps per handout, with assistance as needed.                     Time Tracking:     OT Date of Treatment: 09/09/20  OT Start Time: 1126  OT Stop Time: 1135  OT Total Time (min): 9 min    Billable Minutes:Self Care/Home Management 9 minutes    Russ Woo OT  9/9/2020

## 2020-09-09 NOTE — PROGRESS NOTES
[facility]  Orthopedics  Progress Note    Patient Name: Faith Bernard  MRN: [id number]  Admission Date: 9/7/2020  Hospital Length of Stay: 0 days  Attending Provider: Fer Mcrae,*  Primary Care Provider: Rah Canela MD  Follow-up For: Procedure(s) (LRB):  APPLICATION, EXTERNAL FIXATION DEVICE, TIBIA (Left)    Post-Operative Day: 2 Days Post-Op  Subjective:     Principal Problem:Closed fracture of left distal tibia    Principal Orthopedic Problem: Same    Interval History: Patient seen and examined at bedside.  No acute events overnight.  Pain mildly well controlled.  Elevated and in ex fix.  Did not work with PT yesterday planning to work with them this morning.      Review of patient's allergies indicates:   Allergen Reactions    Cephalexin      Other reaction(s): Unknown    Codeine Itching     Other reaction(s): Unknown    Nsaids (non-steroidal anti-inflammatory drug)        Current Facility-Administered Medications   Medication    acetaminophen tablet 1,000 mg    albuterol-ipratropium 2.5 mg-0.5 mg/3 mL nebulizer solution 3 mL    ARIPiprazole tablet 15 mg    bisacodyL suppository 10 mg    enoxaparin injection 40 mg    hydrALAZINE tablet 10 mg    hydroCHLOROthiazide tablet 25 mg    HYDROmorphone injection 0.5 mg    levothyroxine tablet 100 mcg    melatonin tablet 6 mg    morphine tablet 15 mg    mupirocin 2 % ointment    polyethylene glycol packet 17 g    pregabalin capsule 150 mg    propranoloL tablet 60 mg    senna-docusate 8.6-50 mg per tablet 1 tablet    sodium chloride 0.9% flush 10 mL    sodium chloride 0.9% flush 10 mL    tiZANidine tablet 4 mg    topiramate tablet 400 mg    venlafaxine 24 hr capsule 300 mg     Objective:     Vital Signs (Most Recent):  Temp: 97.2 °F (36.2 °C) (09/09/20 0809)  Pulse: (!) 51 (09/09/20 0809)  Resp: 18 (09/09/20 0821)  BP: 101/61 (09/09/20 0809)  SpO2: 100 % (09/09/20 0809) Vital Signs (24h Range):  Temp:  [96.4 °F  "(35.8 °C)-97.6 °F (36.4 °C)] 97.2 °F (36.2 °C)  Pulse:  [51-81] 51  Resp:  [14-20] 18  SpO2:  [95 %-100 %] 100 %  BP: ()/(49-70) 101/61     Weight: 59.7 kg (131 lb 10.9 oz)  Height: 5' 3" (160 cm)  Body mass index is 23.33 kg/m².      Intake/Output Summary (Last 24 hours) at 9/9/2020 1038  Last data filed at 9/8/2020 2200  Gross per 24 hour   Intake 1300 ml   Output 1450 ml   Net -150 ml       Ortho/SPM Exam       Physical Exam:  NAD, A/O x 3.  Ex fix in place   NVI with some paresthesias in first web space      Significant Labs: All pertinent labs within the past 24 hours have been reviewed.    Significant Imaging: I have reviewed and interpreted all pertinent imaging results/findings.    Assessment/Plan:     * Closed fracture of left distal tibia  Faith Bernard is a 55 y.o. female POD 2 s/p L Pilon ex fix.       - Weight bearing status: NWB  - Pain control: multimodal  - Antibiotics: yessica op ancef  - DVT Prophylaxis: Lovenox , SCD's at all times when not ambulating.  - PT/OT  - Dispo: f/u PT recs and likely discharge home early this week with plans for definitive fixation when soft tissues allow                   Matthew Dior MD  Orthopedics  [facility]  "

## 2020-09-09 NOTE — PLAN OF CARE
[facility]    HOME HEALTH ORDERS  FACE TO FACE ENCOUNTER    Patient Name: Faith Bernard  YOB: 1965    PCP: Rah Canela MD   PCP Address: [address]  [address] / [place] [address]  PCP Phone Number: [phone]  PCP Fax: [phone]    Encounter Date: 09/09/2020    Admit to Home Health    Diagnoses:  Active Hospital Problems    Diagnosis  POA    *Closed fracture of left distal tibia [S82.302A]  Yes    Closed left pilon fracture [S82.872A]  Yes      Resolved Hospital Problems   No resolved problems to display.       No future appointments.        I have seen and examined this patient face to face today. My clinical findings that support the need for the home health skilled services and home bound status are the following:  Weakness/numbness causing balance and gait disturbance due to Fracture making it taxing to leave home.    Allergies:  Review of patient's allergies indicates:   Allergen Reactions    Cephalexin      Other reaction(s): Unknown    Codeine Itching     Other reaction(s): Unknown    Nsaids (non-steroidal anti-inflammatory drug)        Diet: regular diet    Activities: NWB LLE    Home Health Admitting Clinician:   SN/PT to complete comprehensive assessment including routine vital signs. Instruct on disease process and s/s of complications to report to MD. Follow specific home health arthoplasty protocol. Review/verify medication list sent home with the patient at time of discharge  and instruct patient/caregiver as needed. If coumadin ordered, coumadin clinic to manage INR with INR draws 2x per week with a goal to maintain INR between 1.8 and 2.2. Frequency may be adjusted depending on start of care date.    Notify MD if SBP > 160 or < 90; DBP > 90 or < 50; HR > 120 or < 50; Temp > 101    Home Medical Equipment:  Walker, 3-1 bedside commode, transfer tub bench    CONSULTS:    Physical Therapy may admit if patient not on coumadin, PT to perform  comprehensive assessment if performing admit visit and generate therapy plan of care. Evaluate for home safety and equipment needs; Establish/upgrade home exercise program. Perform/instruct on therapeutic exercises, gait training, transfer training, and Range of Motion.      OTHER: (only select if patient needs other therapy disciplines)  Occupational Therapy to evaluate and treat. Evaluate home environment for safety and equipment needs. Perform/Instruct on transfers, ADL training, ROM, and therapeutic exercises.    MISCELLANEOUS CARE:  Routine Skin for Bedridden Patients: Instruct patient/caregiver to apply moisture barrier cream to all skin folds and wet areas in perineal area daily and after baths and all bowel movements.    WOUND CARE ORDERS:    - Betadine to affected fingers BID  - Silicone foam dressing to RE axilla- 2/wk until healed.      Medications: Review discharge medications with patient and family and provide education.      Current Discharge Medication List      START taking these medications    Details   acetaminophen (TYLENOL) 650 MG TbSR Take 1 tablet (650 mg total) by mouth every 6 (six) hours as needed (pain).  Qty: 120 tablet, Refills: 0      docusate sodium (COLACE) 100 MG capsule Take 1 capsule (100 mg total) by mouth 2 (two) times daily.  Qty: 30 capsule, Refills: 0      mupirocin (BACTROBAN) 2 % ointment Apply by nasal route 2 (two) times daily. for 7 days  Qty: 22 g, Refills: 0      oxyCODONE (ROXICODONE) 5 MG immediate release tablet Take 1-2 tablets (5-10 mg total) by mouth every 6 (six) hours as needed for Pain.  Qty: 31 tablet, Refills: 0    Comments: Quantity prescribed more than 7 day supply? Yes, quantity medically necessary         CONTINUE these medications which have NOT CHANGED    Details   aripiprazole (ABILIFY) 10 MG Tab Take 10 mg by mouth once daily.      cholecalciferol, vitamin D3, 50,000 unit capsule Take 50,000 Units by mouth every 7 days.  Refills: 99       CYANOCOBALAMIN, VITAMIN B-12, (VITAMIN B-12 INJ) Inject 1 mL as directed every 30 days.       dextroamphetamine-amphetamine 30 mg Tab TK 1/2 T PO TID  Refills: 0      ESTRING 2 mg (7.5 mcg /24 hour) vaginal ring place 2 mg vaginally once  Refills: 3      levothyroxine (SYNTHROID) 88 MCG tablet Take 88 mcg by mouth once daily.      PENNSAID 20 mg/gram /actuation(2 %) sopm       rizatriptan (MAXALT-MLT) 10 MG disintegrating tablet DISSOLVE ONE TABLET BY MOUTH allow TO dissolve ONCE daily AS NEEDED  Refills: 2      tizanidine (ZANAFLEX) 4 MG tablet Take 4 mg by mouth every evening. May take up to 12mg qhs  Refills: 4      topiramate (TOPAMAX) 200 MG Tab 100 mg 4 (four) times daily.       valsartan-hydrochlorothiazide (DIOVAN-HCT) 160-25 mg per tablet Take 1 tablet by mouth once daily.      venlafaxine (EFFEXOR XR) 75 MG 24 hr capsule Take 150 mg by mouth 2 (two) times daily.       VYVANSE 70 mg capsule Take 70 mg by mouth once daily.  Refills: 0         STOP taking these medications       butalbital-acetaminop-caf-cod -38-30 mg Cap Comments:   Reason for Stopping:         estradiol (ESTRACE) 2 MG tablet Comments:   Reason for Stopping:         oxycodone-acetaminophen (PERCOCET)  mg per tablet Comments:   Reason for Stopping:         traZODone (DESYREL) 150 MG tablet Comments:   Reason for Stopping:         XTAMPZA ER 13.5 mg CSpT Comments:   Reason for Stopping:               I certify that this patient is confined to her home and needs intermittent skilled nursing care, physical therapy and occupational therapy.

## 2020-09-09 NOTE — CONSULTS
Wound care consult received. Pt with history of MRSA, hypertension, migraines, thyroid disorder, arthritis, chronic back pain (pain clinic, chronic opioid use), and medical marijuana use presents as a transfer for evaluation of left P1 fracture. Pt is s/p 2 days for closed fracture of left distal tibia.  Removed tegaderm dressings from fingers noting maceration and partial thickness abrasions from trauma of rope. Healing abrasion to R axilla. Washed fingers with Hibiclens wash and dried them thoroughly.  Applied Betadine and recommend pt keep fingers open to air to dry maceration and reduce bioburden.    Notified nursing and sent secure chat to orthopedics team with recommendations:  - Betadine to affected fingers BID  - Silicone foam dressing to RE axilla- 2/wk until healed.  - Wound care team to follow pt prn  [id number]    R 2nd, 3rd, 4th fingers    L 2nd, 3rd, 4th fingers    R axilla healing abrasion

## 2020-09-09 NOTE — SUBJECTIVE & OBJECTIVE
"Principal Problem:Closed fracture of left distal tibia    Principal Orthopedic Problem: Same    Interval History: Patient seen and examined at bedside.  No acute events overnight.  Pain mildly well controlled.  Elevated and in ex fix.  Did not work with PT yesterday planning to work with them this morning.      Review of patient's allergies indicates:   Allergen Reactions    Cephalexin      Other reaction(s): Unknown    Codeine Itching     Other reaction(s): Unknown    Nsaids (non-steroidal anti-inflammatory drug)        Current Facility-Administered Medications   Medication    acetaminophen tablet 1,000 mg    albuterol-ipratropium 2.5 mg-0.5 mg/3 mL nebulizer solution 3 mL    ARIPiprazole tablet 15 mg    bisacodyL suppository 10 mg    enoxaparin injection 40 mg    hydrALAZINE tablet 10 mg    hydroCHLOROthiazide tablet 25 mg    HYDROmorphone injection 0.5 mg    levothyroxine tablet 100 mcg    melatonin tablet 6 mg    morphine tablet 15 mg    mupirocin 2 % ointment    polyethylene glycol packet 17 g    pregabalin capsule 150 mg    propranoloL tablet 60 mg    senna-docusate 8.6-50 mg per tablet 1 tablet    sodium chloride 0.9% flush 10 mL    sodium chloride 0.9% flush 10 mL    tiZANidine tablet 4 mg    topiramate tablet 400 mg    venlafaxine 24 hr capsule 300 mg     Objective:     Vital Signs (Most Recent):  Temp: 97.2 °F (36.2 °C) (09/09/20 0809)  Pulse: (!) 51 (09/09/20 0809)  Resp: 18 (09/09/20 0821)  BP: 101/61 (09/09/20 0809)  SpO2: 100 % (09/09/20 0809) Vital Signs (24h Range):  Temp:  [96.4 °F (35.8 °C)-97.6 °F (36.4 °C)] 97.2 °F (36.2 °C)  Pulse:  [51-81] 51  Resp:  [14-20] 18  SpO2:  [95 %-100 %] 100 %  BP: ()/(49-70) 101/61     Weight: 59.7 kg (131 lb 10.9 oz)  Height: 5' 3" (160 cm)  Body mass index is 23.33 kg/m².      Intake/Output Summary (Last 24 hours) at 9/9/2020 1038  Last data filed at 9/8/2020 2200  Gross per 24 hour   Intake 1300 ml   Output 1450 ml   Net -150 ml "       Ortho/SPM Exam       Physical Exam:  NAD, A/O x 3.  Ex fix in place   NVI with some paresthesias in first web space      Significant Labs: All pertinent labs within the past 24 hours have been reviewed.    Significant Imaging: I have reviewed and interpreted all pertinent imaging results/findings.

## 2020-09-10 VITALS
DIASTOLIC BLOOD PRESSURE: 70 MMHG | TEMPERATURE: 98 F | HEART RATE: 56 BPM | WEIGHT: 131.69 LBS | SYSTOLIC BLOOD PRESSURE: 115 MMHG | RESPIRATION RATE: 16 BRPM | OXYGEN SATURATION: 98 % | HEIGHT: 63 IN | BODY MASS INDEX: 23.33 KG/M2

## 2020-09-10 DIAGNOSIS — Z01.818 PREOP TESTING: ICD-10-CM

## 2020-09-10 PROCEDURE — 25000003 PHARM REV CODE 250: Performed by: STUDENT IN AN ORGANIZED HEALTH CARE EDUCATION/TRAINING PROGRAM

## 2020-09-10 PROCEDURE — 63600175 PHARM REV CODE 636 W HCPCS: Performed by: STUDENT IN AN ORGANIZED HEALTH CARE EDUCATION/TRAINING PROGRAM

## 2020-09-10 PROCEDURE — G0378 HOSPITAL OBSERVATION PER HR: HCPCS

## 2020-09-10 PROCEDURE — 96376 TX/PRO/DX INJ SAME DRUG ADON: CPT

## 2020-09-10 RX ORDER — ASPIRIN 81 MG/1
81 TABLET ORAL 2 TIMES DAILY
Qty: 60 TABLET | Refills: 0 | Status: ON HOLD | OUTPATIENT
Start: 2020-09-10 | End: 2020-09-20 | Stop reason: SDUPTHER

## 2020-09-10 RX ADMIN — TIZANIDINE 4 MG: 2 TABLET ORAL at 12:09

## 2020-09-10 RX ADMIN — MORPHINE SULFATE 2 MG: 2 INJECTION, SOLUTION INTRAMUSCULAR; INTRAVENOUS at 10:09

## 2020-09-10 RX ADMIN — OXYCODONE HYDROCHLORIDE 15 MG: 10 TABLET ORAL at 08:09

## 2020-09-10 RX ADMIN — POLYETHYLENE GLYCOL 3350 17 G: 17 POWDER, FOR SOLUTION ORAL at 09:09

## 2020-09-10 RX ADMIN — PROPRANOLOL HYDROCHLORIDE 60 MG: 20 TABLET ORAL at 08:09

## 2020-09-10 RX ADMIN — PREGABALIN 150 MG: 150 CAPSULE ORAL at 12:09

## 2020-09-10 RX ADMIN — MORPHINE SULFATE 2 MG: 2 INJECTION, SOLUTION INTRAMUSCULAR; INTRAVENOUS at 04:09

## 2020-09-10 RX ADMIN — MUPIROCIN: 20 OINTMENT TOPICAL at 09:09

## 2020-09-10 RX ADMIN — PROPRANOLOL HYDROCHLORIDE 60 MG: 20 TABLET ORAL at 12:09

## 2020-09-10 RX ADMIN — METHOCARBAMOL 1500 MG: 750 TABLET ORAL at 08:09

## 2020-09-10 RX ADMIN — LEVOTHYROXINE SODIUM 100 MCG: 100 TABLET ORAL at 08:09

## 2020-09-10 RX ADMIN — ACETAMINOPHEN 1000 MG: 500 TABLET ORAL at 08:09

## 2020-09-10 RX ADMIN — VENLAFAXINE HYDROCHLORIDE 300 MG: 75 CAPSULE, EXTENDED RELEASE ORAL at 12:09

## 2020-09-10 RX ADMIN — HYDROCHLOROTHIAZIDE 25 MG: 25 TABLET ORAL at 08:09

## 2020-09-10 RX ADMIN — TOPIRAMATE 400 MG: 200 TABLET, FILM COATED ORAL at 08:09

## 2020-09-10 RX ADMIN — DOCUSATE SODIUM 50MG AND SENNOSIDES 8.6MG 1 TABLET: 8.6; 5 TABLET, FILM COATED ORAL at 08:09

## 2020-09-10 RX ADMIN — ARIPIPRAZOLE 15 MG: 5 TABLET ORAL at 08:09

## 2020-09-10 RX ADMIN — DOCUSATE SODIUM 50MG AND SENNOSIDES 8.6MG 1 TABLET: 8.6; 5 TABLET, FILM COATED ORAL at 12:09

## 2020-09-10 NOTE — DISCHARGE SUMMARY
"[facility]  Orthopedics  Discharge Summary      Patient Name: Faith Bernard  MRN: [id number]  Admission Date: 9/7/2020  Hospital Length of Stay: 0 days  Discharge Date and Time:  09/10/2020 3:35 PM  Attending Physician: No att. providers found   Discharging Provider: Matthew Dior MD  Primary Care Provider: Rah Canela MD    HPI: See H&P    Procedure(s) (LRB):  APPLICATION, EXTERNAL FIXATION DEVICE, TIBIA (Left)      Hospital Course: Patient presented for above procedure.  Tolerated it well and was discharged home after voiding, tolerating diet, ambulating, pain controlled.  Patient was informed about signs and symptoms of compartment syndrome and if any of these should present then he should immediately call us back and come to the ED.  Discharge instructions, follow-up appointment, and med rec are below.      Consults (From admission, onward)        Status Ordering Provider     Inpatient consult to Orthopedic Surgery  Once     Provider:  (Not yet assigned)    Completed ANATOLIY ROY          Significant Diagnostic Studies: Labs: CBC No results for input(s): WBC, HGB, HCT, PLT in the last 48 hours.    Pending Diagnostic Studies:     None        Final Active Diagnoses:    Diagnosis Date Noted POA    PRINCIPAL PROBLEM:  Closed fracture of left distal tibia [S82.302A] 09/07/2020 Yes    Closed left pilon fracture [S82.872A] 09/07/2020 Yes      Problems Resolved During this Admission:      Discharged Condition: good    Disposition: Home or Self Care    Follow Up:  Follow-up Information     [facility].    Specialty: Home Health Services  Why: Home health agency will contact the pt via phone call upon discharge.   Contact information:  [place] [address] [phone]                 Patient Instructions:      COMMODE FOR HOME USE     Order Specific Question Answer Comments   Type: Standard    Height: 5' 3" (1.6 m)    Weight: 59.7 kg (131 " "lb 10.9 oz)    Does patient have medical equipment at home? bedside commode    Length of need (1-99 months): 1    Vendor: Other (use comments)    Expected Date of Delivery: 9/10/2020      CRUTCHES FOR HOME USE     Order Specific Question Answer Comments   Type: Axillary    Height: 5' 3" (1.6 m)    Weight: 59.7 kg (131 lb 10.9 oz)    Does patient have medical equipment at home? bedside commode    Length of need (1-99 months): 99    Vendor: Other (use comments)    Expected Date of Delivery: 9/10/2020      WHEELCHAIR FOR HOME USE     Order Specific Question Answer Comments   Hours in W/C per day: 24    Type of Wheelchair: Standard    Size(Width): 18"(STD adult)    Leg Support: Elevating leg rests    Arm Height: Detachable    Lap Belt: Velcro    Cushion: Foam    Justification for cushion: Prevent pressure ulcers    Height: 5' 3" (1.6 m)    Weight: 59.7 kg (131 lb 10.9 oz)    Does patient have medical equipment at home? bedside commode    Length of need (1-99 months): 99    Please check all that apply: Caregiver is capable and willing to operate wheelchair safely.    Vendor: [facility]    Expected Date of Delivery: 9/10/2020      WALKER FOR HOME USE     Order Specific Question Answer Comments   Type of Walker: Adult (5'4"-6'6")    With wheels? Yes    Height: 5' 3" (1.6 m)    Weight: 59.7 kg (131 lb 10.9 oz)    Length of need (1-99 months): 1    Does patient have medical equipment at home? bedside commode    Please check all that apply: Walker will be used for gait training.    Vendor: Other (use comments)    Expected Date of Delivery: 9/10/2020      TRANSFER TUB BENCH FOR HOME USE     Order Specific Question Answer Comments   Type of Transfer Tub Bench: Unpadded    Height: 5' 3" (1.6 m)    Weight: 59.7 kg (131 lb 10.9 oz)    Does patient have medical equipment at home? bedside commode    Length of need (1-99 months): 1    Vendor: Other (use comments)    Expected Date of Delivery: 9/10/2020      WHEELCHAIR FOR HOME USE " "  Order Comments: Pt. Dc today, pls deliver to room     Order Specific Question Answer Comments   Hours in W/C per day: 10    Type of Wheelchair: Lightweight    Patient unable to propel in Standard wheelchair? Yes    Size(Width): 18"(STD adult)    Leg Support: Elevating leg rests    Lap Belt: Velcro    Cushion: Basic    Height: 5' 3" (1.6 m)    Weight: 59.7 kg (131 lb 10.9 oz)    Does patient have medical equipment at home? bedside commode    Length of need (1-99 months): 99    Please check all that apply: Caregiver is capable and willing to operate wheelchair safely.    Vendor: [facility]    Expected Date of Delivery: 9/10/2020      Call MD for:  temperature >100.4     Call MD for:  persistent nausea and vomiting or diarrhea     Call MD for:  severe uncontrolled pain     Call MD for:  redness, tenderness, or signs of infection (pain, swelling, redness, odor or green/yellow discharge around incision site)     Call MD for:  difficulty breathing or increased cough     Call MD for:  severe persistent headache     Call MD for:  worsening rash     Call MD for:  persistent dizziness, light-headedness, or visual disturbances     Call MD for:  increased confusion or weakness     Sponge bath only until clinic visit     Keep surgical extremity elevated     Ice to affected area     Change dressing (specify)   Order Comments: PIN SITE CARE TWICE DAILY MIX 50% PEROXIDE WITH 50% NORMAL SALINE AND CLEAN ALL PIN SITES WITH GAUZE SOAKED IN SOLUTION. WRAP PIN SITES WITH DRY KERLEX.     Weight bearing restrictions (specify):   Order Comments: NWB LLE     Medications:  Reconciled Home Medications:      Medication List      START taking these medications    aspirin 81 MG EC tablet  Commonly known as: ECOTRIN  Take 1 tablet (81 mg total) by mouth 2 (two) times daily.     MAPAP ARTHRITIS PAIN 650 MG Tbsr  Generic drug: acetaminophen  Take 1 tablet (650 mg total) by mouth every 6 (six) hours as needed (pain).     mupirocin 2 % " ointment  Commonly known as: BACTROBAN  Apply by nasal route 2 (two) times daily. for 7 days     oxyCODONE 5 MG immediate release tablet  Commonly known as: ROXICODONE  Take 1-2 tablets (5-10 mg total) by mouth every 6 (six) hours as needed for Pain.     STOOL SOFTENER 100 MG capsule  Generic drug: docusate sodium  Take 1 capsule (100 mg total) by mouth 2 (two) times daily.        CHANGE how you take these medications    ESTRING 2 mg (7.5 mcg /24 hour) vaginal ring  Generic drug: estradioL  place 2 mg vaginally once  What changed: Another medication with the same name was removed. Continue taking this medication, and follow the directions you see here.        CONTINUE taking these medications    ARIPiprazole 10 MG Tab  Commonly known as: ABILIFY  Take 10 mg by mouth once daily.     cholecalciferol (vitamin D3) 1,250 mcg (50,000 unit) capsule  Take 50,000 Units by mouth every 7 days.     dextroamphetamine-amphetamine 30 mg Tab  TK 1/2 T PO TID     EFFEXOR XR 75 MG 24 hr capsule  Generic drug: venlafaxine  Take 150 mg by mouth 2 (two) times daily.     levothyroxine 88 MCG tablet  Commonly known as: SYNTHROID  Take 88 mcg by mouth once daily.     PENNSAID 20 mg/gram /actuation(2 %) Sopm  Generic drug: diclofenac sodium     rizatriptan 10 MG disintegrating tablet  Commonly known as: MAXALT-MLT  DISSOLVE ONE TABLET BY MOUTH allow TO dissolve ONCE daily AS NEEDED     tiZANidine 4 MG tablet  Commonly known as: ZANAFLEX  Take 4 mg by mouth every evening. May take up to 12mg qhs     topiramate 200 MG Tab  Commonly known as: TOPAMAX  100 mg 4 (four) times daily.     valsartan-hydrochlorothiazide 160-25 mg per tablet  Commonly known as: DIOVAN-HCT  Take 1 tablet by mouth once daily.     VITAMIN B-12 INJ  Inject 1 mL as directed every 30 days.     VYVANSE 70 MG capsule  Generic drug: lisdexamfetamine  Take 70 mg by mouth once daily.        STOP taking these medications    butalbitaL-acetaminop-caf-cod -89-30 mg Cap      oxyCODONE-acetaminophen  mg per tablet  Commonly known as: PERCOCET     traZODone 150 MG tablet  Commonly known as: DESYREL     XTAMPZA ER 13.5 mg Cspt  Generic drug: oxyCODONE myristate            Matthew Dior MD  Orthopedics  [facility]

## 2020-09-10 NOTE — SUBJECTIVE & OBJECTIVE
"Principal Problem:Closed fracture of left distal tibia    Principal Orthopedic Problem: Same    Interval History: Patient seen and examined at bedside.  No acute events overnight.  Pain improving.  Ambulated 5ft yesterday with RW.       Review of patient's allergies indicates:   Allergen Reactions    Cephalexin      Other reaction(s): Unknown    Codeine Itching     Other reaction(s): Unknown    Nsaids (non-steroidal anti-inflammatory drug)        Current Facility-Administered Medications   Medication    acetaminophen tablet 1,000 mg    albuterol-ipratropium 2.5 mg-0.5 mg/3 mL nebulizer solution 3 mL    ARIPiprazole tablet 15 mg    bisacodyL suppository 10 mg    enoxaparin injection 40 mg    hydrALAZINE tablet 10 mg    hydroCHLOROthiazide tablet 25 mg    levothyroxine tablet 100 mcg    melatonin tablet 6 mg    methocarbamoL tablet 1,500 mg    morphine injection 2 mg    mupirocin 2 % ointment    oxyCODONE immediate release tablet 15 mg    oxyCODONE immediate release tablet 5 mg    oxyCODONE immediate release tablet Tab 10 mg    polyethylene glycol packet 17 g    pregabalin capsule 150 mg    propranoloL tablet 60 mg    senna-docusate 8.6-50 mg per tablet 1 tablet    sodium chloride 0.9% flush 10 mL    sodium chloride 0.9% flush 10 mL    tiZANidine tablet 4 mg    topiramate tablet 400 mg    venlafaxine 24 hr capsule 300 mg     Objective:     Vital Signs (Most Recent):  Temp: 98 °F (36.7 °C) (09/10/20 0759)  Pulse: (!) 54 (09/10/20 0759)  Resp: 18 (09/10/20 0823)  BP: 108/64 (09/10/20 0759)  SpO2: 100 % (09/10/20 0759) Vital Signs (24h Range):  Temp:  [96.5 °F (35.8 °C)-98 °F (36.7 °C)] 98 °F (36.7 °C)  Pulse:  [50-61] 54  Resp:  [16-20] 18  SpO2:  [97 %-100 %] 100 %  BP: ()/(58-71) 108/64     Weight: 59.7 kg (131 lb 10.9 oz)  Height: 5' 3" (160 cm)  Body mass index is 23.33 kg/m².      Intake/Output Summary (Last 24 hours) at 9/10/2020 0915  Last data filed at 9/10/2020 0516  Gross per " 24 hour   Intake 500 ml   Output 2750 ml   Net -2250 ml       Ortho/SPM Exam       Physical Exam:  NAD, A/O x 3.  Ex fix in place   NVI with some paresthesias in first web space      Significant Labs: All pertinent labs within the past 24 hours have been reviewed.    Significant Imaging: I have reviewed and interpreted all pertinent imaging results/findings.

## 2020-09-10 NOTE — PLAN OF CARE
09/10/20 1500   Final Note   Assessment Type Final Discharge Note   Anticipated Discharge Disposition Home-Health  ([facility]   What phone number can be called within the next 1-3 days to see how you are doing after discharge? [phone]   Hospital Follow Up  Appt(s) scheduled? Yes   Discharge plans and expectations educations in teach back method with documentation complete? Yes     Wheelchair delivered to patient.   Future Appointments   Date Time Provider Department Center   9/16/2020  9:15 AM COVID TESTING, [facility] [place]   9/17/2020 10:00 AM Fer Mcrae MD [facility] ORTHO [place]

## 2020-09-10 NOTE — PLAN OF CARE
09/10/20 1049   Post-Acute Status   Post-Acute Authorization Home Health   Home Health Status Set-up Complete     Patient has been accepted by [facility]. CM met with pt at bedside to discuss HH choices, and faxed referral via .    Maddie Gross LMSW  Case Management   [facility]   Ext. [phone]

## 2020-09-10 NOTE — PROGRESS NOTES
[facility]  Orthopedics  Progress Note    Patient Name: Faith Bernard  MRN: [id number]  Admission Date: 9/7/2020  Hospital Length of Stay: 0 days  Attending Provider: Fer Mcrae,*  Primary Care Provider: Rah Canela MD  Follow-up For: Procedure(s) (LRB):  APPLICATION, EXTERNAL FIXATION DEVICE, TIBIA (Left)    Post-Operative Day: 3 Days Post-Op  Subjective:     Principal Problem:Closed fracture of left distal tibia    Principal Orthopedic Problem: Same    Interval History: Patient seen and examined at bedside.  No acute events overnight.  Pain improving.  Ambulated 5ft yesterday with RW.       Review of patient's allergies indicates:   Allergen Reactions    Cephalexin      Other reaction(s): Unknown    Codeine Itching     Other reaction(s): Unknown    Nsaids (non-steroidal anti-inflammatory drug)        Current Facility-Administered Medications   Medication    acetaminophen tablet 1,000 mg    albuterol-ipratropium 2.5 mg-0.5 mg/3 mL nebulizer solution 3 mL    ARIPiprazole tablet 15 mg    bisacodyL suppository 10 mg    enoxaparin injection 40 mg    hydrALAZINE tablet 10 mg    hydroCHLOROthiazide tablet 25 mg    levothyroxine tablet 100 mcg    melatonin tablet 6 mg    methocarbamoL tablet 1,500 mg    morphine injection 2 mg    mupirocin 2 % ointment    oxyCODONE immediate release tablet 15 mg    oxyCODONE immediate release tablet 5 mg    oxyCODONE immediate release tablet Tab 10 mg    polyethylene glycol packet 17 g    pregabalin capsule 150 mg    propranoloL tablet 60 mg    senna-docusate 8.6-50 mg per tablet 1 tablet    sodium chloride 0.9% flush 10 mL    sodium chloride 0.9% flush 10 mL    tiZANidine tablet 4 mg    topiramate tablet 400 mg    venlafaxine 24 hr capsule 300 mg     Objective:     Vital Signs (Most Recent):  Temp: 98 °F (36.7 °C) (09/10/20 0759)  Pulse: (!) 54 (09/10/20 0759)  Resp: 18 (09/10/20 0823)  BP: 108/64 (09/10/20 0759)  SpO2: 100  "% (09/10/20 0759) Vital Signs (24h Range):  Temp:  [96.5 °F (35.8 °C)-98 °F (36.7 °C)] 98 °F (36.7 °C)  Pulse:  [50-61] 54  Resp:  [16-20] 18  SpO2:  [97 %-100 %] 100 %  BP: ()/(58-71) 108/64     Weight: 59.7 kg (131 lb 10.9 oz)  Height: 5' 3" (160 cm)  Body mass index is 23.33 kg/m².      Intake/Output Summary (Last 24 hours) at 9/10/2020 0915  Last data filed at 9/10/2020 0516  Gross per 24 hour   Intake 500 ml   Output 2750 ml   Net -2250 ml       Ortho/SPM Exam       Physical Exam:  NAD, A/O x 3.  Ex fix in place   NVI with some paresthesias in first web space      Significant Labs: All pertinent labs within the past 24 hours have been reviewed.    Significant Imaging: I have reviewed and interpreted all pertinent imaging results/findings.    Assessment/Plan:     * Closed fracture of left distal tibia  Faith Bernard is a 55 y.o. female POD 3 s/p L Pilon ex fix.       - Weight bearing status: NWB  - Pain control: multimodal  - Antibiotics: yessica op ancef  - DVT Prophylaxis: Lovenox , SCD's at all times when not ambulating.  - PT/OT  - Dispo: discharge home today                   Matthew Dior MD  Orthopedics  [facility]  "

## 2020-09-10 NOTE — ASSESSMENT & PLAN NOTE
Faith SIMS Betomeryvicky is a 55 y.o. female POD 3 s/p L Pilon ex fix.       - Weight bearing status: NWB  - Pain control: multimodal  - Antibiotics: yessica op ancef  - DVT Prophylaxis: Lovenox , SCD's at all times when not ambulating.  - PT/OT  - Dispo: discharge home today

## 2020-09-11 ENCOUNTER — TELEPHONE (OUTPATIENT)
Dept: ORTHOPEDICS | Facility: CLINIC | Age: 55
End: 2020-09-11

## 2020-09-11 PROCEDURE — G0180 PR HOME HEALTH MD CERTIFICATION: ICD-10-PCS | Mod: ,,, | Performed by: ORTHOPAEDIC SURGERY

## 2020-09-11 PROCEDURE — G0180 MD CERTIFICATION HHA PATIENT: HCPCS | Mod: ,,, | Performed by: ORTHOPAEDIC SURGERY

## 2020-09-11 NOTE — TELEPHONE ENCOUNTER
Spoke with Elizabeth with Dr Swapnil Rollins's office [phone]    Advised of pt's injury and initial sx in which an ex fix was placed on 9/7 and of pt's planned definitive fixation sx on 9/18    Faxed the 9/7 op note to Elizabeth at [phone]   Elizabeth will advise Dr Rollins of pt's current condition and that we are seeking his assistance in controlling pt's post operative pain

## 2020-09-11 NOTE — TELEPHONE ENCOUNTER
----- Message from Evelyne Corona sent at 9/11/2020  9:56 AM CDT -----  Pt states that the medication she's currently taking is not working for her she's asking is there something else she can take for pain its very unbearable please contact pt           Contact info [phone]

## 2020-09-11 NOTE — TELEPHONE ENCOUNTER
Spoke with pt.    States her pain is constant, sharp and throbbing.    Pt is seeing Dr Swapnil Rollins [phone] for pain management.    He has her on Morphine 15 mg TID and Zanaflex 4 mg TID baseline.   Will discuss pain control with Dr Posey and get back to pt.

## 2020-09-14 ENCOUNTER — TELEPHONE (OUTPATIENT)
Dept: ORTHOPEDICS | Facility: CLINIC | Age: 55
End: 2020-09-14

## 2020-09-14 DIAGNOSIS — S82.875D CLOSED NONDISPLACED PILON FRACTURE OF LEFT TIBIA WITH ROUTINE HEALING, SUBSEQUENT ENCOUNTER: Primary | ICD-10-CM

## 2020-09-14 RX ORDER — OXYCODONE HYDROCHLORIDE 5 MG/1
5-10 TABLET ORAL EVERY 6 HOURS PRN
Qty: 31 TABLET | Refills: 0 | Status: SHIPPED | OUTPATIENT
Start: 2020-09-14 | End: 2020-09-14 | Stop reason: SDUPTHER

## 2020-09-14 RX ORDER — OXYCODONE HYDROCHLORIDE 5 MG/1
5-10 TABLET ORAL EVERY 6 HOURS PRN
Qty: 31 TABLET | Refills: 0 | Status: ON HOLD | OUTPATIENT
Start: 2020-09-14 | End: 2020-09-20 | Stop reason: HOSPADM

## 2020-09-15 ENCOUNTER — TELEPHONE (OUTPATIENT)
Dept: ORTHOPEDICS | Facility: CLINIC | Age: 55
End: 2020-09-15

## 2020-09-15 NOTE — TELEPHONE ENCOUNTER
Spoke with pt, told pt that we would cancel covid test  That she had scheduled for tomorrow 9/16/20 and she could just get it done the morning of surgery since she stays 50miles out, pt verbalize understands.

## 2020-09-17 ENCOUNTER — OFFICE VISIT (OUTPATIENT)
Dept: ORTHOPEDICS | Facility: CLINIC | Age: 55
End: 2020-09-17
Payer: COMMERCIAL

## 2020-09-17 ENCOUNTER — ANESTHESIA EVENT (OUTPATIENT)
Dept: SURGERY | Facility: HOSPITAL | Age: 55
End: 2020-09-17
Payer: COMMERCIAL

## 2020-09-17 VITALS
TEMPERATURE: 98 F | SYSTOLIC BLOOD PRESSURE: 123 MMHG | DIASTOLIC BLOOD PRESSURE: 87 MMHG | HEART RATE: 64 BPM | RESPIRATION RATE: 18 BRPM

## 2020-09-17 DIAGNOSIS — S82.872D CLOSED DISPLACED PILON FRACTURE OF LEFT TIBIA WITH ROUTINE HEALING, SUBSEQUENT ENCOUNTER: Primary | ICD-10-CM

## 2020-09-17 PROCEDURE — 99203 PR OFFICE/OUTPT VISIT, NEW, LEVL III, 30-44 MIN: ICD-10-PCS | Mod: S$GLB,,, | Performed by: ORTHOPAEDIC SURGERY

## 2020-09-17 PROCEDURE — 3079F DIAST BP 80-89 MM HG: CPT | Mod: CPTII,S$GLB,, | Performed by: ORTHOPAEDIC SURGERY

## 2020-09-17 PROCEDURE — 99999 PR PBB SHADOW E&M-EST. PATIENT-LVL IV: CPT | Mod: PBBFAC,,, | Performed by: ORTHOPAEDIC SURGERY

## 2020-09-17 PROCEDURE — 99203 OFFICE O/P NEW LOW 30 MIN: CPT | Mod: S$GLB,,, | Performed by: ORTHOPAEDIC SURGERY

## 2020-09-17 PROCEDURE — 3074F SYST BP LT 130 MM HG: CPT | Mod: CPTII,S$GLB,, | Performed by: ORTHOPAEDIC SURGERY

## 2020-09-17 PROCEDURE — 99999 PR PBB SHADOW E&M-EST. PATIENT-LVL IV: ICD-10-PCS | Mod: PBBFAC,,, | Performed by: ORTHOPAEDIC SURGERY

## 2020-09-17 PROCEDURE — 3074F PR MOST RECENT SYSTOLIC BLOOD PRESSURE < 130 MM HG: ICD-10-PCS | Mod: CPTII,S$GLB,, | Performed by: ORTHOPAEDIC SURGERY

## 2020-09-17 PROCEDURE — 3079F PR MOST RECENT DIASTOLIC BLOOD PRESSURE 80-89 MM HG: ICD-10-PCS | Mod: CPTII,S$GLB,, | Performed by: ORTHOPAEDIC SURGERY

## 2020-09-17 RX ORDER — PROGESTERONE 200 MG/1
200 CAPSULE ORAL NIGHTLY
COMMUNITY
End: 2023-02-17

## 2020-09-17 RX ORDER — HYDROCHLOROTHIAZIDE 25 MG/1
25 TABLET ORAL EVERY MORNING
COMMUNITY
End: 2023-09-18

## 2020-09-17 NOTE — ANESTHESIA PREPROCEDURE EVALUATION
[facility]  Anesthesia Pre-Operative Evaluation         Patient Name: Faith Bernard  YOB: 1965  MRN: [id number]    SUBJECTIVE:     Pre-operative evaluation for Procedure(s) (LRB):  ORIF, FRACTURE, PILON - left. Diving board, supine, bone foam. Seda anterolateral distal tibial plate, mini frag, long 3.5mm steel screw, CaPhos bone substitute (Left)     09/17/2020    Faith Bernard is a 55 y.o. female with past medical history of hypertension, migraines, thyroid disorder, arthritis, chronic back pain (pain clinic, chronic opioid use), and medical marijuana use presents who initially presented 9/6/20 with pilon fx after falling from 30ft rope swing s/p ex-fix 9/7/20. She re-presented to orthopedic clinic for wound check c/o 10/10 pain throughout ankle. Orthopedic surgeon consulted patient regarding continued intra-articular incongruity, with resultant joint pain, stiffness, arthritis, poor function with current ex-fix and will perform ORIF and ex-fix removal.      Echo: 6/21/15  1 - Normal left ventricular systolic function (EF 55-60%).     2 - Normal right ventricular systolic function .     3 - Trivial mitral regurgitation.     LDA: None documented.    Prev airway: None documented.     Drips: None documented.    Patient Active Problem List   Diagnosis    Migraine headache    GI bleed    Overdose    Essential hypertension    Weakness    Torsades de pointes    Anxiety    Ileus    Constipation    Bipolar 1 disorder    Hypothyroidism    Acute hepatitis    Chest pain    Dysphagia    Encounter for well woman exam with routine gynecological exam    Dyspareunia, female    Closed fracture of left distal tibia    Closed left pilon fracture       Review of patient's allergies indicates:   Allergen Reactions    Cephalexin      Other reaction(s): Unknown    Codeine Itching     Other reaction(s): Unknown    Nsaids (non-steroidal anti-inflammatory drug)         Current Outpatient Medications on File Prior to Visit   Medication Sig Dispense Refill    acetaminophen (TYLENOL) 650 MG TbSR Take 1 tablet (650 mg total) by mouth every 6 (six) hours as needed (pain). 120 tablet 0    aripiprazole (ABILIFY) 10 MG Tab Take 10 mg by mouth once daily.      aspirin (ECOTRIN) 81 MG EC tablet Take 1 tablet (81 mg total) by mouth 2 (two) times daily. 60 tablet 0    cholecalciferol, vitamin D3, 50,000 unit capsule Take 50,000 Units by mouth every 7 days.  99    CYANOCOBALAMIN, VITAMIN B-12, (VITAMIN B-12 INJ) Inject 1 mL as directed every 30 days.       dextroamphetamine-amphetamine 30 mg Tab TK 1/2 T PO TID  0    docusate sodium (COLACE) 100 MG capsule Take 1 capsule (100 mg total) by mouth 2 (two) times daily. 30 capsule 0    ESTRING 2 mg (7.5 mcg /24 hour) vaginal ring place 2 mg vaginally once  3    levothyroxine (SYNTHROID) 88 MCG tablet Take 88 mcg by mouth once daily.      mupirocin (BACTROBAN) 2 % ointment Apply by nasal route 2 (two) times daily. for 7 days 22 g 0    oxyCODONE (ROXICODONE) 5 MG immediate release tablet Take 1-2 tablets (5-10 mg total) by mouth every 6 (six) hours as needed for Pain. 31 tablet 0    PENNSAID 20 mg/gram /actuation(2 %) sopm       rizatriptan (MAXALT-MLT) 10 MG disintegrating tablet DISSOLVE ONE TABLET BY MOUTH allow TO dissolve ONCE daily AS NEEDED  2    tizanidine (ZANAFLEX) 4 MG tablet Take 4 mg by mouth every evening. May take up to 12mg qhs  4    topiramate (TOPAMAX) 200 MG Tab 100 mg 4 (four) times daily.       valsartan-hydrochlorothiazide (DIOVAN-HCT) 160-25 mg per tablet Take 1 tablet by mouth once daily.      venlafaxine (EFFEXOR XR) 75 MG 24 hr capsule Take 150 mg by mouth 2 (two) times daily.       VYVANSE 70 mg capsule Take 70 mg by mouth once daily.  0     No current facility-administered medications on file prior to visit.        Past Surgical History:   Procedure Laterality Date    ABDOMINAL SURGERY       APPLICATION OF LARGE EXTERNAL FIXATION DEVICE TO TIBIA Left 9/7/2020    Procedure: APPLICATION, EXTERNAL FIXATION DEVICE, TIBIA;  Surgeon: Sujata Londono MD;  Location: [facility] OR [address] [place] [address];  Service: Orthopedics;  Laterality: Left;  need paralysis    BREAST SURGERY  2004    Breast Reduction    CARDIAC CATHETERIZATION      COLONOSCOPY N/A 11/3/2015    Procedure: COLONOSCOPY;  Surgeon: Timothy Barber MD;  Location: [facility];  Service: Endoscopy;  Laterality: N/A;    DENTAL SURGERY      4 teeth removed    GASTRIC BYPASS      HERNIA REPAIR      HYSTERECTOMY      OVARIAN CYST REMOVAL      aprox 5 times    ROTATOR CUFF REPAIR Left 01/2019    tennis elbow repair Left 01/2019    UPPER GASTROINTESTINAL ENDOSCOPY         Social History     Socioeconomic History    Marital status:      Spouse name: Not on file    Number of children: Not on file    Years of education: Not on file    Highest education level: Not on file   Occupational History    Not on file   Social Needs    Financial resource strain: Not on file    Food insecurity     Worry: Not on file     Inability: Not on file    Transportation needs     Medical: Not on file     Non-medical: Not on file   Tobacco Use    Smoking status: Never Smoker    Smokeless tobacco: Never Used   Substance and Sexual Activity    Alcohol use: Yes     Comment: 2 per month    Drug use: Yes     Types: Amphetamines, Barbituates, Benzodiazepines    Sexual activity: Yes     Partners: Male     Birth control/protection: Surgical, None   Lifestyle    Physical activity     Days per week: Not on file     Minutes per session: Not on file    Stress: Not on file   Relationships    Social connections     Talks on phone: Not on file     Gets together: Not on file     Attends Latter day service: Not on file     Active member of club or organization: Not on file     Attends meetings of clubs or organizations: Not on file     Relationship status: Not on file   Other Topics  Concern    Not on file   Social History Narrative    Not on file         CBC:   No results for input(s): WBC, RBC, HGB, HCT, PLT, MCV, MCH, MCHC in the last 72 hours.    CMP:   No results for input(s): NA, K, CL, CO2, BUN, CREATININE, GLU, MG, PHOS, CALCIUM, ALBUMIN, PROT, ALKPHOS, ALT, AST, BILITOT in the last 72 hours.    INR  No results for input(s): PT, INR, PROTIME, APTT in the last 72 hours.        Diagnostic Studies:      EKD Echo:  Results for orders placed or performed during the hospital encounter of 06/19/15   2D echo with color flow doppler   Result Value Ref Range    QEF 55 55 - 65    Mitral Valve Regurgitation TRIVIAL          Anesthesia Evaluation    I have reviewed the Patient Summary Reports.    I have reviewed the Nursing Notes.    I have reviewed the Medications.     Review of Systems  Anesthesia Hx:  Hx of Anesthetic complications PONV   Cardiovascular:   Hypertension    Pulmonary:  Pulmonary Normal    Hepatic/GI:   Liver Disease, Hepatitis    Musculoskeletal:   Arthritis     Neurological:   Neuromuscular Disease, Headaches    Endocrine:   Hypothyroidism    Psych:   Psychiatric History          Physical Exam  General:  Well nourished    Airway/Jaw/Neck:  Airway Findings: Mouth Opening: Normal General Airway Assessment: Adult  Mallampati: II  TM Distance: Normal, at least 6 cm       Chest/Lungs:  Chest/Lungs Findings: Clear to auscultation     Heart/Vascular:  Heart Findings: Rate: Normal        Mental Status:  Mental Status Findings:  Cooperative, Alert and Oriented         Anesthesia Plan  Type of Anesthesia, risks & benefits discussed:  Anesthesia Type:  general  Patient's Preference:   Intra-op Monitoring Plan:   Intra-op Monitoring Plan Comments:   Post Op Pain Control Plan: multimodal analgesia  Post Op Pain Control Plan Comments:   Induction:   IV  Beta Blocker:  Patient is not currently on a Beta-Blocker (No further documentation required).       Informed Consent: Patient  understands risks and agrees with Anesthesia plan.  Questions answered. Anesthesia consent signed with patient.  ASA Score: 3     Day of Surgery Review of History & Physical: I have interviewed and examined the patient. I have reviewed the patient's H&P dated:            Ready For Surgery From Anesthesia Perspective.

## 2020-09-17 NOTE — PRE-PROCEDURE INSTRUCTIONS
PREOP INSTRUCTIONS:No solid food ,milk or milk products for 8 hours prior to procedure.Clear liquids are allowed up to 2 hours before procedure.Clear liquids are:water,apple juice,gatorade & powerade.Patient instructed to follow the surgeon's instructions if they differ from these.Shower instructions as well as directions to the Surgery Center were given.Patient encouraged to wear loose fitting,comfortable clothing.Medication instructions for pm prior to and am of procedure reviewed.Instructed patient to avoid taking vitamins,supplements,aspirin and ibuprofen the morning of surgery. Patient stated an understanding.Patient informed of the current visitor policy and advised patient that one visitor may accompany each patient into the hospital and wait (socially distanced) until a member of the medical team provides an update at the conclusion of the procedure.When they enter the hospital both patient and visitor will have their temperature checked.All visitors are asked to arrive with a mask and to keep their mask on throughout the visit.    Patient denies any side effects or issues with anesthesia or sedation other than PONV

## 2020-09-17 NOTE — PROGRESS NOTES
CC:  Left pilon fracture      HISTORY       HPI:  55-year-old female, chronic pain, on chronic opioids, fall from height/rope swing/30 ft 09/06/2020 sustaining an injury to her left ankle.  Can emergency department, seen by the orthopedic resident on-call team.  Diagnosed with a left pilon fracture.    09/07/2020 - ex-fix left pilon    Subsequently discharged home, to allow soft tissue swelling to subside.  Here today for preoperative skin check and follow-up.    Currently complains of 10 out 10 pain all throughout her ankle.  It is only moderately controlled by her morphine and oxycodone  Currently she is taking   morphine 15 mg t.i.d.  Oxycodone 15 mg Q 6    Lives at home with her   Disabled due to chronic back and knee pain  Takes opioids daily and has so for years  Nonsmoker  Denies diabetes  Denies history of heart attack, stroke, blood clot, cancer  History of prior MRSA infections at surgical sites.  No current infections    ROS:  Constitutional: Denies fever/chills  Neurological: Denies numbness/tingling (any exceptions noted in orthopaedic exam)   Psychiatric/Behavioral: Denies change in normal mood  Eyes: Denies change in vision  Cardiovascular: Denies chest pain  Respiratory: Denies shortness of breath  Hematologic/Lymphatic: Denies easy bleeding/bruising   Skin: Denies new rash or skin lesions   Gastrointestinal: Denies nausea/vomitting/diarrhea, change in bowel habits, abdominal pain   Allergic/Immunologic: Denies adverse reactions to current medications  Musculoskeletal: see HPI    PAST MEDICAL HISTORY:   Past Medical History:   Diagnosis Date    Anxiety     Depression     Encounter for blood transfusion     Esophageal dysphagia     Fibromyalgia     Gastric bypass status for obesity     H/O dental abscess 4/14/2014    drained    Headache(784.0)     migraines.  Treated at [facility] (Dr. 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  Pt states took too many then realized her mistake    Polycystic ovaries     S/P JUHI-BSO     Thyroid disease     hypothyroidism     PAST SURGICAL HISTORY:   Past Surgical History:   Procedure Laterality Date    ABDOMINAL SURGERY      APPLICATION OF LARGE EXTERNAL FIXATION DEVICE TO TIBIA Left 9/7/2020    Procedure: APPLICATION, EXTERNAL FIXATION DEVICE, TIBIA;  Surgeon: Sujata Londono MD;  Location: [address];  Service: Orthopedics;  Laterality: Left;  need paralysis    BREAST SURGERY  2004    Breast Reduction    CARDIAC CATHETERIZATION      COLONOSCOPY N/A 11/3/2015    Procedure: COLONOSCOPY;  Surgeon: Timothy Barber MD;  [facility];  Service: Endoscopy;  Laterality: N/A;    DENTAL SURGERY      4 teeth removed    GASTRIC BYPASS      HERNIA REPAIR      HYSTERECTOMY      OVARIAN CYST REMOVAL      aprox 5 times    ROTATOR CUFF REPAIR Left 01/2019    tennis elbow repair Left 01/2019    UPPER GASTROINTESTINAL ENDOSCOPY       FAMILY HISTORY:   Family History   Problem Relation Age of Onset    Anxiety disorder Mother     Depression Mother     Suicide Mother     Seizures Mother     Drug abuse Mother     Ovarian cancer Mother     Aortic aneurysm Father     Colon cancer Neg Hx     Breast cancer Neg Hx     Diabetes Neg Hx     Hypertension Neg Hx      SOCIAL HISTORY:   Social History     Socioeconomic History    Marital status:      Spouse name: Not on file    Number of children: Not on file    Years of education: Not on file    Highest education level: Not on file   Occupational History    Not on file   Social  Needs    Financial resource strain: Not on file    Food insecurity     Worry: Not on file     Inability: Not on file    Transportation needs     Medical: Not on file     Non-medical: Not on file   Tobacco Use    Smoking status: Never Smoker    Smokeless tobacco: Never Used   Substance and Sexual Activity    Alcohol use: Yes     Comment: 2 per month    Drug use: Yes     Types: Amphetamines, Barbituates, Benzodiazepines    Sexual activity: Yes     Partners: Male     Birth control/protection: Surgical, None   Lifestyle    Physical activity     Days per week: Not on file     Minutes per session: Not on file    Stress: Not on file   Relationships    Social connections     Talks on phone: Not on file     Gets together: Not on file     Attends Mu-ism service: Not on file     Active member of club or organization: Not on file     Attends meetings of clubs or organizations: Not on file     Relationship status: Not on file   Other Topics Concern    Not on file   Social History Narrative    Not on file     MEDICATIONS:   Current Outpatient Medications:     acetaminophen (TYLENOL) 650 MG TbSR, Take 1 tablet (650 mg total) by mouth every 6 (six) hours as needed (pain)., Disp: 120 tablet, Rfl: 0    aripiprazole (ABILIFY) 10 MG Tab, Take 10 mg by mouth once daily., Disp: , Rfl:     aspirin (ECOTRIN) 81 MG EC tablet, Take 1 tablet (81 mg total) by mouth 2 (two) times daily., Disp: 60 tablet, Rfl: 0    cholecalciferol, vitamin D3, 50,000 unit capsule, Take 50,000 Units by mouth every 7 days., Disp: , Rfl: 99    CYANOCOBALAMIN, VITAMIN B-12, (VITAMIN B-12 INJ), Inject 1 mL as directed every 30 days. , Disp: , Rfl:     dextroamphetamine-amphetamine 30 mg Tab, TK 1/2 T PO TID, Disp: , Rfl: 0    docusate sodium (COLACE) 100 MG capsule, Take 1 capsule (100 mg total) by mouth 2 (two) times daily., Disp: 30 capsule, Rfl: 0    ESTRING 2 mg (7.5 mcg /24 hour) vaginal ring, place 2 mg vaginally once, Disp: , Rfl: 3     levothyroxine (SYNTHROID) 88 MCG tablet, Take 88 mcg by mouth once daily., Disp: , Rfl:     mupirocin (BACTROBAN) 2 % ointment, Apply by nasal route 2 (two) times daily. for 7 days, Disp: 22 g, Rfl: 0    oxyCODONE (ROXICODONE) 5 MG immediate release tablet, Take 1-2 tablets (5-10 mg total) by mouth every 6 (six) hours as needed for Pain., Disp: 31 tablet, Rfl: 0    PENNSAID 20 mg/gram /actuation(2 %) sopm, , Disp: , Rfl:     rizatriptan (MAXALT-MLT) 10 MG disintegrating tablet, DISSOLVE ONE TABLET BY MOUTH allow TO dissolve ONCE daily AS NEEDED, Disp: , Rfl: 2    tizanidine (ZANAFLEX) 4 MG tablet, Take 4 mg by mouth every evening. May take up to 12mg qhs, Disp: , Rfl: 4    topiramate (TOPAMAX) 200 MG Tab, 100 mg 4 (four) times daily. , Disp: , Rfl:     valsartan-hydrochlorothiazide (DIOVAN-HCT) 160-25 mg per tablet, Take 1 tablet by mouth once daily., Disp: , Rfl:     venlafaxine (EFFEXOR XR) 75 MG 24 hr capsule, Take 150 mg by mouth 2 (two) times daily. , Disp: , Rfl:     VYVANSE 70 mg capsule, Take 70 mg by mouth once daily., Disp: , Rfl: 0  ALLERGIES:   Review of patient's allergies indicates:   Allergen Reactions    Cephalexin      Other reaction(s): Unknown    Codeine Itching     Other reaction(s): Unknown    Nsaids (non-steroidal anti-inflammatory drug)          EXAM      VITAL SIGNS:   /87   Pulse 64   Temp 98.4 °F (36.9 °C) (Oral)   Resp 18   LMP  (LMP Unknown)       PE:  General:  no acute distress, appears stated age    Neuro: alert and oriented x3  Psych: normal mood  Head: normocephalic, atraumatic.   Eyes: no scleral icterus  Mouth: moist mucous membranes  Cardiovascular: extremities warm and well perfused  Lungs: breathing comfortably, equal chest rise bilat  Skin: clean, dry, intact (any exceptions noted in below musculoskeletal exam)    Musculoskeletal:  RUE:  No gross deformities, wounds  No crepitus, No TTP  Motor intact deltoid, bicep, tricep, wrist flexion/extension, finger  flexion/extension, interossei  Sensation intact axillary, radial, median, ulnar nerves  Palp rad pulse, BCR    LUE:  No gross deformities, wounds  No crepitus, No TTP  Motor intact deltoid, bicep, tricep, wrist flexion/extension, finger flexion/extension, interossei  Sensation intact axillary, radial, median, ulnar nerves  Palp rad pulse, BCR    RLE:  No gross deformities, wounds  No crepitus, No TTP  Motor intact hip flex, quad, Tib Ant, gastroc, EHL, FHL.  Sensation intact saphenous, sural, deep/superficial peroneal, tibial nerves  Palp DP/PT pulse, BCR    LLE:  External fixator in place.  Pin sites clean dry intact, no signs of infection  Swelling has improved from her prior exam, there is some skin wrinkling.  The ecchymosis appears to be resolving somewhat.  Her soft tissues do appear to be amenable to surgery  Tender to palpation all throughout medial and lateral aspect of the ankle.  Ankle appears to be grossly well aligned in the external fixator  Motor intact hip flex, quad, EHL, FHL.  Sensation intact saphenous, sural, deep/superficial peroneal, tibial nerves  Palp DP/PT pulse, BCR            XRAYS:  X-ray left ankle, CT scan left ankle demonstrate a comminuted left pilon fracture with significant articular incongruity, comminution, depression, with a medial fracture component, and a minimally displaced fibular fracture.  (I independently reviewed and interpreted the above imaging)    MEDICAL DECISION MAKING       Encounter Diagnosis   Name Primary?    Closed displaced pilon fracture of left tibia with routine healing, subsequent encounter Yes       55-year-old female, chronic opioid user due to chronic pain, fall from height 09/06/2020 sustaining a closed left pilon fracture.  09/07/2020 ex fix by 1 of my partners.    Counseled patient on her medical diagnosis and treatment options.  Discussed both non operative and operative management.  Non operative management from this point out would consist of  continued external fixator, and allowing the fracture to heal as is.  I feel that this was aid result in continued intra-articular incongruity, with resultant joint pain, stiffness, arthritis, poor function.  Discussed operative intervention the form of open reduction internal fixation.  I feel that this will improve her alignment, mobility, pain, function, though it will carry with it the increased risk of surgical site infections.  Regardless of the intervention performed, due to her severe injury, she will likely have decreased function and some long-term pain in her left ankle.  Also counseled the patient that she has chronic pain, and takes chronic opioids, and it will be very difficult and challenging to control her pain adequately in the postoperative period.      Through a mutual decision making process we came up with the plan to proceed with open reduction internal fixation and removal of her external fixator, tomorrow in operating room.  She will likely be admitted for couple days postop for strict elevation of her leg.  We will consult anesthesia pain management for perioperative block and assistance with pain control given her chronic opioid use.    The risks, benefits, and alternatives to surgery were discussed with the patient and/or family.    Specific risks discussed included, but were not limited to:  Chronic pain, ankle stiffness, ankle arthritis, decreased function, persistent swelling, failure of fixation, hardware prominence, infection, need for multiple staged procedures, damage to nearby structures, including neurovascular structures leading to loss of function or loss of limb, bleeding, need for blood transfusion, pain, stiffness, scarring, numbness, tingling, weakness, compartment syndrome, malunion/nonunion, hardware failure, hardware prominence, infection, need for multiple staged procedures, prolonged antibiotics, iatrogenic fracture, heterotopic ossification, arthritis, a variety of  medical complications including but not limited to heart attack, stroke, deep venous thrombosis, pulmonary embolism, prolonged hospitalization, prolonged intubation, and death.   Patient and/or family expressed an understanding and desires to proceed with surgery.   All questions were answered.  No guarantees were implied or stated.  Informed consent was obtained.        =====================  Fer Mcrae MD  Orthopaedic Surgery

## 2020-09-18 ENCOUNTER — ANESTHESIA (OUTPATIENT)
Dept: SURGERY | Facility: HOSPITAL | Age: 55
End: 2020-09-18
Payer: COMMERCIAL

## 2020-09-18 ENCOUNTER — HOSPITAL ENCOUNTER (OUTPATIENT)
Facility: HOSPITAL | Age: 55
LOS: 1 days | Discharge: HOME | End: 2020-09-20
Attending: ORTHOPAEDIC SURGERY | Admitting: ORTHOPAEDIC SURGERY
Payer: COMMERCIAL

## 2020-09-18 DIAGNOSIS — S82.892A CLOSED LEFT ANKLE FRACTURE: ICD-10-CM

## 2020-09-18 DIAGNOSIS — Z01.810 PREOP CARDIOVASCULAR EXAM: ICD-10-CM

## 2020-09-18 LAB
ABO + RH BLD: NORMAL
BLD GP AB SCN CELLS X3 SERPL QL: NORMAL
SARS-COV-2 RDRP RESP QL NAA+PROBE: NEGATIVE

## 2020-09-18 PROCEDURE — C1769 GUIDE WIRE: HCPCS | Performed by: ORTHOPAEDIC SURGERY

## 2020-09-18 PROCEDURE — 27800903 OPTIME MED/SURG SUP & DEVICES OTHER IMPLANTS: Performed by: ORTHOPAEDIC SURGERY

## 2020-09-18 PROCEDURE — 63600175 PHARM REV CODE 636 W HCPCS: Performed by: NURSE ANESTHETIST, CERTIFIED REGISTERED

## 2020-09-18 PROCEDURE — 76942 FEMORAL CATHETER: ICD-10-PCS | Mod: 26,,, | Performed by: ANESTHESIOLOGY

## 2020-09-18 PROCEDURE — 37000008 HC ANESTHESIA 1ST 15 MINUTES: Performed by: ORTHOPAEDIC SURGERY

## 2020-09-18 PROCEDURE — 63600175 PHARM REV CODE 636 W HCPCS: Performed by: ORTHOPAEDIC SURGERY

## 2020-09-18 PROCEDURE — 36000710: Performed by: ORTHOPAEDIC SURGERY

## 2020-09-18 PROCEDURE — D9220A PRA ANESTHESIA: ICD-10-PCS | Mod: ANES,,, | Performed by: ANESTHESIOLOGY

## 2020-09-18 PROCEDURE — 64448 NJX AA&/STRD FEM NRV NFS IMG: CPT | Mod: 59,LT,, | Performed by: ANESTHESIOLOGY

## 2020-09-18 PROCEDURE — 25000003 PHARM REV CODE 250: Performed by: STUDENT IN AN ORGANIZED HEALTH CARE EDUCATION/TRAINING PROGRAM

## 2020-09-18 PROCEDURE — 88305 TISSUE EXAM BY PATHOLOGIST: CPT | Performed by: PATHOLOGY

## 2020-09-18 PROCEDURE — 64446 NJX AA&/STRD SC NRV NFS IMG: CPT | Mod: 59,LT,, | Performed by: ANESTHESIOLOGY

## 2020-09-18 PROCEDURE — 76942 ECHO GUIDE FOR BIOPSY: CPT | Performed by: STUDENT IN AN ORGANIZED HEALTH CARE EDUCATION/TRAINING PROGRAM

## 2020-09-18 PROCEDURE — 64446 NJX AA&/STRD SC NRV NFS IMG: CPT | Performed by: STUDENT IN AN ORGANIZED HEALTH CARE EDUCATION/TRAINING PROGRAM

## 2020-09-18 PROCEDURE — 63600175 PHARM REV CODE 636 W HCPCS: Performed by: STUDENT IN AN ORGANIZED HEALTH CARE EDUCATION/TRAINING PROGRAM

## 2020-09-18 PROCEDURE — 25000003 PHARM REV CODE 250: Performed by: ANESTHESIOLOGY

## 2020-09-18 PROCEDURE — 36000711: Performed by: ORTHOPAEDIC SURGERY

## 2020-09-18 PROCEDURE — U0002 COVID-19 LAB TEST NON-CDC: HCPCS

## 2020-09-18 PROCEDURE — 86850 RBC ANTIBODY SCREEN: CPT

## 2020-09-18 PROCEDURE — C1713 ANCHOR/SCREW BN/BN,TIS/BN: HCPCS | Performed by: ORTHOPAEDIC SURGERY

## 2020-09-18 PROCEDURE — 64448 FEMORAL CATHETER: ICD-10-PCS | Mod: 59,LT,, | Performed by: ANESTHESIOLOGY

## 2020-09-18 PROCEDURE — 88305 TISSUE EXAM BY PATHOLOGIST: ICD-10-PCS | Mod: 26,,, | Performed by: PATHOLOGY

## 2020-09-18 PROCEDURE — 20694 PR REMOVE EXTERN BONE FIX DEV W ANESTH: ICD-10-PCS | Mod: 51,LT,, | Performed by: ORTHOPAEDIC SURGERY

## 2020-09-18 PROCEDURE — 64448 NJX AA&/STRD FEM NRV NFS IMG: CPT | Mod: LT | Performed by: STUDENT IN AN ORGANIZED HEALTH CARE EDUCATION/TRAINING PROGRAM

## 2020-09-18 PROCEDURE — 37000009 HC ANESTHESIA EA ADD 15 MINS: Performed by: ORTHOPAEDIC SURGERY

## 2020-09-18 PROCEDURE — 64446 SCIATIC CATHETER: ICD-10-PCS | Mod: 59,LT,, | Performed by: ANESTHESIOLOGY

## 2020-09-18 PROCEDURE — 71000015 HC POSTOP RECOV 1ST HR: Performed by: ORTHOPAEDIC SURGERY

## 2020-09-18 PROCEDURE — 27828 PR OPEN TREATMENT FRACTURE DISTAL TIBIA & FIBULA: ICD-10-PCS | Mod: LT,,, | Performed by: ORTHOPAEDIC SURGERY

## 2020-09-18 PROCEDURE — 76942 ECHO GUIDE FOR BIOPSY: CPT | Mod: 26,,, | Performed by: ANESTHESIOLOGY

## 2020-09-18 PROCEDURE — D9220A PRA ANESTHESIA: Mod: ANES,,, | Performed by: ANESTHESIOLOGY

## 2020-09-18 PROCEDURE — 20694 RMVL EXT FIXJ SYS UNDER ANES: CPT | Mod: 51,LT,, | Performed by: ORTHOPAEDIC SURGERY

## 2020-09-18 PROCEDURE — D9220A PRA ANESTHESIA: ICD-10-PCS | Mod: CRNA,,, | Performed by: NURSE ANESTHETIST, CERTIFIED REGISTERED

## 2020-09-18 PROCEDURE — 88305 TISSUE EXAM BY PATHOLOGIST: CPT | Mod: 26,,, | Performed by: PATHOLOGY

## 2020-09-18 PROCEDURE — D9220A PRA ANESTHESIA: Mod: CRNA,,, | Performed by: NURSE ANESTHETIST, CERTIFIED REGISTERED

## 2020-09-18 PROCEDURE — 25000003 PHARM REV CODE 250: Performed by: NURSE ANESTHETIST, CERTIFIED REGISTERED

## 2020-09-18 PROCEDURE — 27201423 OPTIME MED/SURG SUP & DEVICES STERILE SUPPLY: Performed by: ORTHOPAEDIC SURGERY

## 2020-09-18 PROCEDURE — 71000033 HC RECOVERY, INTIAL HOUR: Performed by: ORTHOPAEDIC SURGERY

## 2020-09-18 PROCEDURE — 27828 TREAT LOWER LEG FRACTURE: CPT | Mod: LT,,, | Performed by: ORTHOPAEDIC SURGERY

## 2020-09-18 DEVICE — SCREW BONE AXSOS 4X44MM TI: Type: IMPLANTABLE DEVICE | Site: LEG | Status: FUNCTIONAL

## 2020-09-18 DEVICE — BONE CHIP CANC 1.7-10MM 15ML: Type: IMPLANTABLE DEVICE | Site: LEG | Status: FUNCTIONAL

## 2020-09-18 DEVICE — SCREW BONE CORTICAL 3.5X24MM T: Type: IMPLANTABLE DEVICE | Site: LEG | Status: FUNCTIONAL

## 2020-09-18 DEVICE — SCREW BONE CORTICAL 3.5X28MM T: Type: IMPLANTABLE DEVICE | Site: LEG | Status: FUNCTIONAL

## 2020-09-18 DEVICE — SCREW BONE CORTICAL 3.5X30MM T: Type: IMPLANTABLE DEVICE | Site: LEG | Status: FUNCTIONAL

## 2020-09-18 DEVICE — SCREW BONE AXSOS 4X40MM TI: Type: IMPLANTABLE DEVICE | Site: LEG | Status: FUNCTIONAL

## 2020-09-18 RX ORDER — MUPIROCIN 20 MG/G
OINTMENT TOPICAL
Status: DISCONTINUED | OUTPATIENT
Start: 2020-09-18 | End: 2020-09-18 | Stop reason: HOSPADM

## 2020-09-18 RX ORDER — LISDEXAMFETAMINE DIMESYLATE 70 MG/1
70 CAPSULE ORAL DAILY
Status: DISCONTINUED | OUTPATIENT
Start: 2020-09-19 | End: 2020-09-19

## 2020-09-18 RX ORDER — FENTANYL CITRATE 50 UG/ML
INJECTION, SOLUTION INTRAMUSCULAR; INTRAVENOUS
Status: DISCONTINUED | OUTPATIENT
Start: 2020-09-18 | End: 2020-09-18

## 2020-09-18 RX ORDER — HYDROCHLOROTHIAZIDE 25 MG/1
25 TABLET ORAL DAILY
Status: DISCONTINUED | OUTPATIENT
Start: 2020-09-19 | End: 2020-09-19

## 2020-09-18 RX ORDER — SCOLOPAMINE TRANSDERMAL SYSTEM 1 MG/1
1 PATCH, EXTENDED RELEASE TRANSDERMAL ONCE
Status: DISCONTINUED | OUTPATIENT
Start: 2020-09-18 | End: 2020-09-20 | Stop reason: HOSPADM

## 2020-09-18 RX ORDER — VANCOMYCIN HCL IN 5 % DEXTROSE 1G/250ML
1000 PLASTIC BAG, INJECTION (ML) INTRAVENOUS
Status: COMPLETED | OUTPATIENT
Start: 2020-09-18 | End: 2020-09-18

## 2020-09-18 RX ORDER — LIDOCAINE HYDROCHLORIDE 20 MG/ML
INJECTION INTRAVENOUS
Status: DISCONTINUED | OUTPATIENT
Start: 2020-09-18 | End: 2020-09-18

## 2020-09-18 RX ORDER — SODIUM CHLORIDE 9 MG/ML
INJECTION, SOLUTION INTRAVENOUS CONTINUOUS
Status: DISCONTINUED | OUTPATIENT
Start: 2020-09-18 | End: 2020-09-20 | Stop reason: HOSPADM

## 2020-09-18 RX ORDER — CLINDAMYCIN PHOSPHATE 900 MG/50ML
INJECTION, SOLUTION INTRAVENOUS
Status: DISCONTINUED | OUTPATIENT
Start: 2020-09-18 | End: 2020-09-18

## 2020-09-18 RX ORDER — PHENYLEPHRINE HYDROCHLORIDE 10 MG/ML
INJECTION INTRAVENOUS
Status: DISCONTINUED | OUTPATIENT
Start: 2020-09-18 | End: 2020-09-18

## 2020-09-18 RX ORDER — ONDANSETRON 8 MG/1
8 TABLET, ORALLY DISINTEGRATING ORAL EVERY 8 HOURS PRN
Status: DISCONTINUED | OUTPATIENT
Start: 2020-09-18 | End: 2020-09-18 | Stop reason: HOSPADM

## 2020-09-18 RX ORDER — METHOCARBAMOL 500 MG/1
500 TABLET, FILM COATED ORAL 4 TIMES DAILY
Status: DISPENSED | OUTPATIENT
Start: 2020-09-18 | End: 2020-09-19

## 2020-09-18 RX ORDER — MIDAZOLAM HYDROCHLORIDE 1 MG/ML
INJECTION, SOLUTION INTRAMUSCULAR; INTRAVENOUS
Status: DISCONTINUED | OUTPATIENT
Start: 2020-09-18 | End: 2020-09-18

## 2020-09-18 RX ORDER — ACETAMINOPHEN 500 MG
1000 TABLET ORAL EVERY 8 HOURS
Status: DISCONTINUED | OUTPATIENT
Start: 2020-09-18 | End: 2020-09-19

## 2020-09-18 RX ORDER — OXYCODONE HYDROCHLORIDE 5 MG/1
10 TABLET ORAL EVERY 4 HOURS PRN
Status: DISCONTINUED | OUTPATIENT
Start: 2020-09-18 | End: 2020-09-18

## 2020-09-18 RX ORDER — EPHEDRINE SULFATE 50 MG/ML
INJECTION, SOLUTION INTRAVENOUS
Status: DISCONTINUED | OUTPATIENT
Start: 2020-09-18 | End: 2020-09-18

## 2020-09-18 RX ORDER — HYDROMORPHONE HYDROCHLORIDE 1 MG/ML
INJECTION, SOLUTION INTRAMUSCULAR; INTRAVENOUS; SUBCUTANEOUS
Status: DISPENSED
Start: 2020-09-18 | End: 2020-09-19

## 2020-09-18 RX ORDER — KETAMINE HCL IN 0.9 % NACL 50 MG/5 ML
SYRINGE (ML) INTRAVENOUS
Status: DISCONTINUED | OUTPATIENT
Start: 2020-09-18 | End: 2020-09-18

## 2020-09-18 RX ORDER — VANCOMYCIN HYDROCHLORIDE 1 G/20ML
INJECTION, POWDER, LYOPHILIZED, FOR SOLUTION INTRAVENOUS
Status: DISCONTINUED | OUTPATIENT
Start: 2020-09-18 | End: 2020-09-18 | Stop reason: HOSPADM

## 2020-09-18 RX ORDER — ARIPIPRAZOLE 5 MG/1
10 TABLET ORAL NIGHTLY
Status: DISCONTINUED | OUTPATIENT
Start: 2020-09-18 | End: 2020-09-19

## 2020-09-18 RX ORDER — TIZANIDINE 4 MG/1
4 TABLET ORAL NIGHTLY
Status: DISCONTINUED | OUTPATIENT
Start: 2020-09-18 | End: 2020-09-20 | Stop reason: HOSPADM

## 2020-09-18 RX ORDER — ONDANSETRON 2 MG/ML
INJECTION INTRAMUSCULAR; INTRAVENOUS
Status: DISCONTINUED | OUTPATIENT
Start: 2020-09-18 | End: 2020-09-18

## 2020-09-18 RX ORDER — CLINDAMYCIN PHOSPHATE 900 MG/50ML
900 INJECTION, SOLUTION INTRAVENOUS
Status: DISCONTINUED | OUTPATIENT
Start: 2020-09-18 | End: 2020-09-18 | Stop reason: HOSPADM

## 2020-09-18 RX ORDER — DOCUSATE SODIUM 100 MG/1
100 CAPSULE, LIQUID FILLED ORAL 2 TIMES DAILY
Status: DISCONTINUED | OUTPATIENT
Start: 2020-09-18 | End: 2020-09-20 | Stop reason: HOSPADM

## 2020-09-18 RX ORDER — SODIUM CHLORIDE 0.9 % (FLUSH) 0.9 %
10 SYRINGE (ML) INJECTION
Status: DISCONTINUED | OUTPATIENT
Start: 2020-09-18 | End: 2020-09-18 | Stop reason: HOSPADM

## 2020-09-18 RX ORDER — FENTANYL CITRATE 50 UG/ML
25 INJECTION, SOLUTION INTRAMUSCULAR; INTRAVENOUS EVERY 5 MIN PRN
Status: DISCONTINUED | OUTPATIENT
Start: 2020-09-18 | End: 2020-09-18 | Stop reason: ALTCHOICE

## 2020-09-18 RX ORDER — PROPOFOL 10 MG/ML
VIAL (ML) INTRAVENOUS
Status: DISCONTINUED | OUTPATIENT
Start: 2020-09-18 | End: 2020-09-18

## 2020-09-18 RX ORDER — NEOSTIGMINE METHYLSULFATE 0.5 MG/ML
INJECTION, SOLUTION INTRAVENOUS
Status: DISCONTINUED | OUTPATIENT
Start: 2020-09-18 | End: 2020-09-18

## 2020-09-18 RX ORDER — OXYCODONE HYDROCHLORIDE 5 MG/1
5 TABLET ORAL
Status: DISCONTINUED | OUTPATIENT
Start: 2020-09-18 | End: 2020-09-19

## 2020-09-18 RX ORDER — ROPIVACAINE HYDROCHLORIDE 2 MG/ML
8 INJECTION, SOLUTION EPIDURAL; INFILTRATION; PERINEURAL CONTINUOUS
Status: DISCONTINUED | OUTPATIENT
Start: 2020-09-18 | End: 2020-09-20 | Stop reason: HOSPADM

## 2020-09-18 RX ORDER — ASPIRIN 81 MG/1
81 TABLET ORAL 2 TIMES DAILY
Status: DISCONTINUED | OUTPATIENT
Start: 2020-09-18 | End: 2020-09-20 | Stop reason: HOSPADM

## 2020-09-18 RX ORDER — PROPRANOLOL HYDROCHLORIDE 20 MG/1
60 TABLET ORAL 2 TIMES DAILY
Status: DISCONTINUED | OUTPATIENT
Start: 2020-09-18 | End: 2020-09-20 | Stop reason: HOSPADM

## 2020-09-18 RX ORDER — MIDAZOLAM HYDROCHLORIDE 1 MG/ML
0.5 INJECTION INTRAMUSCULAR; INTRAVENOUS
Status: DISPENSED | OUTPATIENT
Start: 2020-09-18

## 2020-09-18 RX ORDER — ROPIVACAINE HYDROCHLORIDE 2 MG/ML
10 INJECTION, SOLUTION EPIDURAL; INFILTRATION; PERINEURAL CONTINUOUS
Status: DISCONTINUED | OUTPATIENT
Start: 2020-09-18 | End: 2020-09-20 | Stop reason: HOSPADM

## 2020-09-18 RX ORDER — ACETAMINOPHEN 500 MG
1000 TABLET ORAL ONCE
Status: COMPLETED | OUTPATIENT
Start: 2020-09-18 | End: 2020-09-18

## 2020-09-18 RX ORDER — BUPIVACAINE HYDROCHLORIDE AND EPINEPHRINE 5; 5 MG/ML; UG/ML
INJECTION, SOLUTION EPIDURAL; INTRACAUDAL; PERINEURAL
Status: COMPLETED | OUTPATIENT
Start: 2020-09-18 | End: 2020-09-18

## 2020-09-18 RX ORDER — OXYCODONE HYDROCHLORIDE 10 MG/1
10 TABLET ORAL
Status: DISCONTINUED | OUTPATIENT
Start: 2020-09-18 | End: 2020-09-19

## 2020-09-18 RX ORDER — FENTANYL CITRATE 50 UG/ML
25 INJECTION, SOLUTION INTRAMUSCULAR; INTRAVENOUS EVERY 5 MIN PRN
Status: COMPLETED | OUTPATIENT
Start: 2020-09-18 | End: 2020-09-18

## 2020-09-18 RX ORDER — BUPIVACAINE HYDROCHLORIDE AND EPINEPHRINE 2.5; 5 MG/ML; UG/ML
INJECTION, SOLUTION EPIDURAL; INFILTRATION; INTRACAUDAL; PERINEURAL
Status: COMPLETED | OUTPATIENT
Start: 2020-09-18 | End: 2020-09-18

## 2020-09-18 RX ORDER — LEVOTHYROXINE SODIUM 88 UG/1
88 TABLET ORAL
Status: DISCONTINUED | OUTPATIENT
Start: 2020-09-19 | End: 2020-09-19

## 2020-09-18 RX ORDER — ONDANSETRON 2 MG/ML
4 INJECTION INTRAMUSCULAR; INTRAVENOUS DAILY PRN
Status: CANCELLED | OUTPATIENT
Start: 2020-09-18

## 2020-09-18 RX ORDER — VENLAFAXINE HYDROCHLORIDE 75 MG/1
300 CAPSULE, EXTENDED RELEASE ORAL NIGHTLY
Status: DISCONTINUED | OUTPATIENT
Start: 2020-09-18 | End: 2020-09-20 | Stop reason: HOSPADM

## 2020-09-18 RX ORDER — TOPIRAMATE 25 MG/1
100 TABLET ORAL 2 TIMES DAILY
Status: DISCONTINUED | OUTPATIENT
Start: 2020-09-18 | End: 2020-09-20 | Stop reason: HOSPADM

## 2020-09-18 RX ORDER — PROPRANOLOL HYDROCHLORIDE 60 MG/1
60 TABLET ORAL 2 TIMES DAILY
COMMUNITY
End: 2022-10-26

## 2020-09-18 RX ORDER — ROCURONIUM BROMIDE 10 MG/ML
INJECTION, SOLUTION INTRAVENOUS
Status: DISCONTINUED | OUTPATIENT
Start: 2020-09-18 | End: 2020-09-18

## 2020-09-18 RX ORDER — OXYCODONE HYDROCHLORIDE 5 MG/1
5 TABLET ORAL EVERY 4 HOURS PRN
Status: DISCONTINUED | OUTPATIENT
Start: 2020-09-18 | End: 2020-09-18

## 2020-09-18 RX ORDER — HYDROMORPHONE HYDROCHLORIDE 2 MG/ML
INJECTION, SOLUTION INTRAMUSCULAR; INTRAVENOUS; SUBCUTANEOUS
Status: DISCONTINUED | OUTPATIENT
Start: 2020-09-18 | End: 2020-09-18

## 2020-09-18 RX ORDER — OXYCODONE HYDROCHLORIDE 5 MG/1
15 TABLET ORAL EVERY 4 HOURS PRN
Status: DISCONTINUED | OUTPATIENT
Start: 2020-09-18 | End: 2020-09-18

## 2020-09-18 RX ORDER — PREGABALIN 50 MG/1
150 CAPSULE ORAL NIGHTLY
Status: DISCONTINUED | OUTPATIENT
Start: 2020-09-18 | End: 2020-09-20 | Stop reason: HOSPADM

## 2020-09-18 RX ORDER — DEXAMETHASONE SODIUM PHOSPHATE 4 MG/ML
INJECTION, SOLUTION INTRA-ARTICULAR; INTRALESIONAL; INTRAMUSCULAR; INTRAVENOUS; SOFT TISSUE
Status: DISCONTINUED | OUTPATIENT
Start: 2020-09-18 | End: 2020-09-18

## 2020-09-18 RX ADMIN — HYDROMORPHONE HYDROCHLORIDE 0.4 MG: 2 INJECTION, SOLUTION INTRAMUSCULAR; INTRAVENOUS; SUBCUTANEOUS at 08:09

## 2020-09-18 RX ADMIN — SODIUM CHLORIDE: 0.9 INJECTION, SOLUTION INTRAVENOUS at 09:09

## 2020-09-18 RX ADMIN — FENTANYL CITRATE 25 MCG: 50 INJECTION, SOLUTION INTRAMUSCULAR; INTRAVENOUS at 10:09

## 2020-09-18 RX ADMIN — OXYCODONE HYDROCHLORIDE 10 MG: 10 TABLET ORAL at 10:09

## 2020-09-18 RX ADMIN — SCOPALAMINE 1 PATCH: 1 PATCH, EXTENDED RELEASE TRANSDERMAL at 12:09

## 2020-09-18 RX ADMIN — Medication 15 MG: at 07:09

## 2020-09-18 RX ADMIN — ONDANSETRON 4 MG: 2 INJECTION, SOLUTION INTRAMUSCULAR; INTRAVENOUS at 08:09

## 2020-09-18 RX ADMIN — PROPOFOL 150 MG: 10 INJECTION, EMULSION INTRAVENOUS at 03:09

## 2020-09-18 RX ADMIN — PREGABALIN 150 MG: 50 CAPSULE ORAL at 10:09

## 2020-09-18 RX ADMIN — BUPIVACAINE HYDROCHLORIDE AND EPINEPHRINE BITARTRATE 20 ML: 2.5; .0091 INJECTION, SOLUTION EPIDURAL; INFILTRATION; INTRACAUDAL; PERINEURAL at 12:09

## 2020-09-18 RX ADMIN — SODIUM CHLORIDE, SODIUM GLUCONATE, SODIUM ACETATE, POTASSIUM CHLORIDE, MAGNESIUM CHLORIDE, SODIUM PHOSPHATE, DIBASIC, AND POTASSIUM PHOSPHATE: .53; .5; .37; .037; .03; .012; .00082 INJECTION, SOLUTION INTRAVENOUS at 07:09

## 2020-09-18 RX ADMIN — HYDROMORPHONE HYDROCHLORIDE 0.4 MG: 2 INJECTION, SOLUTION INTRAMUSCULAR; INTRAVENOUS; SUBCUTANEOUS at 09:09

## 2020-09-18 RX ADMIN — PROPOFOL 40 MG: 10 INJECTION, EMULSION INTRAVENOUS at 06:09

## 2020-09-18 RX ADMIN — Medication 20 MG: at 03:09

## 2020-09-18 RX ADMIN — ONDANSETRON 4 MG: 2 INJECTION, SOLUTION INTRAMUSCULAR; INTRAVENOUS at 03:09

## 2020-09-18 RX ADMIN — ROPIVACAINE HYDROCHLORIDE 6 ML/HR: 2 INJECTION, SOLUTION EPIDURAL; INFILTRATION at 10:09

## 2020-09-18 RX ADMIN — EPHEDRINE SULFATE 5 MG: 50 INJECTION INTRAVENOUS at 03:09

## 2020-09-18 RX ADMIN — METHOCARBAMOL 500 MG: 500 TABLET ORAL at 10:09

## 2020-09-18 RX ADMIN — NEOSTIGMINE METHYLSULFATE 3 MG: 0.5 INJECTION INTRAVENOUS at 09:09

## 2020-09-18 RX ADMIN — FENTANYL CITRATE 100 MCG: 50 INJECTION INTRAMUSCULAR; INTRAVENOUS at 10:09

## 2020-09-18 RX ADMIN — PROPOFOL 50 MG: 10 INJECTION, EMULSION INTRAVENOUS at 04:09

## 2020-09-18 RX ADMIN — SODIUM CHLORIDE, SODIUM GLUCONATE, SODIUM ACETATE, POTASSIUM CHLORIDE, MAGNESIUM CHLORIDE, SODIUM PHOSPHATE, DIBASIC, AND POTASSIUM PHOSPHATE: .53; .5; .37; .037; .03; .012; .00082 INJECTION, SOLUTION INTRAVENOUS at 03:09

## 2020-09-18 RX ADMIN — PHENYLEPHRINE HYDROCHLORIDE 100 MCG: 10 INJECTION INTRAVENOUS at 03:09

## 2020-09-18 RX ADMIN — FENTANYL CITRATE 100 MCG: 50 INJECTION, SOLUTION INTRAMUSCULAR; INTRAVENOUS at 03:09

## 2020-09-18 RX ADMIN — ROPIVACAINE HYDROCHLORIDE 8 ML/HR: 2 INJECTION, SOLUTION EPIDURAL; INFILTRATION at 10:09

## 2020-09-18 RX ADMIN — BUPIVACAINE HYDROCHLORIDE AND EPINEPHRINE BITARTRATE 30 ML: 5; .005 INJECTION, SOLUTION EPIDURAL; INTRACAUDAL; PERINEURAL at 10:09

## 2020-09-18 RX ADMIN — PHENYLEPHRINE HYDROCHLORIDE 200 MCG: 10 INJECTION INTRAVENOUS at 03:09

## 2020-09-18 RX ADMIN — CLINDAMYCIN PHOSPHATE 900 MG: 18 INJECTION, SOLUTION INTRAVENOUS at 03:09

## 2020-09-18 RX ADMIN — EPHEDRINE SULFATE 5 MG: 50 INJECTION INTRAVENOUS at 04:09

## 2020-09-18 RX ADMIN — MIDAZOLAM HYDROCHLORIDE 2 MG: 1 INJECTION, SOLUTION INTRAMUSCULAR; INTRAVENOUS at 02:09

## 2020-09-18 RX ADMIN — LIDOCAINE HYDROCHLORIDE 100 MG: 20 INJECTION, SOLUTION INTRAVENOUS at 03:09

## 2020-09-18 RX ADMIN — ACETAMINOPHEN 1000 MG: 500 TABLET ORAL at 09:09

## 2020-09-18 RX ADMIN — PHENYLEPHRINE HYDROCHLORIDE 100 MCG: 10 INJECTION INTRAVENOUS at 06:09

## 2020-09-18 RX ADMIN — VANCOMYCIN HYDROCHLORIDE 1000 MG: 1 INJECTION, POWDER, LYOPHILIZED, FOR SOLUTION INTRAVENOUS at 01:09

## 2020-09-18 RX ADMIN — MIDAZOLAM 2 MG: 1 INJECTION INTRAMUSCULAR; INTRAVENOUS at 10:09

## 2020-09-18 RX ADMIN — HYDROMORPHONE HYDROCHLORIDE 1 MG: 2 INJECTION, SOLUTION INTRAMUSCULAR; INTRAVENOUS; SUBCUTANEOUS at 10:09

## 2020-09-18 RX ADMIN — Medication 15 MG: at 06:09

## 2020-09-18 RX ADMIN — ROCURONIUM BROMIDE 20 MG: 10 INJECTION, SOLUTION INTRAVENOUS at 04:09

## 2020-09-18 RX ADMIN — MIDAZOLAM 2 MG: 1 INJECTION INTRAMUSCULAR; INTRAVENOUS at 11:09

## 2020-09-18 RX ADMIN — SODIUM CHLORIDE 1000 ML: 0.9 INJECTION, SOLUTION INTRAVENOUS at 10:09

## 2020-09-18 RX ADMIN — ROCURONIUM BROMIDE 50 MG: 10 INJECTION, SOLUTION INTRAVENOUS at 03:09

## 2020-09-18 RX ADMIN — DEXAMETHASONE SODIUM PHOSPHATE 4 MG: 4 INJECTION, SOLUTION INTRAMUSCULAR; INTRAVENOUS at 03:09

## 2020-09-18 NOTE — H&P
CC:  Left pilon fracture        HISTORY       HPI:  55-year-old female, chronic pain, on chronic opioids, fall from height/rope swing/30 ft 09/06/2020 sustaining an injury to her left ankle.  Can emergency department, seen by the orthopedic resident on-call team.  Diagnosed with a left pilon fracture.    09/07/2020 - ex-fix left pilon     Subsequently discharged home, to allow soft tissue swelling to subside.  Here today for preoperative skin check and follow-up.     Currently complains of 10 out 10 pain all throughout her ankle.  It is only moderately controlled by her morphine and oxycodone  Currently she is taking   morphine 15 mg t.i.d.  Oxycodone 15 mg Q 6     Lives at home with her   Disabled due to chronic back and knee pain  Takes opioids daily and has so for years  Nonsmoker  Denies diabetes  Denies history of heart attack, stroke, blood clot, cancer  History of prior MRSA infections at surgical sites.  No current infections    No changes to medical history or medications      ROS:  Constitutional: Denies fever/chills  Neurological: Denies numbness/tingling (any exceptions noted in orthopaedic exam)   Psychiatric/Behavioral: Denies change in normal mood  Eyes: Denies change in vision  Cardiovascular: Denies chest pain  Respiratory: Denies shortness of breath  Hematologic/Lymphatic: Denies easy bleeding/bruising   Skin: Denies new rash or skin lesions   Gastrointestinal: Denies nausea/vomitting/diarrhea, change in bowel habits, abdominal pain   Allergic/Immunologic: Denies adverse reactions to current medications  Musculoskeletal: see HPI     PAST MEDICAL HISTORY:        Past Medical History:   Diagnosis Date    Anxiety      Depression      Encounter for blood transfusion      Esophageal dysphagia      Fibromyalgia      Gastric bypass status for obesity      H/O dental abscess 4/14/2014     drained    Headache(784.0)       migraines.  Treated at [facility] (Dr. 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  Pt states took too many then realized her mistake    Polycystic ovaries      S/P JUHI-BSO      Thyroid disease       hypothyroidism      PAST SURGICAL HISTORY:   Past Surgical History:   Procedure Laterality Date    ABDOMINAL SURGERY        APPLICATION OF LARGE EXTERNAL FIXATION DEVICE TO TIBIA Left 9/7/2020     Procedure: APPLICATION, EXTERNAL FIXATION DEVICE, TIBIA;  Surgeon: Sujata Londono MD;  Location: [address];  Service: Orthopedics;  Laterality: Left;  need paralysis    BREAST SURGERY   2004     Breast Reduction    CARDIAC CATHETERIZATION        COLONOSCOPY N/A 11/3/2015     Procedure: COLONOSCOPY;  Surgeon: Timothy Barber MD;  Location[facility];  Service: Endoscopy;  Laterality: N/A;    DENTAL SURGERY         4 teeth removed    GASTRIC BYPASS        HERNIA REPAIR        HYSTERECTOMY        OVARIAN CYST REMOVAL         aprox 5 times    ROTATOR CUFF REPAIR Left 01/2019    tennis elbow repair Left 01/2019    UPPER GASTROINTESTINAL ENDOSCOPY          FAMILY HISTORY:         Family History   Problem Relation Age of Onset    Anxiety disorder Mother      Depression Mother      Suicide Mother      Seizures Mother      Drug abuse Mother      Ovarian cancer Mother      Aortic aneurysm Father      Colon cancer Neg Hx      Breast cancer Neg Hx      Diabetes Neg Hx      Hypertension Neg Hx        SOCIAL HISTORY:   Social History               Socioeconomic History    Marital status:        Spouse name: Not on file    Number of  children: Not on file    Years of education: Not on file    Highest education level: Not on file   Occupational History    Not on file   Social Needs    Financial resource strain: Not on file    Food insecurity       Worry: Not on file       Inability: Not on file    Transportation needs       Medical: Not on file       Non-medical: Not on file   Tobacco Use    Smoking status: Never Smoker    Smokeless tobacco: Never Used   Substance and Sexual Activity    Alcohol use: Yes       Comment: 2 per month    Drug use: Yes       Types: Amphetamines, Barbituates, Benzodiazepines    Sexual activity: Yes       Partners: Male       Birth control/protection: Surgical, None   Lifestyle    Physical activity       Days per week: Not on file       Minutes per session: Not on file    Stress: Not on file   Relationships    Social connections       Talks on phone: Not on file       Gets together: Not on file       Attends [employer] service: Not on file       Active member of club or organization: Not on file       Attends meetings of clubs or organizations: Not on file       Relationship status: Not on file   Other Topics Concern    Not on file   Social History Narrative    Not on file        MEDICATIONS:   Current Outpatient Medications:     acetaminophen (TYLENOL) 650 MG TbSR, Take 1 tablet (650 mg total) by mouth every 6 (six) hours as needed (pain)., Disp: 120 tablet, Rfl: 0    aripiprazole (ABILIFY) 10 MG Tab, Take 10 mg by mouth once daily., Disp: , Rfl:     aspirin (ECOTRIN) 81 MG EC tablet, Take 1 tablet (81 mg total) by mouth 2 (two) times daily., Disp: 60 tablet, Rfl: 0    cholecalciferol, vitamin D3, 50,000 unit capsule, Take 50,000 Units by mouth every 7 days., Disp: , Rfl: 99    CYANOCOBALAMIN, VITAMIN B-12, (VITAMIN B-12 INJ), Inject 1 mL as directed every 30 days. , Disp: , Rfl:     dextroamphetamine-amphetamine 30 mg Tab, TK 1/2 T PO TID, Disp: , Rfl: 0    docusate sodium (COLACE) 100 MG capsule,  Take 1 capsule (100 mg total) by mouth 2 (two) times daily., Disp: 30 capsule, Rfl: 0    ESTRING 2 mg (7.5 mcg /24 hour) vaginal ring, place 2 mg vaginally once, Disp: , Rfl: 3    levothyroxine (SYNTHROID) 88 MCG tablet, Take 88 mcg by mouth once daily., Disp: , Rfl:     mupirocin (BACTROBAN) 2 % ointment, Apply by nasal route 2 (two) times daily. for 7 days, Disp: 22 g, Rfl: 0    oxyCODONE (ROXICODONE) 5 MG immediate release tablet, Take 1-2 tablets (5-10 mg total) by mouth every 6 (six) hours as needed for Pain., Disp: 31 tablet, Rfl: 0    PENNSAID 20 mg/gram /actuation(2 %) sopm, , Disp: , Rfl:     rizatriptan (MAXALT-MLT) 10 MG disintegrating tablet, DISSOLVE ONE TABLET BY MOUTH allow TO dissolve ONCE daily AS NEEDED, Disp: , Rfl: 2    tizanidine (ZANAFLEX) 4 MG tablet, Take 4 mg by mouth every evening. May take up to 12mg qhs, Disp: , Rfl: 4    topiramate (TOPAMAX) 200 MG Tab, 100 mg 4 (four) times daily. , Disp: , Rfl:     valsartan-hydrochlorothiazide (DIOVAN-HCT) 160-25 mg per tablet, Take 1 tablet by mouth once daily., Disp: , Rfl:     venlafaxine (EFFEXOR XR) 75 MG 24 hr capsule, Take 150 mg by mouth 2 (two) times daily. , Disp: , Rfl:     VYVANSE 70 mg capsule, Take 70 mg by mouth once daily., Disp: , Rfl: 0  ALLERGIES:         Review of patient's allergies indicates:   Allergen Reactions    Cephalexin         Other reaction(s): Unknown    Codeine Itching       Other reaction(s): Unknown    Nsaids (non-steroidal anti-inflammatory drug)              EXAM      VITAL SIGNS:   /87   Pulse 64   Temp 98.4 °F (36.9 °C) (Oral)   Resp 18   LMP  (LMP Unknown)       PE:  General:  no acute distress, appears stated age    Neuro: alert and oriented x3  Psych: normal mood  Head: normocephalic, atraumatic.   Eyes: no scleral icterus  Mouth: moist mucous membranes  Cardiovascular: extremities warm and well perfused  Lungs: breathing comfortably, equal chest rise bilat  Skin: clean, dry, intact  (any exceptions noted in below musculoskeletal exam)     Musculoskeletal:  RUE:  No gross deformities, wounds  No crepitus, No TTP  Motor intact deltoid, bicep, tricep, wrist flexion/extension, finger flexion/extension, interossei  Sensation intact axillary, radial, median, ulnar nerves  Palp rad pulse, BCR     LUE:  No gross deformities, wounds  No crepitus, No TTP  Motor intact deltoid, bicep, tricep, wrist flexion/extension, finger flexion/extension, interossei  Sensation intact axillary, radial, median, ulnar nerves  Palp rad pulse, BCR     RLE:  No gross deformities, wounds  No crepitus, No TTP  Motor intact hip flex, quad, Tib Ant, gastroc, EHL, FHL.  Sensation intact saphenous, sural, deep/superficial peroneal, tibial nerves  Palp DP/PT pulse, BCR     LLE:  External fixator in place.  Pin sites clean dry intact, no signs of infection  Swelling has improved from her prior exam, there is some skin wrinkling.  The ecchymosis appears to be resolving somewhat.  Her soft tissues do appear to be amenable to surgery  Tender to palpation all throughout medial and lateral aspect of the ankle.  Ankle appears to be grossly well aligned in the external fixator  Motor intact hip flex, quad, EHL, FHL.  Sensation intact saphenous, sural, deep/superficial peroneal, tibial nerves  Palp DP/PT pulse, BCR                 XRAYS:  X-ray left ankle, CT scan left ankle demonstrate a comminuted left pilon fracture with significant articular incongruity, comminution, depression, with a medial fracture component, and a minimally displaced fibular fracture.  (I independently reviewed and interpreted the above imaging)     MEDICAL DECISION MAKING            Encounter Diagnosis   Name Primary?    Closed displaced pilon fracture of left tibia with routine healing, subsequent encounter Yes         55-year-old female, chronic opioid user due to chronic pain, fall from height 09/06/2020 sustaining a closed left pilon fracture.  09/07/2020 ex  fix by 1 of my partners.     Counseled patient on her medical diagnosis and treatment options.  Discussed both non operative and operative management.  Non operative management from this point out would consist of continued external fixator, and allowing the fracture to heal as is.  I feel that this was aid result in continued intra-articular incongruity, with resultant joint pain, stiffness, arthritis, poor function.  Discussed operative intervention the form of open reduction internal fixation.  I feel that this will improve her alignment, mobility, pain, function, though it will carry with it the increased risk of surgical site infections.  Regardless of the intervention performed, due to her severe injury, she will likely have decreased function and some long-term pain in her left ankle.  Also counseled the patient that she has chronic pain, and takes chronic opioids, and it will be very difficult and challenging to control her pain adequately in the postoperative period.       Through a mutual decision making process we came up with the plan to proceed with open reduction internal fixation and removal of her external fixator, tomorrow in operating room.  She will likely be admitted for couple days postop for strict elevation of her leg.  We will consult anesthesia pain management for perioperative block and assistance with pain control given her chronic opioid use.     The risks, benefits, and alternatives to surgery were discussed with the patient and/or family.    Specific risks discussed included, but were not limited to:  Chronic pain, ankle stiffness, ankle arthritis, decreased function, persistent swelling, failure of fixation, hardware prominence, infection, need for multiple staged procedures, damage to nearby structures, including neurovascular structures leading to loss of function or loss of limb, bleeding, need for blood transfusion, pain, stiffness, scarring, numbness, tingling, weakness,  compartment syndrome, malunion/nonunion, hardware failure, hardware prominence, infection, need for multiple staged procedures, prolonged antibiotics, iatrogenic fracture, heterotopic ossification, arthritis, a variety of medical complications including but not limited to heart attack, stroke, deep venous thrombosis, pulmonary embolism, prolonged hospitalization, prolonged intubation, and death.   Patient and/or family expressed an understanding and desires to proceed with surgery.   All questions were answered.  No guarantees were implied or stated.  Informed consent was obtained

## 2020-09-18 NOTE — ANESTHESIA PROCEDURE NOTES
Femoral Catheter    Patient location during procedure: pre-op   Block not for primary anesthetic.  Reason for block: at surgeon's request and post-op pain management   Post-op Pain Location: Left leg pain  Start time: 9/18/2020 11:55 AM  Timeout: 9/18/2020 11:54 AM   End time: 9/18/2020 12:05 PM    Staffing  Authorizing Provider: Kyle Marquez MD  Performing Provider: Myrtle Linares MD    Preanesthetic Checklist  Completed: patient identified, site marked, surgical consent, pre-op evaluation, timeout performed, IV checked, risks and benefits discussed and monitors and equipment checked  Peripheral Block  Patient position: supine  Prep: ChloraPrep and site prepped and draped  Patient monitoring: heart rate, cardiac monitor, continuous pulse ox, continuous capnometry and frequent blood pressure checks  Block type: femoral  Laterality: left  Injection technique: continuous  Needle  Needle type: Tuohy   Needle gauge: 17 G  Needle length: 3.5 in  Needle localization: anatomical landmarks and ultrasound guidance  Catheter type: spring wound  Catheter size: 19 G  Test dose: lidocaine 1.5% with Epi 1-to-200,000 and negative   -ultrasound image captured on disc.  Assessment  Injection assessment: negative aspiration, negative parasthesia and local visualized surrounding nerve  Paresthesia pain: none  Heart rate change: no  Slow fractionated injection: yes  Additional Notes  VSS.  [facility] RN monitoring vitals throughout procedure.  Patient tolerated procedure well. 20cc 0.25% bupivicaine with 1:200k epi given

## 2020-09-18 NOTE — ANESTHESIA PROCEDURE NOTES
Sciatic catheter    Patient location during procedure: pre-op   Block not for primary anesthetic.  Reason for block: at surgeon's request and post-op pain management   Post-op Pain Location: left leg pain  Start time: 9/18/2020 10:26 AM  Timeout: 9/18/2020 10:25 AM   End time: 9/18/2020 10:40 AM    Staffing  Authorizing Provider: Kyle Marquez MD  Performing Provider: Myrtle Linares MD    Preanesthetic Checklist  Completed: patient identified, site marked, surgical consent, pre-op evaluation, timeout performed, IV checked, risks and benefits discussed and monitors and equipment checked  Peripheral Block  Patient position: supine  Prep: ChloraPrep and site prepped and draped  Patient monitoring: heart rate, cardiac monitor, continuous pulse ox, continuous capnometry and frequent blood pressure checks  Block type: sciatic  Laterality: left  Injection technique: continuous  Needle  Needle type: Tuohy   Needle gauge: 17 G  Needle length: 3.5 in  Needle localization: anatomical landmarks and ultrasound guidance  Catheter type: non-stimulating  Catheter size: 20 G  Test dose: lidocaine 1.5% with Epi 1-to-200,000 and negative     Assessment  Injection assessment: negative aspiration, negative parasthesia and local visualized surrounding nerve  Paresthesia pain: none  Heart rate change: no  Slow fractionated injection: yes  Additional Notes  Performed Subgluteal location.  Jay test with appropriate loss of twitch with injection of local anesthetic.  VSS.  [facility] RN monitoring vitals throughout procedure.  Patient tolerated procedure well. Given 30cc 0.5% bupivicaine with 1:200k epi.

## 2020-09-18 NOTE — ANESTHESIA PROCEDURE NOTES
Intubation  Performed by: Patito Grant CRNA  Authorized by: Mayank Cunningham MD     Intubation:     Induction:  Intravenous    Intubated:  Postinduction    Mask Ventilation:  Easy mask    Attempts:  1    Attempted By:  CRNA    Method of Intubation:  Direct    Blade:  Khan 2    Laryngeal View Grade: Grade I - full view of chords      Difficult Airway Encountered?: No      Complications:  None    Airway Device:  Oral endotracheal tube    Airway Device Size:  7.5    Style/Cuff Inflation:  Cuffed    Tube secured:  21    Secured at:  The lips    Placement Verified By:  Capnometry    Complicating Factors:  None

## 2020-09-18 NOTE — PLAN OF CARE
Need orders, h/p update and anesthesia consent. Dr Montez and Ortho Resident notified.  Covid screen completed and sent to lab. Working to complete safety checklist to get patients nerve block done.

## 2020-09-19 LAB
ALBUMIN SERPL BCP-MCNC: 2.7 G/DL (ref 3.5–5.2)
ALP SERPL-CCNC: 87 U/L (ref 55–135)
ALT SERPL W/O P-5'-P-CCNC: 34 U/L (ref 10–44)
ANION GAP SERPL CALC-SCNC: 10 MMOL/L (ref 8–16)
AST SERPL-CCNC: 38 U/L (ref 10–40)
BASOPHILS # BLD AUTO: 0.03 K/UL (ref 0–0.2)
BASOPHILS NFR BLD: 0.2 % (ref 0–1.9)
BILIRUB SERPL-MCNC: 0.3 MG/DL (ref 0.1–1)
BUN SERPL-MCNC: 12 MG/DL (ref 6–20)
CALCIUM SERPL-MCNC: 8.1 MG/DL (ref 8.7–10.5)
CHLORIDE SERPL-SCNC: 106 MMOL/L (ref 95–110)
CO2 SERPL-SCNC: 21 MMOL/L (ref 23–29)
CREAT SERPL-MCNC: 0.8 MG/DL (ref 0.5–1.4)
DIFFERENTIAL METHOD: ABNORMAL
EOSINOPHIL # BLD AUTO: 0.1 K/UL (ref 0–0.5)
EOSINOPHIL NFR BLD: 0.4 % (ref 0–8)
ERYTHROCYTE [DISTWIDTH] IN BLOOD BY AUTOMATED COUNT: 13.9 % (ref 11.5–14.5)
EST. GFR  (AFRICAN AMERICAN): >60 ML/MIN/1.73 M^2
EST. GFR  (NON AFRICAN AMERICAN): >60 ML/MIN/1.73 M^2
GLUCOSE SERPL-MCNC: 201 MG/DL (ref 70–110)
HCT VFR BLD AUTO: 36 % (ref 37–48.5)
HGB BLD-MCNC: 11.5 G/DL (ref 12–16)
IMM GRANULOCYTES # BLD AUTO: 0.05 K/UL (ref 0–0.04)
IMM GRANULOCYTES NFR BLD AUTO: 0.4 % (ref 0–0.5)
LYMPHOCYTES # BLD AUTO: 1.3 K/UL (ref 1–4.8)
LYMPHOCYTES NFR BLD: 10.3 % (ref 18–48)
MCH RBC QN AUTO: 32.2 PG (ref 27–31)
MCHC RBC AUTO-ENTMCNC: 31.9 G/DL (ref 32–36)
MCV RBC AUTO: 101 FL (ref 82–98)
MONOCYTES # BLD AUTO: 1 K/UL (ref 0.3–1)
MONOCYTES NFR BLD: 8.1 % (ref 4–15)
NEUTROPHILS # BLD AUTO: 10.1 K/UL (ref 1.8–7.7)
NEUTROPHILS NFR BLD: 80.6 % (ref 38–73)
NRBC BLD-RTO: 0 /100 WBC
PLATELET # BLD AUTO: 449 K/UL (ref 150–350)
PMV BLD AUTO: 9.5 FL (ref 9.2–12.9)
POTASSIUM SERPL-SCNC: 3.8 MMOL/L (ref 3.5–5.1)
PROT SERPL-MCNC: 5.6 G/DL (ref 6–8.4)
RBC # BLD AUTO: 3.57 M/UL (ref 4–5.4)
SODIUM SERPL-SCNC: 137 MMOL/L (ref 136–145)
VANCOMYCIN SERPL-MCNC: 2.9 UG/ML
WBC # BLD AUTO: 12.58 K/UL (ref 3.9–12.7)

## 2020-09-19 PROCEDURE — 63600175 PHARM REV CODE 636 W HCPCS: Performed by: STUDENT IN AN ORGANIZED HEALTH CARE EDUCATION/TRAINING PROGRAM

## 2020-09-19 PROCEDURE — 80053 COMPREHEN METABOLIC PANEL: CPT

## 2020-09-19 PROCEDURE — 85025 COMPLETE CBC W/AUTO DIFF WBC: CPT

## 2020-09-19 PROCEDURE — 25000003 PHARM REV CODE 250: Performed by: STUDENT IN AN ORGANIZED HEALTH CARE EDUCATION/TRAINING PROGRAM

## 2020-09-19 PROCEDURE — 36415 COLL VENOUS BLD VENIPUNCTURE: CPT

## 2020-09-19 PROCEDURE — 80202 ASSAY OF VANCOMYCIN: CPT

## 2020-09-19 PROCEDURE — 99212 PR OFFICE/OUTPT VISIT, EST, LEVL II, 10-19 MIN: ICD-10-PCS | Mod: ,,, | Performed by: ANESTHESIOLOGY

## 2020-09-19 PROCEDURE — 99212 OFFICE O/P EST SF 10 MIN: CPT | Mod: ,,, | Performed by: ANESTHESIOLOGY

## 2020-09-19 RX ORDER — VANCOMYCIN HCL IN 5 % DEXTROSE 1G/250ML
1000 PLASTIC BAG, INJECTION (ML) INTRAVENOUS ONCE
Status: DISCONTINUED | OUTPATIENT
Start: 2020-09-19 | End: 2020-09-19

## 2020-09-19 RX ORDER — ACETAMINOPHEN 500 MG
1000 TABLET ORAL EVERY 6 HOURS
Status: DISCONTINUED | OUTPATIENT
Start: 2020-09-19 | End: 2020-09-20 | Stop reason: HOSPADM

## 2020-09-19 RX ORDER — CLINDAMYCIN PHOSPHATE 600 MG/50ML
600 INJECTION, SOLUTION INTRAVENOUS
Status: COMPLETED | OUTPATIENT
Start: 2020-09-19 | End: 2020-09-20

## 2020-09-19 RX ORDER — VANCOMYCIN HCL IN 5 % DEXTROSE 1G/250ML
1000 PLASTIC BAG, INJECTION (ML) INTRAVENOUS
Status: DISCONTINUED | OUTPATIENT
Start: 2020-09-19 | End: 2020-09-19

## 2020-09-19 RX ORDER — MORPHINE SULFATE 2 MG/ML
2 INJECTION, SOLUTION INTRAMUSCULAR; INTRAVENOUS ONCE
Status: COMPLETED | OUTPATIENT
Start: 2020-09-19 | End: 2020-09-19

## 2020-09-19 RX ORDER — METHOCARBAMOL 500 MG/1
500 TABLET, FILM COATED ORAL 4 TIMES DAILY
Status: DISCONTINUED | OUTPATIENT
Start: 2020-09-19 | End: 2020-09-20

## 2020-09-19 RX ORDER — OXYCODONE HYDROCHLORIDE 10 MG/1
10 TABLET ORAL
Status: DISCONTINUED | OUTPATIENT
Start: 2020-09-19 | End: 2020-09-20 | Stop reason: HOSPADM

## 2020-09-19 RX ORDER — LEVOTHYROXINE SODIUM 100 UG/1
100 TABLET ORAL
Status: DISCONTINUED | OUTPATIENT
Start: 2020-09-20 | End: 2020-09-19

## 2020-09-19 RX ORDER — ARIPIPRAZOLE 5 MG/1
15 TABLET ORAL NIGHTLY
Status: DISCONTINUED | OUTPATIENT
Start: 2020-09-19 | End: 2020-09-20 | Stop reason: HOSPADM

## 2020-09-19 RX ORDER — LEVOTHYROXINE SODIUM 100 UG/1
100 TABLET ORAL
Status: DISCONTINUED | OUTPATIENT
Start: 2020-09-19 | End: 2020-09-20 | Stop reason: HOSPADM

## 2020-09-19 RX ADMIN — MORPHINE SULFATE 2 MG: 2 INJECTION, SOLUTION INTRAMUSCULAR; INTRAVENOUS at 07:09

## 2020-09-19 RX ADMIN — HYDROCHLOROTHIAZIDE 25 MG: 25 TABLET ORAL at 08:09

## 2020-09-19 RX ADMIN — TOPIRAMATE 100 MG: 25 TABLET, FILM COATED ORAL at 09:09

## 2020-09-19 RX ADMIN — DOCUSATE SODIUM 100 MG: 100 CAPSULE, LIQUID FILLED ORAL at 09:09

## 2020-09-19 RX ADMIN — METHOCARBAMOL TABLETS 500 MG: 500 TABLET, COATED ORAL at 05:09

## 2020-09-19 RX ADMIN — OXYCODONE HYDROCHLORIDE 15 MG: 10 TABLET ORAL at 11:09

## 2020-09-19 RX ADMIN — DOCUSATE SODIUM 100 MG: 100 CAPSULE, LIQUID FILLED ORAL at 08:09

## 2020-09-19 RX ADMIN — ARIPIPRAZOLE 15 MG: 5 TABLET ORAL at 09:09

## 2020-09-19 RX ADMIN — ASPIRIN 81 MG: 81 TABLET, COATED ORAL at 09:09

## 2020-09-19 RX ADMIN — TOPIRAMATE 100 MG: 25 TABLET, FILM COATED ORAL at 08:09

## 2020-09-19 RX ADMIN — PROPRANOLOL HYDROCHLORIDE 60 MG: 20 TABLET ORAL at 08:09

## 2020-09-19 RX ADMIN — ASPIRIN 81 MG: 81 TABLET, COATED ORAL at 08:09

## 2020-09-19 RX ADMIN — METHOCARBAMOL 500 MG: 500 TABLET ORAL at 08:09

## 2020-09-19 RX ADMIN — ROPIVACAINE HYDROCHLORIDE 10 ML/HR: 2 INJECTION, SOLUTION EPIDURAL; INFILTRATION at 08:09

## 2020-09-19 RX ADMIN — OXYCODONE HYDROCHLORIDE 15 MG: 10 TABLET ORAL at 02:09

## 2020-09-19 RX ADMIN — VENLAFAXINE HYDROCHLORIDE 300 MG: 75 CAPSULE, EXTENDED RELEASE ORAL at 09:09

## 2020-09-19 RX ADMIN — OXYCODONE HYDROCHLORIDE 15 MG: 10 TABLET ORAL at 08:09

## 2020-09-19 RX ADMIN — OXYCODONE HYDROCHLORIDE 10 MG: 10 TABLET ORAL at 05:09

## 2020-09-19 RX ADMIN — METHOCARBAMOL TABLETS 500 MG: 500 TABLET, COATED ORAL at 12:09

## 2020-09-19 RX ADMIN — ACETAMINOPHEN 1000 MG: 500 TABLET ORAL at 11:09

## 2020-09-19 RX ADMIN — CLINDAMYCIN IN 5 PERCENT DEXTROSE 600 MG: 12 INJECTION, SOLUTION INTRAVENOUS at 11:09

## 2020-09-19 RX ADMIN — PROPRANOLOL HYDROCHLORIDE 60 MG: 20 TABLET ORAL at 12:09

## 2020-09-19 RX ADMIN — ASPIRIN 81 MG: 81 TABLET, COATED ORAL at 12:09

## 2020-09-19 RX ADMIN — ACETAMINOPHEN 1000 MG: 500 TABLET ORAL at 10:09

## 2020-09-19 RX ADMIN — OXYCODONE HYDROCHLORIDE 15 MG: 10 TABLET ORAL at 05:09

## 2020-09-19 RX ADMIN — DOCUSATE SODIUM 100 MG: 100 CAPSULE, LIQUID FILLED ORAL at 12:09

## 2020-09-19 RX ADMIN — LEVOTHYROXINE SODIUM 100 MCG: 100 TABLET ORAL at 05:09

## 2020-09-19 RX ADMIN — ACETAMINOPHEN 1000 MG: 500 TABLET ORAL at 05:09

## 2020-09-19 RX ADMIN — TIZANIDINE 4 MG: 4 TABLET ORAL at 09:09

## 2020-09-19 RX ADMIN — CLINDAMYCIN IN 5 PERCENT DEXTROSE 600 MG: 12 INJECTION, SOLUTION INTRAVENOUS at 05:09

## 2020-09-19 RX ADMIN — PREGABALIN 150 MG: 50 CAPSULE ORAL at 09:09

## 2020-09-19 RX ADMIN — ACETAMINOPHEN 1000 MG: 500 TABLET ORAL at 01:09

## 2020-09-19 RX ADMIN — OXYCODONE HYDROCHLORIDE 10 MG: 10 TABLET ORAL at 08:09

## 2020-09-19 RX ADMIN — ROPIVACAINE HYDROCHLORIDE 8 ML/HR: 2 INJECTION, SOLUTION EPIDURAL; INFILTRATION at 08:09

## 2020-09-19 RX ADMIN — TIZANIDINE 4 MG: 4 TABLET ORAL at 12:09

## 2020-09-19 RX ADMIN — VENLAFAXINE HYDROCHLORIDE 300 MG: 75 CAPSULE, EXTENDED RELEASE ORAL at 12:09

## 2020-09-19 NOTE — ANESTHESIA POST-OP PAIN MANAGEMENT
Acute Pain Service Progress Note    Faith Bernard is a 55 y.o., female, [id number].    Surgery:     ORIF, FRACTURE, PILON - left. Diving board, supine, bone foam. Seda anterolateral distal tibial plate, mini frag, long 3.5mm steel screw, CaPhos bone substitute (Left Leg Upper) REMOVAL, EXTERNAL FIXATION DEVICE (Left Leg) APPLICATION, WOUND VAC (Left Leg)       Post Op Day #: 1    Catheter and infusion type: Left adductor canal at 10 ml/hr. Left sciatic catheter at 8 ml/hr.         Problem List:    Active Hospital Problems    Diagnosis  POA    *Closed left ankle fracture [S82.892A]  Yes      Resolved Hospital Problems   No resolved problems to display.       Subjective:     General appearance of alert, oriented, no complaints   Pain with rest: 8    Numbers   Pain with movement: 10    Numbers   Side Effects    1. Pruritis No    2. Nausea No    3. Motor Blockade Yes, 1=Ability to bend knees and ankles    4. Sedation No, 1=awake and alert    Objective:        Catheter site clean, dry, intact      Vitals   Vitals:    09/19/20 1135   BP:    Pulse:    Resp: 16   Temp:         Labs    Admission on 09/18/2020   Component Date Value Ref Range Status    SARS-CoV-2 RNA, Amplification, Qual 09/18/2020 Negative  Negative Final    Group & Rh 09/18/2020 O POS   Final    Indirect Evert 09/18/2020 NEG   Final    WBC 09/19/2020 12.58  3.90 - 12.70 K/uL Final    RBC 09/19/2020 3.57* 4.00 - 5.40 M/uL Final    Hemoglobin 09/19/2020 11.5* 12.0 - 16.0 g/dL Final    Hematocrit 09/19/2020 36.0* 37.0 - 48.5 % Final    Mean Corpuscular Volume 09/19/2020 101* 82 - 98 fL Final    Mean Corpuscular Hemoglobin 09/19/2020 32.2* 27.0 - 31.0 pg Final    Mean Corpuscular Hemoglobin Conc 09/19/2020 31.9* 32.0 - 36.0 g/dL Final    RDW 09/19/2020 13.9  11.5 - 14.5 % Final    Platelets 09/19/2020 449* 150 - 350 K/uL Final    MPV 09/19/2020 9.5  9.2 - 12.9 fL Final    Immature Granulocytes 09/19/2020 0.4  0.0 - 0.5 % Final    Gran #  (ANC) 09/19/2020 10.1* 1.8 - 7.7 K/uL Final    Immature Grans (Abs) 09/19/2020 0.05* 0.00 - 0.04 K/uL Final    Lymph # 09/19/2020 1.3  1.0 - 4.8 K/uL Final    Mono # 09/19/2020 1.0  0.3 - 1.0 K/uL Final    Eos # 09/19/2020 0.1  0.0 - 0.5 K/uL Final    Baso # 09/19/2020 0.03  0.00 - 0.20 K/uL Final    nRBC 09/19/2020 0  0 /100 WBC Final    Gran% 09/19/2020 80.6* 38.0 - 73.0 % Final    Lymph% 09/19/2020 10.3* 18.0 - 48.0 % Final    Mono% 09/19/2020 8.1  4.0 - 15.0 % Final    Eosinophil% 09/19/2020 0.4  0.0 - 8.0 % Final    Basophil% 09/19/2020 0.2  0.0 - 1.9 % Final    Differential Method 09/19/2020 Automated   Final    Sodium 09/19/2020 137  136 - 145 mmol/L Final    Potassium 09/19/2020 3.8  3.5 - 5.1 mmol/L Final    Chloride 09/19/2020 106  95 - 110 mmol/L Final    CO2 09/19/2020 21* 23 - 29 mmol/L Final    Glucose 09/19/2020 201* 70 - 110 mg/dL Final    BUN, Bld 09/19/2020 12  6 - 20 mg/dL Final    Creatinine 09/19/2020 0.8  0.5 - 1.4 mg/dL Final    Calcium 09/19/2020 8.1* 8.7 - 10.5 mg/dL Final    Total Protein 09/19/2020 5.6* 6.0 - 8.4 g/dL Final    Albumin 09/19/2020 2.7* 3.5 - 5.2 g/dL Final    Total Bilirubin 09/19/2020 0.3  0.1 - 1.0 mg/dL Final    Alkaline Phosphatase 09/19/2020 87  55 - 135 U/L Final    AST 09/19/2020 38  10 - 40 U/L Final    ALT 09/19/2020 34  10 - 44 U/L Final    Anion Gap 09/19/2020 10  8 - 16 mmol/L Final    eGFR if African American 09/19/2020 >60.0  >60 mL/min/1.73 m^2 Final    eGFR if non African American 09/19/2020 >60.0  >60 mL/min/1.73 m^2 Final    Vancomycin, Random 09/19/2020 2.9  Not established ug/mL Final        Meds   Current Facility-Administered Medications   Medication Dose Route Frequency Provider Last Rate Last Dose    0.9%  NaCl infusion   Intravenous Continuous Matthew Marion MD   Stopped at 09/18/20 1517    0.9%  NaCl infusion   Intravenous Continuous Bryce Spring MD   1,000 mL at 09/18/20 2216    acetaminophen tablet  1,000 mg  1,000 mg Oral Q6H Lj Seo MD        ARIPiprazole tablet 15 mg  15 mg Oral QHS Misael Amaya MD        aspirin EC tablet 81 mg  81 mg Oral BID Matthew Marion MD   81 mg at 09/19/20 0842    clindamycin in D5W 600 mg/50 mL IVPB 600 mg  600 mg Intravenous Q8H Asa Dylon Bundy  mL/hr at 09/19/20 1136 600 mg at 09/19/20 1136    docusate sodium capsule 100 mg  100 mg Oral BID Matthew Marion MD   100 mg at 09/19/20 0843    hydroCHLOROthiazide tablet 25 mg  25 mg Oral Daily Matthew Marion MD   25 mg at 09/19/20 0844    levothyroxine tablet 100 mcg  100 mcg Oral Before breakfast Misael Amaya MD   100 mcg at 09/19/20 0549    lisdexamfetamine capsule 70 mg  70 mg Oral Daily Matthew Marion MD        methocarbamoL tablet 500 mg  500 mg Oral QID Myrtle Linares MD   500 mg at 09/19/20 0843    methocarbamoL tablet 500 mg  500 mg Oral QID Lj Seo MD        midazolam (VERSED) 1 mg/mL injection 0.5 mg  0.5 mg Intravenous PRN Myrtle Linares MD   2 mg at 09/18/20 1155    oxyCODONE immediate release tablet 15 mg  15 mg Oral Q3H PRN Lj Seo MD   15 mg at 09/19/20 1135    oxyCODONE immediate release tablet Tab 10 mg  10 mg Oral Q3H PRN Lj Seo MD        pregabalin capsule 150 mg  150 mg Oral QHS Myrtle Linares MD   150 mg at 09/18/20 2209    propranoloL tablet 60 mg  60 mg Oral BID Matthew Marion MD   60 mg at 09/19/20 0845    ropivacaine (PF) 2 mg/ml (0.2%) infusion  8 mL/hr Perineural Continuous Myrtle Linares MD 8 mL/hr at 09/18/20 2210 8 mL/hr at 09/18/20 2210    ropivacaine (PF) 2 mg/ml (0.2%) infusion  10 mL/hr Perineural Continuous Lj Seo MD 10 mL/hr at 09/19/20 1109 10 mL/hr at 09/19/20 1109    scopolamine 1.3-1.5 mg (1 mg over 3 days) 1 patch  1 patch Transdermal Once Cliff Beltran MD   1 patch at 09/18/20 1213    tiZANidine tablet 4 mg  4 mg Oral QHS Matthew Marion MD   4 mg at  09/19/20 0032    topiramate tablet 100 mg  100 mg Oral BID Matthew Marion MD   100 mg at 09/19/20 0844    venlafaxine 24 hr capsule 300 mg  300 mg Oral QHS Matthew Marion MD   300 mg at 09/19/20 0031        Anticoagulant dose per primary.    Assessment:     Pain control inadequate    Plan:     - Patient endorsing significant pain this AM. LLE inspected by ortho and they determined there were no signs of compartment syndrome.   - Patient given Morphine 2 mg IV and we bolused her PNC's x2 this AM with better pain relief.    - Increased the the rate of her adductor canal PNC from 6 to 10 given the bulk of her pain is in her medial ankle.   - Increased her PRN Oxycodone to 10 and 15 mg PO Q4 PRN.     Evaluator Lj Seo

## 2020-09-19 NOTE — SUBJECTIVE & OBJECTIVE
"Principal Problem:Closed left ankle fracture    Principal Orthopedic Problem: s/p ORIF VANESSA troncoso on 9/19/20    Interval History: VSS. NAEON. Patient complaining of significant left ankle pain on exam. Compartments soft, no increased pain with passive stretch of hallux.    Review of patient's allergies indicates:   Allergen Reactions    Cephalexin Anaphylaxis    Codeine Itching    Nsaids (non-steroidal anti-inflammatory drug)      Has a history of bleeding ulcers       Current Facility-Administered Medications   Medication    0.9%  NaCl infusion    0.9%  NaCl infusion    acetaminophen tablet 1,000 mg    ARIPiprazole tablet 15 mg    aspirin EC tablet 81 mg    docusate sodium capsule 100 mg    hydroCHLOROthiazide tablet 25 mg    HYDROmorphone (DILAUDID) 1 mg/mL injection    levothyroxine tablet 100 mcg    lisdexamfetamine capsule 70 mg    methocarbamoL tablet 500 mg    midazolam (VERSED) 1 mg/mL injection 0.5 mg    oxyCODONE immediate release tablet 10 mg    oxyCODONE immediate release tablet 5 mg    pregabalin capsule 150 mg    propranoloL tablet 60 mg    ropivacaine (PF) 2 mg/ml (0.2%) infusion    ropivacaine (PF) 2 mg/ml (0.2%) infusion    scopolamine 1.3-1.5 mg (1 mg over 3 days) 1 patch    tiZANidine tablet 4 mg    topiramate tablet 100 mg    vancomycin - pharmacy to dose    venlafaxine 24 hr capsule 300 mg     Objective:     Vital Signs (Most Recent):  Temp: 97.5 °F (36.4 °C) (09/19/20 0817)  Pulse: 70 (09/19/20 0817)  Resp: 16 (09/19/20 0839)  BP: 114/71 (09/19/20 0817)  SpO2: 96 % (09/19/20 0817) Vital Signs (24h Range):  Temp:  [97.5 °F (36.4 °C)-99.9 °F (37.7 °C)] 97.5 °F (36.4 °C)  Pulse:  [45-91] 70  Resp:  [14-51] 16  SpO2:  [96 %-100 %] 96 %  BP: (104-131)/(56-85) 114/71     Weight: 56.7 kg (125 lb)  Height: 5' 3" (160 cm)  Body mass index is 22.14 kg/m².      Intake/Output Summary (Last 24 hours) at 9/19/2020 0850  Last data filed at 9/19/2020 0700  Gross per 24 hour   Intake 3220 " ml   Output 1470 ml   Net 1750 ml       Ortho/SPM Exam     Awake/alert/oriented x3, No acute distress, Afebrile, Vital signs stable  Good inspiratory effort with unlaboured breathing  Dressings c/d/i  NVI in operative limb   Compartments soft in LLE  No increased pain with passive stretch of L hallux       Significant Labs: All pertinent labs within the past 24 hours have been reviewed.    Significant Imaging: I have reviewed all pertinent imaging results/findings.

## 2020-09-19 NOTE — TRANSFER OF CARE
"Anesthesia Transfer of Care Note    Patient: Faith Bernard    Procedure(s) Performed: Procedure(s) (LRB):  ORIF, FRACTURE, PILON - left. Diving board, supine, bone foam. [place] anterolateral distal tibial plate, mini frag, long 3.5mm steel screw, CaPhos bone substitute (Left)  REMOVAL, EXTERNAL FIXATION DEVICE (Left)  APPLICATION, WOUND VAC (Left)    Patient location: PACU    Anesthesia Type: general    Transport from OR: Transported from OR on room air with adequate spontaneous ventilation    Post pain: adequate analgesia    Post assessment: no apparent anesthetic complications    Post vital signs: stable    Level of consciousness: awake    Nausea/Vomiting: no nausea/vomiting    Complications: none    Transfer of care protocol was followed      Last vitals:   Visit Vitals  /74 (BP Location: Left arm, Patient Position: Lying)   Pulse 69   Temp 36.7 °C (98.1 °F)   Resp 20   Ht 5' 3" (1.6 m)   Wt 56.7 kg (125 lb)   LMP  (LMP Unknown)   SpO2 100%   Breastfeeding No   BMI 22.14 kg/m²     "

## 2020-09-19 NOTE — NURSING
Pt arrived to the 5th floor via stretcher @ 6090 and placed into room 508. Pt orientated to the room, white board updated, call light within reach and instructed on how to use it, bed in lowest position, side rails up x2. IS and hibiclens @ bedside. SCD applied and turned on. VS taken and stable, IVF continued. Pt started crying in pain because of her IV's. Flushed IVs and both hurt. Took both IVs out and replaced it with another IV.

## 2020-09-19 NOTE — PLAN OF CARE
Pt resting in bed comfortably. PIV line intact and free of infection and irritation. Wound vac in place to continuous suction. PNCs in place (x2) infusing ropivacaine. LLE elevated with ice in place. Fall precautions maintained, no falls noted. Call light within reach, bed locked and in lowest position. Patient instructed to call for assistance. Skin integrity maintained as patient is independent with frequent repositioning. C/o pain, managed with PRN meds, no other complaints or concerns. Voiding without difficulty. Will continue to monitor and follow plan of care.

## 2020-09-19 NOTE — BRIEF OP NOTE
[facility]  Brief Operative Note    SUMMARY     Surgery Date: 9/18/2020     Surgeon(s) and Role:     * Fer Mcrae MD - Primary     * Elton Pool MD - Resident - Assisting     * Matthew Marion MD - Resident - Assisting        Pre-op Diagnosis:  Closed fracture of distal end of left tibia, unspecified fracture morphology, initial encounter [S82.302A]    Post-op Diagnosis:  Post-Op Diagnosis Codes:     * Closed fracture of distal end of left tibia, unspecified fracture morphology, initial encounter [S82.302A]    Procedure(s) (LRB):  ORIF, FRACTURE, PILON - left. Diving board, supine, bone foam. [place] anterolateral distal tibial plate, mini frag, long 3.5mm steel screw, CaPhos bone substitute (Left)  REMOVAL, EXTERNAL FIXATION DEVICE (Left)  APPLICATION, WOUND VAC (Left)    Anesthesia: Choice    Description of Procedure: See op note    Description of the findings of the procedure: See op note    Estimated Blood Loss: 250 mL    Estimated Blood Loss has been documented.         Specimens:   Specimen (12h ago, onward)    None          [id number]

## 2020-09-19 NOTE — NURSING TRANSFER
Nursing Transfer Note      9/18/2020     Transfer To: 508 from PACU    Transfer via stretcher    Transfer with Ropivacaine gtts x2, IV pole, wound vac, personal belongings in overnight bags (2) and purse    Transported by Transport    Medicines sent: NS and Ropivacaine    Chart send with patient: Yes    Notified: spouse    Patient reassessed at: 9/18/2020 22:43

## 2020-09-19 NOTE — PROGRESS NOTES
[facility]  Orthopedics  Progress Note    Patient Name: Faith Bernard  MRN: [id number]  Admission Date: 9/18/2020  Hospital Length of Stay: 1 days  Attending Provider: Fer Mcrae,*  Primary Care Provider: Rah Canela MD  Follow-up For: Procedure(s) (LRB):  ORIF, FRACTURE, PILON - left. Diving board, supine, bone foam. [place] anterolateral distal tibial plate, mini frag, long 3.5mm steel screw, CaPhos bone substitute (Left)  REMOVAL, EXTERNAL FIXATION DEVICE (Left)  APPLICATION, WOUND VAC (Left)    Post-Operative Day: 1 Day Post-Op  Subjective:     Principal Problem:Closed left ankle fracture    Principal Orthopedic Problem: s/p ORIF L pilon on 9/19/20    Interval History: VSS. NAEON. Patient complaining of significant left ankle pain on exam. Compartments soft, no increased pain with passive stretch of hallux.    Review of patient's allergies indicates:   Allergen Reactions    Cephalexin Anaphylaxis    Codeine Itching    Nsaids (non-steroidal anti-inflammatory drug)      Has a history of bleeding ulcers       Current Facility-Administered Medications   Medication    0.9%  NaCl infusion    0.9%  NaCl infusion    acetaminophen tablet 1,000 mg    ARIPiprazole tablet 15 mg    aspirin EC tablet 81 mg    docusate sodium capsule 100 mg    hydroCHLOROthiazide tablet 25 mg    HYDROmorphone (DILAUDID) 1 mg/mL injection    levothyroxine tablet 100 mcg    lisdexamfetamine capsule 70 mg    methocarbamoL tablet 500 mg    midazolam (VERSED) 1 mg/mL injection 0.5 mg    oxyCODONE immediate release tablet 10 mg    oxyCODONE immediate release tablet 5 mg    pregabalin capsule 150 mg    propranoloL tablet 60 mg    ropivacaine (PF) 2 mg/ml (0.2%) infusion    ropivacaine (PF) 2 mg/ml (0.2%) infusion    scopolamine 1.3-1.5 mg (1 mg over 3 days) 1 patch    tiZANidine tablet 4 mg    topiramate tablet 100 mg    vancomycin - pharmacy to dose    venlafaxine 24 hr capsule 300 mg  "    Objective:     Vital Signs (Most Recent):  Temp: 97.5 °F (36.4 °C) (09/19/20 0817)  Pulse: 70 (09/19/20 0817)  Resp: 16 (09/19/20 0839)  BP: 114/71 (09/19/20 0817)  SpO2: 96 % (09/19/20 0817) Vital Signs (24h Range):  Temp:  [97.5 °F (36.4 °C)-99.9 °F (37.7 °C)] 97.5 °F (36.4 °C)  Pulse:  [45-91] 70  Resp:  [14-51] 16  SpO2:  [96 %-100 %] 96 %  BP: (104-131)/(56-85) 114/71     Weight: 56.7 kg (125 lb)  Height: 5' 3" (160 cm)  Body mass index is 22.14 kg/m².      Intake/Output Summary (Last 24 hours) at 9/19/2020 0850  Last data filed at 9/19/2020 0700  Gross per 24 hour   Intake 3220 ml   Output 1470 ml   Net 1750 ml       Ortho/SPM Exam     Awake/alert/oriented x3, No acute distress, Afebrile, Vital signs stable  Good inspiratory effort with unlaboured breathing  Dressings c/d/i  NVI in operative limb   Compartments soft in LLE  No increased pain with passive stretch of L hallux       Significant Labs: All pertinent labs within the past 24 hours have been reviewed.    Significant Imaging: I have reviewed all pertinent imaging results/findings.    Assessment/Plan:     * Closed left ankle fracture  Faith Bernard is a 55 y.o. female s/p ORIF L pilon on 9.18.20      Pain control: multimodal; anesthesia pain following  PT/OT: NWB LLE; Bedrest with leg aggressively elevated and iced  DVT PPx: asa 81 bid; SCDs at all times when not ambulating  Abx: clindamycin post op for hx of recurrent MRSA infections,  Labs: Hgb 11.5  Cordero: dced      Dispo: dc when stable            Asa Dylon Bundy MD  Orthopedics  [facility]  "

## 2020-09-19 NOTE — ASSESSMENT & PLAN NOTE
Faith LEVI Pérezpaul is a 55 y.o. female s/p ORIF L donaon on 9.18.20      Pain control: multimodal; anesthesia pain following  PT/OT: NWB LLE; Bedrest with leg aggressively elevated and iced  DVT PPx: asa 81 bid; SCDs at all times when not ambulating  Abx: clindamycin post op for hx of recurrent MRSA infections,  Labs: Hgb 11.5  Gil: estephania      Dispo: dc when stable

## 2020-09-19 NOTE — ANESTHESIA POSTPROCEDURE EVALUATION
Anesthesia Post Evaluation    Patient: Faith Bernard    Procedure(s) Performed: Procedure(s) (LRB):  ORIF, FRACTURE, PILON - left. Diving board, supine, bone foam. [place] anterolateral distal tibial plate, mini frag, long 3.5mm steel screw, CaPhos bone substitute (Left)  REMOVAL, EXTERNAL FIXATION DEVICE (Left)  APPLICATION, WOUND VAC (Left)    Final Anesthesia Type: general    Patient location during evaluation: PACU  Patient participation: Yes- Able to Participate  Level of consciousness: awake and responds to stimulation  Post-procedure vital signs: reviewed and stable  Pain management: adequate  Airway patency: patent  HENRY mitigation strategies: Extubation and recovery carried out in lateral, semiupright, or other nonsupine position  PONV status at discharge: No PONV  Anesthetic complications: no      Cardiovascular status: hemodynamically stable  Respiratory status: room air and spontaneous ventilation  Hydration status: euvolemic  Follow-up not needed.          Vitals Value Taken Time   /67 09/19/20 0442   Temp 36.8 °C (98.2 °F) 09/19/20 0442   Pulse 62 09/19/20 0442   Resp 16 09/19/20 0442   SpO2 97 % 09/19/20 0442         Event Time   Out of Recovery 22:43:00         Pain/Ricarda Score: Pain Rating Prior to Med Admin: 6 (9/19/2020  1:42 AM)  Pain Rating Post Med Admin: 4 (9/18/2020 10:43 PM)  Ricarda Score: 10 (9/18/2020 10:43 PM)

## 2020-09-19 NOTE — PROGRESS NOTES
Pharmacokinetic Sign-off: IV Vancomycin    Therapy with vancomycin complete and/or consult discontinued by provider.  Pharmacy will sign off, please re-consult as needed.    Randi Stephens, PharmD  [phone]

## 2020-09-19 NOTE — OP NOTE
OPERATIVE NOTE    DATE OF PROCEDURE:  09/18/2020    PREOPERATIVE DIAGNOSIS:   Left pilon fracture, closed, displaced, comminuted, subsequent encounter  Left distal tibia intra-articular fracture, displaced, closed subsequent encounter  Left distal fibula fracture, minimally displaced, closed, subsequent encounter  Presence of left ankle external fixator  Fall from height, rope swing    POSTOPERATIVE DIAGNOSIS:   Left pilon fracture, closed, displaced, comminuted, subsequent encounter  Left distal tibia intra-articular fracture, displaced, closed subsequent encounter  Left distal fibula fracture, minimally displaced, closed, subsequent encounter  Presence of left ankle external fixator  Fall from height, rope swing    PROCEDURE:   Open reduction internal fixation left pilon fracture, with fixation of tibia and fibula  Removal of external fixator under general anesthesia    SURGEON:   Fer Mcrae MD    ASSISTANT:    MD Elias Flores MD    ANESTHESIA:   General with regional block    EBL:    250 mL    COMPLICATIONS:  None    IMPLANTS:   Seda  Tibia (titanium)  2.7 mm Mini frag recon plate, 10 hole  2.7 mm cortical screw, x3  3.5 mm cortical screw, x1  Anterolateral distal tibia locking plate, 8 hole  3.5 mm cortical screw, x4  4.0 mm cancellous screw, x2  4.0 mm locking screw, x3  Hydroset, calcium phosphate, 5 mL  Allograft Cancelloous bone chips, 15mL    Fibula (stainless steel)  3.5 mm cortical screw, x1, stainless steel    Vancomycin powder, 1g    SPECIMENS:   Intra-articular loose body sent to pathology    INDICATIONS FOR PROCEDURE:  55-year-old female, chronic pain, on chronic opioids, fall from height/rope swing/30 ft 09/06/2020 sustaining an injury to her left ankle.  Came to emergency department, seen by the orthopedic resident on-call team.  Diagnosed with a left pilon fracture.    09/07/2020 - ex-fix left pilon     Subsequently discharged home, to allow soft tissue swelling  to subside.  Seen in clinic 09/17/2020.     Currently complains of 10 out 10 pain all throughout her ankle.  It is only moderately controlled by her morphine and oxycodone  Currently she is taking   morphine 15 mg t.i.d.  Oxycodone 15 mg Q 6     Lives at home with her   Disabled due to chronic back and knee pain  Takes opioids, benzos daily and has so for years  Nonsmoker  Denies diabetes  Denies history of heart attack, stroke, blood clot, cancer  History of prior MRSA infections at surgical sites.  No current infections    Counseled patient on her medical diagnosis and treatment options.  Discussed both non operative and operative management.  Non operative management from this point out would consist of continued external fixator, and allowing the fracture to heal as is.  I feel that this was aid result in continued intra-articular incongruity, with resultant joint pain, stiffness, arthritis, poor function.  Discussed operative intervention the form of open reduction internal fixation.  I feel that this will improve her alignment, mobility, pain, function, though it will carry with it the increased risk of surgical site infections.  Regardless of the intervention performed, due to her severe injury, she will likely have decreased function and some long-term pain in her left ankle.  Also counseled the patient that she has chronic pain, and takes chronic opioids, and it will be very difficult and challenging to control her pain adequately in the postoperative period.       Through a mutual decision making process we came up with the plan to proceed with open reduction internal fixation and removal of her external fixator, tomorrow in operating room.  She will likely be admitted for couple days postop for strict elevation of her leg.  We will consult anesthesia pain management for perioperative block and assistance with pain control given her chronic opioid use.     The risks, benefits, and alternatives to  surgery were discussed with the patient and/or family.    Specific risks discussed included, but were not limited to:  Chronic pain, ankle stiffness, ankle arthritis, decreased function, persistent swelling, failure of fixation, hardware prominence, infection, need for multiple staged procedures, damage to nearby structures, including neurovascular structures leading to loss of function or loss of limb, bleeding, need for blood transfusion, pain, stiffness, scarring, numbness, tingling, weakness, compartment syndrome, malunion/nonunion, hardware failure, hardware prominence, infection, need for multiple staged procedures, prolonged antibiotics, iatrogenic fracture, heterotopic ossification, arthritis, a variety of medical complications including but not limited to heart attack, stroke, deep venous thrombosis, pulmonary embolism, prolonged hospitalization, prolonged intubation, and death.   Patient and/or family expressed an understanding and desires to proceed with surgery.   All questions were answered.  No guarantees were implied or stated.  Informed consent was obtained.    OPERATIVE PROCEDURE:  Patient met in the preoperative hold area and the correct site and side of surgery being the left ankle were marked and verified.  Preoperative regional block was placed by our anesthesia colleagues  Patient brought back to the operative suite.  General anesthesia smoothly induced.  Patient transferred over to operative table.  Placed in supine position. All bony prominences were appropriately padded.  Bump placed under left hip, left lower extremity placed on bone foam.  Patient received vancomycin 1 g, clindamycin 900 mg for preoperative antibiotics due to a pre-existing cephalosporin allergy, and history of MRSA infections.  Left lower extremity external fixator was pre prepped with chlorhexidine, and alcohol thoroughly.  The left lower extremity including external fixator was then prepped and draped in normal sterile  fashion.    Time-out was performed verifying the correct patient, site/side of surgery, surgical consent, radiographs as applicable, preop antibiotics, necessary equipment, anticipated blood loss, length of procedure, postoperative disposition.      We started by drawing out our anatomic landmarks and surgical incisions.  We planned for and anterior lateral surgical approach centered on the 4th ray across the ankle joint and distal tibia.  This would be minimally invasive approach at the joint surface, and would allow articular visualization, reduction to some degree.  We also planned a separate medial incision for the medial component of the pilon fracture.  This would also allow for further articular visualization, reduction as needed, as well as fixation of the medial piece.  These incisions were  by approximately 7.5 cm.  We planned for percutaneous fixation of the fibula with a percutaneous screw at the end of the case.    We then removed the external fixator.  Bars and clamps were removed.  Tibial Schanz pins were removed.  Calcaneal pin was left in place for use during the case as a reduction aid.  The tibial pin sites were curetted, irrigated and covered with blue towels and Ioban.  The entire lower extremity was then re-prepped with Betadine, surgical team changed gloves, new down towels were draped.    We then used an Esmarch, inflated the tourniquet for approximately 125 min during the case.  We started with anterior lateral approach.  Skin was incised with scalpel.  We identified and protected branch of the superficial peroneal nerve.  Extensor retinaculum was split.  Extensor tendons were retracted medially as needed.  Distal tibia was identified.  We dissected proximally just enough to see the apex of the fracture.  Some periosteum was elevated the fracture edges as needed.  We performed an arthrotomy through the anterolateral aspect of the ankle.  Hemarthrosis was evacuated and irrigated.   From this approach there was no definitive fracture plane to book open to see the depressed intra-articular piece.  At this point we turned our attention to the medial approach.  This was standard medial approach to medial malleolus, curving slightly anteriorly at the distal aspect.  Skin was incised with scalpel.  Hemostasis was achieved as needed electrocautery.  Saphenous vein and nerve were identified, protected, retracted anteriorly as needed.  We sharply and bluntly dissected down to the medial periosteum, it was quite thick and hypertrophied from its attempt at healing.  Periosteum was incised.  We were able to identified the medial fracture edge.  There was a separate butterfly piece more proximal posteriorly.  We took care to preserve majority of the periosteal attachments.  We dissected anteriorly along the medial malleolus to visualize anterior medial joint line.  We then performed arthrotomy medially.  This allowed us to book open the medial malleolus fracture piece.  Through this window we could visualize and manipulate the depressed articular segment better.  There was a displaced cortical segment, as previously noted on CT scan imaging that was wedged in this fracture, it was removed and discarded.  We then returned our attention to the anterior lateral approach.  Utilizing both the medial and anterior lateral approaches were able to book open the anterior fracture piece which allowed us better visualization of the depressed articular segment.    In order to better visualize the joint surface we placed bilateral distractors.  Placed 5 mm Schanz pins through percutaneous stab incisions in the proximal tibia both medially and laterally..  We hooked this up to the existing calcaneal pin.  We utilized both medial and lateral traction.  This provided good alignment of the joint, and improved articular visualization.  With this distractor in place were able to work through both the medial and anterior  lateral incisions to anatomically reduce the depressed articular segment in the central zone.  This was then pinned in place with a wire.  This wire was advanced from anterior medial to posterior lateral, out the posterior skin just lateral to the Achilles tendon.  Excess wire was cut.  We then assessed our fracture reduction both visually and on fluoroscopic imaging were satisfied.  To further support the central/anterior lateral articular depression we chose to use calcium phosphate cement.  It was mixed on the back table and injected into the void left by the depressed metaphyseal segment anterior laterally.  The previous anterior lateral fracture piece was placed back into the appropriate position to ensure that there was not excessive cement blocking our reduction.  Excess cement was removed as needed.      We then turned our attention to anatomicly reducing the anterior lateral fracture piece.  This was still connected with soft tissues attached to the syndesmosis, AITFL, and these were carefully preserved.  With the current distractor in place was evident that there was some malpositioning caused by the distractor.  Distractors were adjusted as needed and bumps were placed as needed.  We used elevators and a ball spike to line up the central articular segment with the anterior lateral fracture piece utilizing an articular read.  Once this was keyed in and was held in place with multiple wires.  We confirmed appropriate reduction visually and on fluoroscopic imaging.    We then turned our attention to the medial fracture piece.  As noted previously there is a large medial malleolar fracture, as well as some more proximal butterfly fragment comminution.  We then keyed in the proximal medial butterfly piece by visually reducing the fracture segments.  This was then held in place with 2 K-wires.  We then reduced the medial malleolar fracture with a ball spike using visual and fluoroscopic cues, and then placed a  K-wire up the medial mallet into the proximal tibia to hold our reduction.  Fluoroscopic imaging confirmed appropriate reduction of the joint surface.  We selected the appropriate size 2.7 mm mini frag recon plate.  It was contoured appropriately to avoid any undue prominence distally.  It was then placed upon the fracture, used visual and fluoroscopic cues.  We then placed an apex screw proximal to the butterfly piece which sucked down both medial malleolar bony segments nicely.  We then placed a 2nd bicortical screw in the most proximal hole to further secure fixation.  We planned to place a 3rd screw across the distal segment later in the case.    Tourniquet was let down at approximately 125 min during the case.  Hemostasis achieved as needed with electrocautery and direct pressure.    We then turned our attention to placement of an anterior lateral plate.  This was placed MIPO technique through the previously made anterolateral approach.  We used a Sahu to sub muscularly elevate the anterior lateral compartment musculature.  Plate was then slid through the anterior lateral incision along the anterior lateral surface of the tibia.  We utilized visual and Fluoroscopic imaging to confirm appropriate plate placement.  Proximally we made an incision in the few cm long lateral aspect of the leg and bluntly dissected down to the lateral aspect of the tibia was identified.  The neurovascular bundle was protected and retracted as needed.  Plate was pinned in place distally and proximally through the locking guide.  Once again we visually and fluoroscopically confirmed appropriate plate placement.  To suck down the plate distally we used a ball spike pusher and a cancellous screw in 1 of the lateral distal cluster holes.  We then placed a bicortical screw proximal to the fracture along the shaft to suck that part of the plate down.  We then visually and fluoroscopically reassessed plate placement and fracture reduction  were satisfied.  We placed another cancellous screw through the most distal medial cluster hole to further secure our distal fixation.      Next we returned our attention to the medial plate.  We had already placed proximal screws.  We placed a screw through the articular block from medial to lateral engaging our fracture on the medial side, and the central articular depression.  This was a 2.7 mm cortical screw.  It further sucked down the distal aspect of the medial plate nicely.      Next we placed 3 4.0 locking screws in the distal cluster of the plate.  Utilizing our previously made proximal incision, as well as other percutaneous incisions along a long lateral aspect of the leg, in addition to blunt dissection and protection of the neurovascular bundle, we placed 3 bicortical screws along the shaft to complete our fixation.    In order to obtain more robust fixation of the medial aspect of the pilon and medial malleolus, we exchanged out our previously placed guidewire for an appropriate sized 3.5 mm bicortical screw engaging the proximal aspect of the fracture.  We then reassessed our hardware placement, fracture reduction were satisfied.    Next we turned our attention to placement of an intramedullary screw in the fibula for increased lateral support.  The fibula was well aligned.  We used fluoroscopic guidance to make a percutaneous stab incision distal to the tip of the fibula.  We bluntly dissected down to bone.  We then used fluoroscopic guidance to pass a long drill bit and intramedullary location.  We then placed a 120 mm stainless steel intramedullary screw in the fibula    Pins for the distractor were removed.    We then reassessed our fracture reduction both visually and with fluoroscopic imaging were satisfied.  The metaphyseal region did have some bone loss.  Some of this was previously filled with calcium phosphate cement, however some void still did remain.  We utilized some cancellous  allograft chips and impacted that into the metaphyseal void from the medial incision.    Incisions were irrigated with saline.  Hemostasis achieved with electrocautery.  1 g vancomycin placed deep in the wounds.  Fascia and periosteum medially was closed with 0 Vicryl.  Extensor retinaculum laterally was closed with 0 Vicryl.  Subcutaneous tissue closed with 3 O Vicryl.  Skin closed with 3 O nylon.  Incisional wound VAC was placed on the medial and anterior lateral incisions.  Previous pin sites and percutaneous incisions were covered with Xeroform, dry gauze, Tegaderm.  Short-leg well-padded plaster splint was applied with ankle in neutral dorsiflexion.    At the conclusion of procedure the patient had soft and compressible compartments, brisk cap refill, palpable PT and DP pulse and her operative extremity.    Prior to final closure all counts were confirmed to be correct.  Patient tolerated the procedure well without any complications.  She was woken from anesthesia, taken to PACU for further recovery.      POSTOPERATIVE PLAN:  55-year-old female, chronic opioid user due to chronic pain, fall from height 09/06/2020 sustaining a closed left pilon fracture.  09/07/2020 ex fix by 1 of my partners.    09/18/2020 - ORIF left pilon fracture, removal of ex fix    Antibiotics times 24 hr  Admit to the hospital for couple days for strict elevation of her leg, which she will continue at home for couple weeks.  Multimodal pain control    Nonweightbearing/foot flat touchdown weight-bearing left lower extremity times 10-12 weeks  ASA 81 mg daily x6 weeks postop for DVT prophylaxis    Follow-up 1 week postop for removal of splint and incisional wound VAC  Placement into a well-padded cast with silver dressings at that time    2nd follow-up at 3 weeks postop for suture removal and x-rays    Follow-up postop 1 week (cast), 3 wks (sutures, xray, boot), 6 weeks, 3 months, 6 months, 1 year    X-rays at subsequent followups:  Left  ankle  =====================  Fer Mcrae MD  Orthopaedic Surgery

## 2020-09-20 VITALS
RESPIRATION RATE: 16 BRPM | SYSTOLIC BLOOD PRESSURE: 96 MMHG | WEIGHT: 125 LBS | HEART RATE: 66 BPM | DIASTOLIC BLOOD PRESSURE: 60 MMHG | OXYGEN SATURATION: 99 % | TEMPERATURE: 98 F | BODY MASS INDEX: 22.15 KG/M2 | HEIGHT: 63 IN

## 2020-09-20 PROCEDURE — 99212 OFFICE O/P EST SF 10 MIN: CPT | Mod: ,,, | Performed by: ANESTHESIOLOGY

## 2020-09-20 PROCEDURE — 94799 UNLISTED PULMONARY SVC/PX: CPT

## 2020-09-20 PROCEDURE — 25000003 PHARM REV CODE 250: Performed by: STUDENT IN AN ORGANIZED HEALTH CARE EDUCATION/TRAINING PROGRAM

## 2020-09-20 PROCEDURE — 99212 PR OFFICE/OUTPT VISIT, EST, LEVL II, 10-19 MIN: ICD-10-PCS | Mod: ,,, | Performed by: ANESTHESIOLOGY

## 2020-09-20 PROCEDURE — 63600175 PHARM REV CODE 636 W HCPCS: Performed by: STUDENT IN AN ORGANIZED HEALTH CARE EDUCATION/TRAINING PROGRAM

## 2020-09-20 RX ORDER — METHOCARBAMOL 750 MG/1
750 TABLET, FILM COATED ORAL 4 TIMES DAILY
Status: DISCONTINUED | OUTPATIENT
Start: 2020-09-20 | End: 2020-09-20 | Stop reason: HOSPADM

## 2020-09-20 RX ORDER — CELECOXIB 100 MG/1
100 CAPSULE ORAL 2 TIMES DAILY
Qty: 60 CAPSULE | Refills: 0 | Status: SHIPPED | OUTPATIENT
Start: 2020-09-20 | End: 2020-10-19

## 2020-09-20 RX ORDER — GABAPENTIN 300 MG/1
300 CAPSULE ORAL 3 TIMES DAILY
Qty: 90 CAPSULE | Refills: 0 | Status: SHIPPED | OUTPATIENT
Start: 2020-09-20 | End: 2020-10-20

## 2020-09-20 RX ORDER — METHOCARBAMOL 750 MG/1
750 TABLET, FILM COATED ORAL 4 TIMES DAILY
Status: COMPLETED | OUTPATIENT
Start: 2020-09-20 | End: 2020-09-20

## 2020-09-20 RX ORDER — ASPIRIN 81 MG/1
81 TABLET ORAL DAILY
Qty: 42 TABLET | Refills: 0 | Status: SHIPPED | OUTPATIENT
Start: 2020-09-20 | End: 2020-11-01

## 2020-09-20 RX ORDER — MORPHINE SULFATE 15 MG/1
15 TABLET, FILM COATED, EXTENDED RELEASE ORAL 3 TIMES DAILY
Qty: 42 TABLET | Refills: 0 | Status: SHIPPED | OUTPATIENT
Start: 2020-09-20 | End: 2020-09-24 | Stop reason: SDUPTHER

## 2020-09-20 RX ORDER — OXYCODONE HYDROCHLORIDE 10 MG/1
10 TABLET ORAL EVERY 6 HOURS PRN
Qty: 28 TABLET | Refills: 0 | Status: SHIPPED | OUTPATIENT
Start: 2020-09-20 | End: 2020-09-24 | Stop reason: SDUPTHER

## 2020-09-20 RX ADMIN — METHOCARBAMOL 750 MG: 750 TABLET ORAL at 08:09

## 2020-09-20 RX ADMIN — DOCUSATE SODIUM 100 MG: 100 CAPSULE, LIQUID FILLED ORAL at 08:09

## 2020-09-20 RX ADMIN — METHOCARBAMOL 750 MG: 750 TABLET ORAL at 12:09

## 2020-09-20 RX ADMIN — OXYCODONE HYDROCHLORIDE 15 MG: 10 TABLET ORAL at 05:09

## 2020-09-20 RX ADMIN — OXYCODONE HYDROCHLORIDE 15 MG: 10 TABLET ORAL at 11:09

## 2020-09-20 RX ADMIN — OXYCODONE HYDROCHLORIDE 15 MG: 10 TABLET ORAL at 03:09

## 2020-09-20 RX ADMIN — SODIUM CHLORIDE 1000 ML: 0.9 INJECTION, SOLUTION INTRAVENOUS at 09:09

## 2020-09-20 RX ADMIN — ACETAMINOPHEN 1000 MG: 500 TABLET ORAL at 06:09

## 2020-09-20 RX ADMIN — OXYCODONE HYDROCHLORIDE 15 MG: 10 TABLET ORAL at 08:09

## 2020-09-20 RX ADMIN — ROPIVACAINE HYDROCHLORIDE 12 ML/HR: 2 INJECTION, SOLUTION EPIDURAL; INFILTRATION at 01:09

## 2020-09-20 RX ADMIN — ACETAMINOPHEN 1000 MG: 500 TABLET ORAL at 11:09

## 2020-09-20 RX ADMIN — CLINDAMYCIN IN 5 PERCENT DEXTROSE 600 MG: 12 INJECTION, SOLUTION INTRAVENOUS at 01:09

## 2020-09-20 RX ADMIN — ASPIRIN 81 MG: 81 TABLET, COATED ORAL at 08:09

## 2020-09-20 RX ADMIN — OXYCODONE HYDROCHLORIDE 15 MG: 10 TABLET ORAL at 02:09

## 2020-09-20 RX ADMIN — TOPIRAMATE 100 MG: 25 TABLET, FILM COATED ORAL at 08:09

## 2020-09-20 RX ADMIN — LEVOTHYROXINE SODIUM 100 MCG: 100 TABLET ORAL at 05:09

## 2020-09-20 NOTE — DISCHARGE SUMMARY
[facility]  Orthopedics  Discharge Summary      Patient Name: Faith Bernard  MRN: [id number]  Admission Date: 9/18/2020  Hospital Length of Stay: 1 days  Discharge Date and Time: 09/20/2020  Attending Physician: Fer Mcrae,*   Discharging Provider: Misael Amaya MD  Primary Care Provider: Rah Canela MD    HPI:   55-year-old female, chronic pain, on chronic opioids, fall from height/rope swing/30 ft 09/06/2020 sustaining an injury to her left ankle.  Can emergency department, seen by the orthopedic resident on-call team.  Diagnosed with a left pilon fracture.    09/07/2020 - ex-fix left pilon     Subsequently discharged home, to allow soft tissue swelling to subside.  Here today for preoperative skin check and follow-up.     Currently complains of 10 out 10 pain all throughout her ankle.  It is only moderately controlled by her morphine and oxycodone  Currently she is taking   morphine 15 mg t.i.d.  Oxycodone 15 mg Q 6     Lives at home with her   Disabled due to chronic back and knee pain  Takes opioids daily and has so for years  Nonsmoker  Denies diabetes  Denies history of heart attack, stroke, blood clot, cancer  History of prior MRSA infections at surgical sites.  No current infections    Procedure(s) (LRB):  ORIF, FRACTURE, PILON - left. Diving board, supine, bone foam. Seda anterolateral distal tibial plate, mini frag, long 3.5mm steel screw, CaPhos bone substitute (Left)  REMOVAL, EXTERNAL FIXATION DEVICE (Left)  APPLICATION, WOUND VAC (Left)      Hospital Course:  On 9/18/20, the patient arrived to the [facility] for proper pre-operative management.  Upon completion of pre-operative preparation, the patient was taken back to the operative theatre. Left ankle ORIF was performed without complication and the patient was transported to the post anesthesia care unit in stable condition.  After appropriate recovery from the anaesthetic  "agents used during the surgery, the patient was then transported to the hospital inpatient floor.  The interim of the hospital stay from arrival on the floor up to discharge has been uncomplicated. The patient has tolerated regular diet.  The patient's pain has been controlled using a multimodal approach. Currently, the patient's pain is well controlled on an oral regimen.  The patient has been voiding without difficulty.  The patient began participation in physical therapy after surgery and has progressed throughout the entire hospital stay.  Currently, the patient's progress is sufficient to allow the them to be discharged to home safely.  The patient agrees with this assessment and desires a discharge today.          Pending Diagnostic Studies:     Procedure Component Value Units Date/Time    Specimen to Pathology, Surgery Orthopedics [[id number]] Collected: 09/18/20 2020    Order Status: Sent Lab Status: In process Updated: 09/18/20 2021        Final Active Diagnoses:    Diagnosis Date Noted POA    PRINCIPAL PROBLEM:  Closed left ankle fracture [S82.892A] 09/18/2020 Yes      Problems Resolved During this Admission:      Discharged Condition: good    Disposition: Home or Self Care    Follow Up:  Follow-up Information     Kevin Moody NP. Go on 9/24/2020.    Specialty: Orthopedic Surgery  Why: Wound vac removal  Contact information:  [address] [place] [id number]  [phone]                 Patient Instructions:      HME - OTHER     Order Specific Question Answer Comments   Type of Equipment: Foot rest for wheelchair    Height: 5' 3" (1.6 m)    Weight: 56.7 kg (125 lb)      Diet general     Call MD for:  temperature >100.4     Call MD for:  persistent nausea and vomiting     Call MD for:  severe uncontrolled pain     Call MD for:  difficulty breathing, headache or visual disturbances     Call MD for:  redness, tenderness, or signs of infection (pain, swelling, redness, odor or green/yellow " discharge around incision site)     Call MD for:  hives     Call MD for:  persistent dizziness or light-headedness     Call MD for:  extreme fatigue     Sponge bath only until clinic visit     Keep surgical extremity elevated     Ice to affected area     No driving, operating heavy equipment or signing legal documents while taking pain medication     Leave dressing on - Keep it clean, dry, and intact until clinic visit     Non weight bearing   Order Comments: NWB LLE     Medications:  Reconciled Home Medications:      Medication List      START taking these medications    celecoxib 100 MG capsule  Commonly known as: CeleBREX  Take 1 capsule (100 mg total) by mouth 2 (two) times daily.     gabapentin 300 MG capsule  Commonly known as: NEURONTIN  Take 1 capsule (300 mg total) by mouth 3 (three) times daily.     morphine 15 MG 12 hr tablet  Commonly known as: MS CONTIN  Take 1 tablet (15 mg total) by mouth 3 (three) times daily.        CHANGE how you take these medications    aspirin 81 MG EC tablet  Commonly known as: ECOTRIN  Take 1 tablet (81 mg total) by mouth once daily.  What changed: when to take this     oxyCODONE 10 mg Tab immediate release tablet  Commonly known as: ROXICODONE  Take one tablet by mouth every 6 hours as needed for pain. May take one-half to one tablet every 6 hours as needed for pain.  What changed:   · medication strength  · how much to take        CONTINUE taking these medications    ARIPiprazole 10 MG Tab  Commonly known as: ABILIFY  Take 10 mg by mouth every evening.     cholecalciferol (vitamin D3) 1,250 mcg (50,000 unit) capsule  Take 50,000 Units by mouth every 7 days. Patient takes on Saturdays     EFFEXOR XR 75 MG 24 hr capsule  Generic drug: venlafaxine  Take 300 mg by mouth every evening. Patient takes 300 mg once a day at night     hydroCHLOROthiazide 25 MG tablet  Commonly known as: HYDRODIURIL  Take 25 mg by mouth every morning.     levothyroxine 88 MCG tablet  Commonly known as:  SYNTHROID  Take 88 mcg by mouth once daily.     MAPAP ARTHRITIS PAIN 650 MG Tbsr  Generic drug: acetaminophen  Take 1 tablet (650 mg total) by mouth every 6 (six) hours as needed (pain).     progesterone 200 MG capsule  Commonly known as: PROMETRIUM  Take 200 mg by mouth every evening.     propranoloL 60 MG tablet  Commonly known as: INDERAL  Take 60 mg by mouth 2 (two) times daily.     rizatriptan 10 MG disintegrating tablet  Commonly known as: MAXALT-MLT  DISSOLVE ONE TABLET BY MOUTH allow TO dissolve ONCE daily AS NEEDED     STOOL SOFTENER 100 MG capsule  Generic drug: docusate sodium  Take 1 capsule (100 mg total) by mouth 2 (two) times daily.     tiZANidine 4 MG tablet  Commonly known as: ZANAFLEX  Take 4 mg by mouth every evening. May take up to 12mg qhs     topiramate 200 MG Tab  Commonly known as: TOPAMAX  100 mg 4 (four) times daily.     VITAMIN B-12 INJ  Inject 1 mL as directed every 30 days.     VYVANSE 70 MG capsule  Generic drug: lisdexamfetamine  Take 70 mg by mouth once daily.        STOP taking these medications    mupirocin 2 % ointment  Commonly known as: BACTROBAN            Misael Amaya MD  Orthopedics  [facility]

## 2020-09-20 NOTE — NURSING
Pts blood pressure has been low, pt is not symptomatic, follows commands, moves around in her bed, is awake and alert. Pt states her pressure goes down when she takes propanolol. Will continue to monitor.

## 2020-09-20 NOTE — PLAN OF CARE
PAYOR: Primary [employer] PPO ([id number])    Medicare A/B ([id number])    PHARMACY: [facility] [address], [place] [address]   [phone]    SW spoke with pt spouse to complete assessment. Alert and oriented. Stated no hx of dialysis or coumadin. States that pt currently has rolling walker and w/c. Stated that he provides care for pt if/when needed. Stated pt will need a ride at d/c.     No further needs at time of assessment.          09/20/20 1255   Discharge Assessment   Assessment Type Discharge Planning Assessment   Confirmed/corrected address and phone number on facesheet? Yes   Assessment information obtained from? Caregiver   Communicated expected length of stay with patient/caregiver no   Prior to hospitilization cognitive status: Alert/Oriented   Prior to hospitalization functional status: Independent   Current cognitive status: Alert/Oriented   Current Functional Status: Independent   Facility Arrived From: Home   Lives With spouse   Able to Return to Prior Arrangements yes   Is patient able to care for self after discharge? Unable to determine at this time (comments)   Who are your caregiver(s) and their phone number(s)? Spouse; Ronald Champion; [phone]   Patient's perception of discharge disposition home or selfcare   Readmission Within the Last 30 Days unable to assess   Patient currently being followed by outpatient case management? Unable to determine (comments)   Patient currently receives any other outside agency services? No   Equipment Currently Used at Home walker, rolling;wheelchair   Do you have any problems affording any of your prescribed medications? No   Is the patient taking medications as prescribed? yes   Does the patient have transportation home? Yes   Transportation Anticipated family or friend will provide   Dialysis Name and Scheduled days n/s   Does the patient receive services at the [facility]mad[facility] Clinic? No   Discharge Plan A Rehab   Discharge Plan B Home Health   DME  Needed Upon Discharge  none   Patient/Family in Agreement with Plan yes       Julia Jimenez LMSW  Case Management Social Worker   [facility]

## 2020-09-20 NOTE — NURSING
Pt received discharge instructions. Verbalized understanding of all instructions. Prescriptions delivered to bedside via pharmacy. Portable home wound vac in place. OnQ pain balls initiated by anesthesia team, education provided. PIV removed, no redness/swelling. Pt will transport out via wheelchair.

## 2020-09-20 NOTE — HOSPITAL COURSE
On 9/18/20, the patient arrived to the [facility] for proper pre-operative management.  Upon completion of pre-operative preparation, the patient was taken back to the operative theatre. Left ankle ORIF was performed without complication and the patient was transported to the post anesthesia care unit in stable condition.  After appropriate recovery from the anaesthetic agents used during the surgery, the patient was then transported to the hospital inpatient floor.  The interim of the hospital stay from arrival on the floor up to discharge has been uncomplicated. The patient has tolerated regular diet.  The patient's pain has been controlled using a multimodal approach. Currently, the patient's pain is well controlled on an oral regimen.  The patient has been voiding without difficulty.  The patient began participation in physical therapy after surgery and has progressed throughout the entire hospital stay.  Currently, the patient's progress is sufficient to allow the them to be discharged to home safely.  The patient agrees with this assessment and desires a discharge today.

## 2020-09-20 NOTE — PROGRESS NOTES
[facility]  Orthopedics  Progress Note    Patient Name: Faith Bernard  MRN: [id number]  Admission Date: 9/18/2020  Hospital Length of Stay: 1 days  Attending Provider: Fer Mcrae,*  Primary Care Provider: Rah Canela MD  Follow-up For: Procedure(s) (LRB):  ORIF, FRACTURE, PILON - left. Diving board, supine, bone foam. Seda anterolateral distal tibial plate, mini frag, long 3.5mm steel screw, CaPhos bone substitute (Left)  REMOVAL, EXTERNAL FIXATION DEVICE (Left)  APPLICATION, WOUND VAC (Left)    Post-Operative Day: 2 Days Post-Op  Subjective:     Principal Problem:Closed left ankle fracture    Principal Orthopedic Problem: s/p ORIF L pilon on 9/19/20    Interval History: Hypotensive to 93/60, HR 61 overnight. Asymptomatic. Sat 100% on room air. NAEON. Patient left ankle pain improving compared to yesterday. Pt still requesting increase in pain meds. Extremity minimally elevated on bed on arrival, evidently patient was refusing greater elevation overnight per nurse.     Review of patient's allergies indicates:   Allergen Reactions    Cephalexin Anaphylaxis    Codeine Itching    Nsaids (non-steroidal anti-inflammatory drug)      Has a history of bleeding ulcers       Current Facility-Administered Medications   Medication    0.9%  NaCl infusion    0.9%  NaCl infusion    acetaminophen tablet 1,000 mg    ARIPiprazole tablet 15 mg    aspirin EC tablet 81 mg    docusate sodium capsule 100 mg    levothyroxine tablet 100 mcg    methocarbamoL tablet 500 mg    midazolam (VERSED) 1 mg/mL injection 0.5 mg    oxyCODONE immediate release tablet 15 mg    oxyCODONE immediate release tablet Tab 10 mg    pregabalin capsule 150 mg    propranoloL tablet 60 mg    ropivacaine (PF) 2 mg/ml (0.2%) infusion    ropivacaine (PF) 2 mg/ml (0.2%) infusion    scopolamine 1.3-1.5 mg (1 mg over 3 days) 1 patch    tiZANidine tablet 4 mg    topiramate tablet 100 mg    venlafaxine 24 hr  "capsule 300 mg     Objective:     Vital Signs (Most Recent):  Temp: 97 °F (36.1 °C) (09/20/20 0446)  Pulse: 61 (09/20/20 0446)  Resp: (!) 7 (09/20/20 0459)  BP: (!) 91/56 (09/20/20 0446)  SpO2: 100 % (09/20/20 0446) Vital Signs (24h Range):  Temp:  [97 °F (36.1 °C)-98.3 °F (36.8 °C)] 97 °F (36.1 °C)  Pulse:  [52-70] 61  Resp:  [7-18] 7  SpO2:  [96 %-100 %] 100 %  BP: ()/(51-71) 91/56     Weight: 56.7 kg (125 lb)  Height: 5' 3" (160 cm)  Body mass index is 22.14 kg/m².      Intake/Output Summary (Last 24 hours) at 9/20/2020 0702  Last data filed at 9/19/2020 1717  Gross per 24 hour   Intake 1210 ml   Output 2125 ml   Net -915 ml       Ortho/SPM Exam     Awake/alert/oriented x3, No acute distress, Afebrile, Vital signs stable  Good inspiratory effort with unlaboured breathing  Dressings c/d/i  NVI in operative limb   Compartments soft in LLE  No increased pain with passive stretch of L hallux       Significant Labs: All pertinent labs within the past 24 hours have been reviewed.    Significant Imaging: I have reviewed all pertinent imaging results/findings.    Assessment/Plan:     * Closed left ankle fracture  Faith Bernard is a 55 y.o. female s/p ORIF L pilon on 9.18.20. Extremity minimally elevated on bed on arrival, evidently patient was refusing greater elevation overnight per nurse. Counseled patient on importance of elevation to reduce swelling and improve pain. Patient expressed understanding and stated she would keep the extremity elevated above her heart.        Pain control: multimodal; anesthesia pain following  PT/OT: NWB LLE; Bedrest with leg aggressively elevated and iced  DVT PPx: asa 81 bid; SCDs at all times when not ambulating  Abx: clindamycin post op  Cordero: dced      Dispo: dc when stable            Asa Dylon Bundy MD  Orthopedics  [facility]

## 2020-09-20 NOTE — ADDENDUM NOTE
Addendum  created 09/20/20 0949 by Manisha Price MD    Charge Capture section accepted, Cosign clinical note with attestation

## 2020-09-20 NOTE — SUBJECTIVE & OBJECTIVE
"Principal Problem:Closed left ankle fracture    Principal Orthopedic Problem: s/p ORIF VANESSA troncoso on 9/19/20    Interval History: VSS. NAEON. Patient left ankle pain improving compared to yesterday. Pt still requesting increase in pain meds. Extremity minimally elevated on bed on arrival, evidently patient was refusing greater elevation overnight per nurse. Counseled patient on importance of elevation to reduce swelling and improve pain.    Review of patient's allergies indicates:   Allergen Reactions    Cephalexin Anaphylaxis    Codeine Itching    Nsaids (non-steroidal anti-inflammatory drug)      Has a history of bleeding ulcers       Current Facility-Administered Medications   Medication    0.9%  NaCl infusion    0.9%  NaCl infusion    acetaminophen tablet 1,000 mg    ARIPiprazole tablet 15 mg    aspirin EC tablet 81 mg    docusate sodium capsule 100 mg    levothyroxine tablet 100 mcg    methocarbamoL tablet 500 mg    midazolam (VERSED) 1 mg/mL injection 0.5 mg    oxyCODONE immediate release tablet 15 mg    oxyCODONE immediate release tablet Tab 10 mg    pregabalin capsule 150 mg    propranoloL tablet 60 mg    ropivacaine (PF) 2 mg/ml (0.2%) infusion    ropivacaine (PF) 2 mg/ml (0.2%) infusion    scopolamine 1.3-1.5 mg (1 mg over 3 days) 1 patch    tiZANidine tablet 4 mg    topiramate tablet 100 mg    venlafaxine 24 hr capsule 300 mg     Objective:     Vital Signs (Most Recent):  Temp: 97 °F (36.1 °C) (09/20/20 0446)  Pulse: 61 (09/20/20 0446)  Resp: (!) 7 (09/20/20 0459)  BP: (!) 91/56 (09/20/20 0446)  SpO2: 100 % (09/20/20 0446) Vital Signs (24h Range):  Temp:  [97 °F (36.1 °C)-98.3 °F (36.8 °C)] 97 °F (36.1 °C)  Pulse:  [52-70] 61  Resp:  [7-18] 7  SpO2:  [96 %-100 %] 100 %  BP: ()/(51-71) 91/56     Weight: 56.7 kg (125 lb)  Height: 5' 3" (160 cm)  Body mass index is 22.14 kg/m².      Intake/Output Summary (Last 24 hours) at 9/20/2020 0702  Last data filed at 9/19/2020 1717  Gross per " 24 hour   Intake 1210 ml   Output 2125 ml   Net -915 ml       Ortho/SPM Exam     Awake/alert/oriented x3, No acute distress, Afebrile, Vital signs stable  Good inspiratory effort with unlaboured breathing  Dressings c/d/i  NVI in operative limb   Compartments soft in LLE  No increased pain with passive stretch of L hallux       Significant Labs: All pertinent labs within the past 24 hours have been reviewed.    Significant Imaging: I have reviewed all pertinent imaging results/findings.

## 2020-09-20 NOTE — ANESTHESIA POST-OP PAIN MANAGEMENT
Acute Pain Service Progress Note    Faith Bernard is a 55 y.o., female, [id number].    Surgery:     ORIF, FRACTURE, PILON - left. Diving board, supine, bone foam. Seda anterolateral distal tibial plate, mini frag, long 3.5mm steel screw, CaPhos bone substitute (Left Leg Upper) REMOVAL, EXTERNAL FIXATION DEVICE (Left Leg) APPLICATION, WOUND VAC (Left Leg)       Post Op Day #: 2    Catheter and infusion type: Left adductor canal at 12 ml/hr. Left sciatic catheter at 8 ml/hr.         Problem List:    Active Hospital Problems    Diagnosis  POA    *Closed left ankle fracture [S82.892A]  Yes      Resolved Hospital Problems   No resolved problems to display.       Subjective:     General appearance of alert, oriented, no complaints   Pain with rest: 7    Numbers   Pain with movement: 10    Numbers   Side Effects    1. Pruritis No    2. Nausea No    3. Motor Blockade Yes, 1=Ability to bend knees and ankles    4. Sedation No, 1=awake and alert    Objective:        Catheter site clean, dry, intact      Vitals   Vitals:    09/20/20 1149   BP:    Pulse:    Resp: 16   Temp:         Labs    Admission on 09/18/2020   Component Date Value Ref Range Status    SARS-CoV-2 RNA, Amplification, Qual 09/18/2020 Negative  Negative Final    Group & Rh 09/18/2020 O POS   Final    Indirect Evert 09/18/2020 NEG   Final    WBC 09/19/2020 12.58  3.90 - 12.70 K/uL Final    RBC 09/19/2020 3.57* 4.00 - 5.40 M/uL Final    Hemoglobin 09/19/2020 11.5* 12.0 - 16.0 g/dL Final    Hematocrit 09/19/2020 36.0* 37.0 - 48.5 % Final    Mean Corpuscular Volume 09/19/2020 101* 82 - 98 fL Final    Mean Corpuscular Hemoglobin 09/19/2020 32.2* 27.0 - 31.0 pg Final    Mean Corpuscular Hemoglobin Conc 09/19/2020 31.9* 32.0 - 36.0 g/dL Final    RDW 09/19/2020 13.9  11.5 - 14.5 % Final    Platelets 09/19/2020 449* 150 - 350 K/uL Final    MPV 09/19/2020 9.5  9.2 - 12.9 fL Final    Immature Granulocytes 09/19/2020 0.4  0.0 - 0.5 % Final    Gran #  (ANC) 09/19/2020 10.1* 1.8 - 7.7 K/uL Final    Immature Grans (Abs) 09/19/2020 0.05* 0.00 - 0.04 K/uL Final    Lymph # 09/19/2020 1.3  1.0 - 4.8 K/uL Final    Mono # 09/19/2020 1.0  0.3 - 1.0 K/uL Final    Eos # 09/19/2020 0.1  0.0 - 0.5 K/uL Final    Baso # 09/19/2020 0.03  0.00 - 0.20 K/uL Final    nRBC 09/19/2020 0  0 /100 WBC Final    Gran% 09/19/2020 80.6* 38.0 - 73.0 % Final    Lymph% 09/19/2020 10.3* 18.0 - 48.0 % Final    Mono% 09/19/2020 8.1  4.0 - 15.0 % Final    Eosinophil% 09/19/2020 0.4  0.0 - 8.0 % Final    Basophil% 09/19/2020 0.2  0.0 - 1.9 % Final    Differential Method 09/19/2020 Automated   Final    Sodium 09/19/2020 137  136 - 145 mmol/L Final    Potassium 09/19/2020 3.8  3.5 - 5.1 mmol/L Final    Chloride 09/19/2020 106  95 - 110 mmol/L Final    CO2 09/19/2020 21* 23 - 29 mmol/L Final    Glucose 09/19/2020 201* 70 - 110 mg/dL Final    BUN, Bld 09/19/2020 12  6 - 20 mg/dL Final    Creatinine 09/19/2020 0.8  0.5 - 1.4 mg/dL Final    Calcium 09/19/2020 8.1* 8.7 - 10.5 mg/dL Final    Total Protein 09/19/2020 5.6* 6.0 - 8.4 g/dL Final    Albumin 09/19/2020 2.7* 3.5 - 5.2 g/dL Final    Total Bilirubin 09/19/2020 0.3  0.1 - 1.0 mg/dL Final    Alkaline Phosphatase 09/19/2020 87  55 - 135 U/L Final    AST 09/19/2020 38  10 - 40 U/L Final    ALT 09/19/2020 34  10 - 44 U/L Final    Anion Gap 09/19/2020 10  8 - 16 mmol/L Final    eGFR if African American 09/19/2020 >60.0  >60 mL/min/1.73 m^2 Final    eGFR if non African American 09/19/2020 >60.0  >60 mL/min/1.73 m^2 Final    Vancomycin, Random 09/19/2020 2.9  Not established ug/mL Final        Meds   Current Facility-Administered Medications   Medication Dose Route Frequency Provider Last Rate Last Dose    0.9%  NaCl infusion   Intravenous Continuous Matthew Marion MD   Stopped at 09/18/20 1517    0.9%  NaCl infusion   Intravenous Continuous Bryce Spring MD   1,000 mL at 09/18/20 2216    acetaminophen tablet  1,000 mg  1,000 mg Oral Q6H Lj Seo MD   1,000 mg at 09/20/20 1126    ARIPiprazole tablet 15 mg  15 mg Oral QHS Misael Amaya MD   15 mg at 09/19/20 2114    aspirin EC tablet 81 mg  81 mg Oral BID Matthew Marion MD   81 mg at 09/20/20 0855    docusate sodium capsule 100 mg  100 mg Oral BID Matthew Marion MD   100 mg at 09/20/20 0855    levothyroxine tablet 100 mcg  100 mcg Oral Before breakfast Misael Amaya MD   100 mcg at 09/20/20 0506    methocarbamoL tablet 750 mg  750 mg Oral QID Lj Seo MD        midazolam (VERSED) 1 mg/mL injection 0.5 mg  0.5 mg Intravenous PRN Myrtle Linares MD   2 mg at 09/18/20 1155    oxyCODONE immediate release tablet 15 mg  15 mg Oral Q3H PRN Lj Seo MD   15 mg at 09/20/20 1149    oxyCODONE immediate release tablet Tab 10 mg  10 mg Oral Q3H PRN Lj Seo MD        pregabalin capsule 150 mg  150 mg Oral QHS Myrtle Linares MD   150 mg at 09/19/20 2116    propranoloL tablet 60 mg  60 mg Oral BID Mtathew Marion MD   Stopped at 09/20/20 0900    ropivacaine (PF) 2 mg/ml (0.2%) infusion  8 mL/hr Perineural Continuous Myrtle Linares MD 8 mL/hr at 09/19/20 2019 8 mL/hr at 09/19/20 2019    ropivacaine (PF) 2 mg/ml (0.2%) infusion  12 mL/hr Perineural Continuous Lj Seo MD 10 mL/hr at 09/19/20 2027 10 mL/hr at 09/19/20 2027    scopolamine 1.3-1.5 mg (1 mg over 3 days) 1 patch  1 patch Transdermal Once Cliff Beltran MD   1 patch at 09/18/20 1213    tiZANidine tablet 4 mg  4 mg Oral QHS Matthew Marion MD   4 mg at 09/19/20 2116    topiramate tablet 100 mg  100 mg Oral BID Matthew Marion MD   100 mg at 09/20/20 0855    venlafaxine 24 hr capsule 300 mg  300 mg Oral QHS Matthew Marion MD   300 mg at 09/19/20 2119        Anticoagulant dose per primary.    Assessment:     Pain control adequate    Plan:     - Patient endorses much better pain relief today. Continue PNCs with  current multimodal regimen.    - Increased rate on adductor canal PNC to 12cc/hr.     Evaluator Lj Seo

## 2020-09-20 NOTE — ASSESSMENT & PLAN NOTE
Faith LEVI Pérezmeryvicky is a 55 y.o. female s/p ORIF VANESSA troncoso on 9.18.20      Pain control: multimodal; anesthesia pain following  PT/OT: NWB LLE; Bedrest with leg aggressively elevated and iced  DVT PPx: asa 81 bid; SCDs at all times when not ambulating  Abx: clindamycin post op  Cordero: dced      Dispo: dc when stable

## 2020-09-20 NOTE — HPI
55-year-old female, chronic pain, on chronic opioids, fall from height/rope swing/30 ft 09/06/2020 sustaining an injury to her left ankle.  Can emergency department, seen by the orthopedic resident on-call team.  Diagnosed with a left pilon fracture.    09/07/2020 - ex-fix left pilon     Subsequently discharged home, to allow soft tissue swelling to subside.  Here today for preoperative skin check and follow-up.     Currently complains of 10 out 10 pain all throughout her ankle.  It is only moderately controlled by her morphine and oxycodone  Currently she is taking   morphine 15 mg t.i.d.  Oxycodone 15 mg Q 6     Lives at home with her   Disabled due to chronic back and knee pain  Takes opioids daily and has so for years  Nonsmoker  Denies diabetes  Denies history of heart attack, stroke, blood clot, cancer  History of prior MRSA infections at surgical sites.  No current infections

## 2020-09-21 ENCOUNTER — TELEPHONE (OUTPATIENT)
Dept: ORTHOPEDICS | Facility: CLINIC | Age: 55
End: 2020-09-21

## 2020-09-21 ENCOUNTER — EXTERNAL HOME HEALTH (OUTPATIENT)
Dept: HOME HEALTH SERVICES | Facility: HOSPITAL | Age: 55
End: 2020-09-21
Payer: COMMERCIAL

## 2020-09-21 NOTE — PROGRESS NOTES
Contacted Faith Bernard at home regarding her OnQ ball. Patient reports tolerable pain level, well controlled w/ supplemental PO meds. Discussed date and time of removal of OnQ ball and reinforced instructions regarding removal. Will continue to follow while OnQ ball remains in place. All questions answered, all concerns addressed during telephone conversation.      Lj Seo MD  9/21/20  1:22 PM

## 2020-09-21 NOTE — PROGRESS NOTES
I was contacted by Ms Rose this evening at approximately 5 PM regarding catheter leakage. After a lengthy discussion, I determined that the tegaderm over the sciatic catheter had peeled away and the catheter had been pulled back almost to skin, resulting in the ropivacaine leaking out.    Since we are at post-op day 3, and she was scheduled to remove the catheters tomorrow morning, I recommended that she simply remove both catheters now instead of trying to leave the femoral in.  the tegaderms covering both catheters while leaving one effectively covered would have been very difficult. Furthermore, her pain has been very well controlled even though the sciatic catheter has been misplaced for an hour or more.    She successfully removed both catheters, with tips verified intact, while I was on the phone with her. All questions were answered.    Swapnil Lima MD PGY-3  Anesthesiology  [facility], [place]

## 2020-09-21 NOTE — TELEPHONE ENCOUNTER
Received a call from pt.   Pt reports that her q ball is leaking.    Spoke with the .   She left a message for Dr Seo, who spoke with pt earlier today, to call me right away.     Pending a return call from Dr Seo.

## 2020-09-21 NOTE — ADDENDUM NOTE
Addendum  created 09/21/20 0713 by Manisha Price MD    Charge Capture section accepted, Cosign clinical note with attestation

## 2020-09-22 NOTE — PROGRESS NOTES
Contacted Faith Bernard at home regarding her OnQ ball. Patient reports tolerable pain level, well controlled w/ supplemental PO meds. Both catheters removed today. Blue tips intact. All questions answered, all concerns addressed during telephone conversation.      Lj Seo MD  9/22/20  1:23 PM

## 2020-09-24 ENCOUNTER — OFFICE VISIT (OUTPATIENT)
Dept: ORTHOPEDICS | Facility: CLINIC | Age: 55
End: 2020-09-24
Payer: COMMERCIAL

## 2020-09-24 VITALS
BODY MASS INDEX: 22.15 KG/M2 | HEART RATE: 96 BPM | DIASTOLIC BLOOD PRESSURE: 94 MMHG | HEIGHT: 63 IN | WEIGHT: 125 LBS | SYSTOLIC BLOOD PRESSURE: 134 MMHG

## 2020-09-24 DIAGNOSIS — S82.872D CLOSED DISPLACED PILON FRACTURE OF LEFT TIBIA WITH ROUTINE HEALING, SUBSEQUENT ENCOUNTER: Primary | ICD-10-CM

## 2020-09-24 DIAGNOSIS — Z98.890 POST-OPERATIVE STATE: ICD-10-CM

## 2020-09-24 PROCEDURE — 99024 PR POST-OP FOLLOW-UP VISIT: ICD-10-PCS | Mod: S$GLB,,, | Performed by: NURSE PRACTITIONER

## 2020-09-24 PROCEDURE — 99999 PR PBB SHADOW E&M-EST. PATIENT-LVL IV: CPT | Mod: PBBFAC,,, | Performed by: NURSE PRACTITIONER

## 2020-09-24 PROCEDURE — 99999 PR PBB SHADOW E&M-EST. PATIENT-LVL IV: ICD-10-PCS | Mod: PBBFAC,,, | Performed by: NURSE PRACTITIONER

## 2020-09-24 PROCEDURE — 99024 POSTOP FOLLOW-UP VISIT: CPT | Mod: S$GLB,,, | Performed by: NURSE PRACTITIONER

## 2020-09-24 RX ORDER — MORPHINE SULFATE 15 MG/1
15 TABLET, FILM COATED, EXTENDED RELEASE ORAL 3 TIMES DAILY
Qty: 42 TABLET | Refills: 0 | Status: SHIPPED | OUTPATIENT
Start: 2020-09-24 | End: 2020-09-25

## 2020-09-24 RX ORDER — OXYCODONE HYDROCHLORIDE 10 MG/1
10 TABLET ORAL EVERY 6 HOURS PRN
Qty: 28 TABLET | Refills: 0 | Status: SHIPPED | OUTPATIENT
Start: 2020-09-24 | End: 2020-09-30 | Stop reason: SDUPTHER

## 2020-09-24 NOTE — PROGRESS NOTES
Ms. Bernard is here today for a post-operative visit after undergoing an ORIF for her left pilon fracture with fixation of the tibia and fibula and removal of external fixator under anesthesia by Dr. Mcrae on 9/18/2020.    Interval History:  She reports that she is doing ok.  Pain has been an issue, states pain mostly around her heel.  She is taking pain medication.  She is requesting a refill today.  She denies fever, chills, and sweats since the time of the surgery.     Physical exam:  Splint removed, incisional wound vac removed.  Sutures present to medial and lateral leg.  Old pin sites examined.  No redness or drainage seen at incision sites or to old pin sites.  There is some excoriation noted to the lateral heel.  Patient still has numbness to her lower leg, PPP x 2.  Incisions cleaned with alcohol and dried with 4x4.  Aquacel Ag applied to incisions and old pin sites covered with Xeroform.    RADS: none done today    Assessment:  Post-op visit (1 weeks)    Plan:    ICD-10-CM ICD-9-CM   1. Closed displaced pilon fracture of left tibia with routine healing, subsequent encounter  S82.872D V54.19   2. Post-operative state  Z98.890 V45.89     Current care, treatment plan, precautions, activity level/ modifications, limitations, rehabilitation exercises and proposed future treatment were discussed with the patient. We discussed the need to monitor for changes in symptoms and condition and report them to the physician.  Discussed importance of compliance with all appointments and follow up examinations.       - Pain medication: refill was needed, patient requesting refill for her MS Contin and OxyIR.  Will refill both; however cannot get refill on MS Contin until next week.  She reports she sees PMR and understands we will treat her pain for a short duration and she will need to follow up with her pain specialist.  - Pain medication refill policy provided to patient for review, yes  - Patient is to return to  clinic in 2 weeks  - At time x-ray of her left ankle is needed  - At time consider removal of sutures and placement into plantar fasciitis boot.  - Per post-op note, it is recommended NWB for 10-12 weeks pending fracture healing, ASA 81 mg bid x 6 weeks from surgery.  - Ortho TechJhonatan applied short leg cast today well padded.     If there are any questions prior to scheduled follow up, the patient was instructed to contact the office

## 2020-09-25 DIAGNOSIS — Z98.890 POST-OPERATIVE STATE: ICD-10-CM

## 2020-09-25 DIAGNOSIS — S82.872D CLOSED DISPLACED PILON FRACTURE OF LEFT TIBIA WITH ROUTINE HEALING, SUBSEQUENT ENCOUNTER: ICD-10-CM

## 2020-09-25 RX ORDER — MORPHINE SULFATE 15 MG/1
15 TABLET, FILM COATED, EXTENDED RELEASE ORAL 2 TIMES DAILY
Qty: 14 TABLET | Refills: 0 | Status: ON HOLD | OUTPATIENT
Start: 2020-10-02 | End: 2022-03-29 | Stop reason: HOSPADM

## 2020-09-28 LAB
FINAL PATHOLOGIC DIAGNOSIS: NORMAL
GROSS: NORMAL

## 2020-09-30 ENCOUNTER — TELEPHONE (OUTPATIENT)
Dept: ORTHOPEDICS | Facility: CLINIC | Age: 55
End: 2020-09-30

## 2020-09-30 RX ORDER — OXYCODONE HYDROCHLORIDE 10 MG/1
10 TABLET ORAL EVERY 4 HOURS PRN
Qty: 28 TABLET | Refills: 0 | Status: ON HOLD | OUTPATIENT
Start: 2020-09-30 | End: 2022-03-29 | Stop reason: HOSPADM

## 2020-09-30 NOTE — TELEPHONE ENCOUNTER
Received a call from pt.    States she called earlier today for a refill of pain medication as she is completely out until her pain management doctor can take over prescribing it.    Placed pt on hold while I spoke with with Dr Mcrae.        Explained to pt, per Dr Mcrae, that he will send in a script for Roxicodone to her pharmacy   This will be the last script as they have discussed in the past.   Pt verbalized understanding.

## 2020-10-01 ENCOUNTER — OFFICE VISIT (OUTPATIENT)
Dept: ORTHOPEDICS | Facility: CLINIC | Age: 55
End: 2020-10-01
Payer: COMMERCIAL

## 2020-10-01 VITALS
DIASTOLIC BLOOD PRESSURE: 80 MMHG | SYSTOLIC BLOOD PRESSURE: 125 MMHG | WEIGHT: 125 LBS | HEIGHT: 63 IN | BODY MASS INDEX: 22.15 KG/M2 | HEART RATE: 104 BPM

## 2020-10-01 DIAGNOSIS — Z98.890 POST-OPERATIVE STATE: ICD-10-CM

## 2020-10-01 DIAGNOSIS — S82.872D CLOSED DISPLACED PILON FRACTURE OF LEFT TIBIA WITH ROUTINE HEALING, SUBSEQUENT ENCOUNTER: ICD-10-CM

## 2020-10-01 DIAGNOSIS — Z47.89 CAST DISCOMFORT: Primary | ICD-10-CM

## 2020-10-01 PROCEDURE — 99024 POSTOP FOLLOW-UP VISIT: CPT | Mod: S$GLB,,, | Performed by: NURSE PRACTITIONER

## 2020-10-01 PROCEDURE — 99024 PR POST-OP FOLLOW-UP VISIT: ICD-10-PCS | Mod: S$GLB,,, | Performed by: NURSE PRACTITIONER

## 2020-10-01 PROCEDURE — 99999 PR PBB SHADOW E&M-EST. PATIENT-LVL IV: CPT | Mod: PBBFAC,,, | Performed by: NURSE PRACTITIONER

## 2020-10-01 PROCEDURE — 99999 PR PBB SHADOW E&M-EST. PATIENT-LVL IV: ICD-10-PCS | Mod: PBBFAC,,, | Performed by: NURSE PRACTITIONER

## 2020-10-01 RX ORDER — DOCUSATE SODIUM 100 MG/1
100 CAPSULE, LIQUID FILLED ORAL 2 TIMES DAILY
Qty: 60 CAPSULE | Refills: 0 | Status: SHIPPED | OUTPATIENT
Start: 2020-10-01 | End: 2020-10-31

## 2020-10-01 NOTE — PROGRESS NOTES
Ms. Bernard is here today for a post-operative visit after undergoing an ORIF for her left pilon fracture with fixation of the tibia and fibula and removal of external fixator under anesthesia by Dr. Mcrae on 9/18/2020.    Interval History:  She reports that she is doing ok.  Pain has gotten better.  Has seen pain specialist and he has assumed responsibility of managing her pain.  She is asking for a prescription for stool softener.  She is here today because she was having some discomfort at her heel.  She wanted to have it checked before the weekend.  She is taking pain medication.  She denies fever, chills, and sweats since the time of the surgery.     Physical exam:  Cast removed.  She has mild pedal edema.  Old pin site holes are healing well.  She has aquacel Ag dressing to lateral and medial aspect of her lower leg.  Dressings left in place.  There is no drainage or redness to surrounding tissues.  She can plantar flex and dorsiflex about 5 degrees.  There is dry cracked skin to her heel.  Patient reports she can feel her foot and lower leg now.  PPP x 2.      RADS: none done today    Assessment:  Post-op visit (2 weeks)    Plan:    ICD-10-CM ICD-9-CM   1. Cast discomfort  Z47.89 V54.89   2. Closed displaced pilon fracture of left tibia with routine healing, subsequent encounter  S82.872D V54.19   3. Post-operative state  Z98.890 V45.89     Current care, treatment plan, precautions, activity level/ modifications, limitations, rehabilitation exercises and proposed future treatment were discussed with the patient. We discussed the need to monitor for changes in symptoms and condition and report them to the physician.  Discussed importance of compliance with all appointments and follow up examinations.       - Pain medication: refill was not needed - pain specialist now managing.    - Pain medication refill policy provided to patient for review, yes   - Will prescribe colace bid.  - Patient is to return to  clinic in 1 weeks  - At time x-ray of her left ankle is needed out of cast  - At time consider removal of sutures and placement into plantar fasciitis boot.  - Per post-op note, it is recommended NWB for 10-12 weeks pending fracture healing, ASA 81 mg bid x 6 weeks from surgery.  - Ortho TechJhonatan to re-applied short leg cast today well padded.     If there are any questions prior to scheduled follow up, the patient was instructed to contact the office

## 2020-10-01 NOTE — PROGRESS NOTES
LT fiberglass SLC removal and application ordered by SARAH BETH Moody. Skin intact with no redness or bruising.

## 2020-10-08 ENCOUNTER — OFFICE VISIT (OUTPATIENT)
Dept: ORTHOPEDICS | Facility: CLINIC | Age: 55
End: 2020-10-08
Payer: COMMERCIAL

## 2020-10-08 ENCOUNTER — HOSPITAL ENCOUNTER (OUTPATIENT)
Dept: RADIOLOGY | Facility: HOSPITAL | Age: 55
Discharge: HOME | End: 2020-10-08
Attending: NURSE PRACTITIONER
Payer: COMMERCIAL

## 2020-10-08 VITALS — BODY MASS INDEX: 22.15 KG/M2 | HEIGHT: 63 IN | WEIGHT: 125 LBS

## 2020-10-08 DIAGNOSIS — M25.572 LEFT ANKLE PAIN, UNSPECIFIED CHRONICITY: Primary | ICD-10-CM

## 2020-10-08 DIAGNOSIS — S82.872D CLOSED DISPLACED PILON FRACTURE OF LEFT TIBIA WITH ROUTINE HEALING, SUBSEQUENT ENCOUNTER: Primary | ICD-10-CM

## 2020-10-08 DIAGNOSIS — Z98.890 POST-OPERATIVE STATE: ICD-10-CM

## 2020-10-08 DIAGNOSIS — M25.572 LEFT ANKLE PAIN, UNSPECIFIED CHRONICITY: ICD-10-CM

## 2020-10-08 PROCEDURE — 99024 POSTOP FOLLOW-UP VISIT: CPT | Mod: S$GLB,,, | Performed by: NURSE PRACTITIONER

## 2020-10-08 PROCEDURE — 73610 X-RAY EXAM OF ANKLE: CPT | Mod: TC,LT

## 2020-10-08 PROCEDURE — 99999 PR PBB SHADOW E&M-EST. PATIENT-LVL III: ICD-10-PCS | Mod: PBBFAC,,, | Performed by: NURSE PRACTITIONER

## 2020-10-08 PROCEDURE — 99024 PR POST-OP FOLLOW-UP VISIT: ICD-10-PCS | Mod: S$GLB,,, | Performed by: NURSE PRACTITIONER

## 2020-10-08 PROCEDURE — 73610 XR ANKLE COMPLETE 3 VIEW LEFT: ICD-10-PCS | Mod: 26,LT,, | Performed by: RADIOLOGY

## 2020-10-08 PROCEDURE — 73610 X-RAY EXAM OF ANKLE: CPT | Mod: 26,LT,, | Performed by: RADIOLOGY

## 2020-10-08 PROCEDURE — 99999 PR PBB SHADOW E&M-EST. PATIENT-LVL III: CPT | Mod: PBBFAC,,, | Performed by: NURSE PRACTITIONER

## 2020-10-08 NOTE — PROGRESS NOTES
Ms. Bernard is here today for a post-operative visit after undergoing an ORIF for her left pilon fracture with fixation of the tibia and fibula and removal of external fixator under anesthesia by Dr. Mcrae on 9/18/2020.    Interval History:  She reports that she is doing ok.  Pain is controlled.  She is going to her pain specialist for chronic pain management.  She is here today for suture removal.  She does report some lateral heel discomfort when her foot is against hard surfaces.   She is taking pain medication.  She denies fever, chills, and sweats since the time of the surgery.     Physical exam:  Cast removed.  Her edema has improved.  Aquacel dressing removed.  Sutures are intact.  There is no redness or drainage present.  Site cleaned with betadine.  Sutures removed without difficulty.  Steri strips applied for support.  She can plantar flex and dorsiflex about 5 degrees.  There is dry cracked skin to her heel that has softened since last visit, no open wounds seen.  Patient reports she can feel her foot and lower leg now.  PPP x 2.      RADS: Left ankle x-ray obtained and personally reviewed by me.  Fracture to her fibula and tibia still seen.  Hardware appears to be in good position without evidence of failure.  No new fractures seen.      Assessment:  Post-op visit (3 weeks)    Plan:    ICD-10-CM ICD-9-CM   1. Closed displaced pilon fracture of left tibia with routine healing, subsequent encounter  S82.872D V54.19   2. Post-operative state  Z98.890 V45.89     Current care, treatment plan, precautions, activity level/ modifications, limitations, rehabilitation exercises and proposed future treatment were discussed with the patient. We discussed the need to monitor for changes in symptoms and condition and report them to the physician.  Discussed importance of compliance with all appointments and follow up examinations.       - Pain medication: refill was not needed - pain specialist now managing.    -  Pain medication refill policy provided to patient for review, yes   - Patient is to return to clinic in 3 weeks  - At time x-ray of her left ankle is needed.  - Will place her into a plantar fasciitis boot today, can remove and do ROM at the ankle.  - Per post-op note, it is recommended NWB for 10-12 weeks pending fracture healing, ASA 81 mg bid x 6 weeks from surgery.    -Faith was advised to keep the incision clean and dry for the next 24 hours after which she may wash the area with antibacterial soap in the shower.   -She not submerge until the incision is completely healed  -Patient was advised to monitor wound closely and multiple times daily for any problems. Call clinic immediately or report to ED for immediate medical attention for any complications including reopening of wound, drainage, purulence, redness, streaking, odor, pain out of proportion, fever, chills, etc.        If there are any questions prior to scheduled follow up, the patient was instructed to contact the office

## 2020-10-19 ENCOUNTER — HOSPITAL ENCOUNTER (OUTPATIENT)
Dept: RADIOLOGY | Facility: HOSPITAL | Age: 55
Discharge: HOME | End: 2020-10-19
Attending: NURSE PRACTITIONER
Payer: COMMERCIAL

## 2020-10-19 ENCOUNTER — OFFICE VISIT (OUTPATIENT)
Dept: ORTHOPEDICS | Facility: CLINIC | Age: 55
End: 2020-10-19
Payer: COMMERCIAL

## 2020-10-19 VITALS
HEIGHT: 63 IN | HEART RATE: 80 BPM | DIASTOLIC BLOOD PRESSURE: 87 MMHG | BODY MASS INDEX: 22.15 KG/M2 | SYSTOLIC BLOOD PRESSURE: 123 MMHG | WEIGHT: 125 LBS

## 2020-10-19 DIAGNOSIS — M25.572 LEFT ANKLE PAIN, UNSPECIFIED CHRONICITY: Primary | ICD-10-CM

## 2020-10-19 DIAGNOSIS — S82.872D CLOSED DISPLACED PILON FRACTURE OF LEFT TIBIA WITH ROUTINE HEALING, SUBSEQUENT ENCOUNTER: Primary | ICD-10-CM

## 2020-10-19 DIAGNOSIS — M25.572 LEFT ANKLE PAIN, UNSPECIFIED CHRONICITY: ICD-10-CM

## 2020-10-19 DIAGNOSIS — Z98.890 POST-OPERATIVE STATE: ICD-10-CM

## 2020-10-19 PROCEDURE — 73610 X-RAY EXAM OF ANKLE: CPT | Mod: TC,LT

## 2020-10-19 PROCEDURE — 73610 X-RAY EXAM OF ANKLE: CPT | Mod: 26,LT,, | Performed by: RADIOLOGY

## 2020-10-19 PROCEDURE — 99024 PR POST-OP FOLLOW-UP VISIT: ICD-10-PCS | Mod: S$GLB,,, | Performed by: NURSE PRACTITIONER

## 2020-10-19 PROCEDURE — 99024 POSTOP FOLLOW-UP VISIT: CPT | Mod: S$GLB,,, | Performed by: NURSE PRACTITIONER

## 2020-10-19 PROCEDURE — 99999 PR PBB SHADOW E&M-EST. PATIENT-LVL IV: ICD-10-PCS | Mod: PBBFAC,,, | Performed by: NURSE PRACTITIONER

## 2020-10-19 PROCEDURE — 73610 XR ANKLE COMPLETE 3 VIEW LEFT: ICD-10-PCS | Mod: 26,LT,, | Performed by: RADIOLOGY

## 2020-10-19 PROCEDURE — 99999 PR PBB SHADOW E&M-EST. PATIENT-LVL IV: CPT | Mod: PBBFAC,,, | Performed by: NURSE PRACTITIONER

## 2020-10-19 RX ORDER — DOXYCYCLINE 100 MG/1
100 CAPSULE ORAL 2 TIMES DAILY
Qty: 20 CAPSULE | Refills: 0 | Status: SHIPPED | OUTPATIENT
Start: 2020-10-19 | End: 2020-10-29

## 2020-10-19 NOTE — PROGRESS NOTES
Ms. Bernard is here today for a post-operative visit after undergoing an ORIF for her left pilon fracture with fixation of the tibia and fibula and removal of external fixator under anesthesia by Dr. Mcrae on 9/18/2020.    Interval History:  She reports that she is doing ok.  She is here today as she noticed some pain and redness along her incision lines.  States when she took off the plantar fasciitis boot and iced the area for 24 hours the pain and redness improved.  She is also reports some pain to the dorsum of her foot.  She has been using her MS Contin bid and Tylenol arthritis which has controlled her pain.  She denies fever or chills.  She has kept her weight off as previously directed.  No numbness or tingling sensation.  She is taking pain medication.  She denies fever, chills, and sweats since the time of the surgery.     Physical exam:  Plantar fasciitis boot removed, she has a small scabbed area over the lateral ankle incision.  There is no drainage or warmth.  Her incision is healing.  She can plantar flex and dorsiflex about 10 degrees.  She has intact sensation to her lower leg.  PPP x 2.  See photo below.        Above photo(s) was/were taken with verbal permission of patient and/or their representative.  The purpose of photo(s) is to aid in medical treatment plan.      RADS: Left ankle x-ray obtained and personally reviewed by me.  Fracture to her fibula and tibia still seen.  Hardware appears to be in good position without evidence of failure.  No new fractures seen.      Assessment:  Post-op visit (4 weeks)    Plan:    ICD-10-CM ICD-9-CM   1. Closed displaced pilon fracture of left tibia with routine healing, subsequent encounter  S82.872D V54.19   2. Post-operative state  Z98.890 V45.89     Current care, treatment plan, precautions, activity level/ modifications, limitations, rehabilitation exercises and proposed future treatment were discussed with the patient. We discussed the need to  monitor for changes in symptoms and condition and report them to the physician.  Discussed importance of compliance with all appointments and follow up examinations.     - Pain medication: refill was not needed - pain specialist now managing.    - Pain medication refill policy provided to patient for review, yes   - Patient is to return to clinic in 2 weeks  - Will treat with PPX Doxycycline 100 mg bid x 10 days.  - Will schedule next appointment with Dr. Mcrae  - At time x-ray of her left ankle is needed.  - Will continue use of a plantar fasciitis boot, can remove and do ROM at the ankle.  - Per post-op note, it is recommended NWB for 10-12 weeks pending fracture healing, ASA 81 mg bid x 6 weeks from surgery.  - Stop celebrex given recent gastric ulcer, currently on carafate.       If there are any questions prior to scheduled follow up, the patient was instructed to contact the office

## 2020-10-28 DIAGNOSIS — Z98.890 POST-OPERATIVE STATE: Primary | ICD-10-CM

## 2020-10-29 ENCOUNTER — HOSPITAL ENCOUNTER (OUTPATIENT)
Dept: RADIOLOGY | Facility: HOSPITAL | Age: 55
Discharge: HOME | End: 2020-10-29
Attending: ORTHOPAEDIC SURGERY
Payer: COMMERCIAL

## 2020-10-29 ENCOUNTER — OFFICE VISIT (OUTPATIENT)
Dept: ORTHOPEDICS | Facility: CLINIC | Age: 55
End: 2020-10-29
Payer: COMMERCIAL

## 2020-10-29 VITALS — HEIGHT: 63 IN | BODY MASS INDEX: 23.04 KG/M2 | WEIGHT: 130 LBS

## 2020-10-29 DIAGNOSIS — Z98.890 POST-OPERATIVE STATE: ICD-10-CM

## 2020-10-29 DIAGNOSIS — Z98.890 POST-OPERATIVE STATE: Primary | ICD-10-CM

## 2020-10-29 DIAGNOSIS — S82.872D CLOSED DISPLACED PILON FRACTURE OF LEFT TIBIA WITH ROUTINE HEALING, SUBSEQUENT ENCOUNTER: Primary | ICD-10-CM

## 2020-10-29 PROCEDURE — 73610 X-RAY EXAM OF ANKLE: CPT | Mod: 26,LT,, | Performed by: RADIOLOGY

## 2020-10-29 PROCEDURE — 99999 PR PBB SHADOW E&M-EST. PATIENT-LVL III: CPT | Mod: PBBFAC,,, | Performed by: ORTHOPAEDIC SURGERY

## 2020-10-29 PROCEDURE — 99024 POSTOP FOLLOW-UP VISIT: CPT | Mod: S$GLB,,, | Performed by: ORTHOPAEDIC SURGERY

## 2020-10-29 PROCEDURE — 73610 XR ANKLE COMPLETE 3 VIEW LEFT: ICD-10-PCS | Mod: 26,LT,, | Performed by: RADIOLOGY

## 2020-10-29 PROCEDURE — 99024 PR POST-OP FOLLOW-UP VISIT: ICD-10-PCS | Mod: S$GLB,,, | Performed by: ORTHOPAEDIC SURGERY

## 2020-10-29 PROCEDURE — 73610 X-RAY EXAM OF ANKLE: CPT | Mod: TC,LT

## 2020-10-29 PROCEDURE — 99999 PR PBB SHADOW E&M-EST. PATIENT-LVL III: ICD-10-PCS | Mod: PBBFAC,,, | Performed by: ORTHOPAEDIC SURGERY

## 2020-10-29 NOTE — PROGRESS NOTES
CC:  Postop ORIF left pilon fracture    HPI:  55-year-old female, chronic opioid user due to chronic pain, fall from height 09/06/2020 sustaining a closed left pilon fracture.  09/07/2020 ex fix by 1 of my partners.     09/18/2020 - ORIF left pilon fracture, removal of ex fix    SUBJECTIVE:  Doing okay  Is now back on her chronic pain management medicine, from her chronic pain provider  Has had some issues with the plantar fasciitis boot irritating her foot.  States that when she wears the boot her incisions become red and irritated, and they improve when she is not wearing the boot  Last time, seen in clinic for the same reason, started on p.o. doxycycline  Denies fevers, chills, wound drainage  Pain controlled with morphine  Has been compliant with weight-bearing restrictions    OBJECTIVE:  PE  Alert/oriented x3, no acute distress, breathing comfortably, equal chest rise bilaterally  Left lower extremity  Surgical incisions all appear to be healing well, the there is a small scab on the proximal anterior lateral incision, there is no drainage from this site.  The medial and anterior lateral distal incisions have a couple areas of suture spitting out.  There is no purulence, drainage.  There is minimal erythema at these areas.  Range of motion ankle 5° dorsiflexion, 10° plantar flexion no pain  Motor function intact hip flexion, quad, hamstring, gastroc, tibialis anterior, peroneal, EHL, FHL  Sensation intact to light touch saphenous, sural, deep peroneal, superficial peroneal, tibial nerves.  Palpable DP/PT pulse, brisk cap refill    XRAYS:  X-ray left ankle demonstrate a pilon fracture, with hardware in place, stable fixation.    ASSESSMENT:  55-year-old female, chronic opioid user due to chronic pain, fall from height 09/06/2020 sustaining a closed left pilon fracture.  09/07/2020 ex fix by 1 of my partners.     09/18/2020 - ORIF left pilon fracture, removal of ex fix    Doing okay  Pain is now better  controlled  Has superficial stitch abscess, treating with p.o. antibiotics    PLAN:  DC plantar fasciitis boot as it seems to be bothering her  Can wear regular shoes  Ankle brace given today    foot flat touchdown weight-bearing left lower extremity times 10 weeks postop  ASA 81 mg daily x6 weeks postop for DVT prophylaxis     Follow-up postop 3 months, 6 months, 1 year     X-rays at subsequent followups:  Left ankle

## 2020-10-30 ENCOUNTER — DOCUMENT SCAN (OUTPATIENT)
Dept: HOME HEALTH SERVICES | Facility: HOSPITAL | Age: 55
End: 2020-10-30
Payer: COMMERCIAL

## 2020-11-04 ENCOUNTER — TELEPHONE (OUTPATIENT)
Dept: ORTHOPEDICS | Facility: CLINIC | Age: 55
End: 2020-11-04

## 2020-11-04 NOTE — TELEPHONE ENCOUNTER
Spoke with pt, explained to her that CS wants her to Start Pt NWB to work on her ROM but to save some PT for when she starts walking on her own. Pt verbalize understands.

## 2020-11-10 PROCEDURE — G0179 MD RECERTIFICATION HHA PT: HCPCS | Mod: ,,, | Performed by: ORTHOPAEDIC SURGERY

## 2020-11-10 PROCEDURE — G0179 PR HOME HEALTH MD RECERTIFICATION: ICD-10-PCS | Mod: ,,, | Performed by: ORTHOPAEDIC SURGERY

## 2020-11-11 ENCOUNTER — EXTERNAL HOME HEALTH (OUTPATIENT)
Dept: HOME HEALTH SERVICES | Facility: HOSPITAL | Age: 55
End: 2020-11-11
Payer: COMMERCIAL

## 2020-12-08 ENCOUNTER — DOCUMENT SCAN (OUTPATIENT)
Dept: HOME HEALTH SERVICES | Facility: HOSPITAL | Age: 55
End: 2020-12-08
Payer: COMMERCIAL

## 2020-12-10 ENCOUNTER — HOSPITAL ENCOUNTER (OUTPATIENT)
Dept: RADIOLOGY | Facility: HOSPITAL | Age: 55
Discharge: HOME | End: 2020-12-10
Attending: ORTHOPAEDIC SURGERY
Payer: COMMERCIAL

## 2020-12-10 ENCOUNTER — OFFICE VISIT (OUTPATIENT)
Dept: ORTHOPEDICS | Facility: CLINIC | Age: 55
End: 2020-12-10
Payer: COMMERCIAL

## 2020-12-10 VITALS — WEIGHT: 130.06 LBS | BODY MASS INDEX: 23.04 KG/M2 | HEIGHT: 63 IN

## 2020-12-10 DIAGNOSIS — Z98.890 POST-OPERATIVE STATE: ICD-10-CM

## 2020-12-10 DIAGNOSIS — S82.872D CLOSED DISPLACED PILON FRACTURE OF LEFT TIBIA WITH ROUTINE HEALING, SUBSEQUENT ENCOUNTER: Primary | ICD-10-CM

## 2020-12-10 PROCEDURE — 99024 POSTOP FOLLOW-UP VISIT: CPT | Mod: S$GLB,,, | Performed by: ORTHOPAEDIC SURGERY

## 2020-12-10 PROCEDURE — 73610 X-RAY EXAM OF ANKLE: CPT | Mod: 26,LT,, | Performed by: RADIOLOGY

## 2020-12-10 PROCEDURE — 73610 X-RAY EXAM OF ANKLE: CPT | Mod: TC,LT

## 2020-12-10 PROCEDURE — 1125F PR PAIN SEVERITY QUANTIFIED, PAIN PRESENT: ICD-10-PCS | Mod: S$GLB,,, | Performed by: ORTHOPAEDIC SURGERY

## 2020-12-10 PROCEDURE — 3008F BODY MASS INDEX DOCD: CPT | Mod: CPTII,S$GLB,, | Performed by: ORTHOPAEDIC SURGERY

## 2020-12-10 PROCEDURE — 1125F AMNT PAIN NOTED PAIN PRSNT: CPT | Mod: S$GLB,,, | Performed by: ORTHOPAEDIC SURGERY

## 2020-12-10 PROCEDURE — 3008F PR BODY MASS INDEX (BMI) DOCUMENTED: ICD-10-PCS | Mod: CPTII,S$GLB,, | Performed by: ORTHOPAEDIC SURGERY

## 2020-12-10 PROCEDURE — 99999 PR PBB SHADOW E&M-EST. PATIENT-LVL IV: CPT | Mod: PBBFAC,,, | Performed by: ORTHOPAEDIC SURGERY

## 2020-12-10 PROCEDURE — 99999 PR PBB SHADOW E&M-EST. PATIENT-LVL IV: ICD-10-PCS | Mod: PBBFAC,,, | Performed by: ORTHOPAEDIC SURGERY

## 2020-12-10 PROCEDURE — 73610 XR ANKLE COMPLETE 3 VIEW LEFT: ICD-10-PCS | Mod: 26,LT,, | Performed by: RADIOLOGY

## 2020-12-10 PROCEDURE — 99024 PR POST-OP FOLLOW-UP VISIT: ICD-10-PCS | Mod: S$GLB,,, | Performed by: ORTHOPAEDIC SURGERY

## 2020-12-10 RX ORDER — DEXTROMETHORPHAN HYDROBROMIDE, GUAIFENESIN 5; 100 MG/5ML; MG/5ML
650 LIQUID ORAL EVERY 6 HOURS PRN
Qty: 120 TABLET | Refills: 5 | Status: SHIPPED | OUTPATIENT
Start: 2020-12-10 | End: 2022-04-11

## 2020-12-10 NOTE — PROGRESS NOTES
CC:  Postop ORIF left pilon fracture    HPI:  55-year-old female, chronic opioid user due to chronic pain, fall from height 09/06/2020 sustaining a closed left pilon fracture.  09/07/2020 ex fix by 1 of my partners.     09/18/2020 - ORIF left pilon fracture, removal of ex fix    SUBJECTIVE:  Doing okay  Is now back on her chronic pain management medicine, from her chronic pain provider  Started weight-bearing on her ankle week or so ago, using a walker,  Having some medial/posterior medial pain with weight-bearing  Has been doing home health PT    OBJECTIVE:  PE  Alert/oriented x3, no acute distress, breathing comfortably, equal chest rise bilaterally  Left lower extremity  Surgical incisions all appear to be healing well, swelling is resolved  The medial incision have a couple areas of suture that have spit out.  There is no purulence, drainage or erythema.  Range of motion ankle 5° dorsiflexion, 10° plantar flexion no pain  Motor function intact hip flexion, quad, hamstring, gastroc, tibialis anterior, peroneal, EHL, FHL  Sensation  intact to light touch saphenous, sural, deep peroneal, superficial peroneal, tibial nerves.  Palpable DP/PT pulse, brisk cap refill    XRAYS:  X-ray left ankle demonstrate a pilon fracture, with hardware in place, medial plate has distal screw broken, though alignment appears to be stable.  There is interval healing of the fibular fracture    ASSESSMENT:  55-year-old female, chronic opioid user due to chronic pain, fall from height 09/06/2020 sustaining a closed left pilon fracture.  09/07/2020 ex fix by 1 of my partners.     09/18/2020 - ORIF left pilon fracture, removal of ex fix    Doing okay  Pain is now better controlled  Has some medial pain, correlating with a broken medial screw    PLAN:  Weight-bearing as tolerated - bear less weight if it is painful  Avoid activities that cause pain  Outpatient PT       Follow-up postop 6 months, 1 year     X-rays at subsequent  followups:  Left ankle

## 2020-12-18 ENCOUNTER — OFFICE VISIT (OUTPATIENT)
Dept: ORTHOPEDICS | Facility: CLINIC | Age: 55
End: 2020-12-18
Payer: COMMERCIAL

## 2020-12-18 ENCOUNTER — HOSPITAL ENCOUNTER (OUTPATIENT)
Dept: RADIOLOGY | Facility: HOSPITAL | Age: 55
Discharge: HOME | End: 2020-12-18
Attending: NURSE PRACTITIONER
Payer: COMMERCIAL

## 2020-12-18 VITALS
RESPIRATION RATE: 18 BRPM | TEMPERATURE: 98 F | DIASTOLIC BLOOD PRESSURE: 92 MMHG | HEART RATE: 73 BPM | SYSTOLIC BLOOD PRESSURE: 133 MMHG

## 2020-12-18 DIAGNOSIS — Z98.890 POST-OPERATIVE STATE: ICD-10-CM

## 2020-12-18 DIAGNOSIS — S82.872D CLOSED DISPLACED PILON FRACTURE OF LEFT TIBIA WITH ROUTINE HEALING, SUBSEQUENT ENCOUNTER: Primary | ICD-10-CM

## 2020-12-18 DIAGNOSIS — S82.872D CLOSED DISPLACED PILON FRACTURE OF LEFT TIBIA WITH ROUTINE HEALING, SUBSEQUENT ENCOUNTER: ICD-10-CM

## 2020-12-18 PROCEDURE — 73610 X-RAY EXAM OF ANKLE: CPT | Mod: 26,LT,, | Performed by: RADIOLOGY

## 2020-12-18 PROCEDURE — 1125F PR PAIN SEVERITY QUANTIFIED, PAIN PRESENT: ICD-10-PCS | Mod: S$GLB,,, | Performed by: NURSE PRACTITIONER

## 2020-12-18 PROCEDURE — 99024 POSTOP FOLLOW-UP VISIT: CPT | Mod: S$GLB,,, | Performed by: NURSE PRACTITIONER

## 2020-12-18 PROCEDURE — 73610 X-RAY EXAM OF ANKLE: CPT | Mod: TC,LT

## 2020-12-18 PROCEDURE — 73610 XR ANKLE COMPLETE 3 VIEW LEFT: ICD-10-PCS | Mod: 26,LT,, | Performed by: RADIOLOGY

## 2020-12-18 PROCEDURE — 1125F AMNT PAIN NOTED PAIN PRSNT: CPT | Mod: S$GLB,,, | Performed by: NURSE PRACTITIONER

## 2020-12-18 PROCEDURE — 99024 PR POST-OP FOLLOW-UP VISIT: ICD-10-PCS | Mod: S$GLB,,, | Performed by: NURSE PRACTITIONER

## 2020-12-18 PROCEDURE — 99999 PR PBB SHADOW E&M-EST. PATIENT-LVL IV: ICD-10-PCS | Mod: PBBFAC,,, | Performed by: NURSE PRACTITIONER

## 2020-12-18 PROCEDURE — 99999 PR PBB SHADOW E&M-EST. PATIENT-LVL IV: CPT | Mod: PBBFAC,,, | Performed by: NURSE PRACTITIONER

## 2020-12-18 NOTE — PROGRESS NOTES
Ms. Bernard is here today for a post-operative visit after undergoing an ORIF for her left pilon fracture with fixation of the tibia and fibula and removal of external fixator under anesthesia by Dr. Mcrae on 9/18/2020.    Interval History:  She was last seen by Dr. Mcrae on 12/10/2020.  Since then, she has had worsening pain to her left ankle.  She reports her ankle is swollen and painful.  She denies falls or injuries.  No reports of fever or chills.  She has reached out to her pain specialist but they were out.  She said the nurse called in Toradol for her, which she took prior to coming to see me today.  She said she was told by the pain group to follow up with Orthopedics.  She reports she has not been able to bear weight since last seen.  She is tearful.  She states she is now finding the Morphine Sulfate is not controlling her pain.        Physical exam:  Her incision is open to air and have healed.  There is no redness or drainage seen.  She has minimal swelling to lateral and medial aspect of her ankle.  There is no warmth.  Her ROM is 10 degrees in all planes.  She has intact sensation to her lower leg.  PPP x 2.     RADS: Left ankle x-ray obtained and personally reviewed by me.  Fracture to her fibula and tibia appears stable with callus formation.  There is a broken screw to her distal tibia but this was evident on her last x-ray on 12/10/2020.  No new fractures seen.      Assessment:  Post-op visit (3 months)    Plan:    ICD-10-CM ICD-9-CM   1. Closed displaced pilon fracture of left tibia with routine healing, subsequent encounter  S82.872D V54.19   2. Post-operative state  Z98.890 V45.89     Current care, treatment plan, precautions, activity level/ modifications, limitations, rehabilitation exercises and proposed future treatment were discussed with the patient. We discussed the need to monitor for changes in symptoms and condition and report them to the physician.  Discussed importance of  compliance with all appointments and follow up examinations.     - Pain medication: refill was requested however she was told this needs to be managed by her Pain specialist.  She was advised if her pain medication ran out she may need to go to the ED for chronic pain control.    - Patient was advised to use the Toradol given to her today and she can supplement with Tylenol 1000 mg tid.  She can also use Salon pas patches to the ankle to see if this would help.  She cannot use NSAIDs due to gastric ulcers.  - Pain medication refill policy provided to patient for review, yes   - Advised patient will check ESR, CRP and CBC.  She was told will discuss her issues with Dr. Mcrae and he may contact her with further recommendations.  - Advised to limit her weight bearing for now to see if her symptoms improve.    Discussed case with Dr. Mcrae, he reviewed the x-ray and agrees with plan.       If there are any questions prior to scheduled follow up, the patient was instructed to contact the office

## 2020-12-21 ENCOUNTER — TELEPHONE (OUTPATIENT)
Dept: ORTHOPEDICS | Facility: CLINIC | Age: 55
End: 2020-12-21

## 2020-12-21 NOTE — TELEPHONE ENCOUNTER
Spoke with patient, she reports her ankle pain has improved 50% since she has seen my on Friday with use of Lidocaine patches.  She is able to move it with some pain that is tolerable.  Advised her WBC and ESR is normal and CRP is 21.0.  Advised to call if ankle pain or swelling worsens, verb understanding.

## 2020-12-24 ENCOUNTER — TELEPHONE (OUTPATIENT)
Dept: ORTHOPEDICS | Facility: CLINIC | Age: 55
End: 2020-12-24

## 2020-12-24 DIAGNOSIS — M25.572 LEFT ANKLE PAIN, UNSPECIFIED CHRONICITY: ICD-10-CM

## 2020-12-24 DIAGNOSIS — S82.872D CLOSED DISPLACED PILON FRACTURE OF LEFT TIBIA WITH ROUTINE HEALING, SUBSEQUENT ENCOUNTER: Primary | ICD-10-CM

## 2020-12-24 DIAGNOSIS — Z01.818 PREOP TESTING: ICD-10-CM

## 2020-12-24 DIAGNOSIS — Z98.890 POST-OPERATIVE STATE: ICD-10-CM

## 2020-12-24 NOTE — TELEPHONE ENCOUNTER
Spoke with the patient. She stated that yesterday she squeezed on her incision and had a pear size ball of white express from her incision and soreness, no erythema or increased warmth and is not draining today. She has no fever or chills. She has a prescription for doxycycline that she will take. Order placed for labs to be done today.

## 2020-12-25 ENCOUNTER — TELEPHONE (OUTPATIENT)
Dept: ORTHOPEDICS | Facility: CLINIC | Age: 55
End: 2020-12-25

## 2020-12-25 NOTE — TELEPHONE ENCOUNTER
Spoke with pt; regarding pt labs. Pt is schedule for lab to be done 12/26/20 @ 8:00 am in the [facility]. Patient states verbal understanding and has no further questions.

## 2020-12-25 NOTE — TELEPHONE ENCOUNTER
----- Message from Jaime Al sent at 12/24/2020 12:16 PM CST -----  Contact: Pt.[phone]  Pt is in need to speak with nurse regarding blood work. Pt is under pressure and states its urgent. I belive pt is in [facility] but stated the doors are locked because it its closed. Pt needs blood work today and spoke to nurse earlier and nurse said she can come according to pt.     [phone]

## 2020-12-25 NOTE — TELEPHONE ENCOUNTER
----- Message from Jaime Al sent at 12/24/2020 12:16 PM CST -----  Contact: Pt.[phone]  Pt is in need to speak with nurse regarding blood work. Pt is under pressure and states its urgent. I belive pt is in [facility] but stated the doors are locked because it its closed. Pt needs blood work today and spoke to nurse earlier and nurse said she can come according to pt.     [phone]

## 2020-12-26 ENCOUNTER — LAB VISIT (OUTPATIENT)
Dept: LAB | Facility: HOSPITAL | Age: 55
End: 2020-12-26
Attending: PHYSICIAN ASSISTANT
Payer: COMMERCIAL

## 2020-12-26 DIAGNOSIS — S82.872D CLOSED DISPLACED PILON FRACTURE OF LEFT TIBIA WITH ROUTINE HEALING, SUBSEQUENT ENCOUNTER: ICD-10-CM

## 2020-12-26 DIAGNOSIS — M25.572 LEFT ANKLE PAIN, UNSPECIFIED CHRONICITY: ICD-10-CM

## 2020-12-26 DIAGNOSIS — Z98.890 POST-OPERATIVE STATE: ICD-10-CM

## 2020-12-26 LAB
BASOPHILS # BLD AUTO: 0.03 K/UL (ref 0–0.2)
BASOPHILS NFR BLD: 0.4 % (ref 0–1.9)
CRP SERPL-MCNC: 18.5 MG/L (ref 0–8.2)
DIFFERENTIAL METHOD: ABNORMAL
EOSINOPHIL # BLD AUTO: 0.3 K/UL (ref 0–0.5)
EOSINOPHIL NFR BLD: 3.8 % (ref 0–8)
ERYTHROCYTE [DISTWIDTH] IN BLOOD BY AUTOMATED COUNT: 13.4 % (ref 11.5–14.5)
ERYTHROCYTE [SEDIMENTATION RATE] IN BLOOD BY WESTERGREN METHOD: 4 MM/HR (ref 0–20)
HCT VFR BLD AUTO: 47.2 % (ref 37–48.5)
HGB BLD-MCNC: 14.3 G/DL (ref 12–16)
IMM GRANULOCYTES # BLD AUTO: 0.02 K/UL (ref 0–0.04)
IMM GRANULOCYTES NFR BLD AUTO: 0.3 % (ref 0–0.5)
LYMPHOCYTES # BLD AUTO: 2.2 K/UL (ref 1–4.8)
LYMPHOCYTES NFR BLD: 28.9 % (ref 18–48)
MCH RBC QN AUTO: 30.6 PG (ref 27–31)
MCHC RBC AUTO-ENTMCNC: 30.3 G/DL (ref 32–36)
MCV RBC AUTO: 101 FL (ref 82–98)
MONOCYTES # BLD AUTO: 0.6 K/UL (ref 0.3–1)
MONOCYTES NFR BLD: 8.4 % (ref 4–15)
NEUTROPHILS # BLD AUTO: 4.4 K/UL (ref 1.8–7.7)
NEUTROPHILS NFR BLD: 58.2 % (ref 38–73)
NRBC BLD-RTO: 0 /100 WBC
PLATELET # BLD AUTO: 352 K/UL (ref 150–350)
PMV BLD AUTO: 9.8 FL (ref 9.2–12.9)
RBC # BLD AUTO: 4.68 M/UL (ref 4–5.4)
WBC # BLD AUTO: 7.62 K/UL (ref 3.9–12.7)

## 2020-12-26 PROCEDURE — 36415 COLL VENOUS BLD VENIPUNCTURE: CPT | Mod: PO

## 2020-12-26 PROCEDURE — 85651 RBC SED RATE NONAUTOMATED: CPT | Mod: PO

## 2020-12-26 PROCEDURE — 85025 COMPLETE CBC W/AUTO DIFF WBC: CPT

## 2020-12-26 PROCEDURE — 86140 C-REACTIVE PROTEIN: CPT

## 2020-12-28 ENCOUNTER — CLINICAL SUPPORT (OUTPATIENT)
Dept: REHABILITATION | Facility: HOSPITAL | Age: 55
End: 2020-12-28
Payer: COMMERCIAL

## 2020-12-28 ENCOUNTER — TELEPHONE (OUTPATIENT)
Dept: ORTHOPEDICS | Facility: CLINIC | Age: 55
End: 2020-12-28

## 2020-12-28 DIAGNOSIS — M25.672 DECREASED RANGE OF MOTION OF LEFT ANKLE: ICD-10-CM

## 2020-12-28 DIAGNOSIS — M25.572 LEFT ANKLE PAIN, UNSPECIFIED CHRONICITY: ICD-10-CM

## 2020-12-28 DIAGNOSIS — S82.872D CLOSED DISPLACED PILON FRACTURE OF LEFT TIBIA WITH ROUTINE HEALING, SUBSEQUENT ENCOUNTER: ICD-10-CM

## 2020-12-28 DIAGNOSIS — R26.9 GAIT ABNORMALITY: ICD-10-CM

## 2020-12-28 PROCEDURE — 97110 THERAPEUTIC EXERCISES: CPT | Mod: PN

## 2020-12-28 PROCEDURE — 97162 PT EVAL MOD COMPLEX 30 MIN: CPT | Mod: PN

## 2020-12-28 NOTE — TELEPHONE ENCOUNTER
Called and reviewed lab work. She stated that the incision is a little red, but no drainage. She also reports that it feels much better than it did when she saw Kevin previously. She has an appointment with PT today and will call if there is any concerns.

## 2020-12-29 NOTE — PLAN OF CARE
"[facility]  Physical Therapy Initial Evaluation    Name: Faith Bernard  Clinic Number: [id number]    Therapy Diagnosis:   Encounter Diagnoses   Name Primary?    Closed displaced pilon fracture of left tibia with routine healing, subsequent encounter     Left ankle pain, unspecified chronicity     Decreased range of motion of left ankle     Gait abnormality      Physician: Fer Mcrae    Physician Orders: PT Eval and Treat   Medical Diagnosis from Referral: S82.872D (ICD-10-CM) - Closed displaced pilon fracture of left tibia with routine healing, subsequent encounter  Evaluation Date: 12/28/2020  Authorization Period Expiration: 12/10/2021  Plan of Care Expiration: 3/22.2021  Visit # / Visits authorized: 1/ 1    Time In: 112pm  Time Out: 148pm  Total Billable Time: 36 minutes    Precautions: Standard    Subjective   Date of onset: L ankle ORIF on 9/18/2021  History of current condition - Faith reports: that she is currently taking some antibiotics due to some blood work that indicated a possible infection. On september 5th she was on a tree swing and fractured her L ankle, breaking both bones. She went straight to the ER and was then sent to [facility] main [facility] for surgery the next day. An external fixator was placed for a week and an ORIF was then performed. She states that she does have a screw on the medial side that has broke and has become very painful. Her pain has gotten a little more tolerable the last few weeks but was "severe" before that. She was doing home health PT and feels like she has made a lot of progress.        Past Medical History:   Diagnosis Date    Anxiety     Depression     Encounter for blood transfusion     Esophageal dysphagia     Fibromyalgia     Gastric bypass status for obesity     H/O dental abscess 4/14/2014    drained    Headache(784.0)     migraines.  Treated at [facility] (Dr. 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  Pt states took too many then realized her mistake    Polycystic ovaries     S/P JUHI-BSO     Thyroid disease     hypothyroidism     Faith Bernard  has a past surgical history that includes Gastric bypass; Hysterectomy; Abdominal surgery; Cardiac catheterization; Breast surgery (2004); Hernia repair; Ovarian cyst removal; Dental surgery; Colonoscopy (N/A, 11/3/2015); Upper gastrointestinal endoscopy; tennis elbow repair (Left, 01/2019); Rotator cuff repair (Left, 01/2019); Application of large external fixation device to tibia (Left, 9/7/2020); Open reduction and internal fixation (ORIF) of pilon fracture (Left, 9/18/2020); Removal of external fixation device (Left, 9/18/2020); and Application of wound vacuum-assisted closure device (Left, 9/18/2020).    Faith has a current medication list which includes the following prescription(s): acetaminophen, aripiprazole, aspirin, cholecalciferol (vitamin d3), cyanocobalamin (vitamin b-12), gabapentin, hydrochlorothiazide, levothyroxine, morphine, oxycodone, progesterone, propranolol, rizatriptan, tizanidine, topiramate, venlafaxine, and vyvanse, and the following Facility-Administered Medications: midazolam.    Review of patient's allergies indicates:   Allergen Reactions    Cephalexin Anaphylaxis    Codeine Itching    Nsaids (non-steroidal anti-inflammatory drug)      Has a history of bleeding ulcers        Imaging, X-ray Left ankle:   FINDINGS:  Three views left ankle: There is hardware stabilizing distal tibia and fibular fractures good alignment  and no complication.  There is hardware removal from the calcaneus.    Prior Therapy: Home health PT after surgery  Social History: 1 step to get into the home, lives with their family  Occupation: Not employed  Prior Level of Function: no pain with ambulation, ADLs or any movements of her L ankle  Current Level of Function: increased pain with ambulation, all ADLs that require weightbearing, and all motion of her L ankle    Pain:  Current 5/10, worst 8/10, best 1/10   Location: left ankles  Description: Aching, Dull and Throbbing  Aggravating Factors: Standing, Walking and movement   Easing Factors: pain medication, ice and rest    Pts goals: To get back to being as normal, wear high heels, decrease the swelling.     Objective     Observation: pt ambulates into the clinic without an AD. She displays an antalgic gait pattern with with decreased stance time on her L LE. She lacks DF on the L ankle, impairing her anterior tibial translation through the stance phase.     Ankle Range of Motion:   Ankle Right Left   Dorsiflexion 12 0   Plantarflexion 40 40   Inversion 30 20   Eversion 20 20      Lower Extremity Strength  Right LE  Left LE    Knee extension: 5/5 Knee extension: 4/5   Knee flexion: 5/5 Knee flexion: 4/5   Hip flexion: 5/5 Hip flexion: 4/5   Hip extension:  4+/5 Hip extension: 4/5   Hip abduction: 4/5 Hip abduction: 3+/5   Hip adduction: 4/5 Hip adduction 3+/5   Ankle dorsiflexion: 5/5 Ankle dorsiflexion: 4/5   Ankle plantarflexion: 5/5 Ankle plantarflexion: 4/5   Ankle inversion: 5/5 Ankle inversion:  4/5   Ankle eversion: 5/5 Ankle eversion: 4/5     Joint Mobility: hypomobile at L subtalar and talocrural joints in all directions    Palpation: TTP at all incision sites, medial aspect of the ankle, posterior aspect of the calcaneous    Sensation: impaired sensation around incision sites    Flexibility: decreased gastroc/soleous flexibility         CMS Impairment/Limitation/Restriction for FOTO Ankle  Survey    Therapist reviewed FOTO scores for Faith Bernard on 12/28/2020.   FOTO documents entered into [employer] - see Media section.    Limitation Score: 99%           TREATMENT   Treatment Time In: 138pm  Treatment Time Out: 148pm  Total Treatment time separate from Evaluation: 10 minutes    Faith received therapeutic exercises to develop strength and endurance for 10 minutes including:    Bridges, 2 x 10  SL clamshells, 2 x 10  Seated arch doming, 2 x 10       Home Exercises and Patient Education Provided    Education provided re: HEP, plan of care, safety, gait pattern    Written Home Exercises Provided: Yes.  Exercises were reviewed and Faith was able to demonstrate them prior to the end of the session.   Pt received a written copy of exercises to perform at home. Faith demonstrated good  understanding of the education provided.     Assessment   Faith is a 55 y.o. female referred to outpatient Physical Therapy with a medical diagnosis of Closed displaced pilon fracture of left tibia with routine healing, subsequent encounter. Pt's injury occurred on 9/5/2020 and her ORIF was performed on 9/18/2020. She has had a lot of post operative pain that has improved within the last few weeks. She spent a lot of time doing home health PT. She presents with L ankle pain, decreased L ankle ROM, joint hypomobility throughout the L ankle, gait abnormalities and impaired functional abilities. She will benefit from skilled PT services to address the limitations mentioned above in order to improve her functional mobility.    Pt prognosis is Good.   Pt will benefit from skilled outpatient Physical Therapy to address the deficits stated above and in the chart below, provide pt/family education, and to maximize pt's level of independence.     Plan of care discussed with patient: Yes  Pt's spiritual, cultural and educational needs considered and patient is agreeable to the plan of care and goals as stated below:      Anticipated Barriers for therapy: none    Medical Necessity is demonstrated by the following  History  Co-morbidities and personal factors that may impact the plan of care Co-morbidities:   anxiety and depression    Personal Factors:   no deficits     moderate   Examination  Body Structures and Functions, activity limitations and participation restrictions that may impact the plan of care Body Regions:   lower extremities    Body Systems:    ROM  strength  balance  gait  motor control  motor learning    Participation Restrictions:   none    Activity limitations:   Learning and applying knowledge  no deficits    General Tasks and Commands  no deficits    Communication  no deficits    Mobility  lifting and carrying objects  walking    Self care  caring for body parts (brushing teeth, shaving, grooming)  dressing    Domestic Life  shopping  cooking  doing house work (cleaning house, washing dishes, laundry)    Interactions/Relationships  no deficits    Life Areas  no deficits    Community and Social Life  no deficits         moderate   Clinical Presentation evolving clinical presentation with changing clinical characteristics moderate   Decision Making/ Complexity Score: moderate     Goals:  GOALS:   Short Term Goals:  6 weeks  1. Pt. to report decreased L ankle pain  </= 3/10 at rest to improve QOL.   2. Pt. to demonstrate increased L ankle DF ROM by 5 deg to improve ease of ambulation.   3. Pt. to demonstrate improved L LE strength by 1/2 MMT grade to   4. Pt to be independent with HEP to improve ROM and independence with ADL's    Long Term Goals: 12 weeks  1. Pt. to report decreased L ankle pain </ = 1/10 at rest to improve QOL.  2. Pt. to demonstrate increased L ankle DF ROM by 5 deg to improve ease of ambulation.   3. Pt. to demonstrate improved L LE strength by 1/2 MMT grade to.  4. Pt will improve FOTO score to </= 61% limited to decrease perceived limitation with mobility  5. Pt will demonstrate improve  gait pattern, demonstrated by equal step length B and normal DF during stance phase to increase her functional mobility.           Plan   Plan of care Certification: 12/28/2020 to 3/22/2021.    Outpatient Physical Therapy 2 times weekly for 10+ weeks to include the following interventions: Gait Training, Manual Therapy, Moist Heat/ Ice, Neuromuscular Re-ed, Patient Education, Therapeutic Activites and Therapeutic Exercise.     MIKI ORLANDO, PT

## 2021-01-05 ENCOUNTER — CLINICAL SUPPORT (OUTPATIENT)
Dept: REHABILITATION | Facility: HOSPITAL | Age: 56
End: 2021-01-05
Payer: COMMERCIAL

## 2021-01-05 DIAGNOSIS — M25.572 LEFT ANKLE PAIN, UNSPECIFIED CHRONICITY: ICD-10-CM

## 2021-01-05 DIAGNOSIS — R26.9 GAIT ABNORMALITY: ICD-10-CM

## 2021-01-05 DIAGNOSIS — M25.672 DECREASED RANGE OF MOTION OF LEFT ANKLE: ICD-10-CM

## 2021-01-05 PROCEDURE — 97140 MANUAL THERAPY 1/> REGIONS: CPT | Mod: PN

## 2021-01-05 PROCEDURE — 97110 THERAPEUTIC EXERCISES: CPT | Mod: PN

## 2021-01-07 ENCOUNTER — CLINICAL SUPPORT (OUTPATIENT)
Dept: REHABILITATION | Facility: HOSPITAL | Age: 56
End: 2021-01-07
Payer: COMMERCIAL

## 2021-01-07 DIAGNOSIS — M25.572 LEFT ANKLE PAIN, UNSPECIFIED CHRONICITY: ICD-10-CM

## 2021-01-07 DIAGNOSIS — M25.672 DECREASED RANGE OF MOTION OF LEFT ANKLE: ICD-10-CM

## 2021-01-07 DIAGNOSIS — R26.9 GAIT ABNORMALITY: ICD-10-CM

## 2021-01-07 PROCEDURE — 97112 NEUROMUSCULAR REEDUCATION: CPT | Mod: PN,CQ

## 2021-01-07 PROCEDURE — 97110 THERAPEUTIC EXERCISES: CPT | Mod: PN,CQ

## 2021-01-07 PROCEDURE — 97140 MANUAL THERAPY 1/> REGIONS: CPT | Mod: PN,CQ

## 2021-03-02 ENCOUNTER — OFFICE VISIT (OUTPATIENT)
Dept: ORTHOPEDICS | Facility: CLINIC | Age: 56
End: 2021-03-02
Payer: COMMERCIAL

## 2021-03-02 ENCOUNTER — HOSPITAL ENCOUNTER (OUTPATIENT)
Dept: RADIOLOGY | Facility: HOSPITAL | Age: 56
Discharge: HOME | End: 2021-03-02
Attending: PHYSICIAN ASSISTANT
Payer: COMMERCIAL

## 2021-03-02 DIAGNOSIS — S82.872D CLOSED DISPLACED PILON FRACTURE OF LEFT TIBIA WITH ROUTINE HEALING, SUBSEQUENT ENCOUNTER: Primary | ICD-10-CM

## 2021-03-02 DIAGNOSIS — S82.872D CLOSED DISPLACED PILON FRACTURE OF LEFT TIBIA WITH ROUTINE HEALING, SUBSEQUENT ENCOUNTER: ICD-10-CM

## 2021-03-02 PROCEDURE — 99999 PR PBB SHADOW E&M-EST. PATIENT-LVL III: ICD-10-PCS | Mod: PBBFAC,,, | Performed by: PHYSICIAN ASSISTANT

## 2021-03-02 PROCEDURE — 99213 PR OFFICE/OUTPT VISIT, EST, LEVL III, 20-29 MIN: ICD-10-PCS | Mod: S$GLB,,, | Performed by: PHYSICIAN ASSISTANT

## 2021-03-02 PROCEDURE — 99213 OFFICE O/P EST LOW 20 MIN: CPT | Mod: S$GLB,,, | Performed by: PHYSICIAN ASSISTANT

## 2021-03-02 PROCEDURE — 73610 X-RAY EXAM OF ANKLE: CPT | Mod: 26,LT,, | Performed by: RADIOLOGY

## 2021-03-02 PROCEDURE — 73610 X-RAY EXAM OF ANKLE: CPT | Mod: TC,LT

## 2021-03-02 PROCEDURE — 99999 PR PBB SHADOW E&M-EST. PATIENT-LVL III: CPT | Mod: PBBFAC,,, | Performed by: PHYSICIAN ASSISTANT

## 2021-03-02 PROCEDURE — 73610 XR ANKLE COMPLETE 3 VIEW LEFT: ICD-10-PCS | Mod: 26,LT,, | Performed by: RADIOLOGY

## 2021-03-02 RX ORDER — LIDOCAINE 50 MG/G
1 PATCH TOPICAL DAILY
Qty: 5 PATCH | Refills: 0 | Status: SHIPPED | OUTPATIENT
Start: 2021-03-02 | End: 2022-07-07 | Stop reason: SDUPTHER

## 2021-03-12 ENCOUNTER — EXTERNAL HOME HEALTH (OUTPATIENT)
Dept: HOME HEALTH SERVICES | Facility: HOSPITAL | Age: 56
End: 2021-03-12

## 2021-03-17 ENCOUNTER — TELEPHONE (OUTPATIENT)
Dept: ORTHOPEDICS | Facility: CLINIC | Age: 56
End: 2021-03-17

## 2021-03-17 DIAGNOSIS — S82.872D CLOSED DISPLACED PILON FRACTURE OF LEFT TIBIA WITH ROUTINE HEALING, SUBSEQUENT ENCOUNTER: Primary | ICD-10-CM

## 2021-03-18 DIAGNOSIS — S82.872D CLOSED DISPLACED PILON FRACTURE OF LEFT TIBIA WITH ROUTINE HEALING, SUBSEQUENT ENCOUNTER: Primary | ICD-10-CM

## 2021-03-22 ENCOUNTER — TELEPHONE (OUTPATIENT)
Dept: ORTHOPEDICS | Facility: CLINIC | Age: 56
End: 2021-03-22

## 2021-03-26 ENCOUNTER — EXTERNAL HOME HEALTH (OUTPATIENT)
Dept: HOME HEALTH SERVICES | Facility: HOSPITAL | Age: 56
End: 2021-03-26

## 2021-04-01 ENCOUNTER — TELEPHONE (OUTPATIENT)
Dept: ORTHOPEDICS | Facility: CLINIC | Age: 56
End: 2021-04-01

## 2021-04-06 ENCOUNTER — TELEPHONE (OUTPATIENT)
Dept: ORTHOPEDICS | Facility: CLINIC | Age: 56
End: 2021-04-06

## 2021-04-06 ENCOUNTER — HOSPITAL ENCOUNTER (OUTPATIENT)
Dept: RADIOLOGY | Facility: HOSPITAL | Age: 56
Discharge: HOME | End: 2021-04-06
Attending: PHYSICIAN ASSISTANT
Payer: COMMERCIAL

## 2021-04-06 DIAGNOSIS — S82.872D CLOSED DISPLACED PILON FRACTURE OF LEFT TIBIA WITH ROUTINE HEALING, SUBSEQUENT ENCOUNTER: ICD-10-CM

## 2021-04-06 PROCEDURE — 73700 CT LOWER EXTREMITY W/O DYE: CPT | Mod: TC,PO,LT

## 2021-04-08 DIAGNOSIS — S82.872D CLOSED DISPLACED PILON FRACTURE OF LEFT TIBIA WITH ROUTINE HEALING, SUBSEQUENT ENCOUNTER: Primary | ICD-10-CM

## 2021-04-09 ENCOUNTER — TELEPHONE (OUTPATIENT)
Dept: ORTHOPEDICS | Facility: CLINIC | Age: 56
End: 2021-04-09

## 2021-04-14 ENCOUNTER — EXTERNAL HOME HEALTH (OUTPATIENT)
Dept: HOME HEALTH SERVICES | Facility: HOSPITAL | Age: 56
End: 2021-04-14

## 2021-04-21 ENCOUNTER — TELEPHONE (OUTPATIENT)
Dept: ORTHOPEDICS | Facility: CLINIC | Age: 56
End: 2021-04-21

## 2021-06-21 ENCOUNTER — TELEPHONE (OUTPATIENT)
Dept: ORTHOPEDICS | Facility: CLINIC | Age: 56
End: 2021-06-21

## 2021-06-21 DIAGNOSIS — S82.872D CLOSED DISPLACED PILON FRACTURE OF LEFT TIBIA WITH ROUTINE HEALING, SUBSEQUENT ENCOUNTER: Primary | ICD-10-CM

## 2021-06-21 DIAGNOSIS — Z98.890 POST-OPERATIVE STATE: ICD-10-CM

## 2021-06-22 ENCOUNTER — OFFICE VISIT (OUTPATIENT)
Dept: ORTHOPEDICS | Facility: CLINIC | Age: 56
End: 2021-06-22
Payer: COMMERCIAL

## 2021-06-22 ENCOUNTER — HOSPITAL ENCOUNTER (OUTPATIENT)
Dept: RADIOLOGY | Facility: HOSPITAL | Age: 56
Discharge: HOME | End: 2021-06-22
Attending: ORTHOPAEDIC SURGERY
Payer: COMMERCIAL

## 2021-06-22 VITALS — HEIGHT: 63 IN | BODY MASS INDEX: 23.04 KG/M2 | WEIGHT: 130 LBS

## 2021-06-22 DIAGNOSIS — S82.872D CLOSED DISPLACED PILON FRACTURE OF LEFT TIBIA WITH ROUTINE HEALING, SUBSEQUENT ENCOUNTER: ICD-10-CM

## 2021-06-22 DIAGNOSIS — G89.21 CHRONIC PAIN DUE TO TRAUMA: ICD-10-CM

## 2021-06-22 DIAGNOSIS — S82.872K CLOSED DISPLACED PILON FRACTURE OF LEFT TIBIA WITH NONUNION, SUBSEQUENT ENCOUNTER: Primary | ICD-10-CM

## 2021-06-22 DIAGNOSIS — Z98.890 POST-OPERATIVE STATE: ICD-10-CM

## 2021-06-22 DIAGNOSIS — M80.072S: ICD-10-CM

## 2021-06-22 PROCEDURE — 73610 X-RAY EXAM OF ANKLE: CPT | Mod: 26,LT,, | Performed by: RADIOLOGY

## 2021-06-22 PROCEDURE — 99213 PR OFFICE/OUTPT VISIT, EST, LEVL III, 20-29 MIN: ICD-10-PCS | Mod: S$GLB,,, | Performed by: ORTHOPAEDIC SURGERY

## 2021-06-22 PROCEDURE — 99213 OFFICE O/P EST LOW 20 MIN: CPT | Mod: PBBFAC,25 | Performed by: ORTHOPAEDIC SURGERY

## 2021-06-22 PROCEDURE — 99999 PR PBB SHADOW E&M-EST. PATIENT-LVL III: CPT | Mod: PBBFAC,,, | Performed by: ORTHOPAEDIC SURGERY

## 2021-06-22 PROCEDURE — 99999 PR PBB SHADOW E&M-EST. PATIENT-LVL III: ICD-10-PCS | Mod: PBBFAC,,, | Performed by: ORTHOPAEDIC SURGERY

## 2021-06-22 PROCEDURE — 73610 XR ANKLE COMPLETE 3 VIEW LEFT: ICD-10-PCS | Mod: 26,LT,, | Performed by: RADIOLOGY

## 2021-06-22 PROCEDURE — 73610 X-RAY EXAM OF ANKLE: CPT | Mod: TC,LT

## 2021-06-22 PROCEDURE — 99213 OFFICE O/P EST LOW 20 MIN: CPT | Mod: S$GLB,,, | Performed by: ORTHOPAEDIC SURGERY

## 2021-07-08 ENCOUNTER — TELEPHONE (OUTPATIENT)
Dept: ORTHOPEDICS | Facility: CLINIC | Age: 56
End: 2021-07-08

## 2021-07-12 ENCOUNTER — TELEPHONE (OUTPATIENT)
Dept: ORTHOPEDICS | Facility: CLINIC | Age: 56
End: 2021-07-12

## 2021-07-14 ENCOUNTER — PATIENT MESSAGE (OUTPATIENT)
Dept: ORTHOPEDICS | Facility: CLINIC | Age: 56
End: 2021-07-14

## 2021-07-21 ENCOUNTER — TELEPHONE (OUTPATIENT)
Dept: ORTHOPEDICS | Facility: CLINIC | Age: 56
End: 2021-07-21

## 2021-07-27 DIAGNOSIS — S82.872K CLOSED DISPLACED PILON FRACTURE OF LEFT TIBIA WITH NONUNION, SUBSEQUENT ENCOUNTER: Primary | ICD-10-CM

## 2021-07-28 ENCOUNTER — OFFICE VISIT (OUTPATIENT)
Dept: ORTHOPEDICS | Facility: CLINIC | Age: 56
End: 2021-07-28
Payer: COMMERCIAL

## 2021-07-28 VITALS — BODY MASS INDEX: 25.69 KG/M2 | HEIGHT: 63 IN | WEIGHT: 145 LBS

## 2021-07-28 DIAGNOSIS — S82.872K CLOSED DISPLACED PILON FRACTURE OF LEFT TIBIA WITH NONUNION, SUBSEQUENT ENCOUNTER: Primary | ICD-10-CM

## 2021-07-28 PROCEDURE — 99999 PR PBB SHADOW E&M-EST. PATIENT-LVL III: CPT | Mod: PBBFAC,,, | Performed by: ORTHOPAEDIC SURGERY

## 2021-07-28 PROCEDURE — 99999 PR PBB SHADOW E&M-EST. PATIENT-LVL III: ICD-10-PCS | Mod: PBBFAC,,, | Performed by: ORTHOPAEDIC SURGERY

## 2021-07-28 PROCEDURE — 99213 PR OFFICE/OUTPT VISIT, EST, LEVL III, 20-29 MIN: ICD-10-PCS | Mod: S$GLB,,, | Performed by: ORTHOPAEDIC SURGERY

## 2021-07-28 PROCEDURE — 99213 OFFICE O/P EST LOW 20 MIN: CPT | Mod: PBBFAC | Performed by: ORTHOPAEDIC SURGERY

## 2021-07-28 PROCEDURE — 99213 OFFICE O/P EST LOW 20 MIN: CPT | Mod: S$GLB,,, | Performed by: ORTHOPAEDIC SURGERY

## 2021-07-28 RX ORDER — LEVOTHYROXINE SODIUM 100 UG/1
100 TABLET ORAL
COMMUNITY
End: 2023-09-18 | Stop reason: SDUPTHER

## 2021-08-02 ENCOUNTER — HOSPITAL ENCOUNTER (EMERGENCY)
Facility: HOSPITAL | Age: 56
Discharge: HOME | End: 2021-08-03
Attending: EMERGENCY MEDICINE
Payer: COMMERCIAL

## 2021-08-02 ENCOUNTER — HOSPITAL ENCOUNTER (OUTPATIENT)
Dept: RADIOLOGY | Facility: HOSPITAL | Age: 56
Discharge: HOME | End: 2021-08-02
Attending: ORTHOPAEDIC SURGERY
Payer: COMMERCIAL

## 2021-08-02 DIAGNOSIS — R07.9 CHEST PAIN: ICD-10-CM

## 2021-08-02 DIAGNOSIS — S82.872K CLOSED DISPLACED PILON FRACTURE OF LEFT TIBIA WITH NONUNION, SUBSEQUENT ENCOUNTER: ICD-10-CM

## 2021-08-02 DIAGNOSIS — J18.9 PNEUMONIA OF LEFT LOWER LOBE DUE TO INFECTIOUS ORGANISM: Primary | ICD-10-CM

## 2021-08-02 DIAGNOSIS — R06.02 SOB (SHORTNESS OF BREATH): ICD-10-CM

## 2021-08-02 DIAGNOSIS — K59.00 CONSTIPATION, UNSPECIFIED CONSTIPATION TYPE: ICD-10-CM

## 2021-08-02 LAB
ALBUMIN SERPL BCP-MCNC: 3.2 G/DL (ref 3.5–5.2)
ALP SERPL-CCNC: 76 U/L (ref 55–135)
ALT SERPL W/O P-5'-P-CCNC: 13 U/L (ref 10–44)
ANION GAP SERPL CALC-SCNC: 8 MMOL/L (ref 8–16)
AST SERPL-CCNC: 12 U/L (ref 10–40)
BASOPHILS # BLD AUTO: 0.05 K/UL (ref 0–0.2)
BASOPHILS NFR BLD: 0.2 % (ref 0–1.9)
BILIRUB SERPL-MCNC: 0.3 MG/DL (ref 0.1–1)
BNP SERPL-MCNC: 49 PG/ML (ref 0–99)
BUN SERPL-MCNC: 14 MG/DL (ref 6–20)
CALCIUM SERPL-MCNC: 8.6 MG/DL (ref 8.7–10.5)
CHLORIDE SERPL-SCNC: 103 MMOL/L (ref 95–110)
CO2 SERPL-SCNC: 27 MMOL/L (ref 23–29)
CREAT SERPL-MCNC: 0.6 MG/DL (ref 0.5–1.4)
D DIMER PPP IA.FEU-MCNC: 2.47 UG/ML FEU
DIFFERENTIAL METHOD: ABNORMAL
EOSINOPHIL # BLD AUTO: 0.4 K/UL (ref 0–0.5)
EOSINOPHIL NFR BLD: 1.9 % (ref 0–8)
ERYTHROCYTE [DISTWIDTH] IN BLOOD BY AUTOMATED COUNT: 14.8 % (ref 11.5–14.5)
EST. GFR  (AFRICAN AMERICAN): >60 ML/MIN/1.73 M^2
EST. GFR  (NON AFRICAN AMERICAN): >60 ML/MIN/1.73 M^2
GLUCOSE SERPL-MCNC: 108 MG/DL (ref 70–110)
HCT VFR BLD AUTO: 31.5 % (ref 37–48.5)
HGB BLD-MCNC: 9.3 G/DL (ref 12–16)
IMM GRANULOCYTES # BLD AUTO: 0.15 K/UL (ref 0–0.04)
IMM GRANULOCYTES NFR BLD AUTO: 0.7 % (ref 0–0.5)
LACTATE SERPL-SCNC: 1.6 MMOL/L (ref 0.5–1.9)
LYMPHOCYTES # BLD AUTO: 1.2 K/UL (ref 1–4.8)
LYMPHOCYTES NFR BLD: 5.6 % (ref 18–48)
MCH RBC QN AUTO: 26.8 PG (ref 27–31)
MCHC RBC AUTO-ENTMCNC: 29.5 G/DL (ref 32–36)
MCV RBC AUTO: 91 FL (ref 82–98)
MONOCYTES # BLD AUTO: 0.9 K/UL (ref 0.3–1)
MONOCYTES NFR BLD: 4.3 % (ref 4–15)
NEUTROPHILS # BLD AUTO: 18.5 K/UL (ref 1.8–7.7)
NEUTROPHILS NFR BLD: 87.3 % (ref 38–73)
NRBC BLD-RTO: 0 /100 WBC
PLATELET # BLD AUTO: 800 K/UL (ref 150–450)
PMV BLD AUTO: 8.8 FL (ref 9.2–12.9)
POTASSIUM SERPL-SCNC: 5.2 MMOL/L (ref 3.5–5.1)
PROT SERPL-MCNC: 6.8 G/DL (ref 6–8.4)
RBC # BLD AUTO: 3.47 M/UL (ref 4–5.4)
SARS-COV-2 RDRP RESP QL NAA+PROBE: NEGATIVE
SODIUM SERPL-SCNC: 138 MMOL/L (ref 136–145)
TROPONIN I SERPL DL<=0.01 NG/ML-MCNC: <0.03 NG/ML
WBC # BLD AUTO: 21.24 K/UL (ref 3.9–12.7)

## 2021-08-02 PROCEDURE — 93005 ELECTROCARDIOGRAM TRACING: CPT | Performed by: GENERAL PRACTICE

## 2021-08-02 PROCEDURE — 84484 ASSAY OF TROPONIN QUANT: CPT | Performed by: EMERGENCY MEDICINE

## 2021-08-02 PROCEDURE — 99285 EMERGENCY DEPT VISIT HI MDM: CPT | Mod: 25

## 2021-08-02 PROCEDURE — 85025 COMPLETE CBC W/AUTO DIFF WBC: CPT | Performed by: EMERGENCY MEDICINE

## 2021-08-02 PROCEDURE — 93010 ELECTROCARDIOGRAM REPORT: CPT | Mod: ,,, | Performed by: GENERAL PRACTICE

## 2021-08-02 PROCEDURE — 93010 EKG 12-LEAD: ICD-10-PCS | Mod: 76,,, | Performed by: GENERAL PRACTICE

## 2021-08-02 PROCEDURE — 83605 ASSAY OF LACTIC ACID: CPT | Performed by: EMERGENCY MEDICINE

## 2021-08-02 PROCEDURE — 85379 FIBRIN DEGRADATION QUANT: CPT | Performed by: EMERGENCY MEDICINE

## 2021-08-02 PROCEDURE — 80053 COMPREHEN METABOLIC PANEL: CPT | Performed by: EMERGENCY MEDICINE

## 2021-08-02 PROCEDURE — U0002 COVID-19 LAB TEST NON-CDC: HCPCS | Performed by: EMERGENCY MEDICINE

## 2021-08-02 PROCEDURE — 87040 BLOOD CULTURE FOR BACTERIA: CPT | Performed by: EMERGENCY MEDICINE

## 2021-08-02 PROCEDURE — 96374 THER/PROPH/DIAG INJ IV PUSH: CPT

## 2021-08-02 PROCEDURE — 25500020 PHARM REV CODE 255: Performed by: EMERGENCY MEDICINE

## 2021-08-02 PROCEDURE — 63600175 PHARM REV CODE 636 W HCPCS: Performed by: EMERGENCY MEDICINE

## 2021-08-02 PROCEDURE — 83880 ASSAY OF NATRIURETIC PEPTIDE: CPT | Performed by: EMERGENCY MEDICINE

## 2021-08-02 RX ORDER — MORPHINE SULFATE 2 MG/ML
2 INJECTION, SOLUTION INTRAMUSCULAR; INTRAVENOUS
Status: COMPLETED | OUTPATIENT
Start: 2021-08-02 | End: 2021-08-02

## 2021-08-02 RX ORDER — ASPIRIN 325 MG
325 TABLET ORAL
Status: DISCONTINUED | OUTPATIENT
Start: 2021-08-02 | End: 2021-08-02

## 2021-08-02 RX ADMIN — MORPHINE SULFATE 2 MG: 2 INJECTION, SOLUTION INTRAMUSCULAR; INTRAVENOUS at 10:08

## 2021-08-02 RX ADMIN — IOHEXOL 100 ML: 350 INJECTION, SOLUTION INTRAVENOUS at 11:08

## 2021-08-03 VITALS
DIASTOLIC BLOOD PRESSURE: 82 MMHG | OXYGEN SATURATION: 98 % | HEART RATE: 109 BPM | TEMPERATURE: 99 F | WEIGHT: 140 LBS | RESPIRATION RATE: 18 BRPM | HEIGHT: 63 IN | BODY MASS INDEX: 24.8 KG/M2 | SYSTOLIC BLOOD PRESSURE: 125 MMHG

## 2021-08-03 LAB
BILIRUB UR QL STRIP: NEGATIVE
CLARITY UR: CLEAR
COLOR UR: YELLOW
GLUCOSE UR QL STRIP: NEGATIVE
HGB UR QL STRIP: NEGATIVE
KETONES UR QL STRIP: NEGATIVE
LEUKOCYTE ESTERASE UR QL STRIP: NEGATIVE
NITRITE UR QL STRIP: NEGATIVE
PH UR STRIP: 6 [PH] (ref 5–8)
PROT UR QL STRIP: NEGATIVE
SP GR UR STRIP: >1.03 (ref 1–1.03)
URN SPEC COLLECT METH UR: ABNORMAL
UROBILINOGEN UR STRIP-ACNC: NEGATIVE EU/DL

## 2021-08-03 PROCEDURE — 96361 HYDRATE IV INFUSION ADD-ON: CPT | Mod: GZ

## 2021-08-03 PROCEDURE — 81003 URINALYSIS AUTO W/O SCOPE: CPT | Performed by: EMERGENCY MEDICINE

## 2021-08-03 PROCEDURE — 25000003 PHARM REV CODE 250: Performed by: EMERGENCY MEDICINE

## 2021-08-03 RX ORDER — DOCUSATE SODIUM 100 MG/1
100 CAPSULE, LIQUID FILLED ORAL 3 TIMES DAILY PRN
Qty: 60 CAPSULE | Refills: 0 | Status: SHIPPED | OUTPATIENT
Start: 2021-08-03 | End: 2022-04-11

## 2021-08-03 RX ORDER — DOXYCYCLINE 100 MG/1
100 CAPSULE ORAL 2 TIMES DAILY
Qty: 20 CAPSULE | Refills: 0 | Status: SHIPPED | OUTPATIENT
Start: 2021-08-03 | End: 2021-08-13

## 2021-08-03 RX ORDER — ACETAMINOPHEN 500 MG
1000 TABLET ORAL
Status: COMPLETED | OUTPATIENT
Start: 2021-08-03 | End: 2021-08-03

## 2021-08-03 RX ADMIN — SODIUM CHLORIDE 1000 ML: 0.9 INJECTION, SOLUTION INTRAVENOUS at 01:08

## 2021-08-03 RX ADMIN — ACETAMINOPHEN 1000 MG: 500 TABLET, FILM COATED ORAL at 02:08

## 2021-08-06 ENCOUNTER — HOSPITAL ENCOUNTER (OUTPATIENT)
Dept: RADIOLOGY | Facility: HOSPITAL | Age: 56
Discharge: HOME | End: 2021-08-06
Attending: EMERGENCY MEDICINE
Payer: COMMERCIAL

## 2021-08-06 ENCOUNTER — HOSPITAL ENCOUNTER (OUTPATIENT)
Dept: RADIOLOGY | Facility: HOSPITAL | Age: 56
Discharge: HOME | End: 2021-08-06
Attending: RADIOLOGY
Payer: COMMERCIAL

## 2021-08-06 VITALS
OXYGEN SATURATION: 98 % | DIASTOLIC BLOOD PRESSURE: 61 MMHG | RESPIRATION RATE: 18 BRPM | HEART RATE: 113 BPM | SYSTOLIC BLOOD PRESSURE: 118 MMHG

## 2021-08-06 DIAGNOSIS — J90 PLEURAL EFFUSION, RIGHT: Primary | ICD-10-CM

## 2021-08-06 DIAGNOSIS — J90 PLEURAL EFFUSION, RIGHT: ICD-10-CM

## 2021-08-06 PROCEDURE — 71045 XR CHEST 1 VIEW: ICD-10-PCS | Mod: 26,,, | Performed by: RADIOLOGY

## 2021-08-06 PROCEDURE — 25000003 PHARM REV CODE 250: Performed by: RADIOLOGY

## 2021-08-06 PROCEDURE — 87075 CULTR BACTERIA EXCEPT BLOOD: CPT | Performed by: EMERGENCY MEDICINE

## 2021-08-06 PROCEDURE — 87205 SMEAR GRAM STAIN: CPT | Performed by: EMERGENCY MEDICINE

## 2021-08-06 PROCEDURE — 87206 SMEAR FLUORESCENT/ACID STAI: CPT | Performed by: EMERGENCY MEDICINE

## 2021-08-06 PROCEDURE — 32555 ASPIRATE PLEURA W/ IMAGING: CPT

## 2021-08-06 PROCEDURE — 87116 MYCOBACTERIA CULTURE: CPT | Performed by: EMERGENCY MEDICINE

## 2021-08-06 PROCEDURE — 71045 X-RAY EXAM CHEST 1 VIEW: CPT | Mod: TC,FY

## 2021-08-06 PROCEDURE — 32555 ASPIRATE PLEURA W/ IMAGING: CPT | Mod: RT,,, | Performed by: RADIOLOGY

## 2021-08-06 PROCEDURE — 87102 FUNGUS ISOLATION CULTURE: CPT | Performed by: EMERGENCY MEDICINE

## 2021-08-06 PROCEDURE — 32555 US THORACENTESIS WITH IMAGING: ICD-10-PCS | Mod: RT,,, | Performed by: RADIOLOGY

## 2021-08-06 PROCEDURE — 87070 CULTURE OTHR SPECIMN AEROBIC: CPT | Performed by: EMERGENCY MEDICINE

## 2021-08-06 PROCEDURE — 71045 X-RAY EXAM CHEST 1 VIEW: CPT | Mod: 26,,, | Performed by: RADIOLOGY

## 2021-08-06 RX ORDER — OXYCODONE AND ACETAMINOPHEN 10; 325 MG/1; MG/1
1 TABLET ORAL EVERY 4 HOURS PRN
Status: DISCONTINUED | OUTPATIENT
Start: 2021-08-06 | End: 2021-08-07 | Stop reason: HOSPADM

## 2021-08-06 RX ADMIN — OXYCODONE AND ACETAMINOPHEN 1 TABLET: 10; 325 TABLET ORAL at 02:08

## 2021-08-07 LAB
GRAM STN SPEC: NORMAL
GRAM STN SPEC: NORMAL

## 2021-08-08 LAB
BACTERIA BLD CULT: NORMAL
BACTERIA BLD CULT: NORMAL

## 2021-08-10 LAB — BACTERIA SPEC AEROBE CULT: NO GROWTH

## 2021-08-16 LAB — BACTERIA SPEC ANAEROBE CULT: NORMAL

## 2021-08-19 ENCOUNTER — TELEPHONE (OUTPATIENT)
Dept: ORTHOPEDICS | Facility: CLINIC | Age: 56
End: 2021-08-19

## 2021-08-20 DIAGNOSIS — S82.872K CLOSED DISPLACED PILON FRACTURE OF LEFT TIBIA WITH NONUNION, SUBSEQUENT ENCOUNTER: Primary | ICD-10-CM

## 2021-09-05 LAB — FUNGUS SPEC CULT: NORMAL

## 2021-09-13 DIAGNOSIS — S82.872K CLOSED DISPLACED PILON FRACTURE OF LEFT TIBIA WITH NONUNION, SUBSEQUENT ENCOUNTER: Primary | ICD-10-CM

## 2021-09-14 ENCOUNTER — OFFICE VISIT (OUTPATIENT)
Dept: ORTHOPEDICS | Facility: CLINIC | Age: 56
End: 2021-09-14
Payer: COMMERCIAL

## 2021-09-14 ENCOUNTER — HOSPITAL ENCOUNTER (OUTPATIENT)
Dept: RADIOLOGY | Facility: HOSPITAL | Age: 56
Discharge: HOME | End: 2021-09-14
Attending: ORTHOPAEDIC SURGERY
Payer: COMMERCIAL

## 2021-09-14 DIAGNOSIS — S82.872K CLOSED DISPLACED PILON FRACTURE OF LEFT TIBIA WITH NONUNION, SUBSEQUENT ENCOUNTER: Primary | ICD-10-CM

## 2021-09-14 DIAGNOSIS — S82.872K CLOSED DISPLACED PILON FRACTURE OF LEFT TIBIA WITH NONUNION, SUBSEQUENT ENCOUNTER: ICD-10-CM

## 2021-09-14 PROCEDURE — 73610 XR ANKLE COMPLETE 3 VIEW LEFT: ICD-10-PCS | Mod: 26,LT,, | Performed by: RADIOLOGY

## 2021-09-14 PROCEDURE — 99213 OFFICE O/P EST LOW 20 MIN: CPT | Mod: S$GLB,,, | Performed by: ORTHOPAEDIC SURGERY

## 2021-09-14 PROCEDURE — 99999 PR PBB SHADOW E&M-EST. PATIENT-LVL I: CPT | Mod: PBBFAC,,, | Performed by: ORTHOPAEDIC SURGERY

## 2021-09-14 PROCEDURE — 73610 X-RAY EXAM OF ANKLE: CPT | Mod: TC,LT

## 2021-09-14 PROCEDURE — 73610 X-RAY EXAM OF ANKLE: CPT | Mod: 26,LT,, | Performed by: RADIOLOGY

## 2021-09-14 PROCEDURE — 99211 OFF/OP EST MAY X REQ PHY/QHP: CPT | Mod: PBBFAC | Performed by: ORTHOPAEDIC SURGERY

## 2021-09-14 PROCEDURE — 99213 PR OFFICE/OUTPT VISIT, EST, LEVL III, 20-29 MIN: ICD-10-PCS | Mod: S$GLB,,, | Performed by: ORTHOPAEDIC SURGERY

## 2021-09-14 PROCEDURE — 99999 PR PBB SHADOW E&M-EST. PATIENT-LVL I: ICD-10-PCS | Mod: PBBFAC,,, | Performed by: ORTHOPAEDIC SURGERY

## 2021-09-14 RX ORDER — TERIPARATIDE 250 UG/ML
20 INJECTION, SOLUTION SUBCUTANEOUS DAILY
Qty: 30 SYRINGE | Refills: 11 | OUTPATIENT
Start: 2021-09-14 | End: 2022-01-11

## 2021-09-25 LAB
ACID FAST MOD KINY STN SPEC: NORMAL
MYCOBACTERIUM SPEC QL CULT: NORMAL

## 2021-09-28 ENCOUNTER — SPECIALTY PHARMACY (OUTPATIENT)
Dept: PHARMACY | Facility: CLINIC | Age: 56
End: 2021-09-28
Payer: COMMERCIAL

## 2021-09-28 NOTE — TELEPHONE ENCOUNTER
"[facility] working on Forteo PA. One of the clinical questions asks for the patient's BMD T-score. 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 InBasket sent to assess if the patient completed a DEXA scan. If not, we will need one to proceed with PA.   "

## 2021-09-29 NOTE — TELEPHONE ENCOUNTER
Trixie Styles PA-C responded to InBasket and asked her staff to schedule patient an appointment with the fracture clinic to evaluate need of labs and DEXA scan. Will continue to follow up.

## 2021-09-30 ENCOUNTER — TELEPHONE (OUTPATIENT)
Dept: PSYCHIATRY | Facility: CLINIC | Age: 56
End: 2021-09-30

## 2021-09-30 ENCOUNTER — PATIENT MESSAGE (OUTPATIENT)
Dept: PSYCHIATRY | Facility: CLINIC | Age: 56
End: 2021-09-30

## 2021-10-19 ENCOUNTER — OFFICE VISIT (OUTPATIENT)
Dept: ORTHOPEDICS | Facility: CLINIC | Age: 56
End: 2021-10-19
Payer: COMMERCIAL

## 2021-10-19 ENCOUNTER — HOSPITAL ENCOUNTER (OUTPATIENT)
Dept: RADIOLOGY | Facility: HOSPITAL | Age: 56
Discharge: HOME | End: 2021-10-19
Attending: NURSE PRACTITIONER
Payer: COMMERCIAL

## 2021-10-19 VITALS — SYSTOLIC BLOOD PRESSURE: 152 MMHG | HEART RATE: 81 BPM | DIASTOLIC BLOOD PRESSURE: 101 MMHG

## 2021-10-19 DIAGNOSIS — M80.80XA PATHOLOGICAL FRACTURE DUE TO OSTEOPOROSIS, UNSPECIFIED FRACTURE SITE, UNSPECIFIED OSTEOPOROSIS TYPE, INITIAL ENCOUNTER: Primary | ICD-10-CM

## 2021-10-19 DIAGNOSIS — M81.0 OSTEOPOROSIS, UNSPECIFIED OSTEOPOROSIS TYPE, UNSPECIFIED PATHOLOGICAL FRACTURE PRESENCE: ICD-10-CM

## 2021-10-19 DIAGNOSIS — S82.872K CLOSED DISPLACED PILON FRACTURE OF LEFT TIBIA WITH NONUNION, SUBSEQUENT ENCOUNTER: ICD-10-CM

## 2021-10-19 DIAGNOSIS — G89.21 CHRONIC PAIN DUE TO TRAUMA: Primary | ICD-10-CM

## 2021-10-19 DIAGNOSIS — R52 PAIN: ICD-10-CM

## 2021-10-19 DIAGNOSIS — M81.6 LOCALIZED OSTEOPOROSIS OF LEQUESNE: ICD-10-CM

## 2021-10-19 PROCEDURE — 73610 X-RAY EXAM OF ANKLE: CPT | Mod: TC,LT

## 2021-10-19 PROCEDURE — 99214 OFFICE O/P EST MOD 30 MIN: CPT | Mod: S$GLB,,, | Performed by: NURSE PRACTITIONER

## 2021-10-19 PROCEDURE — 99214 PR OFFICE/OUTPT VISIT, EST, LEVL IV, 30-39 MIN: ICD-10-PCS | Mod: S$GLB,,, | Performed by: NURSE PRACTITIONER

## 2021-10-19 PROCEDURE — 73560 X-RAY EXAM OF KNEE 1 OR 2: CPT | Mod: TC,RT

## 2021-10-19 PROCEDURE — 99999 PR PBB SHADOW E&M-EST. PATIENT-LVL III: CPT | Mod: PBBFAC,,, | Performed by: NURSE PRACTITIONER

## 2021-10-19 PROCEDURE — 99999 PR PBB SHADOW E&M-EST. PATIENT-LVL III: ICD-10-PCS | Mod: PBBFAC,,, | Performed by: NURSE PRACTITIONER

## 2021-10-19 PROCEDURE — 99213 OFFICE O/P EST LOW 20 MIN: CPT | Mod: PBBFAC,27 | Performed by: NURSE PRACTITIONER

## 2021-10-19 PROCEDURE — 99213 OFFICE O/P EST LOW 20 MIN: CPT | Mod: PBBFAC

## 2021-10-19 PROCEDURE — 99999 PR PBB SHADOW E&M-EST. PATIENT-LVL III: ICD-10-PCS | Mod: PBBFAC,,, | Performed by: PHYSICIAN ASSISTANT

## 2021-10-19 PROCEDURE — 99214 OFFICE O/P EST MOD 30 MIN: CPT | Mod: 25,S$GLB,, | Performed by: PHYSICIAN ASSISTANT

## 2021-10-19 PROCEDURE — 73610 X-RAY EXAM OF ANKLE: CPT | Mod: 26,LT,, | Performed by: RADIOLOGY

## 2021-10-19 PROCEDURE — 99214 PR OFFICE/OUTPT VISIT, EST, LEVL IV, 30-39 MIN: ICD-10-PCS | Mod: 25,S$GLB,, | Performed by: PHYSICIAN ASSISTANT

## 2021-10-19 PROCEDURE — 73560 X-RAY EXAM OF KNEE 1 OR 2: CPT | Mod: 26,RT,, | Performed by: RADIOLOGY

## 2021-10-19 PROCEDURE — 73562 XR KNEE ORTHO LEFT: ICD-10-PCS | Mod: 26,LT,, | Performed by: RADIOLOGY

## 2021-10-19 PROCEDURE — 73560 XR KNEE ORTHO LEFT: ICD-10-PCS | Mod: 26,RT,, | Performed by: RADIOLOGY

## 2021-10-19 PROCEDURE — 73610 XR ANKLE COMPLETE 3 VIEW LEFT: ICD-10-PCS | Mod: 26,LT,, | Performed by: RADIOLOGY

## 2021-10-19 PROCEDURE — 99999 PR PBB SHADOW E&M-EST. PATIENT-LVL III: CPT | Mod: PBBFAC,,, | Performed by: PHYSICIAN ASSISTANT

## 2021-10-19 PROCEDURE — 73562 X-RAY EXAM OF KNEE 3: CPT | Mod: 26,LT,, | Performed by: RADIOLOGY

## 2021-10-20 ENCOUNTER — TELEPHONE (OUTPATIENT)
Dept: ORTHOPEDICS | Facility: CLINIC | Age: 56
End: 2021-10-20

## 2021-10-25 ENCOUNTER — PATIENT MESSAGE (OUTPATIENT)
Dept: ORTHOPEDICS | Facility: CLINIC | Age: 56
End: 2021-10-25
Payer: COMMERCIAL

## 2021-10-29 ENCOUNTER — PATIENT MESSAGE (OUTPATIENT)
Dept: ORTHOPEDICS | Facility: CLINIC | Age: 56
End: 2021-10-29
Payer: COMMERCIAL

## 2021-10-29 ENCOUNTER — TELEPHONE (OUTPATIENT)
Dept: ORTHOPEDICS | Facility: CLINIC | Age: 56
End: 2021-10-29
Payer: COMMERCIAL

## 2021-11-16 NOTE — TELEPHONE ENCOUNTER
Patient was a no show for her DEXA scan on 11/12. Called patient to assess if she plans to reschedule and patient stated she completely forgot about the appointment due to her nephew's wedding. Provided her with the fracture clinic's phone number to get DEXA scan rescheduled. Will continue to follow up.

## 2021-12-10 ENCOUNTER — TELEPHONE (OUTPATIENT)
Dept: ENDOCRINOLOGY | Facility: CLINIC | Age: 56
End: 2021-12-10
Payer: COMMERCIAL

## 2021-12-10 ENCOUNTER — HOSPITAL ENCOUNTER (OUTPATIENT)
Dept: RADIOLOGY | Facility: CLINIC | Age: 56
Discharge: HOME | End: 2021-12-10
Attending: PHYSICIAN ASSISTANT
Payer: COMMERCIAL

## 2021-12-10 ENCOUNTER — OFFICE VISIT (OUTPATIENT)
Dept: ORTHOPEDICS | Facility: CLINIC | Age: 56
End: 2021-12-10
Payer: COMMERCIAL

## 2021-12-10 VITALS — HEART RATE: 58 BPM | DIASTOLIC BLOOD PRESSURE: 83 MMHG | SYSTOLIC BLOOD PRESSURE: 121 MMHG

## 2021-12-10 DIAGNOSIS — S82.872K CLOSED DISPLACED PILON FRACTURE OF LEFT TIBIA WITH NONUNION, SUBSEQUENT ENCOUNTER: Primary | ICD-10-CM

## 2021-12-10 DIAGNOSIS — M81.6 LOCALIZED OSTEOPOROSIS OF LEQUESNE: ICD-10-CM

## 2021-12-10 DIAGNOSIS — S82.872K CLOSED DISPLACED PILON FRACTURE OF LEFT TIBIA WITH NONUNION, SUBSEQUENT ENCOUNTER: ICD-10-CM

## 2021-12-10 DIAGNOSIS — M80.80XA PATHOLOGICAL FRACTURE DUE TO OSTEOPOROSIS, UNSPECIFIED FRACTURE SITE, UNSPECIFIED OSTEOPOROSIS TYPE, INITIAL ENCOUNTER: ICD-10-CM

## 2021-12-10 DIAGNOSIS — M81.0 OSTEOPOROSIS, UNSPECIFIED OSTEOPOROSIS TYPE, UNSPECIFIED PATHOLOGICAL FRACTURE PRESENCE: ICD-10-CM

## 2021-12-10 PROCEDURE — 99999 PR PBB SHADOW E&M-EST. PATIENT-LVL III: ICD-10-PCS | Mod: PBBFAC,,, | Performed by: PHYSICIAN ASSISTANT

## 2021-12-10 PROCEDURE — 99213 OFFICE O/P EST LOW 20 MIN: CPT | Mod: S$GLB,,, | Performed by: PHYSICIAN ASSISTANT

## 2021-12-10 PROCEDURE — 99213 PR OFFICE/OUTPT VISIT, EST, LEVL III, 20-29 MIN: ICD-10-PCS | Mod: S$GLB,,, | Performed by: PHYSICIAN ASSISTANT

## 2021-12-10 PROCEDURE — 77080 DXA BONE DENSITY AXIAL: CPT | Mod: TC

## 2021-12-10 PROCEDURE — 99213 OFFICE O/P EST LOW 20 MIN: CPT | Mod: PBBFAC,25

## 2021-12-10 PROCEDURE — 77080 DXA BONE DENSITY AXIAL: CPT | Mod: 26,,, | Performed by: INTERNAL MEDICINE

## 2021-12-10 PROCEDURE — 77080 DEXA BONE DENSITY SPINE HIP: ICD-10-PCS | Mod: 26,,, | Performed by: INTERNAL MEDICINE

## 2021-12-10 PROCEDURE — 99999 PR PBB SHADOW E&M-EST. PATIENT-LVL III: CPT | Mod: PBBFAC,,, | Performed by: PHYSICIAN ASSISTANT

## 2021-12-13 ENCOUNTER — LAB VISIT (OUTPATIENT)
Dept: LAB | Facility: HOSPITAL | Age: 56
End: 2021-12-13
Payer: COMMERCIAL

## 2021-12-13 ENCOUNTER — OFFICE VISIT (OUTPATIENT)
Dept: ENDOCRINOLOGY | Facility: CLINIC | Age: 56
End: 2021-12-13
Payer: COMMERCIAL

## 2021-12-13 VITALS
HEART RATE: 71 BPM | DIASTOLIC BLOOD PRESSURE: 72 MMHG | SYSTOLIC BLOOD PRESSURE: 117 MMHG | WEIGHT: 161.06 LBS | HEIGHT: 63 IN | BODY MASS INDEX: 28.54 KG/M2 | OXYGEN SATURATION: 99 %

## 2021-12-13 DIAGNOSIS — E21.3 HYPERPARATHYROIDISM, UNSPECIFIED: ICD-10-CM

## 2021-12-13 DIAGNOSIS — E03.9 HYPOTHYROIDISM, UNSPECIFIED TYPE: ICD-10-CM

## 2021-12-13 DIAGNOSIS — Z98.84 GASTRIC BYPASS STATUS FOR OBESITY: ICD-10-CM

## 2021-12-13 DIAGNOSIS — S82.302A CLOSED FRACTURE OF DISTAL END OF LEFT TIBIA, UNSPECIFIED FRACTURE MORPHOLOGY, INITIAL ENCOUNTER: ICD-10-CM

## 2021-12-13 DIAGNOSIS — R79.89 ELEVATED PARATHYROID HORMONE: Primary | ICD-10-CM

## 2021-12-13 LAB
T4 FREE SERPL-MCNC: 1.14 NG/DL (ref 0.71–1.51)
TSH SERPL DL<=0.005 MIU/L-ACNC: 1.12 UIU/ML (ref 0.4–4)

## 2021-12-13 PROCEDURE — 84439 ASSAY OF FREE THYROXINE: CPT | Performed by: INTERNAL MEDICINE

## 2021-12-13 PROCEDURE — 99204 PR OFFICE/OUTPT VISIT, NEW, LEVL IV, 45-59 MIN: ICD-10-PCS | Mod: S$GLB,,, | Performed by: INTERNAL MEDICINE

## 2021-12-13 PROCEDURE — 84443 ASSAY THYROID STIM HORMONE: CPT | Performed by: INTERNAL MEDICINE

## 2021-12-13 PROCEDURE — 99215 OFFICE O/P EST HI 40 MIN: CPT | Mod: PBBFAC | Performed by: INTERNAL MEDICINE

## 2021-12-13 PROCEDURE — 99999 PR PBB SHADOW E&M-EST. PATIENT-LVL V: CPT | Mod: PBBFAC,,, | Performed by: INTERNAL MEDICINE

## 2021-12-13 PROCEDURE — 99204 OFFICE O/P NEW MOD 45 MIN: CPT | Mod: S$GLB,,, | Performed by: INTERNAL MEDICINE

## 2021-12-13 PROCEDURE — 99999 PR PBB SHADOW E&M-EST. PATIENT-LVL V: ICD-10-PCS | Mod: PBBFAC,,, | Performed by: INTERNAL MEDICINE

## 2021-12-13 PROCEDURE — 36415 COLL VENOUS BLD VENIPUNCTURE: CPT | Performed by: INTERNAL MEDICINE

## 2021-12-13 RX ORDER — DEXTROAMPHETAMINE SACCHARATE, AMPHETAMINE ASPARTATE, DEXTROAMPHETAMINE SULFATE AND AMPHETAMINE SULFATE 7.5; 7.5; 7.5; 7.5 MG/1; MG/1; MG/1; MG/1
1 TABLET ORAL 2 TIMES DAILY
COMMUNITY
Start: 2021-08-16 | End: 2023-11-08

## 2021-12-24 NOTE — TELEPHONE ENCOUNTER
[facility] originally received a Forteo Rx for this patient back in September, which was not approved due to needing DEXA results. The patient was then seen on 12/10 and it was recommended for her to begin treatment with Tymlos. However, [facility] does not have a current Rx for Tymlos. Additionally, the patient was seen by Endo on 12/13 and it was noted the patient's DEXA scan was normal with no diagnosis of osteoporosis. It was also noted that the ankle fracture sustained was due to trauma and she should defer to Orthopedic Surgery for Tymlos treatment.    InBasket sent to clarify if the patient should begin Tymlos. If so, [facility] would need a Rx to begin working up.

## 2022-01-11 ENCOUNTER — SPECIALTY PHARMACY (OUTPATIENT)
Dept: PHARMACY | Facility: CLINIC | Age: 57
End: 2022-01-11

## 2022-01-11 DIAGNOSIS — M81.0 OSTEOPOROSIS, UNSPECIFIED OSTEOPOROSIS TYPE, UNSPECIFIED PATHOLOGICAL FRACTURE PRESENCE: ICD-10-CM

## 2022-01-11 DIAGNOSIS — M80.80XA PATHOLOGICAL FRACTURE DUE TO OSTEOPOROSIS, UNSPECIFIED FRACTURE SITE, UNSPECIFIED OSTEOPOROSIS TYPE, INITIAL ENCOUNTER: ICD-10-CM

## 2022-01-11 DIAGNOSIS — M81.6 LOCALIZED OSTEOPOROSIS OF LEQUESNE: Primary | ICD-10-CM

## 2022-01-12 NOTE — TELEPHONE ENCOUNTER
Tymlos PA submitted 1/12/21  CMM Key: [id number]    Matilde, this is David Dominguez with [facility].  We are working on your prescription that your doctor has sent us. We will be working with your insurance to get this approved for you. We will be calling you along the way with updates on your medication.  If you have any questions, you can reach us at [phone].    Welcome call outcome: Patient/caregiver reached

## 2022-01-13 NOTE — TELEPHONE ENCOUNTER
The patient's Tymlos PA was denied due to not meeting the plan's stated criteria:    A) BMD T-score is less than -2.5                           OR  B) BMD T-score is between -1 and -2.5 plus one of the followin) History of either vertebral compression fracture, fracture of the hip, fracture of distal radius, fracture of the pelvis, or fracture of proximal humerus  2) FRAX 10-year probability of major osteoporotic fracture at >20% or hip fracture at >3%    There is an option for peer to peer review by calling the plan @ [phone]. InBasket sent to assess if provider would prefer to do the peer to peer or have [facility] appeal on his behalf. If he would like [facility] to appeal, requested additional information on why Tymlos is medically necessary. The patient mentioned completing an appeal herself as well.

## 2022-01-29 NOTE — TELEPHONE ENCOUNTER
Per provider:    I have been having difficulty getting through and doing peer to peer on Ms Bernard.  If you could given to trying that would be great.  Medical necessity is based on   1. Pathological pilon (ankle) fracture with nonunion   2. History of gastric bypass surgery increasing risk of osteoporosis issues   3. Hysterectomy at age 34 with or early-onset menopause and associated osteoporosis issues   4. Unable to tolerate oral by Eric for an 8 secondary to gastric bypass surgery   5. Contraindicated for Prolia and but Fosamax secondary to dental surgery and history of infections     Thank You   Bryce Rasmussen appeal to [facility] @ [phone]

## 2022-02-24 NOTE — TELEPHONE ENCOUNTER
Called plan to check status on Tymlos appeal. Rep states appeal is still under review and decision will be made by 2/28/22. Will follow up accordingly.

## 2022-02-25 DIAGNOSIS — M25.572 PAIN OF JOINT OF LEFT ANKLE AND FOOT: ICD-10-CM

## 2022-02-25 DIAGNOSIS — S82.872K CLOSED DISPLACED PILON FRACTURE OF LEFT TIBIA WITH NONUNION, SUBSEQUENT ENCOUNTER: Primary | ICD-10-CM

## 2022-03-09 ENCOUNTER — HOSPITAL ENCOUNTER (OUTPATIENT)
Dept: RADIOLOGY | Facility: HOSPITAL | Age: 57
Discharge: HOME | End: 2022-03-09
Attending: NURSE PRACTITIONER
Payer: COMMERCIAL

## 2022-03-09 ENCOUNTER — OFFICE VISIT (OUTPATIENT)
Dept: ORTHOPEDICS | Facility: CLINIC | Age: 57
End: 2022-03-09
Payer: COMMERCIAL

## 2022-03-09 VITALS — BODY MASS INDEX: 28.56 KG/M2 | WEIGHT: 161.19 LBS | HEIGHT: 63 IN

## 2022-03-09 DIAGNOSIS — M25.561 ACUTE PAIN OF RIGHT KNEE: Primary | ICD-10-CM

## 2022-03-09 DIAGNOSIS — M25.561 ACUTE PAIN OF RIGHT KNEE: ICD-10-CM

## 2022-03-09 PROCEDURE — 99999 PR PBB SHADOW E&M-EST. PATIENT-LVL IV: ICD-10-PCS | Mod: PBBFAC,,, | Performed by: NURSE PRACTITIONER

## 2022-03-09 PROCEDURE — 99214 PR OFFICE/OUTPT VISIT, EST, LEVL IV, 30-39 MIN: ICD-10-PCS | Mod: 25,S$GLB,, | Performed by: NURSE PRACTITIONER

## 2022-03-09 PROCEDURE — 73562 XR KNEE ORTHO RIGHT WITH FLEXION: ICD-10-PCS | Mod: 26,LT,, | Performed by: RADIOLOGY

## 2022-03-09 PROCEDURE — 73562 X-RAY EXAM OF KNEE 3: CPT | Mod: 59,TC,LT

## 2022-03-09 PROCEDURE — 73564 X-RAY EXAM KNEE 4 OR MORE: CPT | Mod: 26,RT,, | Performed by: RADIOLOGY

## 2022-03-09 PROCEDURE — 20610 DRAIN/INJ JOINT/BURSA W/O US: CPT | Mod: RT,S$GLB,, | Performed by: NURSE PRACTITIONER

## 2022-03-09 PROCEDURE — 99214 OFFICE O/P EST MOD 30 MIN: CPT | Mod: PBBFAC | Performed by: NURSE PRACTITIONER

## 2022-03-09 PROCEDURE — 73562 X-RAY EXAM OF KNEE 3: CPT | Mod: 26,LT,, | Performed by: RADIOLOGY

## 2022-03-09 PROCEDURE — 99214 OFFICE O/P EST MOD 30 MIN: CPT | Mod: 25,S$GLB,, | Performed by: NURSE PRACTITIONER

## 2022-03-09 PROCEDURE — 20610 PR DRAIN/INJECT LARGE JOINT/BURSA: ICD-10-PCS | Mod: RT,S$GLB,, | Performed by: NURSE PRACTITIONER

## 2022-03-09 PROCEDURE — 99999 PR PBB SHADOW E&M-EST. PATIENT-LVL IV: CPT | Mod: PBBFAC,,, | Performed by: NURSE PRACTITIONER

## 2022-03-09 PROCEDURE — 20610 DRAIN/INJ JOINT/BURSA W/O US: CPT | Mod: PBBFAC | Performed by: NURSE PRACTITIONER

## 2022-03-09 PROCEDURE — 73564 XR KNEE ORTHO RIGHT WITH FLEXION: ICD-10-PCS | Mod: 26,RT,, | Performed by: RADIOLOGY

## 2022-03-09 RX ORDER — TRIAMCINOLONE ACETONIDE 40 MG/ML
40 INJECTION, SUSPENSION INTRA-ARTICULAR; INTRAMUSCULAR
Status: COMPLETED | OUTPATIENT
Start: 2022-03-09 | End: 2022-03-09

## 2022-03-09 RX ADMIN — TRIAMCINOLONE ACETONIDE 40 MG: 40 INJECTION, SUSPENSION INTRA-ARTICULAR; INTRAMUSCULAR at 10:03

## 2022-03-09 NOTE — PROGRESS NOTES
SUBJECTIVE:     Chief Complaint & History of Present Illness:  Faith Bernard is a Established 57 y.o. year old female patient here with a history of constant right knee pain which started few weeks ago.  There is not a history of trauma.  The pain is located in the global aspect of the knee.  The pain is described as achy, 8/10.  There is not radiation.  There is catching or locking.  Aggravating factors include going up and down stairs, lateral movements, rising after sitting and walking.  Associated symptoms include knee giving out, knee locking.  There is not numbness or tingling of the lower extremity.  There is back pain. Previous treatments include acetaminophen, ice, OTC NSAIDs and rest which have provided minimal relief.  There is not a history of previous injury or surgery to the knee.  The patient does use an assistive device.  She would like to get a steroid injection since it helped when given in her elbow in the past.    Review of patient's allergies indicates:   Allergen Reactions    Cephalexin Anaphylaxis    Codeine Itching    Nsaids (non-steroidal anti-inflammatory drug)      Has a history of bleeding ulcers         Current Outpatient Medications   Medication Sig Dispense Refill    aripiprazole (ABILIFY) 10 MG Tab Take 10 mg by mouth every evening.       cholecalciferol, vitamin D3, 50,000 unit capsule Take 50,000 Units by mouth every 7 days. Patient takes on Saturdays  99    CYANOCOBALAMIN, VITAMIN B-12, (VITAMIN B-12 INJ) Inject 1 mL as directed every 30 days.       dextroamphetamine-amphetamine 30 mg Tab Take 1 tablet by mouth 2 (two) times daily.      hydroCHLOROthiazide (HYDRODIURIL) 25 MG tablet Take 25 mg by mouth every morning.      levothyroxine (SYNTHROID) 100 MCG tablet Take 100 mcg by mouth before breakfast.      LIDOcaine (LIDODERM) 5 % Place 1 patch onto the skin once daily. Remove & Discard patch within 12 hours or as directed by MD 5 patch 0    oxyCODONE (ROXICODONE)  10 mg Tab immediate release tablet Take 1 tablet (10 mg total) by mouth every 4 (four) hours as needed for Pain. 28 tablet 0    progesterone (PROMETRIUM) 200 MG capsule Take 200 mg by mouth every evening.      propranoloL (INDERAL) 60 MG tablet Take 60 mg by mouth 2 (two) times daily.      rizatriptan (MAXALT-MLT) 10 MG disintegrating tablet DISSOLVE ONE TABLET BY MOUTH allow TO dissolve ONCE daily AS NEEDED  2    tizanidine (ZANAFLEX) 4 MG tablet Take 4 mg by mouth every evening. May take up to 12mg qhs  4    topiramate (TOPAMAX) 200 MG Tab 100 mg 4 (four) times daily.       venlafaxine (EFFEXOR-XR) 75 MG 24 hr capsule Take 150 mg by mouth. Patient takes 300 mg once a day at night      VYVANSE 70 mg capsule Take 70 mg by mouth once daily.  0    abaloparatide (TYMLOS) 80 mcg (3,120 mcg/1.56 mL) PnIj Inject 80 mcg into the skin once daily. (Patient not taking: Reported on 3/9/2022) 1.56 mL 11    acetaminophen (TYLENOL) 650 MG TbSR Take 1 tablet (650 mg total) by mouth every 6 (six) hours as needed (pain). (Patient not taking: Reported on 3/9/2022) 120 tablet 5    aspirin (ECOTRIN) 81 MG EC tablet Take 1 tablet (81 mg total) by mouth once daily. 42 tablet 0    docusate sodium (COLACE) 100 MG capsule Take 1 capsule (100 mg total) by mouth 3 (three) times daily as needed for Constipation. (Patient not taking: No sig reported) 60 capsule 0    gabapentin (NEURONTIN) 300 MG capsule Take 1 capsule (300 mg total) by mouth 3 (three) times daily. 90 capsule 0    levothyroxine (SYNTHROID) 88 MCG tablet Take 88 mcg by mouth once daily.      morphine (MS CONTIN) 15 MG 12 hr tablet Take 1 tablet (15 mg total) by mouth 2 (two) times daily. (Patient not taking: No sig reported) 14 tablet 0     No current facility-administered medications for this visit.     Facility-Administered Medications Ordered in Other Visits   Medication Dose Route Frequency Provider Last Rate Last Admin    midazolam (VERSED) 1 mg/mL injection  0.5 mg  0.5 mg Intravenous PRN Myrtle Linares MD   2 mg at 09/18/20 1155       Past Medical History:   Diagnosis Date    Anxiety     Depression     Encounter for blood transfusion     Esophageal dysphagia     Fibromyalgia     Gastric bypass status for obesity     H/O dental abscess 4/14/2014    drained    Headache(784.0)     migraines.  Treated at [facility] (Dr. 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  Pt states took too many then realized her mistake    Polycystic ovaries     S/P JUHI-BSO     Thyroid disease     hypothyroidism       Past Surgical History:   Procedure Laterality Date    ABDOMINAL SURGERY      APPLICATION OF LARGE EXTERNAL FIXATION DEVICE TO TIBIA Left 9/7/2020    Procedure: APPLICATION, EXTERNAL FIXATION DEVICE, TIBIA;  Surgeon: Sujata Londono MD;  Location: [address];  Service: Orthopedics;  Laterality: Left;  need paralysis    APPLICATION OF WOUND VACUUM-ASSISTED CLOSURE DEVICE Left 9/18/2020    Procedure: APPLICATION, WOUND VAC;  Surgeon: Fer Mcrae MD;  Location: [address];  Service: Orthopedics;  Laterality: Left;    BREAST SURGERY  2004    Breast Reduction    CARDIAC CATHETERIZATION      COLONOSCOPY N/A 11/3/2015    Procedure: COLONOSCOPY;  Surgeon: Timothy Barber MD;  Location: [facility];  Service: Endoscopy;  Laterality: N/A;    DENTAL SURGERY      4 teeth removed    GASTRIC BYPASS      HERNIA REPAIR      HYSTERECTOMY      OPEN REDUCTION AND INTERNAL FIXATION (ORIF) OF PILON FRACTURE Left 9/18/2020    Procedure: ORIF,  "FRACTURE, PILON - left. Diving board, supine, bone foam. Seda anterolateral distal tibial plate, mini frag, long 3.5mm steel screw, CaPhos bone substitute;  Surgeon: Fer Mcrae MD;  [facility];  Service: Orthopedics;  Laterality: Left;    OVARIAN CYST REMOVAL      aprox 5 times    REMOVAL OF EXTERNAL FIXATION DEVICE Left 9/18/2020    Procedure: REMOVAL, EXTERNAL FIXATION DEVICE;  Surgeon: Fer Mcrae MD;  [facility];  Service: Orthopedics;  Laterality: Left;    ROTATOR CUFF REPAIR Left 01/2019    tennis elbow repair Left 01/2019    UPPER GASTROINTESTINAL ENDOSCOPY         Family History   Problem Relation Age of Onset    Anxiety disorder Mother     Depression Mother     Suicide Mother     Seizures Mother     Drug abuse Mother     Ovarian cancer Mother     Aortic aneurysm Father     Colon cancer Neg Hx     Breast cancer Neg Hx     Diabetes Neg Hx     Hypertension Neg Hx          Review of Systems:  ROS:  Constitutional: no fever or chills  Eyes: no visual changes  ENT: no nasal congestion or sore throat  Respiratory: no cough or shortness of breath  Cardiovascular: no chest pain or palpitations  Gastrointestinal: no nausea or vomiting, tolerating diet  Genitourinary: no hematuria or dysuria  Integument/Breast: no rash or pruritis  Hematologic/Lymphatic: no easy bruising or lymphadenopathy  Musculoskeletal: right knee pain  Neurological: no seizures or tremors  Behavioral/Psych: no auditory or visual hallucinations  Endocrine: no heat or cold intolerance      OBJECTIVE:     PHYSICAL EXAM:  Vital Signs (Most Recent)  There were no vitals filed for this visit.  Height: 5' 3" (160 cm) Weight: 73.1 kg (161 lb 2.5 oz),   Estimated body mass index is 28.55 kg/m² as calculated from the following:    Height as of this encounter: 5' 3" (1.6 m).    Weight as of this encounter: 73.1 kg (161 lb 2.5 oz).   General Appearance: Well nourished, well " developed, in no acute distress.  HENT: Normal cephalic, oropharynx pink and moist  Eyes: PERRLA bilaterally and EOM x 4  Respiratory: Even and unlabored  Skin: Warm and Dry.   Psychiatric: AAO x 4, Mood & affect are normal.    right  Knee Exam:  Knee Range of Motion:pain with terminal flexion   Effusion:none  Condition of skin:intact  Location of tenderness:Medial joint line and Lateral joint line   Strength:normal  Stability:  stable to testing, Lachman: stable, LCL: stable, MCL: stable and PCL: stable  Varus /Valgus stress:   Mild varus  Angel:   negative    Hip Examination:  full painless range of motion, without tenderness    RADIOGRAPHS:  X-ray of the right knee obtained, findings showed no acute fractures.  There appears to be mild medial tibiofemoral joint space narrowing. All radiographs were personally reviewed by me.    ASSESSMENT/PLAN:       ICD-10-CM ICD-9-CM   1. Acute pain of right knee  M25.561 719.46       Plan: We discussed with the patient at length all the different treatment options available for  the knee including anti-inflammatories, acetaminophen, rest, ice, knee strengthening exercise, occasional cortisone injections for temporary relief, Viscosupplimentation injections, arthroscopic debridement osteotomy, and finally knee arthroplasty.     -Faith LEVI Bernard presents to clinic today with c/c right knee pain for the past few weeks.  -X-ray as above.  -Recommend RICE therapy.  -Patient would like a CSI today.    PROCEDURE:  I have explained the risks, benefits, and alternatives of the procedure in detail.  The patient voices understanding and all questions have been answered.  The patient agrees to proceed as planned. So after I performed a sterile prep of the skin in the normal fashion the right knee is injected using a 22 gauge needle from the anterolateral approach with a combination of 4cc 1% plain lidocaine and 40 mg of Kenalog.  The patient is cautioned and immediate relief of pain  is secondary to the local anesthetic and will be temporary.  After the anesthetic wears off there may be a increase in pain that may last for a few hours or a few days and they should use ice to help alleviate this flair up of pain.     Lab Results   Component Value Date    HGBA1C 5.1 09/07/2020          Faith SIMS Betopaul was advised to monitor her blood sugars closely over the next several days. The steroid may cause a rise in them. If her glucose levels rise to a point she is uncomfortable or she is unable to control them she is to contact her PCP or go immediately to the emergency department.      Patient tolerated procedure well and post injection they reported improvement in their pain.

## 2022-03-17 ENCOUNTER — OFFICE VISIT (OUTPATIENT)
Dept: ORTHOPEDICS | Facility: CLINIC | Age: 57
End: 2022-03-17
Payer: COMMERCIAL

## 2022-03-17 DIAGNOSIS — S82.872K CLOSED DISPLACED PILON FRACTURE OF LEFT TIBIA WITH NONUNION, SUBSEQUENT ENCOUNTER: Primary | ICD-10-CM

## 2022-03-17 PROCEDURE — 99999 PR PBB SHADOW E&M-EST. PATIENT-LVL III: CPT | Mod: PBBFAC,,, | Performed by: ORTHOPAEDIC SURGERY

## 2022-03-17 PROCEDURE — 99999 PR PBB SHADOW E&M-EST. PATIENT-LVL III: ICD-10-PCS | Mod: PBBFAC,,, | Performed by: ORTHOPAEDIC SURGERY

## 2022-03-17 PROCEDURE — 99213 OFFICE O/P EST LOW 20 MIN: CPT | Mod: PBBFAC,25 | Performed by: ORTHOPAEDIC SURGERY

## 2022-03-17 PROCEDURE — 99213 PR OFFICE/OUTPT VISIT, EST, LEVL III, 20-29 MIN: ICD-10-PCS | Mod: S$GLB,,, | Performed by: ORTHOPAEDIC SURGERY

## 2022-03-17 PROCEDURE — 99213 OFFICE O/P EST LOW 20 MIN: CPT | Mod: S$GLB,,, | Performed by: ORTHOPAEDIC SURGERY
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
## 2022-03-24 ENCOUNTER — PATIENT MESSAGE (OUTPATIENT)
Dept: SURGERY | Facility: HOSPITAL | Age: 57
End: 2022-03-24
Payer: COMMERCIAL

## 2022-03-24 DIAGNOSIS — Z01.818 PRE-OP TESTING: ICD-10-CM

## 2022-03-24 DIAGNOSIS — M25.572 LEFT ANKLE PAIN, UNSPECIFIED CHRONICITY: Primary | ICD-10-CM

## 2022-03-25 ENCOUNTER — LAB VISIT (OUTPATIENT)
Dept: PRIMARY CARE CLINIC | Facility: CLINIC | Age: 57
DRG: 493 | End: 2022-03-25
Payer: MEDICARE

## 2022-03-25 ENCOUNTER — ANESTHESIA EVENT (OUTPATIENT)
Dept: SURGERY | Facility: HOSPITAL | Age: 57
DRG: 493 | End: 2022-03-25
Payer: COMMERCIAL

## 2022-03-25 ENCOUNTER — TELEPHONE (OUTPATIENT)
Dept: ORTHOPEDICS | Facility: CLINIC | Age: 57
End: 2022-03-25
Payer: COMMERCIAL

## 2022-03-25 DIAGNOSIS — Z01.818 PRE-OP TESTING: ICD-10-CM

## 2022-03-25 PROCEDURE — U0003 INFECTIOUS AGENT DETECTION BY NUCLEIC ACID (DNA OR RNA); SEVERE ACUTE RESPIRATORY SYNDROME CORONAVIRUS 2 (SARS-COV-2) (CORONAVIRUS DISEASE [COVID-19]), AMPLIFIED PROBE TECHNIQUE, MAKING USE OF HIGH THROUGHPUT TECHNOLOGIES AS DESCRIBED BY CMS-2020-01-R: HCPCS | Performed by: ORTHOPAEDIC SURGERY

## 2022-03-25 PROCEDURE — U0005 INFEC AGEN DETEC AMPLI PROBE: HCPCS | Performed by: ORTHOPAEDIC SURGERY

## 2022-03-25 RX ORDER — ERENUMAB-AOOE 140 MG/ML
INJECTION, SOLUTION SUBCUTANEOUS
COMMUNITY
Start: 2022-03-07 | End: 2023-09-11

## 2022-03-25 RX ORDER — AMLODIPINE BESYLATE 5 MG/1
TABLET ORAL
COMMUNITY
Start: 2022-02-18 | End: 2022-10-11

## 2022-03-25 RX ORDER — CHLORDIAZEPOXIDE HYDROCHLORIDE AND CLIDINIUM BROMIDE 5; 2.5 MG/1; MG/1
CAPSULE ORAL
COMMUNITY
End: 2022-04-11

## 2022-03-25 RX ORDER — TRAZODONE HYDROCHLORIDE 150 MG/1
300 TABLET ORAL NIGHTLY
COMMUNITY
Start: 2021-12-15 | End: 2022-04-11

## 2022-03-25 RX ORDER — OXYCODONE AND ACETAMINOPHEN 10; 325 MG/1; MG/1
1 TABLET ORAL EVERY 4 HOURS PRN
Status: ON HOLD | COMMUNITY
Start: 2022-03-23 | End: 2022-03-29 | Stop reason: HOSPADM

## 2022-03-25 NOTE — PRE-PROCEDURE INSTRUCTIONS
Preop instructions(bathing/wear loose fitting clothing/fasting/directions/location of surgery/ and preop medication instructions reviewed with patient). Clear liquids are allowed up to 2 hours before procedure.Clear liquids are:water,apple juice, jello, gatorade & powerade. Patient instructed to hold/stop all blood thinning medications, prior to surgery, following the pre-surgery recommended guidelines. Instructed to follow the surgeon's instructions if they differ from these.    Patient verbalized understanding.        Patient advised of the updated visitor policy.    Patient given 0700 arrival to [facility] DEPT    Pt reports she became combative, aggressive - pulling at chest tube/PIV after VATS Sx at [facility] in [place] - 8/11/2021 (?)   States she was in restraints for ~ 12 hours    Pt has has subsequent procedures w/anesthesia without difficulty

## 2022-03-25 NOTE — ANESTHESIA PREPROCEDURE EVALUATION
03/25/2022  Faith Bernard is a 57 y.o., female.      Pre-op Assessment    I have reviewed the Patient Summary Reports.    I have reviewed the NPO Status.      Review of Systems  Anesthesia Hx:  History of prior surgery of interest to airway management or planning: Previous anesthesia: General Personal Hx of Anesthesia complications   Cardiovascular:   Exercise tolerance: good Hypertension    Neurological:   Neuromuscular Disease, Headaches    Endocrine:   Hypothyroidism    Psych:   Psychiatric History          Physical Exam  General: Well nourished    Airway:  Mallampati: II   Mouth Opening: Normal  TM Distance: Normal  Tongue: Normal  Neck ROM: Normal ROM    Dental:  Intact    Chest/Lungs:  Clear to auscultation    Heart:  Rate: Normal  Rhythm: Regular Rhythm        Anesthesia Plan  Type of Anesthesia, risks & benefits discussed:    Anesthesia Type: Gen ETT, Regional  Intra-op Monitoring Plan: Standard ASA Monitors  Post Op Pain Control Plan: multimodal analgesia  Induction:  IV  Airway Plan: Direct, Post-Induction  Informed Consent: Informed consent signed with the Patient and all parties understand the risks and agree with anesthesia plan.  All questions answered.   ASA Score: 3    Ready For Surgery From Anesthesia Perspective.     .  Pt reports she became combative, aggressive - pulling at chest tube/PIV after VATS Sx at [facility] in [place] - 8/11/2021 (?)   States she was in restraints for ~ 12 hours    Pt has had subsequent procedures w/anesthesia without difficulty

## 2022-03-25 NOTE — TELEPHONE ENCOUNTER
Spoke with pt in regards to surgery on Monday 3/28/2022, stated to pt no eating/drinking after midnight arrival time 7am due to still having to be consented, and she's going to [facility] same day surgery center. Pt verbalize understands.

## 2022-03-25 NOTE — TELEPHONE ENCOUNTER
----- Message from Adele Escobar sent at 3/25/2022  9:51 AM CDT -----  Contact: Pt  Pt's requesting a call back re: sx stay. Pt would like to know how long will she be in hosp.    Confirmed contact info below:  Contact Name: Faith Bernard  Phone Number: [phone]

## 2022-03-26 LAB
SARS-COV-2 RNA RESP QL NAA+PROBE: NOT DETECTED
SARS-COV-2- CYCLE NUMBER: NORMAL

## 2022-03-28 ENCOUNTER — HOSPITAL ENCOUNTER (INPATIENT)
Facility: HOSPITAL | Age: 57
LOS: 1 days | Discharge: HOME HEALTH | DRG: 493 | End: 2022-03-29
Attending: ORTHOPAEDIC SURGERY | Admitting: ORTHOPAEDIC SURGERY
Payer: COMMERCIAL

## 2022-03-28 ENCOUNTER — ANESTHESIA (OUTPATIENT)
Dept: SURGERY | Facility: HOSPITAL | Age: 57
DRG: 493 | End: 2022-03-28
Payer: COMMERCIAL

## 2022-03-28 DIAGNOSIS — S82.873A PILON FRACTURE: Primary | ICD-10-CM

## 2022-03-28 LAB
GRAM STN SPEC: NORMAL
GRAM STN SPEC: NORMAL

## 2022-03-28 PROCEDURE — D9220A PRA ANESTHESIA: ICD-10-PCS | Mod: ANES,,, | Performed by: ANESTHESIOLOGY

## 2022-03-28 PROCEDURE — 37000008 HC ANESTHESIA 1ST 15 MINUTES: Performed by: ORTHOPAEDIC SURGERY

## 2022-03-28 PROCEDURE — C1769 GUIDE WIRE: HCPCS | Performed by: ORTHOPAEDIC SURGERY

## 2022-03-28 PROCEDURE — 76942 ECHO GUIDE FOR BIOPSY: CPT | Performed by: STUDENT IN AN ORGANIZED HEALTH CARE EDUCATION/TRAINING PROGRAM

## 2022-03-28 PROCEDURE — 64447 NJX AA&/STRD FEMORAL NRV IMG: CPT | Performed by: STUDENT IN AN ORGANIZED HEALTH CARE EDUCATION/TRAINING PROGRAM

## 2022-03-28 PROCEDURE — 94761 N-INVAS EAR/PLS OXIMETRY MLT: CPT

## 2022-03-28 PROCEDURE — 64445 NJX AA&/STRD SCIATIC NRV IMG: CPT | Performed by: STUDENT IN AN ORGANIZED HEALTH CARE EDUCATION/TRAINING PROGRAM

## 2022-03-28 PROCEDURE — 25000003 PHARM REV CODE 250: Performed by: ANESTHESIOLOGY

## 2022-03-28 PROCEDURE — 71000033 HC RECOVERY, INTIAL HOUR: Performed by: ORTHOPAEDIC SURGERY

## 2022-03-28 PROCEDURE — 11000001 HC ACUTE MED/SURG PRIVATE ROOM

## 2022-03-28 PROCEDURE — 63600175 PHARM REV CODE 636 W HCPCS: Performed by: STUDENT IN AN ORGANIZED HEALTH CARE EDUCATION/TRAINING PROGRAM

## 2022-03-28 PROCEDURE — 64445 NJX AA&/STRD SCIATIC NRV IMG: CPT | Mod: 59,LT,, | Performed by: ANESTHESIOLOGY

## 2022-03-28 PROCEDURE — 25000003 PHARM REV CODE 250

## 2022-03-28 PROCEDURE — 76942 ADDUCTOR CANAL SINGLE INJECTION BLOCK: ICD-10-PCS | Mod: 26,,, | Performed by: ANESTHESIOLOGY

## 2022-03-28 PROCEDURE — D9220A PRA ANESTHESIA: ICD-10-PCS | Mod: CRNA,,, | Performed by: NURSE ANESTHETIST, CERTIFIED REGISTERED

## 2022-03-28 PROCEDURE — 87070 CULTURE OTHR SPECIMN AEROBIC: CPT | Mod: 59 | Performed by: ORTHOPAEDIC SURGERY

## 2022-03-28 PROCEDURE — 27724 REPAIR/GRAFT OF TIBIA: CPT | Mod: LT,GC,, | Performed by: ORTHOPAEDIC SURGERY

## 2022-03-28 PROCEDURE — 25000003 PHARM REV CODE 250: Performed by: NURSE ANESTHETIST, CERTIFIED REGISTERED

## 2022-03-28 PROCEDURE — 63600175 PHARM REV CODE 636 W HCPCS: Performed by: NURSE ANESTHETIST, CERTIFIED REGISTERED

## 2022-03-28 PROCEDURE — 25000003 PHARM REV CODE 250: Performed by: STUDENT IN AN ORGANIZED HEALTH CARE EDUCATION/TRAINING PROGRAM

## 2022-03-28 PROCEDURE — 87205 SMEAR GRAM STAIN: CPT | Performed by: ORTHOPAEDIC SURGERY

## 2022-03-28 PROCEDURE — 87116 MYCOBACTERIA CULTURE: CPT | Performed by: ORTHOPAEDIC SURGERY

## 2022-03-28 PROCEDURE — 64445 SCIATIC NERVE SINGLE INJECTION BLOCK: ICD-10-PCS | Mod: 59,LT,, | Performed by: ANESTHESIOLOGY

## 2022-03-28 PROCEDURE — 27201423 OPTIME MED/SURG SUP & DEVICES STERILE SUPPLY: Performed by: ORTHOPAEDIC SURGERY

## 2022-03-28 PROCEDURE — 36000711: Performed by: ORTHOPAEDIC SURGERY

## 2022-03-28 PROCEDURE — 71000015 HC POSTOP RECOV 1ST HR: Performed by: ORTHOPAEDIC SURGERY

## 2022-03-28 PROCEDURE — 63600175 PHARM REV CODE 636 W HCPCS: Performed by: ORTHOPAEDIC SURGERY

## 2022-03-28 PROCEDURE — 25000242 PHARM REV CODE 250 ALT 637 W/ HCPCS: Performed by: NURSE ANESTHETIST, CERTIFIED REGISTERED

## 2022-03-28 PROCEDURE — 37000009 HC ANESTHESIA EA ADD 15 MINS: Performed by: ORTHOPAEDIC SURGERY

## 2022-03-28 PROCEDURE — 87176 TISSUE HOMOGENIZATION CULTR: CPT | Performed by: ORTHOPAEDIC SURGERY

## 2022-03-28 PROCEDURE — 64447 NJX AA&/STRD FEMORAL NRV IMG: CPT | Mod: 59,LT,, | Performed by: ANESTHESIOLOGY

## 2022-03-28 PROCEDURE — 25000003 PHARM REV CODE 250: Performed by: ORTHOPAEDIC SURGERY

## 2022-03-28 PROCEDURE — 87075 CULTR BACTERIA EXCEPT BLOOD: CPT | Performed by: ORTHOPAEDIC SURGERY

## 2022-03-28 PROCEDURE — 36000710: Performed by: ORTHOPAEDIC SURGERY

## 2022-03-28 PROCEDURE — 76942 ECHO GUIDE FOR BIOPSY: CPT | Mod: 26,,, | Performed by: ANESTHESIOLOGY

## 2022-03-28 PROCEDURE — D9220A PRA ANESTHESIA: Mod: CRNA,,, | Performed by: NURSE ANESTHETIST, CERTIFIED REGISTERED

## 2022-03-28 PROCEDURE — 64447 ADDUCTOR CANAL SINGLE INJECTION BLOCK: ICD-10-PCS | Mod: 59,LT,, | Performed by: ANESTHESIOLOGY

## 2022-03-28 PROCEDURE — 87102 FUNGUS ISOLATION CULTURE: CPT | Mod: 59 | Performed by: ORTHOPAEDIC SURGERY

## 2022-03-28 PROCEDURE — C1713 ANCHOR/SCREW BN/BN,TIS/BN: HCPCS | Performed by: ORTHOPAEDIC SURGERY

## 2022-03-28 PROCEDURE — 27724 PR RNON/MALUNION TIBIA+AUTOGRAFT: ICD-10-PCS | Mod: LT,GC,, | Performed by: ORTHOPAEDIC SURGERY

## 2022-03-28 PROCEDURE — D9220A PRA ANESTHESIA: Mod: ANES,,, | Performed by: ANESTHESIOLOGY

## 2022-03-28 PROCEDURE — 87206 SMEAR FLUORESCENT/ACID STAI: CPT | Performed by: ORTHOPAEDIC SURGERY

## 2022-03-28 DEVICE — SCREW VARIAX NON LOK 2.7X28MM: Type: IMPLANTABLE DEVICE | Site: ANKLE | Status: FUNCTIONAL

## 2022-03-28 DEVICE — SCREW BONE LOCK T8 2.7X20MM: Type: IMPLANTABLE DEVICE | Site: ANKLE | Status: FUNCTIONAL

## 2022-03-28 DEVICE — SCREW VARIAX NON-LOK 2.7X30MM: Type: IMPLANTABLE DEVICE | Site: ANKLE | Status: FUNCTIONAL

## 2022-03-28 DEVICE — GRAFT BONE SUB VITOSS BBT: Type: IMPLANTABLE DEVICE | Site: ANKLE | Status: FUNCTIONAL

## 2022-03-28 DEVICE — SCREW BONE CORTICAL 3.5X32MM T: Type: IMPLANTABLE DEVICE | Site: ANKLE | Status: FUNCTIONAL

## 2022-03-28 DEVICE — IMPLANTABLE DEVICE: Type: IMPLANTABLE DEVICE | Site: ANKLE | Status: FUNCTIONAL

## 2022-03-28 DEVICE — SCREW BONE CORTICAL 3.5X30MM T: Type: IMPLANTABLE DEVICE | Site: ANKLE | Status: FUNCTIONAL

## 2022-03-28 DEVICE — SCREW VARIAX NON LOK 2.7X32MM: Type: IMPLANTABLE DEVICE | Site: ANKLE | Status: FUNCTIONAL

## 2022-03-28 RX ORDER — VENLAFAXINE HYDROCHLORIDE 75 MG/1
75 CAPSULE, EXTENDED RELEASE ORAL DAILY
Status: DISCONTINUED | OUTPATIENT
Start: 2022-03-29 | End: 2022-03-29 | Stop reason: HOSPADM

## 2022-03-28 RX ORDER — HYDROCHLOROTHIAZIDE 25 MG/1
25 TABLET ORAL EVERY MORNING
Status: DISCONTINUED | OUTPATIENT
Start: 2022-03-29 | End: 2022-03-29 | Stop reason: HOSPADM

## 2022-03-28 RX ORDER — HALOPERIDOL 5 MG/ML
0.5 INJECTION INTRAMUSCULAR
Status: DISCONTINUED | OUTPATIENT
Start: 2022-03-28 | End: 2022-03-28 | Stop reason: HOSPADM

## 2022-03-28 RX ORDER — CLINDAMYCIN PHOSPHATE 900 MG/50ML
900 INJECTION, SOLUTION INTRAVENOUS
Status: COMPLETED | OUTPATIENT
Start: 2022-03-28 | End: 2022-03-28

## 2022-03-28 RX ORDER — FENTANYL CITRATE 50 UG/ML
25 INJECTION, SOLUTION INTRAMUSCULAR; INTRAVENOUS EVERY 5 MIN PRN
Status: DISCONTINUED | OUTPATIENT
Start: 2022-03-28 | End: 2022-03-28 | Stop reason: HOSPADM

## 2022-03-28 RX ORDER — ROCURONIUM BROMIDE 10 MG/ML
INJECTION, SOLUTION INTRAVENOUS
Status: DISCONTINUED | OUTPATIENT
Start: 2022-03-28 | End: 2022-03-28

## 2022-03-28 RX ORDER — ASPIRIN 81 MG/1
81 TABLET ORAL 2 TIMES DAILY
Status: DISCONTINUED | OUTPATIENT
Start: 2022-03-28 | End: 2022-03-29

## 2022-03-28 RX ORDER — NALTREXONE HYDROCHLORIDE AND BUPROPION HYDROCHLORIDE 8; 90 MG/1; MG/1
TABLET, EXTENDED RELEASE ORAL 2 TIMES DAILY
COMMUNITY
End: 2022-12-01

## 2022-03-28 RX ORDER — PROPOFOL 10 MG/ML
VIAL (ML) INTRAVENOUS
Status: DISCONTINUED | OUTPATIENT
Start: 2022-03-28 | End: 2022-03-28

## 2022-03-28 RX ORDER — ACETAMINOPHEN 325 MG/1
650 TABLET ORAL EVERY 6 HOURS
Status: CANCELLED | OUTPATIENT
Start: 2022-03-28

## 2022-03-28 RX ORDER — KETAMINE HCL IN 0.9 % NACL 50 MG/5 ML
SYRINGE (ML) INTRAVENOUS
Status: DISCONTINUED | OUTPATIENT
Start: 2022-03-28 | End: 2022-03-28

## 2022-03-28 RX ORDER — FENTANYL CITRATE 50 UG/ML
100 INJECTION, SOLUTION INTRAMUSCULAR; INTRAVENOUS EVERY 5 MIN PRN
Status: DISCONTINUED | OUTPATIENT
Start: 2022-03-28 | End: 2022-03-28

## 2022-03-28 RX ORDER — ARIPIPRAZOLE 10 MG/1
10 TABLET ORAL DAILY
Status: DISCONTINUED | OUTPATIENT
Start: 2022-03-28 | End: 2022-03-29 | Stop reason: HOSPADM

## 2022-03-28 RX ORDER — TRAZODONE HYDROCHLORIDE 100 MG/1
300 TABLET ORAL NIGHTLY
Status: DISCONTINUED | OUTPATIENT
Start: 2022-03-28 | End: 2022-03-29 | Stop reason: HOSPADM

## 2022-03-28 RX ORDER — MORPHINE SULFATE 2 MG/ML
4 INJECTION, SOLUTION INTRAMUSCULAR; INTRAVENOUS
Status: DISCONTINUED | OUTPATIENT
Start: 2022-03-28 | End: 2022-03-29 | Stop reason: HOSPADM

## 2022-03-28 RX ORDER — PROPRANOLOL HYDROCHLORIDE 20 MG/1
60 TABLET ORAL 2 TIMES DAILY
Status: DISCONTINUED | OUTPATIENT
Start: 2022-03-28 | End: 2022-03-29 | Stop reason: HOSPADM

## 2022-03-28 RX ORDER — LEVOTHYROXINE SODIUM 50 UG/1
100 TABLET ORAL
Status: DISCONTINUED | OUTPATIENT
Start: 2022-03-29 | End: 2022-03-29 | Stop reason: HOSPADM

## 2022-03-28 RX ORDER — HYDROMORPHONE HYDROCHLORIDE 1 MG/ML
INJECTION, SOLUTION INTRAMUSCULAR; INTRAVENOUS; SUBCUTANEOUS
Status: COMPLETED
Start: 2022-03-28 | End: 2022-03-28

## 2022-03-28 RX ORDER — SODIUM CHLORIDE 9 MG/ML
INJECTION, SOLUTION INTRAVENOUS CONTINUOUS
Status: DISCONTINUED | OUTPATIENT
Start: 2022-03-28 | End: 2022-03-28

## 2022-03-28 RX ORDER — MIDAZOLAM HYDROCHLORIDE 1 MG/ML
2 INJECTION INTRAMUSCULAR; INTRAVENOUS
Status: DISCONTINUED | OUTPATIENT
Start: 2022-03-28 | End: 2022-03-28

## 2022-03-28 RX ORDER — ARIPIPRAZOLE 10 MG/1
10 TABLET ORAL DAILY
Status: DISCONTINUED | OUTPATIENT
Start: 2022-03-29 | End: 2022-03-28

## 2022-03-28 RX ORDER — FENTANYL CITRATE 50 UG/ML
INJECTION, SOLUTION INTRAMUSCULAR; INTRAVENOUS
Status: DISCONTINUED | OUTPATIENT
Start: 2022-03-28 | End: 2022-03-28

## 2022-03-28 RX ORDER — ONDANSETRON 2 MG/ML
INJECTION INTRAMUSCULAR; INTRAVENOUS
Status: DISCONTINUED | OUTPATIENT
Start: 2022-03-28 | End: 2022-03-28

## 2022-03-28 RX ORDER — SODIUM CHLORIDE 0.9 % (FLUSH) 0.9 %
10 SYRINGE (ML) INJECTION
Status: CANCELLED | OUTPATIENT
Start: 2022-03-28

## 2022-03-28 RX ORDER — BUPIVACAINE HYDROCHLORIDE AND EPINEPHRINE 5; 5 MG/ML; UG/ML
INJECTION, SOLUTION EPIDURAL; INTRACAUDAL; PERINEURAL
Status: COMPLETED | OUTPATIENT
Start: 2022-03-28 | End: 2022-03-28

## 2022-03-28 RX ORDER — BUPIVACAINE HYDROCHLORIDE AND EPINEPHRINE 2.5; 5 MG/ML; UG/ML
INJECTION, SOLUTION EPIDURAL; INFILTRATION; INTRACAUDAL; PERINEURAL
Status: COMPLETED | OUTPATIENT
Start: 2022-03-28 | End: 2022-03-28

## 2022-03-28 RX ORDER — SODIUM CHLORIDE 0.9 % (FLUSH) 0.9 %
3 SYRINGE (ML) INJECTION
Status: DISCONTINUED | OUTPATIENT
Start: 2022-03-28 | End: 2022-03-28

## 2022-03-28 RX ORDER — AMOXICILLIN 250 MG
1 CAPSULE ORAL DAILY
Status: DISCONTINUED | OUTPATIENT
Start: 2022-03-29 | End: 2022-03-29 | Stop reason: HOSPADM

## 2022-03-28 RX ORDER — GABAPENTIN 300 MG/1
300 CAPSULE ORAL 3 TIMES DAILY
Status: DISCONTINUED | OUTPATIENT
Start: 2022-03-28 | End: 2022-03-29

## 2022-03-28 RX ORDER — DEXAMETHASONE SODIUM PHOSPHATE 4 MG/ML
INJECTION, SOLUTION INTRA-ARTICULAR; INTRALESIONAL; INTRAMUSCULAR; INTRAVENOUS; SOFT TISSUE
Status: DISCONTINUED | OUTPATIENT
Start: 2022-03-28 | End: 2022-03-28

## 2022-03-28 RX ORDER — TOPIRAMATE 200 MG/1
400 TABLET ORAL DAILY
Status: DISCONTINUED | OUTPATIENT
Start: 2022-03-29 | End: 2022-03-29 | Stop reason: HOSPADM

## 2022-03-28 RX ORDER — ACETAMINOPHEN 10 MG/ML
INJECTION, SOLUTION INTRAVENOUS
Status: DISCONTINUED | OUTPATIENT
Start: 2022-03-28 | End: 2022-03-28

## 2022-03-28 RX ORDER — MUPIROCIN 20 MG/G
OINTMENT TOPICAL
Status: DISPENSED
Start: 2022-03-28 | End: 2022-03-28

## 2022-03-28 RX ORDER — DIPHENHYDRAMINE HYDROCHLORIDE 50 MG/ML
INJECTION INTRAMUSCULAR; INTRAVENOUS
Status: DISCONTINUED | OUTPATIENT
Start: 2022-03-28 | End: 2022-03-28

## 2022-03-28 RX ORDER — ACETAMINOPHEN 325 MG/1
650 TABLET ORAL EVERY 6 HOURS
Status: DISCONTINUED | OUTPATIENT
Start: 2022-03-28 | End: 2022-03-29

## 2022-03-28 RX ORDER — CLINDAMYCIN PHOSPHATE 900 MG/50ML
900 INJECTION, SOLUTION INTRAVENOUS
Status: COMPLETED | OUTPATIENT
Start: 2022-03-28 | End: 2022-03-29

## 2022-03-28 RX ORDER — METHOCARBAMOL 500 MG/1
500 TABLET, FILM COATED ORAL 3 TIMES DAILY
Status: DISCONTINUED | OUTPATIENT
Start: 2022-03-28 | End: 2022-03-29 | Stop reason: HOSPADM

## 2022-03-28 RX ORDER — HYDROMORPHONE HYDROCHLORIDE 1 MG/ML
INJECTION, SOLUTION INTRAMUSCULAR; INTRAVENOUS; SUBCUTANEOUS
Status: DISCONTINUED | OUTPATIENT
Start: 2022-03-28 | End: 2022-03-28

## 2022-03-28 RX ORDER — NEOSTIGMINE METHYLSULFATE 0.5 MG/ML
INJECTION, SOLUTION INTRAVENOUS
Status: DISCONTINUED | OUTPATIENT
Start: 2022-03-28 | End: 2022-03-28

## 2022-03-28 RX ORDER — VANCOMYCIN HYDROCHLORIDE 1 G/20ML
INJECTION, POWDER, LYOPHILIZED, FOR SOLUTION INTRAVENOUS
Status: DISCONTINUED | OUTPATIENT
Start: 2022-03-28 | End: 2022-03-28 | Stop reason: HOSPADM

## 2022-03-28 RX ORDER — DEXMEDETOMIDINE HYDROCHLORIDE 100 UG/ML
INJECTION, SOLUTION INTRAVENOUS
Status: DISCONTINUED | OUTPATIENT
Start: 2022-03-28 | End: 2022-03-28

## 2022-03-28 RX ORDER — OXYCODONE HYDROCHLORIDE 10 MG/1
10 TABLET ORAL
Status: DISCONTINUED | OUTPATIENT
Start: 2022-03-28 | End: 2022-03-29 | Stop reason: HOSPADM

## 2022-03-28 RX ORDER — AMLODIPINE BESYLATE 5 MG/1
5 TABLET ORAL DAILY
Status: DISCONTINUED | OUTPATIENT
Start: 2022-03-29 | End: 2022-03-29 | Stop reason: HOSPADM

## 2022-03-28 RX ORDER — MUPIROCIN 20 MG/G
OINTMENT TOPICAL
Status: DISCONTINUED | OUTPATIENT
Start: 2022-03-28 | End: 2022-03-28

## 2022-03-28 RX ORDER — ALBUTEROL SULFATE 90 UG/1
AEROSOL, METERED RESPIRATORY (INHALATION)
Status: DISCONTINUED | OUTPATIENT
Start: 2022-03-28 | End: 2022-03-28

## 2022-03-28 RX ORDER — LIDOCAINE HYDROCHLORIDE 20 MG/ML
INJECTION INTRAVENOUS
Status: DISCONTINUED | OUTPATIENT
Start: 2022-03-28 | End: 2022-03-28

## 2022-03-28 RX ORDER — BUPIVACAINE HYDROCHLORIDE AND EPINEPHRINE 5; 5 MG/ML; UG/ML
INJECTION, SOLUTION EPIDURAL; INTRACAUDAL; PERINEURAL
Status: DISCONTINUED | OUTPATIENT
Start: 2022-03-28 | End: 2022-03-28 | Stop reason: HOSPADM

## 2022-03-28 RX ADMIN — ALBUTEROL SULFATE 2 PUFF: 108 INHALANT RESPIRATORY (INHALATION) at 03:03

## 2022-03-28 RX ADMIN — ROCURONIUM BROMIDE 20 MG: 10 INJECTION, SOLUTION INTRAVENOUS at 02:03

## 2022-03-28 RX ADMIN — Medication 25 MG: at 11:03

## 2022-03-28 RX ADMIN — DEXAMETHASONE SODIUM PHOSPHATE 8 MG: 4 INJECTION, SOLUTION INTRAMUSCULAR; INTRAVENOUS at 11:03

## 2022-03-28 RX ADMIN — CLINDAMYCIN PHOSPHATE 900 MG: 18 INJECTION, SOLUTION INTRAVENOUS at 11:03

## 2022-03-28 RX ADMIN — NEOSTIGMINE METHYLSULFATE 5 MG: 0.5 INJECTION INTRAVENOUS at 03:03

## 2022-03-28 RX ADMIN — HYDROMORPHONE HYDROCHLORIDE 0.5 MG: 1 INJECTION, SOLUTION INTRAMUSCULAR; INTRAVENOUS; SUBCUTANEOUS at 03:03

## 2022-03-28 RX ADMIN — PROPOFOL 30 MG: 10 INJECTION, EMULSION INTRAVENOUS at 03:03

## 2022-03-28 RX ADMIN — FENTANYL CITRATE 50 MCG: 50 INJECTION, SOLUTION INTRAMUSCULAR; INTRAVENOUS at 02:03

## 2022-03-28 RX ADMIN — BUPIVACAINE HYDROCHLORIDE AND EPINEPHRINE BITARTRATE 30 ML: 5; .005 INJECTION, SOLUTION EPIDURAL; INTRACAUDAL; PERINEURAL at 08:03

## 2022-03-28 RX ADMIN — ROCURONIUM BROMIDE 50 MG: 10 INJECTION, SOLUTION INTRAVENOUS at 11:03

## 2022-03-28 RX ADMIN — ASPIRIN 81 MG: 81 TABLET, COATED ORAL at 08:03

## 2022-03-28 RX ADMIN — PROPOFOL 20 MG: 10 INJECTION, EMULSION INTRAVENOUS at 03:03

## 2022-03-28 RX ADMIN — MORPHINE SULFATE 4 MG: 2 INJECTION, SOLUTION INTRAMUSCULAR; INTRAVENOUS at 10:03

## 2022-03-28 RX ADMIN — MORPHINE SULFATE 4 MG: 2 INJECTION, SOLUTION INTRAMUSCULAR; INTRAVENOUS at 06:03

## 2022-03-28 RX ADMIN — GABAPENTIN 300 MG: 300 CAPSULE ORAL at 08:03

## 2022-03-28 RX ADMIN — METHOCARBAMOL TABLETS 500 MG: 500 TABLET, COATED ORAL at 08:03

## 2022-03-28 RX ADMIN — SODIUM CHLORIDE: 0.9 INJECTION, SOLUTION INTRAVENOUS at 11:03

## 2022-03-28 RX ADMIN — OXYCODONE 10 MG: 5 TABLET ORAL at 04:03

## 2022-03-28 RX ADMIN — BUPIVACAINE HYDROCHLORIDE AND EPINEPHRINE BITARTRATE 15 ML: 2.5; .0091 INJECTION, SOLUTION EPIDURAL; INFILTRATION; INTRACAUDAL; PERINEURAL at 08:03

## 2022-03-28 RX ADMIN — PROPOFOL 20 MG: 10 INJECTION, EMULSION INTRAVENOUS at 01:03

## 2022-03-28 RX ADMIN — CLINDAMYCIN IN 5 PERCENT DEXTROSE 900 MG: 18 INJECTION, SOLUTION INTRAVENOUS at 08:03

## 2022-03-28 RX ADMIN — DIPHENHYDRAMINE HYDROCHLORIDE 12.5 MG: 50 INJECTION INTRAMUSCULAR; INTRAVENOUS at 11:03

## 2022-03-28 RX ADMIN — PROPRANOLOL HYDROCHLORIDE 60 MG: 20 TABLET ORAL at 09:03

## 2022-03-28 RX ADMIN — OXYCODONE HYDROCHLORIDE 15 MG: 10 TABLET ORAL at 07:03

## 2022-03-28 RX ADMIN — ONDANSETRON HYDROCHLORIDE 4 MG: 2 INJECTION INTRAMUSCULAR; INTRAVENOUS at 02:03

## 2022-03-28 RX ADMIN — Medication 10 MG: at 01:03

## 2022-03-28 RX ADMIN — TRAZODONE HYDROCHLORIDE 300 MG: 100 TABLET ORAL at 08:03

## 2022-03-28 RX ADMIN — LIDOCAINE HYDROCHLORIDE 100 MG: 20 INJECTION, SOLUTION INTRAVENOUS at 11:03

## 2022-03-28 RX ADMIN — FENTANYL CITRATE 25 MCG: 50 INJECTION, SOLUTION INTRAMUSCULAR; INTRAVENOUS at 02:03

## 2022-03-28 RX ADMIN — PROPOFOL 30 MG: 10 INJECTION, EMULSION INTRAVENOUS at 02:03

## 2022-03-28 RX ADMIN — MUPIROCIN: 20 OINTMENT TOPICAL at 10:03

## 2022-03-28 RX ADMIN — PROPOFOL 150 MG: 10 INJECTION, EMULSION INTRAVENOUS at 11:03

## 2022-03-28 RX ADMIN — SODIUM CHLORIDE, SODIUM GLUCONATE, SODIUM ACETATE, POTASSIUM CHLORIDE, MAGNESIUM CHLORIDE, SODIUM PHOSPHATE, DIBASIC, AND POTASSIUM PHOSPHATE: .53; .5; .37; .037; .03; .012; .00082 INJECTION, SOLUTION INTRAVENOUS at 01:03

## 2022-03-28 RX ADMIN — GLYCOPYRROLATE 0.6 MG: 0.2 INJECTION INTRAMUSCULAR; INTRAVENOUS at 03:03

## 2022-03-28 RX ADMIN — FENTANYL CITRATE 100 MCG: 50 INJECTION, SOLUTION INTRAMUSCULAR; INTRAVENOUS at 11:03

## 2022-03-28 RX ADMIN — MIDAZOLAM 2 MG: 1 INJECTION INTRAMUSCULAR; INTRAVENOUS at 08:03

## 2022-03-28 RX ADMIN — Medication 15 MG: at 01:03

## 2022-03-28 RX ADMIN — ACETAMINOPHEN 1000 MG: 10 INJECTION, SOLUTION INTRAVENOUS at 03:03

## 2022-03-28 RX ADMIN — DEXMEDETOMIDINE HYDROCHLORIDE 8 MCG: 100 INJECTION, SOLUTION, CONCENTRATE INTRAVENOUS at 01:03

## 2022-03-28 RX ADMIN — PROPOFOL 30 MG: 10 INJECTION, EMULSION INTRAVENOUS at 01:03

## 2022-03-28 RX ADMIN — ROCURONIUM BROMIDE 20 MG: 10 INJECTION, SOLUTION INTRAVENOUS at 01:03

## 2022-03-28 RX ADMIN — ARIPIPRAZOLE 10 MG: 10 TABLET ORAL at 08:03

## 2022-03-28 NOTE — ANESTHESIA PROCEDURE NOTES
Intubation    Date/Time: 3/28/2022 11:26 AM  Performed by: Keshia Titus CRNA  Authorized by: Charisse Decker MD     Intubation:     Induction:  Intravenous    Intubated:  Postinduction    Mask Ventilation:  Easy mask    Attempts:  1    Attempted By:  Student    Method of Intubation:  Direct    Blade:  Khan 2    Laryngeal View Grade: Grade I - full view of cords      Difficult Airway Encountered?: No      Complications:  None    Airway Device:  Oral endotracheal tube    Airway Device Size:  7.0    Style/Cuff Inflation:  Cuffed (inflated to minimal occlusive pressure)    Tube secured:  21    Secured at:  The lips    Placement Verified By:  Capnometry    Complicating Factors:  None    Findings Post-Intubation:  BS equal bilateral and atraumatic/condition of teeth unchanged

## 2022-03-28 NOTE — H&P
L pilon nonunion   H&P as below  Plan revision ORIF w/ likely iliac crest bone graft        CC:  Postop ORIF left pilon fracture        HISTORY       HPI:  55-year-old female, chronic opioid user due to chronic pain, fall from height 09/06/2020 sustaining a closed left pilon fracture.  09/07/2020 ex fix by 1 of my partners.     09/18/2020 - ORIF left pilon fracture, removal of ex fix     Comes in for routine f/u     We had previously discussed limited MARNIE and bone grafting for her nonunion, but pt was hospitalized w/ GI bleeding, pneumonia, pneumothorax. She is now back to her baseline health.     Initially postop had significant complaints of pain, which have continued all throughout the 18 months since her surgery.  She is now walking generally, without any assistive devices, however has some pain mainly along the posterior aspect of her distal ankle.  This gets worse the longer she is on her feet.  Also has diffuse neuropathic type pain all throughout her foot, and essentially every nerve distribution.  She takes percocet and gabapentin daily which only helps to some degree.      X-ray and CT scan demonstrate persistent nonunion of the fracture, with anterolateral plate have broken screws long shaft, a broken cancellous screws distally.  There was also broken screw along the medial plate.     She comes in today to discuss further treatment options, surgery     Patient has obtain AFO, but it does not seem to fit that well because she gained weight     ROS:  Constitutional: Denies fever/chills  Neurological: Denies numbness/tingling (any exceptions noted in orthopaedic exam)   Psychiatric/Behavioral: Denies change in normal mood  Eyes: Denies change in vision  Cardiovascular: Denies chest pain  Respiratory: Denies shortness of breath  Hematologic/Lymphatic: Denies easy bleeding/bruising   Skin: Denies new rash or skin lesions   Gastrointestinal: Denies nausea/vomitting/diarrhea, change in bowel habits, abdominal  pain   Allergic/Immunologic: Denies adverse reactions to current medications  Musculoskeletal: see HPI     PAST MEDICAL HISTORY:        Past Medical History:   Diagnosis Date    Anxiety      Depression      Encounter for blood transfusion      Esophageal dysphagia      Fibromyalgia      Gastric bypass status for obesity      H/O dental abscess 4/14/2014     drained    Headache(784.0)       migraines.  Treated at [facility] (Dr. 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  Pt states took too many then realized her mistake    Polycystic ovaries      S/P JUHI-BSO      Thyroid disease       hypothyroidism      PAST SURGICAL HISTORY:         Past Surgical History:   Procedure Laterality Date    ABDOMINAL SURGERY        APPLICATION OF LARGE EXTERNAL FIXATION DEVICE TO TIBIA Left 9/7/2020     Procedure: APPLICATION, EXTERNAL FIXATION DEVICE, TIBIA;  Surgeon: Sujata Londono MD;  Location: [address];  Service: Orthopedics;  Laterality: Left;  need paralysis    APPLICATION OF WOUND VACUUM-ASSISTED CLOSURE DEVICE Left 9/18/2020     Procedure: APPLICATION, WOUND VAC;  Surgeon: Fer Mcrae MD;  Location: [address];  Service: Orthopedics;  Laterality: Left;    BREAST SURGERY   2004     Breast Reduction    CARDIAC CATHETERIZATION        COLONOSCOPY N/A 11/3/2015     Procedure: COLONOSCOPY;  Surgeon: Timothy Barber MD;  Location: [facility];  Service: Endoscopy;  Laterality: N/A;    DENTAL SURGERY         4 teeth removed    GASTRIC BYPASS        HERNIA  REPAIR        HYSTERECTOMY        OPEN REDUCTION AND INTERNAL FIXATION (ORIF) OF PILON FRACTURE Left 9/18/2020     Procedure: ORIF, FRACTURE, PILON - left. Diving board, supine, bone foam. [place] anterolateral distal tibial plate, mini frag, long 3.5mm steel screw, CaPhos bone substitute;  Surgeon: Fer Mcrae MD;  Location: [facility] OR 2ND FLR;  Service: Orthopedics;  Laterality: Left;    OVARIAN CYST REMOVAL         aprox 5 times    REMOVAL OF EXTERNAL FIXATION DEVICE Left 9/18/2020     Procedure: REMOVAL, EXTERNAL FIXATION DEVICE;  Surgeon: Fer Mcrae MD;  Location: [facility] OR 2ND FLR;  Service: Orthopedics;  Laterality: Left;    ROTATOR CUFF REPAIR Left 01/2019    tennis elbow repair Left 01/2019    UPPER GASTROINTESTINAL ENDOSCOPY          FAMILY HISTORY:         Family History   Problem Relation Age of Onset    Anxiety disorder Mother      Depression Mother      Suicide Mother      Seizures Mother      Drug abuse Mother      Ovarian cancer Mother      Aortic aneurysm Father      Colon cancer Neg Hx      Breast cancer Neg Hx      Diabetes Neg Hx      Hypertension Neg Hx        SOCIAL HISTORY:   Social History               Socioeconomic History    Marital status:    Tobacco Use    Smoking status: Never Smoker    Smokeless tobacco: Never Used   Substance and Sexual Activity    Alcohol use: Yes       Comment: 2 per month    Drug use: Yes       Types: Amphetamines, Barbituates, Benzodiazepines    Sexual activity: Yes       Partners: Male       Birth control/protection: Surgical, None         MEDICATIONS:   Current Outpatient Medications:     abaloparatide (TYMLOS) 80 mcg (3,120 mcg/1.56 mL) PnIj, Inject 80 mcg into the skin once daily. (Patient not taking: Reported on 3/9/2022), Disp: 1.56 mL, Rfl: 11    acetaminophen (TYLENOL) 650 MG TbSR, Take 1 tablet (650 mg total) by mouth every 6 (six) hours as needed (pain). (Patient not taking: Reported on 3/9/2022),  Disp: 120 tablet, Rfl: 5    aripiprazole (ABILIFY) 10 MG Tab, Take 10 mg by mouth every evening. , Disp: , Rfl:     aspirin (ECOTRIN) 81 MG EC tablet, Take 1 tablet (81 mg total) by mouth once daily., Disp: 42 tablet, Rfl: 0    cholecalciferol, vitamin D3, 50,000 unit capsule, Take 50,000 Units by mouth every 7 days. Patient takes on Saturdays, Disp: , Rfl: 99    CYANOCOBALAMIN, VITAMIN B-12, (VITAMIN B-12 INJ), Inject 1 mL as directed every 30 days. , Disp: , Rfl:     dextroamphetamine-amphetamine 30 mg Tab, Take 1 tablet by mouth 2 (two) times daily., Disp: , Rfl:     docusate sodium (COLACE) 100 MG capsule, Take 1 capsule (100 mg total) by mouth 3 (three) times daily as needed for Constipation. (Patient not taking: No sig reported), Disp: 60 capsule, Rfl: 0    gabapentin (NEURONTIN) 300 MG capsule, Take 1 capsule (300 mg total) by mouth 3 (three) times daily., Disp: 90 capsule, Rfl: 0    hydroCHLOROthiazide (HYDRODIURIL) 25 MG tablet, Take 25 mg by mouth every morning., Disp: , Rfl:     levothyroxine (SYNTHROID) 100 MCG tablet, Take 100 mcg by mouth before breakfast., Disp: , Rfl:     levothyroxine (SYNTHROID) 88 MCG tablet, Take 88 mcg by mouth once daily., Disp: , Rfl:     LIDOcaine (LIDODERM) 5 %, Place 1 patch onto the skin once daily. Remove & Discard patch within 12 hours or as directed by MD, Disp: 5 patch, Rfl: 0    morphine (MS CONTIN) 15 MG 12 hr tablet, Take 1 tablet (15 mg total) by mouth 2 (two) times daily. (Patient not taking: No sig reported), Disp: 14 tablet, Rfl: 0    oxyCODONE (ROXICODONE) 10 mg Tab immediate release tablet, Take 1 tablet (10 mg total) by mouth every 4 (four) hours as needed for Pain., Disp: 28 tablet, Rfl: 0    progesterone (PROMETRIUM) 200 MG capsule, Take 200 mg by mouth every evening., Disp: , Rfl:     propranoloL (INDERAL) 60 MG tablet, Take 60 mg by mouth 2 (two) times daily., Disp: , Rfl:     rizatriptan (MAXALT-MLT) 10 MG disintegrating tablet, DISSOLVE  ONE TABLET BY MOUTH allow TO dissolve ONCE daily AS NEEDED, Disp: , Rfl: 2    tizanidine (ZANAFLEX) 4 MG tablet, Take 4 mg by mouth every evening. May take up to 12mg qhs, Disp: , Rfl: 4    topiramate (TOPAMAX) 200 MG Tab, 100 mg 4 (four) times daily. , Disp: , Rfl:     venlafaxine (EFFEXOR-XR) 75 MG 24 hr capsule, Take 150 mg by mouth. Patient takes 300 mg once a day at night, Disp: , Rfl:     VYVANSE 70 mg capsule, Take 70 mg by mouth once daily., Disp: , Rfl: 0  No current facility-administered medications for this visit.     Facility-Administered Medications Ordered in Other Visits:     midazolam (VERSED) 1 mg/mL injection 0.5 mg, 0.5 mg, Intravenous, PRN, Myrtle Linares MD, 2 mg at 09/18/20 1155  ALLERGIES:         Review of patient's allergies indicates:   Allergen Reactions    Cephalexin Anaphylaxis    Codeine Itching    Nsaids (non-steroidal anti-inflammatory drug)         Has a history of bleeding ulcers            EXAM      VITAL SIGNS:   LMP  (LMP Unknown)       PE:  General:  no acute distress, appears stated age    Neuro: alert and oriented x3  Psych: normal mood  Head: normocephalic, atraumatic.   Eyes: no scleral icterus  Mouth: moist mucous membranes  Cardiovascular: extremities warm and well perfused  Lungs: breathing comfortably, equal chest rise bilat  Skin: clean, dry, intact (any exceptions noted in below musculoskeletal exam)     Musculoskeletal:  Left lower extremity  Prior medial and anterior lateral surgical incisions well healed, no signs of infection.  No erythema or swelling.  She has diffuse tenderness all throughout her ankle, especially along the medial and anterior lateral incisions.  Range of motion ankle 10° dorsiflexion, 15° plantar flexion, though she has pain with extremes of motion of both.  Motor  5/5 hip flexion, quad, hamstring, gastroc, tibialis anterior, peroneal, EHL, FHL  Sensation intact to light touch saphenous, sural, deep peroneal, superficial peroneal,  tibial nerves.  All nerve distributions are hypersensitive  Palpable DP/PT pulse, brisk cap refill           XRAYS:  X-ray and CT scan demonstrate persistent nonunion of the fracture, with anterolateral plate have broken screws long shaft, a broken cancellous screws distally.  There was also broken screw along the medial plate.  (I independently reviewed and interpreted the above imaging)     MEDICAL DECISION MAKING            Encounter Diagnosis   Name Primary?   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  09/07/2020 ex fix by 1 of my partners.     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  Non operative management would consist of continued observation, vitamin-D and calcium supplementation, bone stimulator, Forteo, custom AFO.       Discussed operative intervention in the form of partial revision of fixation, revision of medial plate, autograft bone from iliac crest versus local graft from lower extremity, plus or minus synthetic/allograft supplementation.       If they are signs of infection, may require two-stage revision with antibiotic spacer, followed by second-stage masquelet bone grafting - though it does not appear this why on physical exam, labs     The risks, benefits, and alternatives to surgery were discussed with the patient and/or family.    Specific risks discussed included, but were not limited to:  Failure of fixation, infection, pain, need for multiple staged procedures, soft tissue flap/graft, damage to nearby structures, including neurovascular structures leading to loss of  function or loss of limb, bleeding, need for blood transfusion, pain, stiffness, scarring, numbness, tingling, weakness, compartment syndrome, malunion/nonunion, hardware failure, hardware prominence, infection, need for multiple staged procedures, prolonged antibiotics, iatrogenic fracture, heterotopic ossification, arthritis, a variety of medical complications including but not limited to heart attack, stroke, deep venous thrombosis, pulmonary embolism, prolonged hospitalization, prolonged intubation, and death.   Patient and/or family expressed an understanding and desires to proceed with surgery.   All questions were answered.  No guarantees were implied or stated.  Informed consent was obtained.        Will try to get bone stimulator approved, continue postop  Will get custom patellar bearing AFO to trial preop and cont postop  CT completed  Continue Tymlos     Plan for medial approach, removal of the medial plate and broken screw if able, debridement of nonunion, cultures, likely acute bone grafting from iliac crest, we also revised the fixation of the anterior lateral plate, by likely removing the broken screws, and placing new screws.  and primary closure that day.       If the soft tissues are not amenable to closure, may require wound VAC, and plastic surgery assisted free flap.       If infected may require multiple staged debridements, antibiotic spacer, later bone grafting.     Surgery date 3.28.22        =====================  Fer Mcrae MD  Orthopaedic Surgery

## 2022-03-28 NOTE — ANESTHESIA PROCEDURE NOTES
Sciatic Nerve Single Injection Block    Patient location during procedure: pre-op   Block not for primary anesthetic.  Reason for block: at surgeon's request and post-op pain management   Post-op Pain Location: L ankle   Start time: 3/28/2022 8:42 AM  Timeout: 3/28/2022 8:40 AM   End time: 3/28/2022 8:48 AM    Staffing  Authorizing Provider: Apple Ball MD  Performing Provider: Stefano Hansen MD    Preanesthetic Checklist  Completed: patient identified, IV checked, site marked, risks and benefits discussed, surgical consent, monitors and equipment checked, pre-op evaluation and timeout performed  Peripheral Block  Prep: ChloraPrep  Patient monitoring: heart rate, cardiac monitor, continuous pulse ox, continuous capnometry and frequent blood pressure checks  Block type: sciatic  Laterality: left  Injection technique: single shot  Needle  Needle type: Echogenic   Needle gauge: 20 G  Needle length: 4 in  Needle localization: nerve stimulator, anatomical landmarks and ultrasound guidance   -ultrasound image captured on disc.  Assessment  Injection assessment: negative aspiration, negative parasthesia and local visualized surrounding nerve  Paresthesia pain: none  Heart rate change: no  Slow fractionated injection: yes  Pain Tolerance: comfortable throughout block and no complaints  Medications:    Medications: bupivacaine 0.5%-EPINEPHrine 1:200,000 injection - Perineural   30 mL - 3/28/2022 8:47:00 AM    Additional Notes  Time out conducted. Site emily confirmed with team and patient. Allergies reviewed.   Vital signs stable throughout block. RN monitoring vitals throughout.   Performed subgluteal approach. Needle advanced under continuous ultrasound guidance.  Jay test with appropriate loss of twitch with local anesthetic injection.  Local injected incrementally after confirming negative aspiration. No signs or symptoms of intravascular or intraneural injection noted.   No persistent paresthesias elicited or  expressed. Patient tolerated procedure well.  30 cc of 0.5% bupiv with epinephrine 1:300K used for the block.

## 2022-03-28 NOTE — ANESTHESIA PROCEDURE NOTES
Adductor Canal Single Injection Block    Patient location during procedure: pre-op   Block not for primary anesthetic.  Reason for block: at surgeon's request and post-op pain management   Post-op Pain Location: L ankle   Start time: 3/28/2022 8:50 AM  Timeout: 3/28/2022 8:40 AM   End time: 3/28/2022 8:53 AM    Staffing  Authorizing Provider: Apple Ball MD  Performing Provider: Stefano Hansen MD    Preanesthetic Checklist  Completed: patient identified, IV checked, site marked, risks and benefits discussed, surgical consent, monitors and equipment checked, pre-op evaluation and timeout performed  Peripheral Block  Patient position: supine  Prep: ChloraPrep  Patient monitoring: heart rate, cardiac monitor, continuous pulse ox, continuous capnometry and frequent blood pressure checks  Block type: adductor canal  Laterality: left  Injection technique: single shot  Needle  Needle type: Echogenic   Needle gauge: 20 G  Needle length: 4 in  Needle localization: anatomical landmarks and ultrasound guidance   -ultrasound image captured on disc.  Assessment  Injection assessment: negative aspiration, negative parasthesia and local visualized surrounding nerve  Paresthesia pain: none  Heart rate change: no  Slow fractionated injection: yes  Pain Tolerance: comfortable throughout block and no complaints  Medications:    Medications: bupivacaine 0.25%-EPINEPHrine (PF) 1:200,000 injection - Other   15 mL - 3/28/2022 8:52:00 AM    Additional Notes  Time out conducted. Site emily confirmed with team and patient. Allergies reviewed.   Vital signs stable throughout block. RN monitoring vitals throughout.   Needle advanced under continuous ultrasound guidance.  Local injected incrementally after confirming negative aspiration. No signs or symptoms of intravascular or intraneural injection noted.   No persistent paresthesias elicited or expressed. Patient tolerated procedure well.  15 cc of 0.25% bupiv with epinephrine 1:300K,  PF dexamethasone 1 mg, and clonidine 50 mcg used for the block.

## 2022-03-28 NOTE — TRANSFER OF CARE
"Anesthesia Transfer of Care Note    Patient: Faith Bernard    Procedure(s) Performed: Procedure(s) (LRB):  ORIF, FRACTURE, PILON nonunion + Iliac crest bone graft - diving board, supine, bone foam. [place] axsos3 screws, 2.7 mini plates/screws, curettes, [place]oyDoc Augment (Left)  REMOVAL, HARDWARE, ANKLE (Left)  BONE GRAFT, ILIAC CREST (Left)    Patient location: PACU    Anesthesia Type: general    Transport from OR: Transported from OR on 6-10 L/min O2 by face mask with adequate spontaneous ventilation    Post pain: adequate analgesia    Post assessment: no apparent anesthetic complications and tolerated procedure well    Post vital signs: stable    Level of consciousness: awake and alert    Nausea/Vomiting: no nausea/vomiting    Complications: none    Transfer of care protocol was followed      Last vitals:   Visit Vitals  /72 (BP Location: Right arm, Patient Position: Lying)   Pulse 68   Temp 36.6 °C (97.9 °F) (Temporal)   Resp 18   Ht 5' 3" (1.6 m)   Wt 72.6 kg (160 lb)   LMP  (LMP Unknown)   SpO2 100%   Breastfeeding No   BMI 28.34 kg/m²     "
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
## 2022-03-28 NOTE — NURSING TRANSFER
Nursing Transfer Note      3/28/2022     Reason patient is being transferred: postop ORIF    Transfer To: 505    Transfer via stretcher    Transfer with pt dentures    Transported by PCT    Medicines sent: n/a    Chart send with patient: Yes    Notified: spouse    Patient reassessed at: 3/28 2720

## 2022-03-29 VITALS
SYSTOLIC BLOOD PRESSURE: 100 MMHG | TEMPERATURE: 98 F | OXYGEN SATURATION: 100 % | HEIGHT: 63 IN | RESPIRATION RATE: 16 BRPM | WEIGHT: 170 LBS | DIASTOLIC BLOOD PRESSURE: 64 MMHG | HEART RATE: 50 BPM | BODY MASS INDEX: 30.12 KG/M2

## 2022-03-29 LAB
ALBUMIN SERPL BCP-MCNC: 3.2 G/DL (ref 3.5–5.2)
ALP SERPL-CCNC: 102 U/L (ref 55–135)
ALT SERPL W/O P-5'-P-CCNC: 11 U/L (ref 10–44)
ANION GAP SERPL CALC-SCNC: 8 MMOL/L (ref 8–16)
AST SERPL-CCNC: 12 U/L (ref 10–40)
BASOPHILS # BLD AUTO: 0.02 K/UL (ref 0–0.2)
BASOPHILS NFR BLD: 0.1 % (ref 0–1.9)
BILIRUB SERPL-MCNC: 0.2 MG/DL (ref 0.1–1)
BUN SERPL-MCNC: 11 MG/DL (ref 6–20)
CALCIUM SERPL-MCNC: 9.1 MG/DL (ref 8.7–10.5)
CHLORIDE SERPL-SCNC: 110 MMOL/L (ref 95–110)
CO2 SERPL-SCNC: 21 MMOL/L (ref 23–29)
CREAT SERPL-MCNC: 0.8 MG/DL (ref 0.5–1.4)
DIFFERENTIAL METHOD: ABNORMAL
EOSINOPHIL # BLD AUTO: 0 K/UL (ref 0–0.5)
EOSINOPHIL NFR BLD: 0.1 % (ref 0–8)
ERYTHROCYTE [DISTWIDTH] IN BLOOD BY AUTOMATED COUNT: 17.7 % (ref 11.5–14.5)
EST. GFR  (AFRICAN AMERICAN): >60 ML/MIN/1.73 M^2
EST. GFR  (NON AFRICAN AMERICAN): >60 ML/MIN/1.73 M^2
GLUCOSE SERPL-MCNC: 183 MG/DL (ref 70–110)
HCT VFR BLD AUTO: 37 % (ref 37–48.5)
HGB BLD-MCNC: 11.1 G/DL (ref 12–16)
IMM GRANULOCYTES # BLD AUTO: 0.08 K/UL (ref 0–0.04)
IMM GRANULOCYTES NFR BLD AUTO: 0.5 % (ref 0–0.5)
LYMPHOCYTES # BLD AUTO: 1.4 K/UL (ref 1–4.8)
LYMPHOCYTES NFR BLD: 8.3 % (ref 18–48)
MCH RBC QN AUTO: 25.9 PG (ref 27–31)
MCHC RBC AUTO-ENTMCNC: 30 G/DL (ref 32–36)
MCV RBC AUTO: 86 FL (ref 82–98)
MONOCYTES # BLD AUTO: 1.3 K/UL (ref 0.3–1)
MONOCYTES NFR BLD: 7.3 % (ref 4–15)
NEUTROPHILS # BLD AUTO: 14.5 K/UL (ref 1.8–7.7)
NEUTROPHILS NFR BLD: 83.7 % (ref 38–73)
NRBC BLD-RTO: 0 /100 WBC
PLATELET # BLD AUTO: 343 K/UL (ref 150–450)
PMV BLD AUTO: 10.3 FL (ref 9.2–12.9)
POTASSIUM SERPL-SCNC: 4.2 MMOL/L (ref 3.5–5.1)
PROT SERPL-MCNC: 6.1 G/DL (ref 6–8.4)
RBC # BLD AUTO: 4.28 M/UL (ref 4–5.4)
SODIUM SERPL-SCNC: 139 MMOL/L (ref 136–145)
WBC # BLD AUTO: 17.3 K/UL (ref 3.9–12.7)

## 2022-03-29 PROCEDURE — 36415 COLL VENOUS BLD VENIPUNCTURE: CPT | Performed by: STUDENT IN AN ORGANIZED HEALTH CARE EDUCATION/TRAINING PROGRAM

## 2022-03-29 PROCEDURE — 63600175 PHARM REV CODE 636 W HCPCS: Performed by: STUDENT IN AN ORGANIZED HEALTH CARE EDUCATION/TRAINING PROGRAM

## 2022-03-29 PROCEDURE — 97165 OT EVAL LOW COMPLEX 30 MIN: CPT

## 2022-03-29 PROCEDURE — 99499 UNLISTED E&M SERVICE: CPT | Mod: ,,, | Performed by: ANESTHESIOLOGY

## 2022-03-29 PROCEDURE — 25000003 PHARM REV CODE 250: Performed by: STUDENT IN AN ORGANIZED HEALTH CARE EDUCATION/TRAINING PROGRAM

## 2022-03-29 PROCEDURE — 97161 PT EVAL LOW COMPLEX 20 MIN: CPT

## 2022-03-29 PROCEDURE — 85025 COMPLETE CBC W/AUTO DIFF WBC: CPT | Performed by: STUDENT IN AN ORGANIZED HEALTH CARE EDUCATION/TRAINING PROGRAM

## 2022-03-29 PROCEDURE — 97530 THERAPEUTIC ACTIVITIES: CPT

## 2022-03-29 PROCEDURE — 99499 NO LOS: ICD-10-PCS | Mod: ,,, | Performed by: ANESTHESIOLOGY

## 2022-03-29 PROCEDURE — 64447 NJX AA&/STRD FEMORAL NRV IMG: CPT | Performed by: STUDENT IN AN ORGANIZED HEALTH CARE EDUCATION/TRAINING PROGRAM

## 2022-03-29 PROCEDURE — 80053 COMPREHEN METABOLIC PANEL: CPT | Performed by: STUDENT IN AN ORGANIZED HEALTH CARE EDUCATION/TRAINING PROGRAM

## 2022-03-29 PROCEDURE — 64446 NJX AA&/STRD SC NRV NFS IMG: CPT | Performed by: STUDENT IN AN ORGANIZED HEALTH CARE EDUCATION/TRAINING PROGRAM

## 2022-03-29 RX ORDER — METHOCARBAMOL 500 MG/1
1000 TABLET, FILM COATED ORAL 3 TIMES DAILY
Qty: 84 TABLET | Refills: 0 | Status: SHIPPED | OUTPATIENT
Start: 2022-03-29 | End: 2022-04-12

## 2022-03-29 RX ORDER — HEPARIN SODIUM 5000 [USP'U]/ML
5000 INJECTION, SOLUTION INTRAVENOUS; SUBCUTANEOUS EVERY 8 HOURS
Status: DISCONTINUED | OUTPATIENT
Start: 2022-03-29 | End: 2022-03-29 | Stop reason: HOSPADM

## 2022-03-29 RX ORDER — GABAPENTIN 300 MG/1
600 CAPSULE ORAL 3 TIMES DAILY
Status: DISCONTINUED | OUTPATIENT
Start: 2022-03-29 | End: 2022-03-29 | Stop reason: HOSPADM

## 2022-03-29 RX ORDER — ROPIVACAINE HYDROCHLORIDE 2 MG/ML
INJECTION, SOLUTION EPIDURAL; INFILTRATION; PERINEURAL
Status: DISCONTINUED
Start: 2022-03-29 | End: 2022-03-29 | Stop reason: HOSPADM

## 2022-03-29 RX ORDER — DEXTROMETHORPHAN HYDROBROMIDE, GUAIFENESIN 5; 100 MG/5ML; MG/5ML
650 LIQUID ORAL EVERY 6 HOURS PRN
Qty: 56 TABLET | Refills: 0 | Status: SHIPPED | OUTPATIENT
Start: 2022-03-29 | End: 2022-06-21

## 2022-03-29 RX ORDER — ASPIRIN 81 MG/1
81 TABLET ORAL 2 TIMES DAILY
Qty: 84 TABLET | Refills: 0 | Status: SHIPPED | OUTPATIENT
Start: 2022-03-29 | End: 2022-03-29 | Stop reason: HOSPADM

## 2022-03-29 RX ORDER — DOXYCYCLINE 50 MG/1
100 CAPSULE ORAL EVERY 12 HOURS
Status: DISCONTINUED | OUTPATIENT
Start: 2022-03-29 | End: 2022-03-29 | Stop reason: HOSPADM

## 2022-03-29 RX ORDER — DOXYCYCLINE 150 MG/1
150 TABLET ORAL EVERY 12 HOURS
Qty: 28 TABLET | Refills: 0 | Status: SHIPPED | OUTPATIENT
Start: 2022-03-29 | End: 2022-06-21

## 2022-03-29 RX ORDER — OXYCODONE HYDROCHLORIDE 5 MG/1
5 TABLET ORAL EVERY 4 HOURS PRN
Qty: 30 TABLET | Refills: 0 | Status: SHIPPED | OUTPATIENT
Start: 2022-03-29 | End: 2022-07-07

## 2022-03-29 RX ORDER — FENTANYL CITRATE 50 UG/ML
25-200 INJECTION, SOLUTION INTRAMUSCULAR; INTRAVENOUS EVERY 5 MIN PRN
Status: COMPLETED | OUTPATIENT
Start: 2022-03-29 | End: 2022-03-29

## 2022-03-29 RX ORDER — ENOXAPARIN SODIUM 100 MG/ML
40 INJECTION SUBCUTANEOUS EVERY 12 HOURS
Qty: 24 ML | Refills: 0 | Status: SHIPPED | OUTPATIENT
Start: 2022-03-29 | End: 2022-04-28

## 2022-03-29 RX ORDER — DOCUSATE SODIUM 100 MG/1
100 CAPSULE, LIQUID FILLED ORAL 2 TIMES DAILY PRN
Qty: 60 CAPSULE | Refills: 0 | Status: SHIPPED | OUTPATIENT
Start: 2022-03-29 | End: 2022-06-21

## 2022-03-29 RX ORDER — MIDAZOLAM HYDROCHLORIDE 1 MG/ML
.5-4 INJECTION INTRAMUSCULAR; INTRAVENOUS
Status: DISCONTINUED | OUTPATIENT
Start: 2022-03-29 | End: 2022-03-29 | Stop reason: HOSPADM

## 2022-03-29 RX ORDER — ACETAMINOPHEN 500 MG
1000 TABLET ORAL EVERY 6 HOURS
Status: DISCONTINUED | OUTPATIENT
Start: 2022-03-29 | End: 2022-03-29 | Stop reason: HOSPADM

## 2022-03-29 RX ORDER — GABAPENTIN 400 MG/1
400 CAPSULE ORAL 3 TIMES DAILY
Qty: 42 CAPSULE | Refills: 0 | Status: SHIPPED | OUTPATIENT
Start: 2022-03-29 | End: 2022-07-07

## 2022-03-29 RX ADMIN — ACETAMINOPHEN 1000 MG: 500 TABLET ORAL at 03:03

## 2022-03-29 RX ADMIN — OXYCODONE HYDROCHLORIDE 15 MG: 10 TABLET ORAL at 08:03

## 2022-03-29 RX ADMIN — GABAPENTIN 600 MG: 300 CAPSULE ORAL at 02:03

## 2022-03-29 RX ADMIN — OXYCODONE HYDROCHLORIDE 15 MG: 10 TABLET ORAL at 05:03

## 2022-03-29 RX ADMIN — LEVOTHYROXINE SODIUM 100 MCG: 50 TABLET ORAL at 06:03

## 2022-03-29 RX ADMIN — ACETAMINOPHEN 650 MG: 325 TABLET ORAL at 05:03

## 2022-03-29 RX ADMIN — HEPARIN SODIUM 5000 UNITS: 5000 INJECTION INTRAVENOUS; SUBCUTANEOUS at 01:03

## 2022-03-29 RX ADMIN — TOPIRAMATE 400 MG: 200 TABLET, FILM COATED ORAL at 10:03

## 2022-03-29 RX ADMIN — OXYCODONE HYDROCHLORIDE 15 MG: 10 TABLET ORAL at 03:03

## 2022-03-29 RX ADMIN — CLINDAMYCIN IN 5 PERCENT DEXTROSE 900 MG: 18 INJECTION, SOLUTION INTRAVENOUS at 05:03

## 2022-03-29 RX ADMIN — MIDAZOLAM 2 MG: 1 INJECTION INTRAMUSCULAR; INTRAVENOUS at 11:03

## 2022-03-29 RX ADMIN — FENTANYL CITRATE 50 MCG: 50 INJECTION INTRAMUSCULAR; INTRAVENOUS at 11:03

## 2022-03-29 RX ADMIN — OXYCODONE HYDROCHLORIDE 15 MG: 10 TABLET ORAL at 01:03

## 2022-03-29 RX ADMIN — GABAPENTIN 600 MG: 300 CAPSULE ORAL at 08:03

## 2022-03-29 RX ADMIN — AMLODIPINE BESYLATE 5 MG: 5 TABLET ORAL at 08:03

## 2022-03-29 RX ADMIN — MORPHINE SULFATE 4 MG: 2 INJECTION, SOLUTION INTRAMUSCULAR; INTRAVENOUS at 10:03

## 2022-03-29 RX ADMIN — METHOCARBAMOL TABLETS 500 MG: 500 TABLET, COATED ORAL at 02:03

## 2022-03-29 RX ADMIN — ACETAMINOPHEN 1000 MG: 500 TABLET ORAL at 09:03

## 2022-03-29 RX ADMIN — PROPRANOLOL HYDROCHLORIDE 60 MG: 20 TABLET ORAL at 10:03

## 2022-03-29 RX ADMIN — HEPARIN SODIUM 5000 UNITS: 5000 INJECTION INTRAVENOUS; SUBCUTANEOUS at 10:03

## 2022-03-29 RX ADMIN — ARIPIPRAZOLE 10 MG: 10 TABLET ORAL at 08:03

## 2022-03-29 RX ADMIN — CLINDAMYCIN IN 5 PERCENT DEXTROSE 900 MG: 18 INJECTION, SOLUTION INTRAVENOUS at 11:03

## 2022-03-29 RX ADMIN — DOXYCYCLINE 100 MG: 50 CAPSULE ORAL at 10:03

## 2022-03-29 RX ADMIN — SENNOSIDES AND DOCUSATE SODIUM 1 TABLET: 50; 8.6 TABLET ORAL at 08:03

## 2022-03-29 NOTE — NURSING
Patient received after procedure with PNC Niimbus pump with dressing c/d/i  Writer spoke with Pharmacist for clarification regarding  change in Gabapentin.

## 2022-03-29 NOTE — ANESTHESIA POSTPROCEDURE EVALUATION
Anesthesia Post Evaluation    Patient: Faith Bernard    Procedure(s) Performed: Procedure(s) (LRB):  ORIF, FRACTURE, PILON nonunion + Iliac crest bone graft - diving board, supine, bone foam. Seda axsos3 screws, 2.7 mini plates/screws, curettes, Seda/Scoutzie medical Augment (Left)  REMOVAL, HARDWARE, ANKLE (Left)  BONE GRAFT, ILIAC CREST (Left)    Final Anesthesia Type: general      Patient location during evaluation: floor  Patient participation: Yes- Able to Participate  Level of consciousness: awake and alert  Post-procedure vital signs: reviewed and stable  Pain management: adequate  Airway patency: patent    PONV status at discharge: No PONV  Anesthetic complications: no      Cardiovascular status: blood pressure returned to baseline  Respiratory status: unassisted, spontaneous ventilation and room air  Hydration status: euvolemic  Follow-up not needed.          Vitals Value Taken Time   /60 03/29/22 1045   Temp 36.7 °C (98 °F) 03/29/22 1045   Pulse 60 03/29/22 1045   Resp 14 03/29/22 1045   SpO2 96 % 03/29/22 1045         Event Time   Out of Recovery 17:00:00         Pain/Ricarda Score: Pain Rating Prior to Med Admin: 7 (3/29/2022 10:25 AM)  Ricarda Score: 9 (3/28/2022  5:00 PM)

## 2022-03-29 NOTE — PLAN OF CARE
OT Acute eval complete. Pt is SBA-Supervision in bed mobility and t/f's using RW. Pt ambulated ~30ft SBA using RW. Pt able to maintain NWB LLE precautions throughout session. Pt would benefit from HH upon d/c to return to PLOF. Pt does not require Acute OT services at this time.

## 2022-03-29 NOTE — SUBJECTIVE & OBJECTIVE
"Principal Problem:Closed left pilon fracture    Principal Orthopedic Problem: same    Interval History: Pt seen and examined at bedside. JESSICA. She reports pain is controlled. She still has little sensation in her left leg after single shot block. Plans to work with PT today. APS evaluated. Plan to put a PNC in today. No other concerns.       Review of patient's allergies indicates:   Allergen Reactions    Cephalexin Anaphylaxis    Codeine Itching    Nsaids (non-steroidal anti-inflammatory drug)      Has a history of bleeding ulcers       Current Facility-Administered Medications   Medication    acetaminophen tablet 1,000 mg    amLODIPine tablet 5 mg    ARIPiprazole tablet 10 mg    clindamycin in D5W 900 mg/50 mL IVPB 900 mg    doxycycline capsule 100 mg    gabapentin capsule 600 mg    heparin (porcine) injection 5,000 Units    hydroCHLOROthiazide tablet 25 mg    levothyroxine tablet 100 mcg    methocarbamoL tablet 500 mg    oxyCODONE immediate release tablet Tab 10 mg    And    oxyCODONE immediate release tablet 15 mg    And    morphine injection 4 mg    propranoloL tablet 60 mg    senna-docusate 8.6-50 mg per tablet 1 tablet    topiramate tablet 400 mg    traZODone tablet 300 mg    venlafaxine 24 hr capsule 75 mg     Facility-Administered Medications Ordered in Other Encounters   Medication    midazolam (VERSED) 1 mg/mL injection 0.5 mg     Objective:     Vital Signs (Most Recent):  Temp: 97.9 °F (36.6 °C) (03/29/22 0753)  Pulse: 65 (03/29/22 0753)  Resp: 18 (03/29/22 0855)  BP: 100/62 (03/29/22 0753)  SpO2: 96 % (03/29/22 0753) Vital Signs (24h Range):  Temp:  [96 °F (35.6 °C)-98 °F (36.7 °C)] 97.9 °F (36.6 °C)  Pulse:  [50-90] 65  Resp:  [13-18] 18  SpO2:  [95 %-100 %] 96 %  BP: ()/(50-89) 100/62     Weight: 79.5 kg (175 lb 4.8 oz)  Height: 5' 3" (160 cm)  Body mass index is 31.05 kg/m².      Intake/Output Summary (Last 24 hours) at 3/29/2022 0932  Last data filed at 3/28/2022 1542  Gross per 24 hour "   Intake 1901 ml   Output 815 ml   Net 1086 ml       Ortho/SPM Exam    NAD   Normal respiratory effort  Short leg splint in place to LLE  Left hip dressings c/d/I from iliac crest harvest site  Able to wiggle toes in splint  Right leg with no swelling or pain, no evidence of DVT      Significant Labs: CBC:   Recent Labs   Lab 03/29/22  0323   WBC 17.30*   HGB 11.1*   HCT 37.0        CMP:   Recent Labs   Lab 03/29/22  0323      K 4.2      CO2 21*   *   BUN 11   CREATININE 0.8   CALCIUM 9.1   PROT 6.1   ALBUMIN 3.2*   BILITOT 0.2   ALKPHOS 102   AST 12   ALT 11   ANIONGAP 8   EGFRNONAA >60.0     All pertinent labs within the past 24 hours have been reviewed.    Significant Imaging: I have reviewed and interpreted all pertinent imaging results/findings.

## 2022-03-29 NOTE — PLAN OF CARE
Problem: Physical Therapy  Goal: Physical Therapy Goal  Outcome: Met     PT evaluation completed and patient demonstrates safe mobility.  Patient demonstrates good understanding of weight bearing status and safety.  Patient ambulated ~ 30 ft with rolling walker and SBA with maintenance of foot flat touchdown weight bearing.  Patient required between  Patient is without further skilled PT needs at this time.      Charles Robison, PT, DPT  3/29/2022  Pager #: [phone]

## 2022-03-29 NOTE — ASSESSMENT & PLAN NOTE
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 Patient with history of GI bleed. Discussed with patient risks and benefits of anticoagulation versus DVT and PE risks. She understands and would like to proceed with anticoagulation. Will avoid aspirin as she has not tolerated this in the past and it is an NSAID. Plan for heparin tid while inpatient with lovenox on discharge.  Abx: postop Ancef  Labs: Hgb 11.1  Drain: none  Cordero: none    Dispo: f/u PT recs, pending APS placement of PNC.

## 2022-03-29 NOTE — PT/OT/SLP EVAL
Physical Therapy Co-Evaluation and Discharge Note    Patient Name:  Faith Bernard   MRN:  [id number]    Recommendations:     Discharge Recommendations:  home with home health   Discharge Equipment Recommendations: none   Barriers to discharge: None    Assessment:     Faith Bernard is a 57 y.o. female admitted with a medical diagnosis of Closed left pilon fracture. Patient demonstrates good understanding of weight bearing status and safety.  Patient ambulated ~ 30 ft with rolling walker and SBA with maintenance of foot flat touchdown weight bearing.  Patient required between  Patient is without further skilled PT needs at this time.      Recent Surgery: Procedure(s) (LRB):  Block, Nerve (N/A) Day of Surgery    Plan:     During this hospitalization, patient does not require further acute PT services.  Please re-consult if situation changes.      Subjective     Chief Complaint: pain  Patient/Family Comments/goals: To go home.  Pain/Comfort:  · Pain Rating 1: 8/10  · Location - Side 1: Left  · Location - Orientation 1: lower  · Location 1: leg  · Pain Addressed 1: Pre-medicate for activity, Reposition, Cessation of Activity  · Pain Rating Post-Intervention 1:  (did not rate)    Patients cultural, spiritual, Jainism conflicts given the current situation:      Living Environment:  Prior level of function: Patient lives with their  in a single story home with one small step to enter home.  Patient previously independent with all mobility and ADLs.   Support available upon discharge: family  Equipment owned: bedside commode, walker, rolling, wheelchair  Objective:     Communicated with RN prior to session.  Patient found supine with FCD  upon PT entry to room.    General Precautions: Standard, fall   Orthopedic Precautions: (L LE flat foot touchdown)   Braces:  (soft splint L foot)     Exams:  · RLE ROM: WFL  · RLE Strength: WFL  · LLE ROM: ankle not assess secondary to plaster splint and s/p sx  · LLE  Strength: grossly 4/5    Functional Mobility:  · Bed Mobility:     · Supine to Sit: stand by assistance  · Transfers:     · Sit to Stand:  stand by assistance with rolling walker  · Gait: Patient ambulated 30 ft with rolling walker and stand by assistance.      Therapeutic Activities and Exercises:   Patient demonstrates good understanding of weight bearing status and ambulated limited household distances safely with good balance and maintenance of weight bearing status.      Patient Education:    Patient educated on assistive device use, bed mobility training, Fall risk, gait training, home safety, Home exercise program, plan of care and transfer training by explanation.  Patient was receptive to education and needs reinforcement.   AM-PAC 6 CLICK MOBILITY  Total Score:23     Patient left up in chair with all lines intact and call button in reach.    GOALS:   Multidisciplinary Problems     Physical Therapy Goals     Not on file          Multidisciplinary Problems (Resolved)        Problem: Physical Therapy    Goal Priority Disciplines Outcome Goal Variances Interventions   Physical Therapy Goal   (Resolved)     PT, PT/OT Met                     History:     Past Medical History:   Diagnosis Date    Anxiety     Depression     Encounter for blood transfusion     Esophageal dysphagia     Fibromyalgia     Gastric bypass status for obesity     H/O dental abscess 4/14/2014    drained    Headache(784.0)     migraines.  Treated at [facility] (Dr. 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  Pt  states took too many then realized her mistake    Polycystic ovaries     S/P JUHI-BSO     Thyroid disease     hypothyroidism       Past Surgical History:   Procedure Laterality Date    ABDOMINAL SURGERY      ANKLE HARDWARE REMOVAL Left 3/28/2022    Procedure: REMOVAL, HARDWARE, ANKLE;  Surgeon: Fer Mcrae MD;  [facility];  Service: Orthopedics;  Laterality: Left;    APPLICATION OF LARGE EXTERNAL FIXATION DEVICE TO TIBIA Left 9/7/2020    Procedure: APPLICATION, EXTERNAL FIXATION DEVICE, TIBIA;  Surgeon: Sujata Londono MD;  [facility];  Service: Orthopedics;  Laterality: Left;  need paralysis    APPLICATION OF WOUND VACUUM-ASSISTED CLOSURE DEVICE Left 9/18/2020    Procedure: APPLICATION, WOUND VAC;  Surgeon: Fer Mcrae MD;  Location: [facility] OR [address] [place] [address];  Service: Orthopedics;  Laterality: Left;    BREAST SURGERY  2004    Breast Reduction    CARDIAC CATHETERIZATION      COLONOSCOPY N/A 11/3/2015    Procedure: COLONOSCOPY;  Surgeon: Timothy Barber MD;  Location: [facility] ENDO;  Service: Endoscopy;  Laterality: N/A;    DENTAL SURGERY      4 teeth removed    GASTRIC BYPASS      HERNIA REPAIR      HYSTERECTOMY      ILIAC CREST BONE GRAFT Left 3/28/2022    Procedure: BONE GRAFT, ILIAC CREST;  Surgeon: Fer Mcrae MD;  Location: [facility] OR [address] [place] [address];  Service: Orthopedics;  Laterality: Left;    OPEN REDUCTION AND INTERNAL FIXATION (ORIF) OF PILON FRACTURE Left 9/18/2020    Procedure: ORIF, FRACTURE, PILON - left. Diving board, supine, bone foam. [place] anterolateral distal tibial plate, mini frag, long 3.5mm steel screw, CaPhos bone substitute;  Surgeon: Fer Mcrae MD;  Location: [address];  Service: Orthopedics;  Laterality: Left;    OPEN REDUCTION AND INTERNAL FIXATION (ORIF) OF PILON FRACTURE Left 3/28/2022    Procedure: ORIF, FRACTURE, PILON nonunion + Iliac crest bone graft - diving board, supine, bone foam. [place] axsos3  screws, 2.7 mini plates/screws, curettes, Seda/Lomas medical Augment;  Surgeon: Fer Mcrae MD;  Location: [facility] OR [address] [place] [address];  Service: Orthopedics;  Laterality: Left;  Spinal? + postop catheter (draping out pelvis for bone graft harvest)    OVARIAN CYST REMOVAL      aprox 5 times    REMOVAL OF EXTERNAL FIXATION DEVICE Left 9/18/2020    Procedure: REMOVAL, EXTERNAL FIXATION DEVICE;  Surgeon: Fer Mcrae MD;  Location: [facility] OR [address] [place] [address];  Service: Orthopedics;  Laterality: Left;    ROTATOR CUFF REPAIR Left 01/2019    tennis elbow repair Left 01/2019    UPPER GASTROINTESTINAL ENDOSCOPY         Time Tracking:     PT Received On: 03/29/22  PT Start Time: 1005     PT Stop Time: 1018  PT Total Time (min): 13 min     Billable Minutes: Evaluation 13 min       Charles Robison, PT  03/29/2022    Co-treatment performed for this visit due to patient need for two skilled therapists to ensure patient and staff safety and to accommodate for patient activity tolerance/pain management

## 2022-03-29 NOTE — ANESTHESIA POST-OP PAIN MANAGEMENT
Acute Pain Service Progress Note    Faith Bernard is a 57 y.o., female, [id number].    Surgery:  Procedure(s) (LRB):  ORIF, FRACTURE, PILON nonunion + Iliac crest bone graft - diving board, supine, bone foam. [place] axsos3 screws, 2.7 mini plates/screws, curettes, [place] medical Augment (Left)  REMOVAL, HARDWARE, ANKLE (Left)  BONE GRAFT, ILIAC CREST (Left)         Post Op Day #: 1    Problem List:  There are no hospital problems to display for this patient.      Subjective:     General appearance of alert, oriented, no complaints   Pain with rest: 1    Faces   Pain with movement: 2    Faces   Side Effects    1. Pruritis No    2. Nausea No    3. Motor Blockade No, 0=Ability to raise lower extremities off bed    4. Sedation No, 1=awake and alert    Objective:      Vitals   Vitals:    03/29/22 0753   BP: 100/62   Pulse: 65   Resp:    Temp: 36.6 °C (97.9 °F)        Labs    Admission on 03/28/2022   Component Date Value Ref Range Status    Anaerobic Culture 03/28/2022 Culture in progress   Preliminary    Aerobic Bacterial Culture 03/28/2022 No growth   Preliminary    Gram Stain Result 03/28/2022 No WBC's   Final    Gram Stain Result 03/28/2022 No organisms seen   Final    Anaerobic Culture 03/28/2022 Culture in progress   Preliminary    WBC 03/29/2022 17.30 (A) 3.90 - 12.70 K/uL Final    RBC 03/29/2022 4.28  4.00 - 5.40 M/uL Final    Hemoglobin 03/29/2022 11.1 (A) 12.0 - 16.0 g/dL Final    Hematocrit 03/29/2022 37.0  37.0 - 48.5 % Final    MCV 03/29/2022 86  82 - 98 fL Final    MCH 03/29/2022 25.9 (A) 27.0 - 31.0 pg Final    MCHC 03/29/2022 30.0 (A) 32.0 - 36.0 g/dL Final    RDW 03/29/2022 17.7 (A) 11.5 - 14.5 % Final    Platelets 03/29/2022 343  150 - 450 K/uL Final    MPV 03/29/2022 10.3  9.2 - 12.9 fL Final    Immature Granulocytes 03/29/2022 0.5  0.0 - 0.5 % Final    Gran # (ANC) 03/29/2022 14.5 (A) 1.8 - 7.7 K/uL Final    Immature Grans (Abs) 03/29/2022 0.08 (A) 0.00 - 0.04 K/uL Final     Lymph # 03/29/2022 1.4  1.0 - 4.8 K/uL Final    Mono # 03/29/2022 1.3 (A) 0.3 - 1.0 K/uL Final    Eos # 03/29/2022 0.0  0.0 - 0.5 K/uL Final    Baso # 03/29/2022 0.02  0.00 - 0.20 K/uL Final    nRBC 03/29/2022 0  0 /100 WBC Final    Gran % 03/29/2022 83.7 (A) 38.0 - 73.0 % Final    Lymph % 03/29/2022 8.3 (A) 18.0 - 48.0 % Final    Mono % 03/29/2022 7.3  4.0 - 15.0 % Final    Eosinophil % 03/29/2022 0.1  0.0 - 8.0 % Final    Basophil % 03/29/2022 0.1  0.0 - 1.9 % Final    Differential Method 03/29/2022 Automated   Final    Sodium 03/29/2022 139  136 - 145 mmol/L Final    Potassium 03/29/2022 4.2  3.5 - 5.1 mmol/L Final    Chloride 03/29/2022 110  95 - 110 mmol/L Final    CO2 03/29/2022 21 (A) 23 - 29 mmol/L Final    Glucose 03/29/2022 183 (A) 70 - 110 mg/dL Final    BUN 03/29/2022 11  6 - 20 mg/dL Final    Creatinine 03/29/2022 0.8  0.5 - 1.4 mg/dL Final    Calcium 03/29/2022 9.1  8.7 - 10.5 mg/dL Final    Total Protein 03/29/2022 6.1  6.0 - 8.4 g/dL Final    Albumin 03/29/2022 3.2 (A) 3.5 - 5.2 g/dL Final    Total Bilirubin 03/29/2022 0.2  0.1 - 1.0 mg/dL Final    Alkaline Phosphatase 03/29/2022 102  55 - 135 U/L Final    AST 03/29/2022 12  10 - 40 U/L Final    ALT 03/29/2022 11  10 - 44 U/L Final    Anion Gap 03/29/2022 8  8 - 16 mmol/L Final    eGFR if African American 03/29/2022 >60.0  >60 mL/min/1.73 m^2 Final    eGFR if non African American 03/29/2022 >60.0  >60 mL/min/1.73 m^2 Final        Meds   Current Facility-Administered Medications   Medication Dose Route Frequency Provider Last Rate Last Admin    acetaminophen tablet 1,000 mg  1,000 mg Oral Q6H Fer Mayer MD        amLODIPine tablet 5 mg  5 mg Oral Daily Elton Pool MD        ARIPiprazole tablet 10 mg  10 mg Oral Daily Elton Pool MD   10 mg at 03/28/22 2000    clindamycin in D5W 900 mg/50 mL IVPB 900 mg  900 mg Intravenous Q8H Elton Pool MD   Stopped at 03/29/22 0605     doxycycline capsule 100 mg  100 mg Oral Q12H Elton Pool MD        gabapentin capsule 600 mg  600 mg Oral TID Fer Mayer MD        heparin (porcine) injection 5,000 Units  5,000 Units Subcutaneous Q8H Elton Pool MD        hydroCHLOROthiazide tablet 25 mg  25 mg Oral QAM Elton Pool MD        levothyroxine tablet 100 mcg  100 mcg Oral Before breakfast Elton Pool MD   100 mcg at 03/29/22 0619    methocarbamoL tablet 500 mg  500 mg Oral TID Elton Pool MD   500 mg at 03/28/22 2000    oxyCODONE immediate release tablet Tab 10 mg  10 mg Oral Q3H PRN Elton Pool MD   10 mg at 03/28/22 1654    And    oxyCODONE immediate release tablet 15 mg  15 mg Oral Q3H PRN Elton Pool MD   15 mg at 03/29/22 0505    And    morphine injection 4 mg  4 mg Intravenous Q3H PRN Elton Pool MD   4 mg at 03/28/22 2200    propranoloL tablet 60 mg  60 mg Oral BID Elton Pool MD   60 mg at 03/28/22 2159    senna-docusate 8.6-50 mg per tablet 1 tablet  1 tablet Oral Daily Elton Pool MD        topiramate tablet 400 mg  400 mg Oral Daily Elton Pool MD        traZODone tablet 300 mg  300 mg Oral QHS Elton Pool MD   300 mg at 03/28/22 2000    venlafaxine 24 hr capsule 75 mg  75 mg Oral Daily Elton Pool MD         Facility-Administered Medications Ordered in Other Encounters   Medication Dose Route Frequency Provider Last Rate Last Admin    midazolam (VERSED) 1 mg/mL injection 0.5 mg  0.5 mg Intravenous PRN Myrtle Linares MD   2 mg at 09/18/20 1155     Assessment:     Pain control adequate    Plan:    - performed on patient, on percocet 10 q4h at home, gabapentin 600mg po TID for pain.   -currently on oxy 10 and 15 PRNs   -increased gabapentin to home dose  -when examined this morning, foot still numb from single shot block yesterday. If pain controlled with oral medications once  block wears off today, will forego catheter placement. If pain out of control, plan to NPO at midnight and place PNC tomorrow.

## 2022-03-29 NOTE — PROGRESS NOTES
[facility] - [facility]  Orthopedics  Progress Note    Patient Name: Faith Bernard  MRN: [id number]  Admission Date: 3/28/2022  Hospital Length of Stay: 1 days  Attending Provider: Fer Mcrae,*  Primary Care Provider: Rah Canela MD  Follow-up For: Procedure(s) (LRB):  ORIF, FRACTURE, PILON nonunion + Iliac crest bone graft - diving board, supine, bone foam. [place] axsos3 screws, 2.7 mini plates/screws, curettes, Seda/Lomas medical Augment (Left)  REMOVAL, HARDWARE, ANKLE (Left)  BONE GRAFT, ILIAC CREST (Left)    Post-Operative Day: 1 Day Post-Op  Subjective:     Principal Problem:Closed left pilon fracture    Principal Orthopedic Problem: same    Interval History: Pt seen and examined at bedside. JESSICA. She reports pain is controlled. She still has little sensation in her left leg after single shot block. Plans to work with PT today. APS evaluated. Plan to put a PNC in today. No other concerns.       Review of patient's allergies indicates:   Allergen Reactions    Cephalexin Anaphylaxis    Codeine Itching    Nsaids (non-steroidal anti-inflammatory drug)      Has a history of bleeding ulcers       Current Facility-Administered Medications   Medication    acetaminophen tablet 1,000 mg    amLODIPine tablet 5 mg    ARIPiprazole tablet 10 mg    clindamycin in D5W 900 mg/50 mL IVPB 900 mg    doxycycline capsule 100 mg    gabapentin capsule 600 mg    heparin (porcine) injection 5,000 Units    hydroCHLOROthiazide tablet 25 mg    levothyroxine tablet 100 mcg    methocarbamoL tablet 500 mg    oxyCODONE immediate release tablet Tab 10 mg    And    oxyCODONE immediate release tablet 15 mg    And    morphine injection 4 mg    propranoloL tablet 60 mg    senna-docusate 8.6-50 mg per tablet 1 tablet    topiramate tablet 400 mg    traZODone tablet 300 mg    venlafaxine 24 hr capsule 75 mg     Facility-Administered Medications Ordered in Other Encounters   Medication    midazolam (VERSED) 1  "mg/mL injection 0.5 mg     Objective:     Vital Signs (Most Recent):  Temp: 97.9 °F (36.6 °C) (03/29/22 0753)  Pulse: 65 (03/29/22 0753)  Resp: 18 (03/29/22 0855)  BP: 100/62 (03/29/22 0753)  SpO2: 96 % (03/29/22 0753) Vital Signs (24h Range):  Temp:  [96 °F (35.6 °C)-98 °F (36.7 °C)] 97.9 °F (36.6 °C)  Pulse:  [50-90] 65  Resp:  [13-18] 18  SpO2:  [95 %-100 %] 96 %  BP: ()/(50-89) 100/62     Weight: 79.5 kg (175 lb 4.8 oz)  Height: 5' 3" (160 cm)  Body mass index is 31.05 kg/m².      Intake/Output Summary (Last 24 hours) at 3/29/2022 0945  Last data filed at 3/28/2022 1542  Gross per 24 hour   Intake 1901 ml   Output 815 ml   Net 1086 ml       Ortho/SPM Exam    NAD   Normal respiratory effort  Short leg splint in place to LLE  Left hip dressings c/d/I from iliac crest harvest site  Able to wiggle toes in splint  Right leg with no swelling or pain, no evidence of DVT      Significant Labs: CBC:   Recent Labs   Lab 03/29/22  0323   WBC 17.30*   HGB 11.1*   HCT 37.0        CMP:   Recent Labs   Lab 03/29/22  0323      K 4.2      CO2 21*   *   BUN 11   CREATININE 0.8   CALCIUM 9.1   PROT 6.1   ALBUMIN 3.2*   BILITOT 0.2   ALKPHOS 102   AST 12   ALT 11   ANIONGAP 8   EGFRNONAA >60.0     All pertinent labs within the past 24 hours have been reviewed.    Significant Imaging: I have reviewed and interpreted all pertinent imaging results/findings.    Assessment/Plan:     * Closed left pilon fracture  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 Patient with history of GI bleed. Discussed with patient risks and benefits of anticoagulation versus DVT and PE risks. She understands and would like to proceed with anticoagulation. Will avoid aspirin as she has not tolerated this in the past and it is an NSAID. Plan for " heparin tid while inpatient with lovenox on discharge.  Abx: postop Ancef  Labs: Hgb 11.1  Drain: none  Cordero: none    Dispo: f/u PT recs, pending APS placement of PNC.             Elton Pool MD  Orthopedics  [facility] - Surgery

## 2022-03-29 NOTE — PLAN OF CARE
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, subsequent encounter [P64.071Z]  Pilon fracture [M02.017G]    Admission Date: 3/28/2022  Expected Discharge Date: 3/29/2022         Payor: MEDICARE / Plan: MEDICARE PART A & B / Product Type: Government /     Extended Emergency Contact Information  Primary Emergency Contact: Ronald Bernard  Address: [address] [place], [place] [id number] [place] [address]  Home Phone: [phone]  Mobile Phone: [phone]  Relation: Spouse  Secondary Emergency Contact: SuzanJayme  Address: unknown   [place]  Mobile Phone: [phone]  Relation: Friend  Preferred language: English    Discharge Plan A: Home with family, Home Health  Discharge Plan B: Home with family, Home Health      [id number] - [place], [place] - [address]  [address]  [place] [address]  Phone: [phone] Fax: [phone]    [id number] - [place], [place] - [address]  [address]  [place] [address]  Phone: [phone] Fax: [phone]    [id number] - [place], [place] - [address]  [address]  [place] [address]  Phone: [phone] Fax: [phone]      Initial Assessment (most recent)     Adult Discharge Assessment - 03/29/22 1330        Discharge Assessment    Assessment Type Discharge Planning Brief Assessment     Confirmed/corrected address, phone number and insurance Yes     Confirmed Demographics Correct on Facesheet     Source of Information patient     Does patient/caregiver understand observation status Yes     Communicated PRINCESS with patient/caregiver Yes     Lives With spouse;parent(s)     Do you expect to return to your current living situation? Yes     Do you have help at home or someone to help you manage your care at home? Yes      Who are your caregiver(s) and their phone number(s)? Ronald Champion (Spouse) [phone]     Prior to hospitilization cognitive status: Alert/Oriented     Current cognitive status: Alert/Oriented     Walking or Climbing Stairs Difficulty none     Dressing/Bathing Difficulty none     Equipment Currently Used at Home bedside commode;walker, rolling;wheelchair     Readmission within 30 days? No     Patient currently being followed by outpatient case management? No     Do you currently have service(s) that help you manage your care at home? No     Is the pt/caregiver preference to resume services with current agency No     Do you take prescription medications? Yes     Do you have prescription coverage? Yes     Do you have any problems affording any of your prescribed medications? No     Is the patient taking medications as prescribed? yes     Who is going to help you get home at discharge? Spouse and personal sitters     How do you get to doctors appointments? family or friend will provide     Are you on dialysis? No     Do you take coumadin? No     Discharge Plan A Home with family;Home Health     Discharge Plan B Home with family;Home Health               Spoke with patient at bedside to complete d/c planning assessment. Patient lives with spouse and her mother in a single story home with one step to enter. Patient's mother has 24hour/day sitters who patient also uses to assist her when needed. Patient has the following DME in home; Bedside commode, Walker and Wheelchair. PT/OT giovannial completed and recommending HH at discharge. Referral sent to [facility] for post-acute care. PCP, Pharmacy and Health Insurance verified per patient. Anticipate d/c home today per team. Spouse to provide help at home along with sitters and transportation home. Will continue to follow for needs.

## 2022-03-29 NOTE — PT/OT/SLP EVAL
"Occupational Therapy   Evaluation and Discharge Note    Name: Faith Bernard  MRN: [id number]  Admitting Diagnosis:  Closed left pilon fracture   Recent Surgery: Procedure(s) (LRB):  Block, Nerve (N/A) Day of Surgery    Recommendations:     Discharge Recommendations: home with home health  Discharge Equipment Recommendations:  none  Barriers to discharge:  None    Assessment:     Faith Bernard is a 57 y.o. female with a medical diagnosis of Closed left pilon fracture. At this time, patient is functioning at their prior level of function and does not require further acute OT services.     OT Acute eval complete. Pt is SBA-Supervision in bed mobility and t/f's using RW. Pt ambulated ~30ft SBA using RW. Pt able to maintain NWB LLE precautions throughout session. Pt would benefit from HH upon d/c to return to PLOF. Pt does not require Acute OT services at this time.    Plan:     During this hospitalization, patient does not require further acute OT services.  Please re-consult if situation changes.    · Plan of Care Reviewed with: patient, spouse    Subjective     Chief Complaint: "I have to do this now"  Patient/Family Comments/goals: to get home    Occupational Profile:  Living Environment: Pt lives with spouse and mother in [facility] with threshold to enter  Previous level of function: Independent  Equipment Used at home:  walker, rolling, wheelchair, bedside commode, bath bench  Assistance upon Discharge: Pt will have assistance from family upon d/c.    Pain/Comfort:  · Pain Rating 1: other (see comments) (pt did not rate)  · Location - Side 1: Left  · Location 1: leg  · Pain Addressed 1: Pre-medicate for activity, Reposition, Distraction    Patients cultural, spiritual, Protestant conflicts given the current situation: no    Objective:     Communicated with: RN prior to session.  Patient found supine with FCD upon OT entry to room.    General Precautions: Standard, fall   Orthopedic Precautions:LLE non weight " bearing (LLE flatfood touchdown)   Braces: N/A  Respiratory Status: Room air     Occupational Performance:    Bed Mobility:    · Patient completed Scooting/Bridging with supervision and stand by assistance  · Patient completed Supine to Sit with supervision and stand by assistance    Functional Mobility/Transfers:  · Patient completed Sit <> Stand Transfer with supervision and stand by assistance  with  rolling walker   · Patient completed Bed <> Chair Transfer using Step Transfer technique with supervision and stand by assistance with rolling walker  · Functional Mobility: OT Acute eval complete. Pt is SBA-Supervision in bed mobility and t/f's using RW. Pt ambulated ~30ft SBA using RW. Pt able to maintain NWB LLE precautions throughout session.     Activities of Daily Living:  · Upper Body Dressing: minimum assistance dillan gown    Cognitive/Visual Perceptual:  Cognitive/Psychosocial Skills:     -       Oriented to: Person, Place, Time and Situation   -       Safety awareness/insight to disability: intact     Physical Exam:  Upper Extremity Range of Motion:     -       Right Upper Extremity: WFL  -       Left Upper Extremity: WFL  Upper Extremity Strength:    -       Right Upper Extremity: WFL  -       Left Upper Extremity: WFL   Strength:    -       Right Upper Extremity: WFL  -       Left Upper Extremity: WFL    AMPAC 6 Click ADL:  AMPAC Total Score: 22    Treatment & Education:  - OT/POC-  - Importance of mobility to maximize recovery.  - safety w/ functional mobility; hand placement to ensure safe transfers to various surfaces in prep for ADLs  - Reviewed gait sequence and RW management via verbalization and demonstration   - encouraged to ambulate within household environment at least every hour to hour 1/2    Education:    Patient left up in chair with all lines intact, call button in reach and RN notified    GOALS:   Multidisciplinary Problems     Occupational Therapy Goals     Not on file                 History:     Past Medical History:   Diagnosis Date    Anxiety     Depression     Encounter for blood transfusion     Esophageal dysphagia     Fibromyalgia     Gastric bypass status for obesity     H/O dental abscess 4/14/2014    drained    Headache(784.0)     migraines.  Treated at [facility] (Dr. 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  Pt states took too many then realized her mistake    Polycystic ovaries     S/P JUHI-BSO     Thyroid disease     hypothyroidism       Past Surgical History:   Procedure Laterality Date    ABDOMINAL SURGERY      ANKLE HARDWARE REMOVAL Left 3/28/2022    Procedure: REMOVAL, HARDWARE, ANKLE;  Surgeon: Fer Mcrae MD;  Location: [address];  Service: Orthopedics;  Laterality: Left;    APPLICATION OF LARGE EXTERNAL FIXATION DEVICE TO TIBIA Left 9/7/2020    Procedure: APPLICATION, EXTERNAL FIXATION DEVICE, TIBIA;  Surgeon: Sujata Londono MD;  Location: [address];  Service: Orthopedics;  Laterality: Left;  need paralysis    APPLICATION OF WOUND VACUUM-ASSISTED CLOSURE DEVICE Left 9/18/2020    Procedure: APPLICATION, WOUND VAC;  Surgeon: Fer Mcrae MD;  Location: [address];  Service: Orthopedics;  Laterality: Left;    BREAST SURGERY  2004    Breast Reduction    CARDIAC CATHETERIZATION      COLONOSCOPY N/A 11/3/2015    Procedure: COLONOSCOPY;  Surgeon: Timothy Barber MD;  Location: [facility] ENDO;  Service: Endoscopy;  Laterality: N/A;    DENTAL SURGERY      4 teeth removed    GASTRIC BYPASS      HERNIA  REPAIR      HYSTERECTOMY      ILIAC CREST BONE GRAFT Left 3/28/2022    Procedure: BONE GRAFT, ILIAC CREST;  Surgeon: Fer Mcrae MD;  [facility];  Service: Orthopedics;  Laterality: Left;    OPEN REDUCTION AND INTERNAL FIXATION (ORIF) OF PILON FRACTURE Left 9/18/2020    Procedure: ORIF, FRACTURE, PILON - left. Diving board, supine, bone foam. Seda anterolateral distal tibial plate, mini frag, long 3.5mm steel screw, CaPhos bone substitute;  Surgeon: Fer Mcrae MD;  Location: [facility] OR [facility];  Service: Orthopedics;  Laterality: Left;    OPEN REDUCTION AND INTERNAL FIXATION (ORIF) OF PILON FRACTURE Left 3/28/2022    Procedure: ORIF, FRACTURE, PILON nonunion + Iliac crest bone graft - diving board, supine, bone foam. [place] axsos3 screws, 2.7 mini plates/screws, curettes, [employer] medical Augment;  Surgeon: Fer Mcrae MD;  [facility];  Service: Orthopedics;  Laterality: Left;  Spinal? + postop catheter (draping out pelvis for bone graft harvest)    OVARIAN CYST REMOVAL      aprox 5 times    REMOVAL OF EXTERNAL FIXATION DEVICE Left 9/18/2020    Procedure: REMOVAL, EXTERNAL FIXATION DEVICE;  Surgeon: Fer Mcrae MD;  [facility];  Service: Orthopedics;  Laterality: Left;    ROTATOR CUFF REPAIR Left 01/2019    tennis elbow repair Left 01/2019    UPPER GASTROINTESTINAL ENDOSCOPY         Time Tracking:     OT Date of Treatment: 03/29/22  OT Start Time: 1000  OT Stop Time: 1020  OT Total Time (min): 20 min    Billable Minutes:Evaluation 10  Therapeutic Activity 10     Co-treatment performed for this visit due to patient need for two skilled therapists to ensure patient and staff safety and to accommodate for patient activity tolerance/pain management       3/29/2022
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
## 2022-03-30 NOTE — ANESTHESIA POST-OP PAIN MANAGEMENT
Left voicemail with patient, encouraged to call anesthesia phone number if she experiences problems with PNC.    Fer Mayer MD  Resident Physician, PGY4  Department of Anesthesiology

## 2022-03-30 NOTE — PLAN OF CARE
[facility]  Discharge Final Note    Primary Care Provider: Rah Canela MD    Expected Discharge Date: 3/29/2022    Final Discharge Note (most recent)     Final Note - 03/30/22 0710        Final Note    Assessment Type Final Discharge Note     Anticipated Discharge Disposition Home-Health Care [facility]     What phone number can be called within the next 1-3 days to see how you are doing after discharge? --   [phone]    Hospital Resources/Appts/Education Provided Appointments scheduled and added to AVS        Post-Acute Status    Post-Acute Authorization Home Health     Home Health Status Set-up Complete/Auth obtained                 Important Message from Medicare             Contact Info     Rah Canela MD   Specialty: Internal Medicine   Relationship: PCP - General    [facility]  [address]  [facility] [address]   Phone: [phone]       Next Steps: Follow up    Instructions: Hospital follow-up scheduled for 4/5/22 @ 215PM            Future Appointments   Date Time Provider Department Center   4/5/2022  2:45 PM SARAH B[facility]   4/11/2022 10:30 AM SARAH BETH [facility]   4/13/2022 10:00 AM SPECIMEN, [facility]TOM[facility]   4/22/2022  1:30 PM MD YOANA Chaudhary [facility]   5/9/2022 10:45 AM SARAH BETH [facility]     Patient discharged home to care of family and [facility]  [facility] on 3/29/22.

## 2022-03-30 NOTE — PLAN OF CARE
[facility]      HOME HEALTH ORDERS  FACE TO FACE ENCOUNTER    Patient Name: Faith Bernard  YOB: 1965    PCP: Rah Canela MD   PCP Address: [address] [place] [address] [address] / [place] [address]  PCP Phone Number: [phone]  PCP Fax: [phone]    Encounter Date: 3/17/22    Admit to Home Health    Diagnoses:  Active Hospital Problems    Diagnosis  POA    *Closed left pilon fracture [S82.872A]  Yes      Resolved Hospital Problems   No resolved problems to display.       Follow Up Appointments:  Future Appointments   Date Time Provider Department Center   4/5/2022  2:45 PM SARAH BETH [facility]ias [facility] [address]   4/11/2022 10:30 AM SARAH BETH [facility] [address]   4/13/2022 10:00 AM SPECIMEN, SLIDELL [facility] SPECLAB [place]   4/22/2022  1:30 PM Chriss You MD [facility] [address]   5/9/2022 10:45 AM Kevin Moody NP [facility]TH[facility] [address]       Allergies:  Review of patient's allergies indicates:   Allergen Reactions    Cephalexin Anaphylaxis    Codeine Itching    Nsaids (non-steroidal anti-inflammatory drug)      Has a history of bleeding ulcers       Medications: Review discharge medications with patient and family and provide education.    No current facility-administered medications for this encounter.     Current Outpatient Medications   Medication Sig Dispense Refill    acetaminophen (TYLENOL) 650 MG TbSR Take 1 tablet (650 mg total) by mouth every 6 (six) hours as needed (pain). 120 tablet 5    AIMOVIG AUTOINJECTOR 140 mg/mL autoinjector       amLODIPine (NORVASC) 5 MG tablet       aripiprazole (ABILIFY) 10 MG Tab Take 10 mg by mouth every evening.       cholecalciferol, vitamin D3, 50,000 unit capsule Take 50,000 Units by mouth every 7 days. Patient takes on Saturdays  99    CYANOCOBALAMIN, VITAMIN B-12, (VITAMIN B-12 INJ) Inject 1 mL as directed every 30 days.       dextroamphetamine-amphetamine 30 mg Tab Take 1 tablet by mouth 2 (two) times daily.       hydroCHLOROthiazide (HYDRODIURIL) 25 MG tablet Take 25 mg by mouth every morning.      levothyroxine (SYNTHROID) 100 MCG tablet Take 100 mcg by mouth before breakfast.      LIDOcaine (LIDODERM) 5 % Place 1 patch onto the skin once daily. Remove & Discard patch within 12 hours or as directed by MD 5 patch 0    naltrexone-bupropion (CONTRAVE) 8-90 mg TbSR Take by mouth 2 (two) times daily.      propranoloL (INDERAL) 60 MG tablet Take 60 mg by mouth 2 (two) times daily.      rizatriptan (MAXALT-MLT) 10 MG disintegrating tablet DISSOLVE ONE TABLET BY MOUTH allow TO dissolve ONCE daily AS NEEDED  2    tizanidine (ZANAFLEX) 4 MG tablet Take 4 mg by mouth every evening. May take up to 12mg qhs  4    topiramate (TOPAMAX) 200 MG Tab Take 400 mg by mouth once daily.      venlafaxine (EFFEXOR-XR) 75 MG 24 hr capsule Take 150 mg by mouth. Patient takes 300 mg once a day at night      VYVANSE 70 mg capsule Take 70 mg by mouth once daily.  0    abaloparatide (TYMLOS) 80 mcg (3,120 mcg/1.56 mL) PnIj Inject 80 mcg into the skin once daily. (Patient not taking: No sig reported) 1.56 mL 11    acetaminophen (TYLENOL) 650 MG TbSR Take 1 tablet (650 mg total) by mouth every 6 (six) hours as needed (pain). 56 tablet 0    chlordiazepoxide-clidinium 5-2.5 mg (LIBRAX) 5-2.5 mg Cap chlordiazepoxide-clidinium 5 mg-2.5 mg capsule      docusate sodium (COLACE) 100 MG capsule Take 1 capsule (100 mg total) by mouth 3 (three) times daily as needed for Constipation. (Patient not taking: No sig reported) 60 capsule 0    docusate sodium (COLACE) 100 MG capsule Take 1 capsule (100 mg total) by mouth 2 (two) times daily as needed. 60 capsule 0    doxycycline (ADOXA) 150 MG tablet Take 1 tablet (150 mg total) by mouth every 12 (twelve) hours. 28 tablet 0    enoxaparin (LOVENOX) 40 mg/0.4 mL Syrg Inject 0.4 mLs (40 mg total) into the skin every 12 (twelve) hours. 24 mL 0    gabapentin (NEURONTIN) 400 MG capsule Take 1 capsule (400 mg  total) by mouth 3 (three) times daily. for 14 days 42 capsule 0    methocarbamoL (ROBAXIN) 500 MG Tab Take 2 tablets (1,000 mg total) by mouth 3 (three) times daily. for 14 days 84 tablet 0    oxyCODONE (ROXICODONE) 5 MG immediate release tablet Take 1 tablet (5 mg total) by mouth every 4 (four) hours as needed for Pain. 30 tablet 0    progesterone (PROMETRIUM) 200 MG capsule Take 200 mg by mouth every evening.      traZODone (DESYREL) 150 MG tablet Take 300 mg by mouth nightly.       Facility-Administered Medications Ordered in Other Encounters   Medication Dose Route Frequency Provider Last Rate Last Admin    midazolam (VERSED) 1 mg/mL injection 0.5 mg  0.5 mg Intravenous PRN Myrtle Lianres MD   2 mg at 09/18/20 1155     Discharge Medication List as of 3/29/2022 11:09 AM      START taking these medications    Details   !! acetaminophen (TYLENOL) 650 MG TbSR Take 1 tablet (650 mg total) by mouth every 6 (six) hours as needed (pain)., Starting Tue 3/29/2022, Normal      !! docusate sodium (COLACE) 100 MG capsule Take 1 capsule (100 mg total) by mouth 2 (two) times daily as needed., Starting Tue 3/29/2022, Normal      doxycycline (ADOXA) 150 MG tablet Take 1 tablet (150 mg total) by mouth every 12 (twelve) hours., Starting Tue 3/29/2022, Normal      enoxaparin (LOVENOX) 40 mg/0.4 mL Syrg Inject 0.4 mLs (40 mg total) into the skin every 12 (twelve) hours., Starting Tue 3/29/2022, Until Thu 4/28/2022, Normal      methocarbamoL (ROBAXIN) 500 MG Tab Take 2 tablets (1,000 mg total) by mouth 3 (three) times daily. for 14 days, Starting Tue 3/29/2022, Until Tue 4/12/2022, Normal       !! - Potential duplicate medications found. Please discuss with provider.      CONTINUE these medications which have CHANGED    Details   gabapentin (NEURONTIN) 400 MG capsule Take 1 capsule (100 mg total) by mouth 3 (three) times daily. for 14 days, Starting Tue 3/29/2022, Until Tue 4/12/2022, Normal      oxyCODONE (ROXICODONE) 5 MG  immediate release tablet Take 1 tablet (5 mg total) by mouth every 4 (four) hours as needed for Pain., Starting Tue 3/29/2022, Normal         CONTINUE these medications which have NOT CHANGED    Details   !! acetaminophen (TYLENOL) 650 MG TbSR Take 1 tablet (650 mg total) by mouth every 6 (six) hours as needed (pain)., Starting u 12/10/2020, Normal      AIMOVIG AUTOINJECTOR 140 mg/mL autoinjector Starting Mon 3/7/2022, Historical Med      amLODIPine (NORVASC) 5 MG tablet Starting Fri 2/18/2022, Historical Med      aripiprazole (ABILIFY) 10 MG Tab Take 10 mg by mouth every evening. , Historical Med      cholecalciferol, vitamin D3, 50,000 unit capsule Take 50,000 Units by mouth every 7 days. Patient takes on Saturdays, Starting Tue 11/29/2016, Historical Med      CYANOCOBALAMIN, VITAMIN B-12, (VITAMIN B-12 INJ) Inject 1 mL as directed every 30 days. , Historical Med      dextroamphetamine-amphetamine 30 mg Tab Take 1 tablet by mouth 2 (two) times daily., Starting Mon 8/16/2021, Historical Med      hydroCHLOROthiazide (HYDRODIURIL) 25 MG tablet Take 25 mg by mouth every morning., Historical Med      levothyroxine (SYNTHROID) 100 MCG tablet Take 100 mcg by mouth before breakfast., Historical Med      LIDOcaine (LIDODERM) 5 % Place 1 patch onto the skin once daily. Remove & Discard patch within 12 hours or as directed by MD, Starting Tue 3/2/2021, Normal      naltrexone-bupropion (CONTRAVE) 8-90 mg TbSR Take by mouth 2 (two) times daily., Historical Med      propranoloL (INDERAL) 60 MG tablet Take 60 mg by mouth 2 (two) times daily., Historical Med      rizatriptan (MAXALT-MLT) 10 MG disintegrating tablet DISSOLVE ONE TABLET BY MOUTH allow TO dissolve ONCE daily AS NEEDED, Historical Med      tizanidine (ZANAFLEX) 4 MG tablet Take 4 mg by mouth every evening. May take up to 12mg qhs, Starting Wed 7/8/2015, Historical Med      topiramate (TOPAMAX) 200 MG Tab Take 400 mg by mouth once daily., Starting Fri 1/13/2017,  Historical Med      venlafaxine (EFFEXOR-XR) 75 MG 24 hr capsule Take 150 mg by mouth. Patient takes 300 mg once a day at night, Historical Med      VYVANSE 70 mg capsule Take 70 mg by mouth once daily., Starting Wed 9/13/2017, Historical Med      abaloparatide (TYMLOS) 80 mcg (3,120 mcg/1.56 mL) PnIj Inject 80 mcg into the skin once daily., Starting Tue 1/11/2022, Normal      aspirin (ECOTRIN) 81 MG EC tablet Take 1 tablet (81 mg total) by mouth once daily., Starting Sun 9/20/2020, Until Sun 11/1/2020, Normal      chlordiazepoxide-clidinium 5-2.5 mg (LIBRAX) 5-2.5 mg Cap chlordiazepoxide-clidinium 5 mg-2.5 mg capsule, Historical Med      !! docusate sodium (COLACE) 100 MG capsule Take 1 capsule (100 mg total) by mouth 3 (three) times daily as needed for Constipation., Starting Tue 8/3/2021, Print      progesterone (PROMETRIUM) 200 MG capsule Take 200 mg by mouth every evening., Historical Med      traZODone (DESYREL) 150 MG tablet Take 300 mg by mouth nightly., Starting Wed 12/15/2021, Historical Med       !! - Potential duplicate medications found. Please discuss with provider.      STOP taking these medications       oxyCODONE-acetaminophen (PERCOCET)  mg per tablet Comments:   Reason for Stopping:         morphine (MS CONTIN) 15 MG 12 hr tablet Comments:   Reason for Stopping:                 I have seen and examined this patient within the last 30 days. My clinical findings that support the need for the home health skilled services and home bound status are the following:no   Weakness/numbness causing balance and gait disturbance due to Surgery making it taxing to leave home.     Diet:   regular diet    Labs:  none    Referrals/ Consults  Physical Therapy to evaluate and treat. Evaluate for home safety and equipment needs; Establish/upgrade home exercise program. Perform / instruct on therapeutic exercises, gait training, transfer training, and Range of Motion.  Occupational Therapy to evaluate and treat.  Evaluate home environment for safety and equipment needs. Perform/Instruct on transfers, ADL training, ROM, and therapeutic exercises.    Activities:   other Flat foot weight bearing LLE    Nursing:   Agency to admit patient within 24 hours of hospital discharge unless specified on physician order or at patient request    SN to complete comprehensive assessment including routine vital signs. Instruct on disease process and s/s of complications to report to MD. Review/verify medication list sent home with the patient at time of discharge  and instruct patient/caregiver as needed. Frequency may be adjusted depending on start of care date.     Skilled nurse to perform up to 3 visits PRN for symptoms related to diagnosis    Notify MD if SBP > 160 or < 90; DBP > 90 or < 50; HR > 120 or < 50; Temp > 101; O2 < 88%; Other:   none    Ok to schedule additional visits based on staff availability and patient request on consecutive days within the home health episode.    When multiple disciplines ordered:    Start of Care occurs on Sunday - Wednesday schedule remaining discipline evaluations as ordered on separate consecutive days following the start of care.    Thursday SOC -schedule subsequent evaluations Friday and Monday the following week.     Friday - Saturday SOC - schedule subsequent discipline evaluations on consecutive days starting Monday of the following week.    For all post-discharge communication and subsequent orders please contact patient's primary care physician. If unable to reach primary care physician or do not receive response within 30 minutes, please contact Fer Mcrae MD for clinical staff order clarification    Miscellaneous   Routine Skin for Bedridden Patients: Instruct patient/caregiver to apply moisture barrier cream to all skin folds and wet areas in perineal area daily and after baths and all bowel movements.    Home Health Aide:  none    Wound Care Orders  leave splint on, keep  c/d/i    I certify that this patient is confined to her home and needs intermittent skilled nursing care, physical therapy and occupational therapy.

## 2022-03-31 ENCOUNTER — NURSE TRIAGE (OUTPATIENT)
Dept: ADMINISTRATIVE | Facility: CLINIC | Age: 57
End: 2022-03-31
Payer: COMMERCIAL

## 2022-03-31 ENCOUNTER — PATIENT OUTREACH (OUTPATIENT)
Dept: ADMINISTRATIVE | Facility: CLINIC | Age: 57
End: 2022-03-31
Payer: COMMERCIAL

## 2022-03-31 ENCOUNTER — ANESTHESIA EVENT (OUTPATIENT)
Dept: ANESTHESIOLOGY | Facility: HOSPITAL | Age: 57
End: 2022-03-31

## 2022-03-31 ENCOUNTER — ANESTHESIA (OUTPATIENT)
Dept: ANESTHESIOLOGY | Facility: HOSPITAL | Age: 57
End: 2022-03-31
Payer: COMMERCIAL

## 2022-03-31 PROCEDURE — 64447 PERIPHERAL BLOCK: ICD-10-PCS | Mod: 59,LT,, | Performed by: ANESTHESIOLOGY

## 2022-03-31 PROCEDURE — 64447 NJX AA&/STRD FEMORAL NRV IMG: CPT | Mod: 59,LT,, | Performed by: ANESTHESIOLOGY

## 2022-03-31 PROCEDURE — 76942 PERIPHERAL BLOCK: ICD-10-PCS | Mod: 26,,, | Performed by: ANESTHESIOLOGY

## 2022-03-31 PROCEDURE — 64446 PERIPHERAL BLOCK: ICD-10-PCS | Mod: 59,LT,, | Performed by: ANESTHESIOLOGY

## 2022-03-31 PROCEDURE — 64446 NJX AA&/STRD SC NRV NFS IMG: CPT | Mod: 59,LT,, | Performed by: ANESTHESIOLOGY

## 2022-03-31 PROCEDURE — 76942 ECHO GUIDE FOR BIOPSY: CPT | Mod: 26,,, | Performed by: ANESTHESIOLOGY

## 2022-03-31 NOTE — ANESTHESIA PROCEDURE NOTES
Peripheral Block    Patient location during procedure: pre-op   Block not for primary anesthetic.  Reason for block: at surgeon's request and post-op pain management   Post-op Pain Location: L ankle   Start time: 3/29/2022 12:10 PM  Timeout: 3/29/2022 12:05 PM   End time: 3/29/2022 12:20 PM    Staffing  Authorizing Provider: Myrtle Cai MD  Performing Provider: Inna Bledsoe MD    Preanesthetic Checklist  Completed: patient identified, IV checked, site marked, risks and benefits discussed, surgical consent, monitors and equipment checked, pre-op evaluation and timeout performed  Peripheral Block  Patient position: supine  Prep: ChloraPrep and site prepped and draped  Patient monitoring: heart rate, cardiac monitor, continuous pulse ox, continuous capnometry and frequent blood pressure checks  Block type: popliteal  Laterality: left  Injection technique: continuous  Needle  Needle type: Tuohy   Needle gauge: 18 G  Needle length: 3.5 in  Needle localization: anatomical landmarks and ultrasound guidance  Catheter type: non-stimulating  Catheter size: 20 G  Test dose: lidocaine 1.5% with Epi 1-to-200,000 and negative   -ultrasound image captured on disc.  Assessment  Injection assessment: negative aspiration, negative parasthesia and local visualized surrounding nerve  Paresthesia pain: none  Heart rate change: no  Slow fractionated injection: yes  Pain Tolerance: comfortable throughout block and no complaints      Additional Notes  VSS.  [facility] RN monitoring vitals throughout procedure.  Patient tolerated procedure well.

## 2022-03-31 NOTE — ANESTHESIA PROCEDURE NOTES
Peripheral Block    Patient location during procedure: pre-op   Block not for primary anesthetic.  Reason for block: at surgeon's request and post-op pain management   Post-op Pain Location: L ankle   Start time: 3/29/2022 12:21 PM  Timeout: 3/29/2022 12:20 PM   End time: 3/29/2022 12:24 PM    Staffing  Authorizing Provider: Myrtle Cai MD  Performing Provider: Inna Bledsoe MD    Preanesthetic Checklist  Completed: patient identified, IV checked, site marked, risks and benefits discussed, surgical consent, monitors and equipment checked, pre-op evaluation and timeout performed  Peripheral Block  Patient position: supine  Prep: ChloraPrep  Patient monitoring: heart rate, cardiac monitor, continuous pulse ox, continuous capnometry and frequent blood pressure checks  Block type: adductor canal  Laterality: left  Injection technique: single shot  Needle  Needle type: Stimuplex   Needle gauge: 21 G  Needle length: 4 in  Needle localization: anatomical landmarks and ultrasound guidance   -ultrasound image captured on disc.  Assessment  Injection assessment: negative aspiration, negative parasthesia and local visualized surrounding nerve  Paresthesia pain: none  Heart rate change: no  Slow fractionated injection: yes  Pain Tolerance: comfortable throughout block and no complaints      Additional Notes  VSS.  [facility] RN monitoring vitals throughout procedure.  Patient tolerated procedure well.

## 2022-04-01 ENCOUNTER — PATIENT MESSAGE (OUTPATIENT)
Dept: ORTHOPEDICS | Facility: CLINIC | Age: 57
End: 2022-04-01
Payer: COMMERCIAL

## 2022-04-01 ENCOUNTER — TELEPHONE (OUTPATIENT)
Dept: ORTHOPEDICS | Facility: CLINIC | Age: 57
End: 2022-04-01
Payer: COMMERCIAL

## 2022-04-01 ENCOUNTER — TELEPHONE (OUTPATIENT)
Dept: ADMINISTRATIVE | Facility: CLINIC | Age: 57
End: 2022-04-01
Payer: COMMERCIAL

## 2022-04-01 ENCOUNTER — TELEPHONE (OUTPATIENT)
Dept: ORTHOPEDICS | Facility: HOSPITAL | Age: 57
End: 2022-04-01
Payer: COMMERCIAL

## 2022-04-01 LAB
BACTERIA SPEC AEROBE CULT: NO GROWTH
BACTERIA SPEC AEROBE CULT: NO GROWTH

## 2022-04-01 NOTE — TELEPHONE ENCOUNTER
4/1/22 0948  Spoke with patient, she picked up the zofran that was called in and her nausea stopped.  Said she feels much better.  She reports the blood thinners has caused her to start bleeding vaginally and she is post menopausal.  I advised her I will speak with the surgeon and get back with her but in the meantime to reach out to her Gyn.  She reports she has a call in to her Gyn and waiting for them to call her back.    Pt reports surgery on Monday, but have been nauseated since last night, dry heaving, holding food and fluids down, just having episodes of nausea and dry heaving, pt tried taking some phenergan but it is not working, Pt advised a call will be placed to on call MD per protocol, Dr. Jose Bundy contacted and authorized a prescription for zofran 8 mg every 8 hours PRN nausea Disp 15 with no refills to be called in for pt, but if pt is unable to keep down food and fluids she would need to be seen in the ED and recommended for pt to try taking less pain medication because that could be causing the nausea. Pt informed and [facility] in [place]. Medication called into pharmacy at this time. Pt encouraged to call back with any worsening symptoms or questions and verbalized understanding.    Reason for Disposition   [1] Vomiting AND [2] persists > 4 hours    Additional Information   Negative: Sounds like a life-threatening emergency to the triager   Negative: [1] Widespread rash AND [2] bright red, sunburn-like   Negative: [1] SEVERE headache AND [2] after spinal (epidural) anesthesia    Protocols used: POST-OP SYMPTOMS AND [id number]

## 2022-04-01 NOTE — TELEPHONE ENCOUNTER
Notified the patient after further review of her records it indicates that the patient was discharged home on Lovenox 40 mg twice a day following her recent surgery.  Patient with no history of blood clots.  Advised the patient that Lovenox should be taken once a day for 30 days following her surgery.  Advised patient to change dosing regimen from twice a day to once day.  She will notify the office should her vaginal bleeding continue.  Patient states that she has spoken with her gyn doctor who advised her to continue taking the medication she was not concerned with the amount of vaginal bleeding she was currently having.  Patient verbalized understanding.

## 2022-04-03 NOTE — ANESTHESIA POST-OP PAIN MANAGEMENT
Spoke with patient today. Reports adequate pain control. Denies signs/symptoms of LAST. Reviewed removal process with patient, plans to discontinue today and remove PNC. Encouraged patient to contact anesthesia or [facility] support line should any questions or concerns arise. Verbalizes understanding. Questions answered and concerns addressed.          Stefano Hansen MD  Regional Anesthesiology and Acute Pain Medicine  [facility]  04/03/2022  2:59 PM

## 2022-04-04 LAB
BACTERIA SPEC ANAEROBE CULT: NORMAL
BACTERIA SPEC ANAEROBE CULT: NORMAL

## 2022-04-05 ENCOUNTER — OFFICE VISIT (OUTPATIENT)
Dept: ORTHOPEDICS | Facility: CLINIC | Age: 57
End: 2022-04-05
Payer: COMMERCIAL

## 2022-04-05 DIAGNOSIS — G89.4 CHRONIC PAIN SYNDROME: ICD-10-CM

## 2022-04-05 DIAGNOSIS — Z98.890 POST-OPERATIVE STATE: ICD-10-CM

## 2022-04-05 DIAGNOSIS — S82.872K CLOSED DISPLACED PILON FRACTURE OF LEFT TIBIA WITH NONUNION, SUBSEQUENT ENCOUNTER: Primary | ICD-10-CM

## 2022-04-05 PROCEDURE — 99024 PR POST-OP FOLLOW-UP VISIT: ICD-10-PCS | Mod: S$GLB,,, | Performed by: NURSE PRACTITIONER

## 2022-04-05 PROCEDURE — 1160F PR REVIEW ALL MEDS BY PRESCRIBER/CLIN PHARMACIST DOCUMENTED: ICD-10-PCS | Mod: CPTII,S$GLB,, | Performed by: NURSE PRACTITIONER

## 2022-04-05 PROCEDURE — 99999 PR PBB SHADOW E&M-EST. PATIENT-LVL III: ICD-10-PCS | Mod: PBBFAC,,, | Performed by: NURSE PRACTITIONER

## 2022-04-05 PROCEDURE — 1160F RVW MEDS BY RX/DR IN RCRD: CPT | Mod: CPTII,S$GLB,, | Performed by: NURSE PRACTITIONER

## 2022-04-05 PROCEDURE — 1159F PR MEDICATION LIST DOCUMENTED IN MEDICAL RECORD: ICD-10-PCS | Mod: CPTII,S$GLB,, | Performed by: NURSE PRACTITIONER

## 2022-04-05 PROCEDURE — 99999 PR PBB SHADOW E&M-EST. PATIENT-LVL III: CPT | Mod: PBBFAC,,, | Performed by: NURSE PRACTITIONER

## 2022-04-05 PROCEDURE — 1159F MED LIST DOCD IN RCRD: CPT | Mod: CPTII,S$GLB,, | Performed by: NURSE PRACTITIONER

## 2022-04-05 PROCEDURE — 99024 POSTOP FOLLOW-UP VISIT: CPT | Mod: S$GLB,,, | Performed by: NURSE PRACTITIONER

## 2022-04-05 RX ORDER — OXYCODONE HYDROCHLORIDE 10 MG/1
10 TABLET ORAL EVERY 4 HOURS PRN
Qty: 100 TABLET | Refills: 0 | Status: SHIPPED | OUTPATIENT
Start: 2022-04-08 | End: 2022-06-21

## 2022-04-05 NOTE — PROGRESS NOTES
Pt w/ pilon nonunion s/p revision surgery  Has baseline chronic pain and opioid dependence.    Rx oxy 10mg #100  For acute postop pain    Recommend all future refills come from chronic pain provider    =====================  Fer Mcrae MD  Orthopaedic Surgery
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
## 2022-04-08 ENCOUNTER — EXTERNAL HOME HEALTH (OUTPATIENT)
Dept: HOME HEALTH SERVICES | Facility: HOSPITAL | Age: 57
End: 2022-04-08
Payer: COMMERCIAL

## 2022-04-11 ENCOUNTER — HOSPITAL ENCOUNTER (OUTPATIENT)
Dept: RADIOLOGY | Facility: HOSPITAL | Age: 57
Discharge: HOME | End: 2022-04-11
Attending: NURSE PRACTITIONER
Payer: COMMERCIAL

## 2022-04-11 ENCOUNTER — OFFICE VISIT (OUTPATIENT)
Dept: ORTHOPEDICS | Facility: CLINIC | Age: 57
End: 2022-04-11
Payer: COMMERCIAL

## 2022-04-11 ENCOUNTER — TELEPHONE (OUTPATIENT)
Dept: ORTHOPEDICS | Facility: CLINIC | Age: 57
End: 2022-04-11
Payer: COMMERCIAL

## 2022-04-11 VITALS — HEIGHT: 63 IN | BODY MASS INDEX: 30.12 KG/M2 | WEIGHT: 170 LBS

## 2022-04-11 DIAGNOSIS — S82.872K CLOSED DISPLACED PILON FRACTURE OF LEFT TIBIA WITH NONUNION, SUBSEQUENT ENCOUNTER: Primary | ICD-10-CM

## 2022-04-11 DIAGNOSIS — M25.572 LEFT ANKLE PAIN, UNSPECIFIED CHRONICITY: ICD-10-CM

## 2022-04-11 DIAGNOSIS — Z98.890 POST-OPERATIVE STATE: ICD-10-CM

## 2022-04-11 PROCEDURE — 99024 PR POST-OP FOLLOW-UP VISIT: ICD-10-PCS | Mod: S$GLB,,, | Performed by: NURSE PRACTITIONER

## 2022-04-11 PROCEDURE — 1160F RVW MEDS BY RX/DR IN RCRD: CPT | Mod: CPTII,S$GLB,, | Performed by: NURSE PRACTITIONER

## 2022-04-11 PROCEDURE — 29405 APPL SHORT LEG CAST: CPT | Mod: 58,LT,S$GLB, | Performed by: NURSE PRACTITIONER

## 2022-04-11 PROCEDURE — 29405 PR APPLY SHORT LEG CAST: ICD-10-PCS | Mod: 58,LT,S$GLB, | Performed by: NURSE PRACTITIONER

## 2022-04-11 PROCEDURE — 99024 POSTOP FOLLOW-UP VISIT: CPT | Mod: S$GLB,,, | Performed by: NURSE PRACTITIONER

## 2022-04-11 PROCEDURE — 73610 XR ANKLE COMPLETE 3 VIEW LEFT: ICD-10-PCS | Mod: 26,LT,, | Performed by: RADIOLOGY

## 2022-04-11 PROCEDURE — 3008F PR BODY MASS INDEX (BMI) DOCUMENTED: ICD-10-PCS | Mod: CPTII,S$GLB,, | Performed by: NURSE PRACTITIONER

## 2022-04-11 PROCEDURE — 73610 X-RAY EXAM OF ANKLE: CPT | Mod: TC,LT

## 2022-04-11 PROCEDURE — 99999 PR PBB SHADOW E&M-EST. PATIENT-LVL II: ICD-10-PCS | Mod: PBBFAC,,, | Performed by: NURSE PRACTITIONER

## 2022-04-11 PROCEDURE — 1159F PR MEDICATION LIST DOCUMENTED IN MEDICAL RECORD: ICD-10-PCS | Mod: CPTII,S$GLB,, | Performed by: NURSE PRACTITIONER

## 2022-04-11 PROCEDURE — 73610 X-RAY EXAM OF ANKLE: CPT | Mod: 26,LT,, | Performed by: RADIOLOGY

## 2022-04-11 PROCEDURE — 99999 PR PBB SHADOW E&M-EST. PATIENT-LVL II: CPT | Mod: PBBFAC,,, | Performed by: NURSE PRACTITIONER

## 2022-04-11 PROCEDURE — 3008F BODY MASS INDEX DOCD: CPT | Mod: CPTII,S$GLB,, | Performed by: NURSE PRACTITIONER

## 2022-04-11 PROCEDURE — 1159F MED LIST DOCD IN RCRD: CPT | Mod: CPTII,S$GLB,, | Performed by: NURSE PRACTITIONER

## 2022-04-11 PROCEDURE — 1160F PR REVIEW ALL MEDS BY PRESCRIBER/CLIN PHARMACIST DOCUMENTED: ICD-10-PCS | Mod: CPTII,S$GLB,, | Performed by: NURSE PRACTITIONER

## 2022-04-11 RX ORDER — OXYCODONE HYDROCHLORIDE 10 MG/1
10 TABLET ORAL EVERY 4 HOURS PRN
Qty: 100 TABLET | Refills: 0 | Status: SHIPPED | OUTPATIENT
Start: 2022-04-11 | End: 2022-07-07

## 2022-04-11 RX ORDER — OXYCODONE HYDROCHLORIDE 10 MG/1
10 TABLET ORAL EVERY 4 HOURS PRN
Qty: 100 TABLET | Refills: 0 | OUTPATIENT
Start: 2022-04-11

## 2022-04-11 NOTE — TELEPHONE ENCOUNTER
Left a voice message for pt, that RAHEL Moody NP will not be in the office this morning 4/11/22. RAHEL Moody NP will be in the office after 4/11/22 @ 1:00 pm. Pt is schedule to be seen on 4/11/22 @ 2:45 pm.
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
## 2022-04-12 ENCOUNTER — CLINICAL SUPPORT (OUTPATIENT)
Dept: ORTHOPEDICS | Facility: CLINIC | Age: 57
End: 2022-04-12
Payer: COMMERCIAL

## 2022-04-12 DIAGNOSIS — E03.9 ACQUIRED HYPOTHYROIDISM: ICD-10-CM

## 2022-04-18 ENCOUNTER — OFFICE VISIT (OUTPATIENT)
Dept: ORTHOPEDICS | Facility: CLINIC | Age: 57
End: 2022-04-18
Payer: COMMERCIAL

## 2022-04-18 ENCOUNTER — TELEPHONE (OUTPATIENT)
Dept: ORTHOPEDICS | Facility: CLINIC | Age: 57
End: 2022-04-18

## 2022-04-18 ENCOUNTER — PATIENT MESSAGE (OUTPATIENT)
Dept: ADMINISTRATIVE | Facility: OTHER | Age: 57
End: 2022-04-18
Payer: COMMERCIAL

## 2022-04-18 DIAGNOSIS — S82.872K CLOSED DISPLACED PILON FRACTURE OF LEFT TIBIA WITH NONUNION, SUBSEQUENT ENCOUNTER: Primary | ICD-10-CM

## 2022-04-18 DIAGNOSIS — Z98.890 POST-OPERATIVE STATE: ICD-10-CM

## 2022-04-18 PROCEDURE — 1160F RVW MEDS BY RX/DR IN RCRD: CPT | Mod: CPTII,S$GLB,, | Performed by: NURSE PRACTITIONER

## 2022-04-18 PROCEDURE — 1159F PR MEDICATION LIST DOCUMENTED IN MEDICAL RECORD: ICD-10-PCS | Mod: CPTII,S$GLB,, | Performed by: NURSE PRACTITIONER

## 2022-04-18 PROCEDURE — 1160F PR REVIEW ALL MEDS BY PRESCRIBER/CLIN PHARMACIST DOCUMENTED: ICD-10-PCS | Mod: CPTII,S$GLB,, | Performed by: NURSE PRACTITIONER

## 2022-04-18 PROCEDURE — 99024 PR POST-OP FOLLOW-UP VISIT: ICD-10-PCS | Mod: S$GLB,,, | Performed by: NURSE PRACTITIONER

## 2022-04-18 PROCEDURE — 99999 PR PBB SHADOW E&M-EST. PATIENT-LVL I: ICD-10-PCS | Mod: PBBFAC,,, | Performed by: NURSE PRACTITIONER

## 2022-04-18 PROCEDURE — 99999 PR PBB SHADOW E&M-EST. PATIENT-LVL I: CPT | Mod: PBBFAC,,, | Performed by: NURSE PRACTITIONER

## 2022-04-18 PROCEDURE — 99024 POSTOP FOLLOW-UP VISIT: CPT | Mod: S$GLB,,, | Performed by: NURSE PRACTITIONER

## 2022-04-18 PROCEDURE — 1159F MED LIST DOCD IN RCRD: CPT | Mod: CPTII,S$GLB,, | Performed by: NURSE PRACTITIONER

## 2022-04-18 NOTE — TELEPHONE ENCOUNTER
----- Message from David Dominguez PharmD sent at 4/18/2022  7:59 AM CDT -----  Regarding: [employer] Appeal Denial  Good morning,    Unfortunately, the patient's [employer] appeal decision was upheld (denied) on 2nd level external review. At this time, there are no additional appeal options and we will close the referral at [facility]. Please let me know if I can be of further assistance.    Thank you,    David Dominguez, Kailey  Clinical Pharmacist   [facility]   P: [phone]

## 2022-04-18 NOTE — TELEPHONE ENCOUNTER
Called plan to check status on the [employer] 2nd level external review appeal. Rep states, decision was upheld (denied) on 4/4. At this time, there are no additional appeal options and we will close the referral at [facility]. Maria Elena sent to notify [facility].
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
## 2022-04-18 NOTE — PROGRESS NOTES
LT fiberglass SLC removal ordered by SARAH BETH Moody. Skin intact with no redness or bruising.

## 2022-05-02 LAB
FUNGUS SPEC CULT: NORMAL
FUNGUS SPEC CULT: NORMAL

## 2022-05-09 ENCOUNTER — TELEPHONE (OUTPATIENT)
Dept: ORTHOPEDICS | Facility: CLINIC | Age: 57
End: 2022-05-09
Payer: COMMERCIAL

## 2022-05-09 NOTE — TELEPHONE ENCOUNTER
----- Message from Abril Summers sent at 5/9/2022  8:29 AM CDT -----  Regarding: Pt called to cancel/reschedule her 10:45 post op appt today and would like a call back  Appointment Access Request:    Pt called to cancel/reschedule her 10:45 post op appt today and would like a call back    Pt can be reached at [phone]

## 2022-05-09 NOTE — TELEPHONE ENCOUNTER
Spoke with pt. Pt is reschedule to 5/10/22 @ 1430, per pt request. Patient states verbal understanding and has no further questions.

## 2022-05-10 ENCOUNTER — OFFICE VISIT (OUTPATIENT)
Dept: ORTHOPEDICS | Facility: CLINIC | Age: 57
End: 2022-05-10
Payer: COMMERCIAL

## 2022-05-10 ENCOUNTER — HOSPITAL ENCOUNTER (OUTPATIENT)
Dept: RADIOLOGY | Facility: HOSPITAL | Age: 57
Discharge: HOME | End: 2022-05-10
Attending: NURSE PRACTITIONER
Payer: COMMERCIAL

## 2022-05-10 ENCOUNTER — DOCUMENT SCAN (OUTPATIENT)
Dept: HOME HEALTH SERVICES | Facility: HOSPITAL | Age: 57
End: 2022-05-10
Payer: COMMERCIAL

## 2022-05-10 DIAGNOSIS — Z98.890 POST-OPERATIVE STATE: ICD-10-CM

## 2022-05-10 DIAGNOSIS — S82.872K CLOSED DISPLACED PILON FRACTURE OF LEFT TIBIA WITH NONUNION, SUBSEQUENT ENCOUNTER: Primary | ICD-10-CM

## 2022-05-10 DIAGNOSIS — S82.872K CLOSED DISPLACED PILON FRACTURE OF LEFT TIBIA WITH NONUNION, SUBSEQUENT ENCOUNTER: ICD-10-CM

## 2022-05-10 PROCEDURE — 1159F PR MEDICATION LIST DOCUMENTED IN MEDICAL RECORD: ICD-10-PCS | Mod: CPTII,S$GLB,, | Performed by: NURSE PRACTITIONER

## 2022-05-10 PROCEDURE — 99024 POSTOP FOLLOW-UP VISIT: CPT | Mod: S$GLB,,, | Performed by: NURSE PRACTITIONER

## 2022-05-10 PROCEDURE — 99999 PR PBB SHADOW E&M-EST. PATIENT-LVL IV: CPT | Mod: PBBFAC,,, | Performed by: NURSE PRACTITIONER

## 2022-05-10 PROCEDURE — 99999 PR PBB SHADOW E&M-EST. PATIENT-LVL IV: ICD-10-PCS | Mod: PBBFAC,,, | Performed by: NURSE PRACTITIONER

## 2022-05-10 PROCEDURE — 1159F MED LIST DOCD IN RCRD: CPT | Mod: CPTII,S$GLB,, | Performed by: NURSE PRACTITIONER

## 2022-05-10 PROCEDURE — 1160F RVW MEDS BY RX/DR IN RCRD: CPT | Mod: CPTII,S$GLB,, | Performed by: NURSE PRACTITIONER

## 2022-05-10 PROCEDURE — 99024 PR POST-OP FOLLOW-UP VISIT: ICD-10-PCS | Mod: S$GLB,,, | Performed by: NURSE PRACTITIONER

## 2022-05-10 PROCEDURE — 73610 X-RAY EXAM OF ANKLE: CPT | Mod: 26,LT,, | Performed by: RADIOLOGY

## 2022-05-10 PROCEDURE — 73610 XR ANKLE COMPLETE 3 VIEW LEFT: ICD-10-PCS | Mod: 26,LT,, | Performed by: RADIOLOGY

## 2022-05-10 PROCEDURE — 73610 X-RAY EXAM OF ANKLE: CPT | Mod: TC,LT

## 2022-05-10 PROCEDURE — 1160F PR REVIEW ALL MEDS BY PRESCRIBER/CLIN PHARMACIST DOCUMENTED: ICD-10-PCS | Mod: CPTII,S$GLB,, | Performed by: NURSE PRACTITIONER
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
## 2022-05-16 LAB
ACID FAST MOD KINY STN SPEC: NORMAL
MYCOBACTERIUM SPEC QL CULT: NORMAL

## 2022-05-18 ENCOUNTER — CLINICAL SUPPORT (OUTPATIENT)
Dept: REHABILITATION | Facility: HOSPITAL | Age: 57
End: 2022-05-18
Attending: NURSE PRACTITIONER
Payer: COMMERCIAL

## 2022-05-18 DIAGNOSIS — R29.898 ANKLE WEAKNESS: ICD-10-CM

## 2022-05-18 DIAGNOSIS — S82.872K CLOSED DISPLACED PILON FRACTURE OF LEFT TIBIA WITH NONUNION, SUBSEQUENT ENCOUNTER: ICD-10-CM

## 2022-05-18 PROCEDURE — 97161 PT EVAL LOW COMPLEX 20 MIN: CPT | Mod: PN

## 2022-05-18 PROCEDURE — 97110 THERAPEUTIC EXERCISES: CPT | Mod: PN

## 2022-05-18 NOTE — PLAN OF CARE
[facility]   Physical Therapy Initial Evaluation     Date: 5/18/2022   [facility]  Clinic Number: [id number]    Therapy Diagnosis:   Encounter Diagnoses   Name Primary?   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    Time In: 1200  Time Out: 1240  Total Appointment Time (timed & untimed codes): 40 minutes      SUBJECTIVE     Date of onset: 9/5/20 DOI.  History of current condition - Faith reports: pt fell from a swing 7/5/20 and suffered pilon fx of left tibia.It was repaired with ext fixator on 9/7/20 f/b ORIF 9/18/20.Some of the hardware was removed 3/28/22.        Falls: NO    Imaging,     CLINICAL HISTORY:  Displaced pilon fracture of left tibia, subsequent encounter for closed fracture with nonunion     TECHNIQUE:  AP, lateral and oblique views of the left ankle were performed.     COMPARISON:  04/11/2022     FINDINGS:  Patient has had extensive internal fixation of the distal tibia and a long screw in the fibula position and alignment is satisfactory.     Prior Therapy: In 2020 following first sx.  Social History: in [place] lives with their family  Occupation: Not working.  Prior Level of Function: Independent   Current Level of Function: Modified independent.    Pain:  Current 2/10, worst 10/10, best 0/10   Location: left ankle  Description: Dull, Burning, Throbbing and Sharp  Aggravating Factors: Standing, Bending, Touching, Walking, Extension and Flexing  Easing Factors:  relaxation, pain medication, rest and elevation    Patients goals: walking without AD and no boot.     Medical History:   Past Medical History:   Diagnosis Date    Anxiety     Depression     Encounter for blood transfusion     Esophageal dysphagia     Fibromyalgia     Gastric bypass status for obesity     H/O dental abscess 4/14/2014    drained    Headache(784.0)     migraines.  Treated at [facility] (Dr. 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  Pt states took too many then realized her mistake    Polycystic ovaries     S/P JUHI-BSO     Thyroid disease     hypothyroidism       Surgical History:   Faith SIMS Betopaul  has a past surgical history that includes Gastric bypass; Hysterectomy; Abdominal surgery; Cardiac catheterization; Breast surgery (2004); Hernia repair; Ovarian cyst removal; Dental surgery; Colonoscopy (N/A, 11/3/2015); Upper gastrointestinal endoscopy; tennis elbow repair (Left, 01/2019); Rotator cuff repair (Left, 01/2019); Application of large external fixation device to tibia (Left, 9/7/2020); Open reduction and internal fixation (ORIF) of pilon fracture (Left, 9/18/2020); Removal of external fixation device (Left, 9/18/2020); Application of wound vacuum-assisted closure device (Left, 9/18/2020); Injection of anesthetic agent around nerve (N/A, 3/29/2022); Open reduction and internal fixation (ORIF) of pilon fracture (Left, 3/28/2022); Ankle hardware removal (Left, 3/28/2022); and Iliac crest bone graft (Left, 3/28/2022).    Medications:   Faith has a current  medication list which includes the following prescription(s): acetaminophen, aimovig autoinjector, amlodipine, aripiprazole, cholecalciferol (vitamin d3), cyanocobalamin (vitamin b-12), dextroamphetamine-amphetamine, docusate sodium, doxycycline, gabapentin, hydrochlorothiazide, levothyroxine, lidocaine, contrave, oxycodone, oxycodone, oxycodone, progesterone, propranolol, rizatriptan, tizanidine, topiramate, venlafaxine, vyvanse, and [DISCONTINUED] aspirin, and the following Facility-Administered Medications: midazolam.    Allergies:   Review of patient's allergies indicates:   Allergen Reactions    Cephalexin Anaphylaxis    Codeine Itching    Nsaids (non-steroidal anti-inflammatory drug)      Has a history of bleeding ulcers          OBJECTIVE       Ankle  Right   Left  Pain/Dysfunction with Movement    AROM PROM MMT AROM PROM MMT    Plantarflexion    48 50 2    Dorsiflexion    0 2 2    Inversion    8 10 2    Eversion    4 6 2      Gait without AD,wearing a boot.WBAT.  Fig 8; LT;47.8, RT;47.7 cm.  Palpation;all aspects of left ankle tender.  Transfers;CGA.       Limitation/Restriction for FOTO ankle Survey    Therapist reviewed FOTO scores for Faith Bernard on 5/18/2022.   FOTO documents entered into [employer] - see Media section.    Limitation Score: 64%         TREATMENT     Total Treatment time (time-based codes) separate from Evaluation: 8 minutes      Faith received the treatments listed below:      therapeutic exercises to develop ROM for 8 minutes including:  PROM,  DF/PF 3X10.  INV/EVER 3X10  Towel crunch 3'.NWB    PATIENT EDUCATION AND HOME EXERCISES     Education provided:   - Role of PT POC..    ASSESSMENT     Faith is a 57 y.o. female referred to outpatient Physical Therapy with a medical diagnosis of s/p pilon fx of left tibia. Patient presents with ROM and strength deficits,gait abnormality,impaired function due to pain and above listed deficits.    Patient prognosis is Good.   Patient will  benefit from skilled outpatient Physical Therapy to address the deficits stated above and in the chart below, provide patient /family education, and to maximize patientt's level of independence.     Plan of care discussed with patient: Yes  Patient's spiritual, cultural and educational needs considered and patient is agreeable to the plan of care and goals as stated below:     Anticipated Barriers for therapy: no    Medical Necessity is demonstrated by the following  History  Co-morbidities and personal factors that may impact the plan of care Co-morbidities:   high BMI    Personal Factors:   no deficits     low   Examination  Body Structures and Functions, activity limitations and participation restrictions that may impact the plan of care Body Regions:   lower extremities    Body Systems:    gross symmetry  ROM  strength  transfers    Participation Restrictions:   no    Activity limitations:   Learning and applying knowledge  no deficits    General Tasks and Commands  no deficits    Communication  no deficits    Mobility  lifting and carrying objects  walking    Self care  no deficits    Domestic Life  no deficits    Interactions/Relationships  no deficits    Life Areas  no deficits    Community and Social Life  no deficits         low   Clinical Presentation stable and uncomplicated low   Decision Making/ Complexity Score: low     Goals:  Short Term Goals: 4 weeks   1.Decrease pain x 1 grade.  2.Start HEP.    Long Term Goals: 10 weeks   1.Tolerable pain 0-4/10.  2.Independent HEP for maintenance.  3.ROM WNL/WFL to normalize gait.  4.Strength 5/5 in all planes to restore function.  5.Pt will ambulate 500' without boot.  6.Pt will return to previous LOF.      PLAN   Plan of care Certification: 5/18/2022 to 8/5/22.    Outpatient Physical Therapy 2 times weekly for 10 weeks to include the following interventions: Electrical Stimulation PRN, Fluidotherapy, Gait Training, Manual Therapy, Moist Heat/ Ice, Patient  Education, Therapeutic Activities, Therapeutic Exercise and dry needling PRN.IMS PRN..     Luciano Tierney, PT      I CERTIFY THE NEED FOR THESE SERVICES FURNISHED UNDER THIS PLAN OF TREATMENT AND WHILE UNDER MY CARE   Physician's comments:     Physician's Signature: ___________________________________________________

## 2022-05-23 NOTE — ADDENDUM NOTE
Addendum  created 05/23/22 1522 by Myrtle Cai MD    Attestation recorded in Intraprocedure, Intraprocedure Attestations filed

## 2022-05-25 DIAGNOSIS — S82.872K CLOSED DISPLACED PILON FRACTURE OF LEFT TIBIA WITH NONUNION, SUBSEQUENT ENCOUNTER: Primary | ICD-10-CM

## 2022-05-25 NOTE — PROGRESS NOTES
Patient call the clinic, she is requesting a change in venue for outpatient physical therapy.  She provided the fax number and orders will be sent to the new location today.

## 2022-06-15 ENCOUNTER — TELEPHONE (OUTPATIENT)
Dept: ORTHOPEDICS | Facility: CLINIC | Age: 57
End: 2022-06-15
Payer: COMMERCIAL

## 2022-06-15 NOTE — TELEPHONE ENCOUNTER
Called and spoke with pt in regards to receiving a new boot due to her current boot falling apart. Informed pt I will reach out to [facility] and he will give her a call.

## 2022-06-21 ENCOUNTER — TELEPHONE (OUTPATIENT)
Dept: ORTHOPEDICS | Facility: CLINIC | Age: 57
End: 2022-06-21

## 2022-06-21 ENCOUNTER — PATIENT MESSAGE (OUTPATIENT)
Dept: ORTHOPEDICS | Facility: CLINIC | Age: 57
End: 2022-06-21

## 2022-06-21 ENCOUNTER — HOSPITAL ENCOUNTER (OUTPATIENT)
Dept: RADIOLOGY | Facility: HOSPITAL | Age: 57
Discharge: HOME | End: 2022-06-21
Attending: NURSE PRACTITIONER
Payer: COMMERCIAL

## 2022-06-21 ENCOUNTER — OFFICE VISIT (OUTPATIENT)
Dept: ORTHOPEDICS | Facility: CLINIC | Age: 57
End: 2022-06-21
Payer: COMMERCIAL

## 2022-06-21 VITALS — HEIGHT: 63 IN | BODY MASS INDEX: 30.12 KG/M2 | WEIGHT: 170 LBS

## 2022-06-21 DIAGNOSIS — S82.872K CLOSED DISPLACED PILON FRACTURE OF LEFT TIBIA WITH NONUNION, SUBSEQUENT ENCOUNTER: Primary | ICD-10-CM

## 2022-06-21 DIAGNOSIS — Z98.890 POST-OPERATIVE STATE: ICD-10-CM

## 2022-06-21 DIAGNOSIS — M25.572 ACUTE LEFT ANKLE PAIN: ICD-10-CM

## 2022-06-21 DIAGNOSIS — M25.572 ACUTE LEFT ANKLE PAIN: Primary | ICD-10-CM

## 2022-06-21 PROCEDURE — 73610 X-RAY EXAM OF ANKLE: CPT | Mod: TC,LT

## 2022-06-21 PROCEDURE — 99999 PR PBB SHADOW E&M-EST. PATIENT-LVL IV: CPT | Mod: PBBFAC,,, | Performed by: NURSE PRACTITIONER

## 2022-06-21 PROCEDURE — 3008F BODY MASS INDEX DOCD: CPT | Mod: CPTII,S$GLB,, | Performed by: NURSE PRACTITIONER

## 2022-06-21 PROCEDURE — 99999 PR PBB SHADOW E&M-EST. PATIENT-LVL IV: ICD-10-PCS | Mod: PBBFAC,,, | Performed by: NURSE PRACTITIONER

## 2022-06-21 PROCEDURE — 1160F PR REVIEW ALL MEDS BY PRESCRIBER/CLIN PHARMACIST DOCUMENTED: ICD-10-PCS | Mod: CPTII,S$GLB,, | Performed by: NURSE PRACTITIONER

## 2022-06-21 PROCEDURE — 73610 X-RAY EXAM OF ANKLE: CPT | Mod: 26,LT,, | Performed by: RADIOLOGY

## 2022-06-21 PROCEDURE — 1160F RVW MEDS BY RX/DR IN RCRD: CPT | Mod: CPTII,S$GLB,, | Performed by: NURSE PRACTITIONER

## 2022-06-21 PROCEDURE — 3008F PR BODY MASS INDEX (BMI) DOCUMENTED: ICD-10-PCS | Mod: CPTII,S$GLB,, | Performed by: NURSE PRACTITIONER

## 2022-06-21 PROCEDURE — 99024 PR POST-OP FOLLOW-UP VISIT: ICD-10-PCS | Mod: S$GLB,,, | Performed by: NURSE PRACTITIONER

## 2022-06-21 PROCEDURE — 1159F MED LIST DOCD IN RCRD: CPT | Mod: CPTII,S$GLB,, | Performed by: NURSE PRACTITIONER

## 2022-06-21 PROCEDURE — 73610 XR ANKLE COMPLETE 3 VIEW LEFT: ICD-10-PCS | Mod: 26,LT,, | Performed by: RADIOLOGY

## 2022-06-21 PROCEDURE — 99024 POSTOP FOLLOW-UP VISIT: CPT | Mod: S$GLB,,, | Performed by: NURSE PRACTITIONER

## 2022-06-21 PROCEDURE — 1159F PR MEDICATION LIST DOCUMENTED IN MEDICAL RECORD: ICD-10-PCS | Mod: CPTII,S$GLB,, | Performed by: NURSE PRACTITIONER
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
## 2022-06-22 ENCOUNTER — PATIENT MESSAGE (OUTPATIENT)
Dept: ORTHOPEDICS | Facility: CLINIC | Age: 57
End: 2022-06-22
Payer: COMMERCIAL

## 2022-06-22 DIAGNOSIS — S82.872K CLOSED DISPLACED PILON FRACTURE OF LEFT TIBIA WITH NONUNION, SUBSEQUENT ENCOUNTER: Primary | ICD-10-CM

## 2022-07-05 ENCOUNTER — PATIENT MESSAGE (OUTPATIENT)
Dept: ORTHOPEDICS | Facility: CLINIC | Age: 57
End: 2022-07-05
Payer: COMMERCIAL

## 2022-07-07 ENCOUNTER — HOSPITAL ENCOUNTER (OUTPATIENT)
Dept: RADIOLOGY | Facility: HOSPITAL | Age: 57
Discharge: HOME | End: 2022-07-07
Attending: NURSE PRACTITIONER
Payer: COMMERCIAL

## 2022-07-07 ENCOUNTER — OFFICE VISIT (OUTPATIENT)
Dept: ORTHOPEDICS | Facility: CLINIC | Age: 57
End: 2022-07-07
Payer: COMMERCIAL

## 2022-07-07 ENCOUNTER — TELEPHONE (OUTPATIENT)
Dept: ORTHOPEDICS | Facility: CLINIC | Age: 57
End: 2022-07-07
Payer: COMMERCIAL

## 2022-07-07 DIAGNOSIS — S82.872D CLOSED DISPLACED PILON FRACTURE OF LEFT TIBIA WITH ROUTINE HEALING, SUBSEQUENT ENCOUNTER: Primary | ICD-10-CM

## 2022-07-07 DIAGNOSIS — G89.4 CHRONIC PAIN SYNDROME: ICD-10-CM

## 2022-07-07 DIAGNOSIS — M25.572 ACUTE LEFT ANKLE PAIN: ICD-10-CM

## 2022-07-07 DIAGNOSIS — M25.572 ACUTE LEFT ANKLE PAIN: Primary | ICD-10-CM

## 2022-07-07 PROCEDURE — 99999 PR PBB SHADOW E&M-EST. PATIENT-LVL III: CPT | Mod: PBBFAC,,, | Performed by: NURSE PRACTITIONER

## 2022-07-07 PROCEDURE — 73610 XR ANKLE COMPLETE 3 VIEW LEFT: ICD-10-PCS | Mod: 26,LT,, | Performed by: RADIOLOGY

## 2022-07-07 PROCEDURE — 1159F PR MEDICATION LIST DOCUMENTED IN MEDICAL RECORD: ICD-10-PCS | Mod: CPTII,S$GLB,, | Performed by: NURSE PRACTITIONER

## 2022-07-07 PROCEDURE — 99999 PR PBB SHADOW E&M-EST. PATIENT-LVL III: ICD-10-PCS | Mod: PBBFAC,,, | Performed by: NURSE PRACTITIONER

## 2022-07-07 PROCEDURE — 73610 X-RAY EXAM OF ANKLE: CPT | Mod: TC,LT

## 2022-07-07 PROCEDURE — 1160F PR REVIEW ALL MEDS BY PRESCRIBER/CLIN PHARMACIST DOCUMENTED: ICD-10-PCS | Mod: CPTII,S$GLB,, | Performed by: NURSE PRACTITIONER

## 2022-07-07 PROCEDURE — 99214 OFFICE O/P EST MOD 30 MIN: CPT | Mod: S$GLB,,, | Performed by: NURSE PRACTITIONER

## 2022-07-07 PROCEDURE — 99214 PR OFFICE/OUTPT VISIT, EST, LEVL IV, 30-39 MIN: ICD-10-PCS | Mod: S$GLB,,, | Performed by: NURSE PRACTITIONER

## 2022-07-07 PROCEDURE — 1159F MED LIST DOCD IN RCRD: CPT | Mod: CPTII,S$GLB,, | Performed by: NURSE PRACTITIONER

## 2022-07-07 PROCEDURE — 1160F RVW MEDS BY RX/DR IN RCRD: CPT | Mod: CPTII,S$GLB,, | Performed by: NURSE PRACTITIONER

## 2022-07-07 PROCEDURE — 73610 X-RAY EXAM OF ANKLE: CPT | Mod: 26,LT,, | Performed by: RADIOLOGY

## 2022-07-07 RX ORDER — OXYCODONE AND ACETAMINOPHEN 10; 325 MG/1; MG/1
1 TABLET ORAL EVERY 8 HOURS PRN
Refills: 0
Start: 2022-07-07 | End: 2023-09-18

## 2022-07-07 RX ORDER — LIDOCAINE 50 MG/G
1 PATCH TOPICAL DAILY
Qty: 30 PATCH | Refills: 0 | Status: SHIPPED | OUTPATIENT
Start: 2022-07-07 | End: 2022-12-12

## 2022-07-07 RX ORDER — GABAPENTIN 300 MG/1
1200 CAPSULE ORAL 3 TIMES DAILY
Qty: 168 CAPSULE | Refills: 0
Start: 2022-07-07 | End: 2022-10-11
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
## 2022-07-07 NOTE — TELEPHONE ENCOUNTER
Called and informed of appt and scheduled her x-rays prior to her appt today at the imaging center. Pt was satisfied with with x-ray and clinic appt.

## 2022-07-18 ENCOUNTER — TELEPHONE (OUTPATIENT)
Dept: ORTHOPEDICS | Facility: CLINIC | Age: 57
End: 2022-07-18
Payer: COMMERCIAL

## 2022-07-18 NOTE — TELEPHONE ENCOUNTER
I spoke with the patient, she reports that she had a mild increase in her pain in her ankle but states I feel fine.  She is taking her pain medication as prescribed by her pain specialist.  She also wanted to let me know that she is yet to hear from the bone stimulator company.  I advised her we will check into this and give her a call back with more detailed information.  Additionally I advised the patient should her pain persist or worsen then I recommend she call the clinic and asked to get in visit with Dr. Mcrae.  She verbalized understanding.    ----- Message from Alta Tanner MA sent at 7/18/2022  3:54 PM CDT -----    ----- Message -----  From: Mouna Price  Sent: 7/18/2022   3:17 PM CDT  To: Heidy PEREZ Staff    Type:  Patient Returning Call    Who Called: pt   Who Left Message for Patient pt   Does the patient know what this is regarding?: she want the Dr to know she still in pain and need a call   Would the patient rather a call back or a response via [employer]?  Call   Best Call Back Number: [phone]  Additional Information:  call  back

## 2022-08-04 ENCOUNTER — OFFICE VISIT (OUTPATIENT)
Dept: ORTHOPEDICS | Facility: CLINIC | Age: 57
End: 2022-08-04
Payer: COMMERCIAL

## 2022-08-04 ENCOUNTER — HOSPITAL ENCOUNTER (OUTPATIENT)
Dept: RADIOLOGY | Facility: HOSPITAL | Age: 57
Discharge: HOME | End: 2022-08-04
Attending: NURSE PRACTITIONER
Payer: COMMERCIAL

## 2022-08-04 DIAGNOSIS — S82.872K CLOSED DISPLACED PILON FRACTURE OF LEFT TIBIA WITH NONUNION, SUBSEQUENT ENCOUNTER: Primary | ICD-10-CM

## 2022-08-04 DIAGNOSIS — M25.572 ACUTE LEFT ANKLE PAIN: ICD-10-CM

## 2022-08-04 DIAGNOSIS — M25.572 ACUTE LEFT ANKLE PAIN: Primary | ICD-10-CM

## 2022-08-04 PROCEDURE — 99999 PR PBB SHADOW E&M-EST. PATIENT-LVL III: ICD-10-PCS | Mod: PBBFAC,,, | Performed by: NURSE PRACTITIONER

## 2022-08-04 PROCEDURE — 99999 PR PBB SHADOW E&M-EST. PATIENT-LVL III: CPT | Mod: PBBFAC,,, | Performed by: NURSE PRACTITIONER

## 2022-08-04 PROCEDURE — 1160F RVW MEDS BY RX/DR IN RCRD: CPT | Mod: CPTII,S$GLB,, | Performed by: NURSE PRACTITIONER

## 2022-08-04 PROCEDURE — 1159F MED LIST DOCD IN RCRD: CPT | Mod: CPTII,S$GLB,, | Performed by: NURSE PRACTITIONER

## 2022-08-04 PROCEDURE — 99214 PR OFFICE/OUTPT VISIT, EST, LEVL IV, 30-39 MIN: ICD-10-PCS | Mod: S$GLB,,, | Performed by: NURSE PRACTITIONER

## 2022-08-04 PROCEDURE — 1160F PR REVIEW ALL MEDS BY PRESCRIBER/CLIN PHARMACIST DOCUMENTED: ICD-10-PCS | Mod: CPTII,S$GLB,, | Performed by: NURSE PRACTITIONER

## 2022-08-04 PROCEDURE — 73610 XR ANKLE COMPLETE 3 VIEW LEFT: ICD-10-PCS | Mod: 26,LT,, | Performed by: RADIOLOGY

## 2022-08-04 PROCEDURE — 1159F PR MEDICATION LIST DOCUMENTED IN MEDICAL RECORD: ICD-10-PCS | Mod: CPTII,S$GLB,, | Performed by: NURSE PRACTITIONER

## 2022-08-04 PROCEDURE — 73610 X-RAY EXAM OF ANKLE: CPT | Mod: 26,LT,, | Performed by: RADIOLOGY

## 2022-08-04 PROCEDURE — 73610 X-RAY EXAM OF ANKLE: CPT | Mod: TC,LT

## 2022-08-04 PROCEDURE — 99214 OFFICE O/P EST MOD 30 MIN: CPT | Mod: S$GLB,,, | Performed by: NURSE PRACTITIONER
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
## 2022-08-10 ENCOUNTER — OFFICE VISIT (OUTPATIENT)
Dept: ORTHOPEDICS | Facility: CLINIC | Age: 57
End: 2022-08-10
Payer: COMMERCIAL

## 2022-08-10 VITALS — WEIGHT: 170 LBS | BODY MASS INDEX: 30.12 KG/M2 | HEIGHT: 63 IN

## 2022-08-10 DIAGNOSIS — S82.872K CLOSED DISPLACED PILON FRACTURE OF LEFT TIBIA WITH NONUNION, SUBSEQUENT ENCOUNTER: Primary | ICD-10-CM

## 2022-08-10 PROCEDURE — 99212 PR OFFICE/OUTPT VISIT, EST, LEVL II, 10-19 MIN: ICD-10-PCS | Mod: S$GLB,,, | Performed by: ORTHOPAEDIC SURGERY

## 2022-08-10 PROCEDURE — 1160F PR REVIEW ALL MEDS BY PRESCRIBER/CLIN PHARMACIST DOCUMENTED: ICD-10-PCS | Mod: CPTII,S$GLB,, | Performed by: ORTHOPAEDIC SURGERY

## 2022-08-10 PROCEDURE — 1160F RVW MEDS BY RX/DR IN RCRD: CPT | Mod: CPTII,S$GLB,, | Performed by: ORTHOPAEDIC SURGERY

## 2022-08-10 PROCEDURE — 1159F PR MEDICATION LIST DOCUMENTED IN MEDICAL RECORD: ICD-10-PCS | Mod: CPTII,S$GLB,, | Performed by: ORTHOPAEDIC SURGERY

## 2022-08-10 PROCEDURE — 99999 PR PBB SHADOW E&M-EST. PATIENT-LVL IV: CPT | Mod: PBBFAC,,, | Performed by: ORTHOPAEDIC SURGERY

## 2022-08-10 PROCEDURE — 1159F MED LIST DOCD IN RCRD: CPT | Mod: CPTII,S$GLB,, | Performed by: ORTHOPAEDIC SURGERY

## 2022-08-10 PROCEDURE — 3008F BODY MASS INDEX DOCD: CPT | Mod: CPTII,S$GLB,, | Performed by: ORTHOPAEDIC SURGERY

## 2022-08-10 PROCEDURE — 3008F PR BODY MASS INDEX (BMI) DOCUMENTED: ICD-10-PCS | Mod: CPTII,S$GLB,, | Performed by: ORTHOPAEDIC SURGERY

## 2022-08-10 PROCEDURE — 99212 OFFICE O/P EST SF 10 MIN: CPT | Mod: S$GLB,,, | Performed by: ORTHOPAEDIC SURGERY

## 2022-08-10 PROCEDURE — 99999 PR PBB SHADOW E&M-EST. PATIENT-LVL IV: ICD-10-PCS | Mod: PBBFAC,,, | Performed by: ORTHOPAEDIC SURGERY
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
## 2022-08-18 ENCOUNTER — TELEPHONE (OUTPATIENT)
Dept: ORTHOPEDICS | Facility: CLINIC | Age: 57
End: 2022-08-18
Payer: COMMERCIAL

## 2022-08-18 NOTE — TELEPHONE ENCOUNTER
I spoke with patient regarding her EMG results.  EMG reveals that she does have perineal damage in her left lower leg.  I advised the patient and I discussed this with Dr. Mcrae.  He reviewed the results.  He does not feel that the patient would require a nerve decompression at this time.  She does have motor function in her lower leg.  He is recommending aggressive physical therapy.    I explained the recommendation to the patient.  She states that physical therapy had told her the same thing.  She is wanting to continue to work with physical therapy to improve her motor function of her left lower leg.  She is also asking about trying to get the bone stimulator to strengthen the bone as well as getting on medication that would strengthen her bone as well.  I advised the patient that I will work on this for her and notify Bryce in our fracture clinic to see if there is an alternative medication she could use.  Verb understanding.

## 2022-09-09 ENCOUNTER — TELEPHONE (OUTPATIENT)
Dept: UROGYNECOLOGY | Facility: CLINIC | Age: 57
End: 2022-09-09
Payer: COMMERCIAL

## 2022-09-09 NOTE — TELEPHONE ENCOUNTER
----- Message from Yris Fernandez sent at 9/9/2022 10:39 AM CDT -----  Contact: patient  Type: Appointment Request    Caller is requesting an appointment.      Name of Caller:  patient  When is the first available appointment?  N/a  Symptoms:  new patient- referred for bladder suspension   Best Call Back Number:  [phone] (home)   Additional Information:

## 2022-09-09 NOTE — TELEPHONE ENCOUNTER
Informed that Dr Swanson is not taking any new patients , states she was referred by Dr Ochoa says she has a prolpase and rectocele that she would like to get taken care of at the same time.

## 2022-09-15 ENCOUNTER — OFFICE VISIT (OUTPATIENT)
Dept: UROGYNECOLOGY | Facility: CLINIC | Age: 57
End: 2022-09-15
Payer: COMMERCIAL

## 2022-09-15 VITALS
BODY MASS INDEX: 30.12 KG/M2 | WEIGHT: 170 LBS | DIASTOLIC BLOOD PRESSURE: 87 MMHG | HEART RATE: 72 BPM | SYSTOLIC BLOOD PRESSURE: 137 MMHG | HEIGHT: 63 IN

## 2022-09-15 DIAGNOSIS — N39.3 SUI (STRESS URINARY INCONTINENCE, FEMALE): Primary | ICD-10-CM

## 2022-09-15 PROCEDURE — 99204 OFFICE O/P NEW MOD 45 MIN: CPT | Mod: S$GLB,,, | Performed by: OBSTETRICS & GYNECOLOGY

## 2022-09-15 PROCEDURE — 1159F MED LIST DOCD IN RCRD: CPT | Mod: CPTII,S$GLB,, | Performed by: OBSTETRICS & GYNECOLOGY

## 2022-09-15 PROCEDURE — 99999 PR PBB SHADOW E&M-EST. PATIENT-LVL IV: ICD-10-PCS | Mod: PBBFAC,,, | Performed by: OBSTETRICS & GYNECOLOGY

## 2022-09-15 PROCEDURE — 99204 PR OFFICE/OUTPT VISIT, NEW, LEVL IV, 45-59 MIN: ICD-10-PCS | Mod: S$GLB,,, | Performed by: OBSTETRICS & GYNECOLOGY

## 2022-09-15 PROCEDURE — 3075F SYST BP GE 130 - 139MM HG: CPT | Mod: CPTII,S$GLB,, | Performed by: OBSTETRICS & GYNECOLOGY

## 2022-09-15 PROCEDURE — 3079F DIAST BP 80-89 MM HG: CPT | Mod: CPTII,S$GLB,, | Performed by: OBSTETRICS & GYNECOLOGY

## 2022-09-15 PROCEDURE — 3008F BODY MASS INDEX DOCD: CPT | Mod: CPTII,S$GLB,, | Performed by: OBSTETRICS & GYNECOLOGY

## 2022-09-15 PROCEDURE — 3075F PR MOST RECENT SYSTOLIC BLOOD PRESS GE 130-139MM HG: ICD-10-PCS | Mod: CPTII,S$GLB,, | Performed by: OBSTETRICS & GYNECOLOGY

## 2022-09-15 PROCEDURE — 3008F PR BODY MASS INDEX (BMI) DOCUMENTED: ICD-10-PCS | Mod: CPTII,S$GLB,, | Performed by: OBSTETRICS & GYNECOLOGY

## 2022-09-15 PROCEDURE — 3079F PR MOST RECENT DIASTOLIC BLOOD PRESSURE 80-89 MM HG: ICD-10-PCS | Mod: CPTII,S$GLB,, | Performed by: OBSTETRICS & GYNECOLOGY

## 2022-09-15 PROCEDURE — 1159F PR MEDICATION LIST DOCUMENTED IN MEDICAL RECORD: ICD-10-PCS | Mod: CPTII,S$GLB,, | Performed by: OBSTETRICS & GYNECOLOGY

## 2022-09-15 PROCEDURE — 99999 PR PBB SHADOW E&M-EST. PATIENT-LVL IV: CPT | Mod: PBBFAC,,, | Performed by: OBSTETRICS & GYNECOLOGY

## 2022-09-15 NOTE — PROGRESS NOTES
Subjective:      Patient ID: Faith Bernard is a 57 y.o. female.    Chief Complaint:  No chief complaint on file.      History of Present Illness  52-year-old who underwent a the abdominal hysterectomy open in back in around 1998 at that time she had some type of concomitant bladder work that was done to help with leakage of urine.    She would now like this repeated as there is leakage of urine with coughing and sneezing          Past Medical History:   Diagnosis Date    Anxiety     Depression     Encounter for blood transfusion     Esophageal dysphagia     Fibromyalgia     Gastric bypass status for obesity     H/O dental abscess 4/14/2014    drained    Headache(784.0)     migraines.  Treated at [facility] (Dr. 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  Pt states took too many then realized her mistake    Polycystic ovaries     S/P JUHI-BSO     Thyroid disease     hypothyroidism       Past Surgical History:   Procedure Laterality Date    ABDOMINAL SURGERY      ANKLE HARDWARE REMOVAL Left 3/28/2022    Procedure: REMOVAL, HARDWARE, ANKLE;  Surgeon: Fer Mcrae MD;  Location: [facility] OR [address] [place] [address];  Service: Orthopedics;  Laterality: Left;    APPLICATION OF LARGE EXTERNAL FIXATION DEVICE TO TIBIA Left 9/7/2020    Procedure: APPLICATION, EXTERNAL FIXATION DEVICE, TIBIA;  Surgeon: Sujata Londono MD;  Location: [facility] OR [address] [place] [address];  Service: Orthopedics;  Laterality: Left;  need paralysis    APPLICATION OF WOUND VACUUM-ASSISTED CLOSURE DEVICE Left 9/18/2020    Procedure: APPLICATION, WOUND VAC;  Surgeon:  Fer Mcrae MD;  [address];  Service: Orthopedics;  Laterality: Left;    BREAST SURGERY  2004    Breast Reduction    CARDIAC CATHETERIZATION      COLONOSCOPY N/A 11/3/2015    Procedure: COLONOSCOPY;  Surgeon: Timothy Barber MD;  Location: [facility];  Service: Endoscopy;  Laterality: N/A;    DENTAL SURGERY      4 teeth removed    GASTRIC BYPASS      HERNIA REPAIR      HYSTERECTOMY      ILIAC CREST BONE GRAFT Left 3/28/2022    Procedure: BONE GRAFT, ILIAC CREST;  Surgeon: Fer Mcrae MD;  [facility];  Service: Orthopedics;  Laterality: Left;    INJECTION OF ANESTHETIC AGENT AROUND NERVE N/A 3/29/2022    Procedure: Block, Nerve;  Surgeon: Alexus Surgeon;  Location: [facility];  Service: Anesthesiology;  Laterality: N/A;    OPEN REDUCTION AND INTERNAL FIXATION (ORIF) OF PILON FRACTURE Left 9/18/2020    Procedure: ORIF, FRACTURE, PILON - left. Diving board, supine, bone foam. Covington anterolateral distal tibial plate, mini frag, long 3.5mm steel screw, CaPhos bone substitute;  Surgeon: Fer Mcrae MD;  [address];  Service: Orthopedics;  Laterality: Left;    OPEN REDUCTION AND INTERNAL FIXATION (ORIF) OF PILON FRACTURE Left 3/28/2022    Procedure: ORIF, FRACTURE, PILON nonunion + Iliac crest bone graft - diving board, supine, bone foam. Covington axsos3 screws, 2.7 mini plates/screws, curettes, Seda/Lomas medical Augment;  Surgeon: Fer Mcrae MD;  Location: [address];  Service: Orthopedics;  Laterality: Left;  Spinal? + postop catheter (draping out pelvis for bone graft harvest)    OVARIAN CYST REMOVAL      aprox 5 times    REMOVAL OF EXTERNAL FIXATION DEVICE Left 9/18/2020    Procedure: REMOVAL, EXTERNAL FIXATION DEVICE;  Surgeon: Fer Mcrae MD;  [address];  Service: Orthopedics;  Laterality: Left;    ROTATOR CUFF REPAIR Left 01/2019    tennis elbow repair Left 01/2019    UPPER  "GASTROINTESTINAL ENDOSCOPY         GYN & OB History  No LMP recorded (lmp unknown). Patient has had a hysterectomy.   Date of Last Pap: No result found    OB History    Para Term  AB Living   4 1 0 1 2 2   SAB IAB Ectopic Multiple Live Births   2 0 0 0 2      # Outcome Date GA Lbr Cipriano/2nd Weight Sex Delivery Anes PTL Lv   4 SAB            3       CS-Unspec   SERGIO   2 SAB            1       Vag-Spont   SERGIO       Health Maintenance         Date Due Completion Date    COVID-19 Vaccine (1) Never done ---    HIV Screening Never done ---    TETANUS VACCINE Never done ---    Mammogram Never done ---    Shingles Vaccine (2 of 2) 2020    Lipid Panel 10/06/2020 10/6/2015    Influenza Vaccine (1) 2022    DEXA Scan 12/10/2023 12/10/2021    Colorectal Cancer Screening 2025 11/3/2015    Pneumococcal Vaccines (Age 0-64) (3 - PPSV23 or PCV20) 2032 10/2/2015            Family History   Problem Relation Age of Onset    Anxiety disorder Mother     Depression Mother     Suicide Mother     Seizures Mother     Drug abuse Mother     Ovarian cancer Mother     Aortic aneurysm Father     Colon cancer Neg Hx     Breast cancer Neg Hx     Diabetes Neg Hx     Hypertension Neg Hx        Social History     Socioeconomic History    Marital status:    Tobacco Use    Smoking status: Never    Smokeless tobacco: Never   Substance and Sexual Activity    Alcohol use: Yes     Comment: 2 per month    Drug use: Yes     Types: Amphetamines, Barbituates, Benzodiazepines    Sexual activity: Yes     Partners: Male     Birth control/protection: Surgical, None       Review of Systems  Review of Systems  Leakage of urine     Objective:   /87   Pulse 72   Ht 5' 3" (1.6 m)   Wt 77.1 kg (169 lb 15.6 oz)   LMP  (LMP Unknown)   BMI 30.11 kg/m²     Physical Exam   Actually very well suspended vaginal access it seems as the their mice might of been a urethropexy IC some indentation " at the level of the bladder neck no other abnormality noted inspection of the suprapubic region as well as adductor space is did not show any FX at puncture marks for any type of  nor retropubic sling.    Nor has any graft palpated  Assessment:     1. FILIBERTO (stress urinary incontinence, female)            Plan:     1. FILIBERTO (stress urinary incontinence, female)      Long discussion with patient at this point do think that the urethropexy he has been done I do not know if he will be possible get the old operative note but if it is that will be of tremendous benefit she is going to try as her  in her in the  of the practice are good friends.  So she is going to try but regardless I am going to carry out a urodynamics with cysto here the ping on results of that we might move forward with a mid urethral sling patient understanding of of it will all be really depend once again on this UDS report that will be generated      There are no Patient Instructions on file for this visit.

## 2022-09-21 ENCOUNTER — HOSPITAL ENCOUNTER (OUTPATIENT)
Dept: RADIOLOGY | Facility: HOSPITAL | Age: 57
Discharge: HOME | End: 2022-09-21
Attending: NURSE PRACTITIONER
Payer: COMMERCIAL

## 2022-09-21 ENCOUNTER — OFFICE VISIT (OUTPATIENT)
Dept: ORTHOPEDICS | Facility: CLINIC | Age: 57
End: 2022-09-21
Payer: COMMERCIAL

## 2022-09-21 VITALS — WEIGHT: 170 LBS | BODY MASS INDEX: 30.12 KG/M2 | HEIGHT: 63 IN

## 2022-09-21 DIAGNOSIS — M25.572 ACUTE LEFT ANKLE PAIN: Primary | ICD-10-CM

## 2022-09-21 DIAGNOSIS — M25.572 ACUTE LEFT ANKLE PAIN: ICD-10-CM

## 2022-09-21 DIAGNOSIS — S82.872K CLOSED DISPLACED PILON FRACTURE OF LEFT TIBIA WITH NONUNION, SUBSEQUENT ENCOUNTER: Primary | ICD-10-CM

## 2022-09-21 PROCEDURE — 99213 PR OFFICE/OUTPT VISIT, EST, LEVL III, 20-29 MIN: ICD-10-PCS | Mod: S$GLB,,, | Performed by: NURSE PRACTITIONER

## 2022-09-21 PROCEDURE — 99999 PR PBB SHADOW E&M-EST. PATIENT-LVL III: ICD-10-PCS | Mod: PBBFAC,,, | Performed by: NURSE PRACTITIONER

## 2022-09-21 PROCEDURE — 3008F PR BODY MASS INDEX (BMI) DOCUMENTED: ICD-10-PCS | Mod: CPTII,S$GLB,, | Performed by: NURSE PRACTITIONER

## 2022-09-21 PROCEDURE — 1159F PR MEDICATION LIST DOCUMENTED IN MEDICAL RECORD: ICD-10-PCS | Mod: CPTII,S$GLB,, | Performed by: NURSE PRACTITIONER

## 2022-09-21 PROCEDURE — 1160F RVW MEDS BY RX/DR IN RCRD: CPT | Mod: CPTII,S$GLB,, | Performed by: NURSE PRACTITIONER

## 2022-09-21 PROCEDURE — 99999 PR PBB SHADOW E&M-EST. PATIENT-LVL III: CPT | Mod: PBBFAC,,, | Performed by: NURSE PRACTITIONER

## 2022-09-21 PROCEDURE — 99213 OFFICE O/P EST LOW 20 MIN: CPT | Mod: S$GLB,,, | Performed by: NURSE PRACTITIONER

## 2022-09-21 PROCEDURE — 73610 X-RAY EXAM OF ANKLE: CPT | Mod: 26,LT,, | Performed by: RADIOLOGY

## 2022-09-21 PROCEDURE — 1159F MED LIST DOCD IN RCRD: CPT | Mod: CPTII,S$GLB,, | Performed by: NURSE PRACTITIONER

## 2022-09-21 PROCEDURE — 3008F BODY MASS INDEX DOCD: CPT | Mod: CPTII,S$GLB,, | Performed by: NURSE PRACTITIONER

## 2022-09-21 PROCEDURE — 73610 X-RAY EXAM OF ANKLE: CPT | Mod: TC,LT

## 2022-09-21 PROCEDURE — 73610 XR ANKLE COMPLETE 3 VIEW LEFT: ICD-10-PCS | Mod: 26,LT,, | Performed by: RADIOLOGY

## 2022-09-21 PROCEDURE — 1160F PR REVIEW ALL MEDS BY PRESCRIBER/CLIN PHARMACIST DOCUMENTED: ICD-10-PCS | Mod: CPTII,S$GLB,, | Performed by: NURSE PRACTITIONER

## 2022-09-21 NOTE — PROGRESS NOTES
Ms. 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 Mcrae on 3/28/2022.    57-year-old female, chronic opioid user due to chronic pain, fall from height 09/06/2020 sustaining a closed left pilon fracture.       09/07/2020 - ex fix.     09/18/2020 - ORIF left pilon fracture, removal of ex fix     Nonunion, broken hardware, ongoing pain.     03/28/2022 - repair nonunion left distal tibia with iliac crest bone graft    Interval History:  She reports that she is feeling better but still having sharp shooting the ankle to the foot.  Denies any falls or injuries since last seen in clinic.  She reports she is only taking 1 pain pill a day.  She states that the pain is more noticeable after being active all day.  She is able to bear weight without a boot.  She is planning a cruise.  She is following up with pain management for chronic pain control.  She is currently taking gabapentin which she finds helpful for the paresthesia.  She has not gotten the bone stimulator that was previously recommended, I advised her I spoke with the wrap this week in sent in additional clinical information.    Physical exam:  Incision is open to air and healed.  No signs of infection seen.   She has tactile stimulation to their lower leg, she denies calf pain, there is no leg edema and their pedal pulse is palpable x 2.  ROM of ankle is 25 degrees in all planes.  She can flex and curl her toes.      RADS:  X-ray of the left ankle was obtained and personally reviewed by me and Dr. Mcrae.  Hardware appears to be intact to the lateral medial side of the tibia and long screw to the distal fibula appears to be intact.  There are multiple broken screws that is similar to prior x-ray.  When comparing to prior x-ray there appears to be 1 additional  broken screw on the medial side.  No new fractures seen.  Fracture appears to be stable.  Fracture at distal tibia is still seen.  There is < 1 cm fracture gap noted.  Ankle mortise appears to be symmetrical.    Assessment:  Follow up visit (6 months)    Plan:    ICD-10-CM ICD-9-CM   1. 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 We discussed the need to monitor for changes in symptoms and condition and report them to the physician.  Discussed importance of compliance with all appointments and follow up examinations.     OTHER:  - I discussed clinical case as well as x-rays with Dr. Mcrae at which time Dr. Mcrae came in to see the patient.  He reviewed the x-ray and discussed the findings with the patient.  He advised patient to continue bearing weight as tolerated but at low impact activities to avoid any further damage her hardware.      PHYSICAL THERAPY:   - Continue PT.    - Weight bearing advance as tolerated.  - Range of motion as tolerated.        PAIN MEDICATION:   - Pain medication: refill was not needed, she is followed by pain management. Pain medications updated in Epic to reflect what she is currently using.  lidoderm patch written for her today.    - Pain medication refill policy provided to patient for review, yes.    - Patient was informed a multi-modal approach is used to treat their pain.      FOLLOW UP:   - Patient will follow up in the clinic in 3 months.  - X-ray of her left ankle standing is needed.  - Future Appointments:   Future Appointments   Date Time Provider Department Center   10/5/2022  2:15 PM Frandy Sherman MD [facility] UROGYN [place]   11/10/2022 10:30 AM Fer Mcrae MD [facility] ORTHO [facility]   12/2/2022  1:30 PM Kevin Moody,  NP [facility] ORTHO [facility]           If there are any questions prior to scheduled follow up, the patient was instructed to contact the office

## 2022-09-23 ENCOUNTER — DOCUMENT SCAN (OUTPATIENT)
Dept: HOME HEALTH SERVICES | Facility: HOSPITAL | Age: 57
End: 2022-09-23
Payer: COMMERCIAL

## 2022-09-26 ENCOUNTER — TELEPHONE (OUTPATIENT)
Dept: ORTHOPEDICS | Facility: CLINIC | Age: 57
End: 2022-09-26
Payer: COMMERCIAL

## 2022-09-26 NOTE — TELEPHONE ENCOUNTER
Called and informed Ms Simental with the bone stimulator Dr. Mcrae and Pamela is out for today but will be back in tomorrow. Informed Ms Villagomez I will message the staff and someone will reach out to  her tomorrow. Ms Villagomez verbally understood and was satisfied.

## 2022-10-10 ENCOUNTER — TELEPHONE (OUTPATIENT)
Dept: ORTHOPEDICS | Facility: CLINIC | Age: 57
End: 2022-10-10
Payer: COMMERCIAL

## 2022-10-11 ENCOUNTER — OFFICE VISIT (OUTPATIENT)
Dept: SURGERY | Facility: CLINIC | Age: 57
End: 2022-10-11
Payer: COMMERCIAL

## 2022-10-11 VITALS
SYSTOLIC BLOOD PRESSURE: 116 MMHG | HEART RATE: 99 BPM | TEMPERATURE: 99 F | BODY MASS INDEX: 30.66 KG/M2 | WEIGHT: 173.06 LBS | HEIGHT: 63 IN | DIASTOLIC BLOOD PRESSURE: 72 MMHG

## 2022-10-11 DIAGNOSIS — K64.8 HEMORRHOID PROLAPSE: Primary | ICD-10-CM

## 2022-10-11 DIAGNOSIS — S82.872K CLOSED DISPLACED PILON FRACTURE OF LEFT TIBIA WITH NONUNION, SUBSEQUENT ENCOUNTER: Primary | ICD-10-CM

## 2022-10-11 PROCEDURE — 3078F DIAST BP <80 MM HG: CPT | Mod: CPTII,S$GLB,, | Performed by: SURGERY

## 2022-10-11 PROCEDURE — 3078F PR MOST RECENT DIASTOLIC BLOOD PRESSURE < 80 MM HG: ICD-10-PCS | Mod: CPTII,S$GLB,, | Performed by: SURGERY

## 2022-10-11 PROCEDURE — 3008F PR BODY MASS INDEX (BMI) DOCUMENTED: ICD-10-PCS | Mod: CPTII,S$GLB,, | Performed by: SURGERY

## 2022-10-11 PROCEDURE — 3008F BODY MASS INDEX DOCD: CPT | Mod: CPTII,S$GLB,, | Performed by: SURGERY

## 2022-10-11 PROCEDURE — 99203 PR OFFICE/OUTPT VISIT, NEW, LEVL III, 30-44 MIN: ICD-10-PCS | Mod: S$GLB,,, | Performed by: SURGERY

## 2022-10-11 PROCEDURE — 99999 PR PBB SHADOW E&M-EST. PATIENT-LVL IV: CPT | Mod: PBBFAC,,, | Performed by: SURGERY

## 2022-10-11 PROCEDURE — 99203 OFFICE O/P NEW LOW 30 MIN: CPT | Mod: S$GLB,,, | Performed by: SURGERY

## 2022-10-11 PROCEDURE — 1159F MED LIST DOCD IN RCRD: CPT | Mod: CPTII,S$GLB,, | Performed by: SURGERY

## 2022-10-11 PROCEDURE — 99999 PR PBB SHADOW E&M-EST. PATIENT-LVL IV: ICD-10-PCS | Mod: PBBFAC,,, | Performed by: SURGERY

## 2022-10-11 PROCEDURE — 3074F PR MOST RECENT SYSTOLIC BLOOD PRESSURE < 130 MM HG: ICD-10-PCS | Mod: CPTII,S$GLB,, | Performed by: SURGERY

## 2022-10-11 PROCEDURE — 1159F PR MEDICATION LIST DOCUMENTED IN MEDICAL RECORD: ICD-10-PCS | Mod: CPTII,S$GLB,, | Performed by: SURGERY

## 2022-10-11 PROCEDURE — 3074F SYST BP LT 130 MM HG: CPT | Mod: CPTII,S$GLB,, | Performed by: SURGERY

## 2022-10-11 NOTE — PROGRESS NOTES
Subjective:       Patient ID: Faith Bernard is a 57 y.o. female.    Chief Complaint: Consult (Rectocele)    HPI  pleasant 57-year-old female who notes she will occasionally have a fecal residue were fecal leakage.  She also notes she has tissue prolapsing from her rectum at times.  She notes this typically occurs with straining.  Denies any bleeding.  No discomfort.  No other complaints.  Last colonoscopy was 7 years ago.  Review of Systems   Constitutional:  Negative for activity change and appetite change.   Respiratory:  Negative for apnea and shortness of breath.    Cardiovascular:  Negative for chest pain.   Musculoskeletal:  Negative for arthralgias and neck stiffness.       Objective:      Physical Exam  Vitals reviewed.   Cardiovascular:      Rate and Rhythm: Normal rate.      Pulses: Normal pulses.   Pulmonary:      Effort: Pulmonary effort is normal.   Abdominal:      General: Abdomen is flat. Bowel sounds are normal. There is no distension.   Genitourinary:     Comments: External exam demonstrates no significant external hemorrhoids.  Digital rectal exam with normal sphincter tone.  Anoscopy performed demonstrating moderate internal hemorrhoids.  Neurological:      General: No focal deficit present.      Mental Status: She is alert.      Cranial Nerves: No cranial nerve deficit.   Psychiatric:         Mood and Affect: Mood normal.       Assessment:     Hemorrhoid prolapse  Problem List Items Addressed This Visit    None      Plan:         D/w pt.  I have recommended increasing fiber in female diet and to also begin using a fiber supplement (metamucil or psyillium husk).  Will monitor for improvement and if no significant improvement will consider more aggressive treatment.

## 2022-10-18 ENCOUNTER — PATIENT MESSAGE (OUTPATIENT)
Dept: ORTHOPEDICS | Facility: CLINIC | Age: 57
End: 2022-10-18
Payer: COMMERCIAL

## 2022-10-19 ENCOUNTER — PATIENT MESSAGE (OUTPATIENT)
Dept: ORTHOPEDICS | Facility: CLINIC | Age: 57
End: 2022-10-19
Payer: COMMERCIAL

## 2022-10-20 ENCOUNTER — TELEPHONE (OUTPATIENT)
Dept: UROGYNECOLOGY | Facility: CLINIC | Age: 57
End: 2022-10-20
Payer: COMMERCIAL

## 2022-10-20 NOTE — TELEPHONE ENCOUNTER
----- Message from Maya Lopez sent at 10/20/2022  9:43 AM CDT -----  .Type:  Patient Call Back    Who Called: PT       Does the patient know what this is regarding?: PT CALLED TO SPEAK WITH THE OFFICE ABOUT HER UPCOMING VISIT SHE WAS TOLD IT WAS A DIFFERENT DAY SHE'S UPSET     Would the patient rather a call back YES     Best Call Back Number: [phone]    Additional Information: Thank You

## 2022-10-26 ENCOUNTER — OFFICE VISIT (OUTPATIENT)
Dept: PULMONOLOGY | Facility: CLINIC | Age: 57
End: 2022-10-26
Payer: COMMERCIAL

## 2022-10-26 VITALS
DIASTOLIC BLOOD PRESSURE: 80 MMHG | HEART RATE: 76 BPM | OXYGEN SATURATION: 99 % | HEIGHT: 63 IN | SYSTOLIC BLOOD PRESSURE: 112 MMHG | BODY MASS INDEX: 30.92 KG/M2 | WEIGHT: 174.5 LBS

## 2022-10-26 DIAGNOSIS — R06.2 WHEEZES: ICD-10-CM

## 2022-10-26 DIAGNOSIS — R05.3 CHRONIC COUGH: Primary | ICD-10-CM

## 2022-10-26 DIAGNOSIS — D84.9 IMMUNE DEFICIENCY DISORDER: ICD-10-CM

## 2022-10-26 PROCEDURE — 99204 PR OFFICE/OUTPT VISIT, NEW, LEVL IV, 45-59 MIN: ICD-10-PCS | Mod: S$GLB,,, | Performed by: INTERNAL MEDICINE

## 2022-10-26 PROCEDURE — 3074F PR MOST RECENT SYSTOLIC BLOOD PRESSURE < 130 MM HG: ICD-10-PCS | Mod: CPTII,S$GLB,, | Performed by: INTERNAL MEDICINE

## 2022-10-26 PROCEDURE — 99999 PR PBB SHADOW E&M-EST. PATIENT-LVL V: CPT | Mod: PBBFAC,,, | Performed by: INTERNAL MEDICINE

## 2022-10-26 PROCEDURE — 99999 PR PBB SHADOW E&M-EST. PATIENT-LVL V: ICD-10-PCS | Mod: PBBFAC,,, | Performed by: INTERNAL MEDICINE

## 2022-10-26 PROCEDURE — 3079F DIAST BP 80-89 MM HG: CPT | Mod: CPTII,S$GLB,, | Performed by: INTERNAL MEDICINE

## 2022-10-26 PROCEDURE — 99204 OFFICE O/P NEW MOD 45 MIN: CPT | Mod: S$GLB,,, | Performed by: INTERNAL MEDICINE

## 2022-10-26 PROCEDURE — 3074F SYST BP LT 130 MM HG: CPT | Mod: CPTII,S$GLB,, | Performed by: INTERNAL MEDICINE

## 2022-10-26 PROCEDURE — 1159F PR MEDICATION LIST DOCUMENTED IN MEDICAL RECORD: ICD-10-PCS | Mod: CPTII,S$GLB,, | Performed by: INTERNAL MEDICINE

## 2022-10-26 PROCEDURE — 3079F PR MOST RECENT DIASTOLIC BLOOD PRESSURE 80-89 MM HG: ICD-10-PCS | Mod: CPTII,S$GLB,, | Performed by: INTERNAL MEDICINE

## 2022-10-26 PROCEDURE — 1159F MED LIST DOCD IN RCRD: CPT | Mod: CPTII,S$GLB,, | Performed by: INTERNAL MEDICINE

## 2022-10-26 RX ORDER — FLUTICASONE FUROATE, UMECLIDINIUM BROMIDE AND VILANTEROL TRIFENATATE 200; 62.5; 25 UG/1; UG/1; UG/1
1 POWDER RESPIRATORY (INHALATION) DAILY
Qty: 60 EACH | Refills: 11 | Status: SHIPPED | OUTPATIENT
Start: 2022-10-26 | End: 2023-05-22 | Stop reason: SDUPTHER

## 2022-10-26 RX ORDER — GABAPENTIN 600 MG/1
1200 TABLET ORAL 3 TIMES DAILY
COMMUNITY
Start: 2022-10-12 | End: 2023-09-11

## 2022-10-26 RX ORDER — ALBUTEROL SULFATE 90 UG/1
AEROSOL, METERED RESPIRATORY (INHALATION)
Qty: 18 G | Refills: 11 | Status: SHIPPED | OUTPATIENT
Start: 2022-10-26 | End: 2023-05-22 | Stop reason: SDUPTHER

## 2022-10-26 NOTE — PATIENT INSTRUCTIONS
Inderal/propranolol would not be good to use with wheezes- you aren't using    Need to follow up chest xray with empyema.    Albuterol needs suggest airway disease      Your esophagus had been problem -- very severe til dilation.  You are having regurg and esophagus problems.  Should have follow up as esophagus may contribute or cause problems?    Would check lung function    Would recommend trial trelegy once daily  200- you may have asthma.  Use albuterol as needed.    Would re check immune system -- no apparent increase infections except around right lung.  Not urgent.  May do external.        Will get back with results over phone.  May  need more evaluation..     Could consider salvia therapy -- need to be cleared by Dr Beard.

## 2022-10-26 NOTE — PROGRESS NOTES
10/26/2022    Faith Bernard  New Patient Consult    Chief Complaint   Patient presents with    Shortness of Breath     Gets very short of breath just walking short distances   Lung failure right side after surgery for bleeding ulcer - 8/2021       HPI: pt was healthy - dental assistant.  Had mrsa post breast reduction in 2004.  Had low igg -- pt had injections and immunizations and got off rx.    In 2006 - 7 of 14 good titers for pneumococcus. Was 3/14 prior to vaccines?    Family has asthma-- no childhood asthma.    Pt had gastric bypass prior to 2006.  Monitors intake/vitamens.      Pt had fall off rope swing 2 yrs ago with subsequent fx left ankle needing numerous surgeries.    Pt had empyema 8/2021 -- -- pt had gastric ulcer cautery and eso dilation -- later returned with right effusion and later right chest tube --- pt eventually went to [facility] with [facility] to thoracotomy/decortication-- pt had not had recent cxr or swallow evaluation.      No fever nor night sweats.  No aspiration. Has regurg into mouth 5 /wk.  Sleeps hob elevated.  No osas but snores.  Sleep study prior to bypass.     No asthma but uses albuterol inhaler prn when very sickly every couple yrs or so.  Uses albuterol once a wk or less.  Will have noctural arousal with wheezes.    Inderal started last yr with min improvement wrt migranes-- skips inderal     Chr dry mouth on numerous     Pt said to have narcolepsy-- dextroamphetamine ppt cough and wheezes per pt. No cataplexy.  No eds now.  Uses aderal and vvyanse.      Post thoracotomy with cough/sob, occ noc wheezes, more albuterol use.  Vague swallow problems with regurg-- no aspiration.               PFSH:  Past Medical History:   Diagnosis Date    Anxiety     Depression     Encounter for blood transfusion     Esophageal dysphagia     Fibromyalgia     Gastric bypass status for obesity     H/O dental abscess 4/14/2014    drained    Headache(784.0)     migraines.  Treated at [facility] (Dr. 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  Pt states took too many then realized her mistake    Polycystic ovaries     S/P JUHI-BSO     Thyroid disease     hypothyroidism         Past Surgical History:   Procedure Laterality Date    ABDOMINAL SURGERY      ANKLE HARDWARE REMOVAL Left 3/28/2022    Procedure: REMOVAL, HARDWARE, ANKLE;  Surgeon: Fer Mcrae MD;  Location: [address];  Service: Orthopedics;  Laterality: Left;    APPLICATION OF LARGE EXTERNAL FIXATION DEVICE TO TIBIA Left 9/7/2020    Procedure: APPLICATION, EXTERNAL FIXATION DEVICE, TIBIA;  Surgeon: Sujata Londono MD;  Location: [address];  Service: Orthopedics;  Laterality: Left;  need paralysis    APPLICATION OF WOUND VACUUM-ASSISTED CLOSURE DEVICE Left 9/18/2020    Procedure: APPLICATION, WOUND VAC;  Surgeon: Fer Mcrae MD;  Location: [address];  Service: Orthopedics;  Laterality: Left;    BREAST SURGERY  2004    Breast Reduction    CARDIAC CATHETERIZATION      COLONOSCOPY N/A 11/3/2015    Procedure: COLONOSCOPY;  Surgeon: Timothy Barber MD;  Location: [facility];  Service: Endoscopy;  Laterality: N/A;    DENTAL SURGERY      4 teeth removed    GASTRIC BYPASS      HERNIA REPAIR      HYSTERECTOMY      ILIAC CREST BONE GRAFT Left 3/28/2022    Procedure: BONE GRAFT, ILIAC CREST;  Surgeon: Fer Mcrae MD;  Location: [address];  Service: Orthopedics;  Laterality: Left;    INJECTION OF ANESTHETIC AGENT AROUND NERVE N/A 3/29/2022    Procedure: Block, Nerve;  Surgeon: Alexus  Surgeon;  Location: [facility];  Service: Anesthesiology;  Laterality: N/A;    OPEN REDUCTION AND INTERNAL FIXATION (ORIF) OF PILON FRACTURE Left 9/18/2020    Procedure: ORIF, FRACTURE, PILON - left. Diving board, supine, bone foam. [place] anterolateral distal tibial plate, mini frag, long 3.5mm steel screw, CaPhos bone substitute;  Surgeon: Fer Mcrae MD;  [facility];  Service: Orthopedics;  Laterality: Left;    OPEN REDUCTION AND INTERNAL FIXATION (ORIF) OF PILON FRACTURE Left 3/28/2022    Procedure: ORIF, FRACTURE, PILON nonunion + Iliac crest bone graft - diving board, supine, bone foam. [place] axsos3 screws, 2.7 mini plates/screws, curettes, [place]/[employer] medical Augment;  Surgeon: Fer Mcrae MD;  [facility];  Service: Orthopedics;  Laterality: Left;  Spinal? + postop catheter (draping out pelvis for bone graft harvest)    OVARIAN CYST REMOVAL      aprox 5 times    REMOVAL OF EXTERNAL FIXATION DEVICE Left 9/18/2020    Procedure: REMOVAL, EXTERNAL FIXATION DEVICE;  Surgeon: Fer Mcrae MD;  [facility];  Service: Orthopedics;  Laterality: Left;    ROTATOR CUFF REPAIR Left 01/2019    tennis elbow repair Left 01/2019    UPPER GASTROINTESTINAL ENDOSCOPY       Social History     Tobacco Use    Smoking status: Never    Smokeless tobacco: Never   Substance Use Topics    Alcohol use: Yes     Comment: 2 per month    Drug use: Yes     Types: Amphetamines, Barbituates, Benzodiazepines     Family History   Problem Relation Age of Onset    Anxiety disorder Mother     Depression Mother     Suicide Mother     Seizures Mother     Drug abuse Mother     Ovarian cancer Mother     Aortic aneurysm Father     Colon cancer Neg Hx     Breast cancer Neg Hx     Diabetes Neg Hx     Hypertension Neg Hx      Review of patient's allergies indicates:   Allergen Reactions    Cephalexin Anaphylaxis    Codeine Itching    Nsaids (non-steroidal  "anti-inflammatory drug)      Has a history of bleeding ulcers          Review of Systems:  a review of eleven systems covering constitutional, Eye, HEENT, Psych, Respiratory, Cardiac, GI, , Musculoskeletal, Endocrine, Dermatologic was negative except for pertinent findings as listed ABOVE and below:  pertinent positive as above, rest is good       Exam:Comprehensive exam done. /80 (BP Location: Left arm, Patient Position: Sitting, BP Method: Medium (Automatic))   Pulse 76   Ht 5' 3" (1.6 m)   Wt 79.2 kg (174 lb 7.9 oz)   LMP  (LMP Unknown)   SpO2 99% Comment: on room air at rest  BMI 30.91 kg/m²   Exam included Vitals as listed, and patient's appearance and affect and alertness and mood, oral exam for yeast and hygiene and pharynx lesions and Mallapatti (M) score, neck with inspection for jvd and masses and thyroid abnormalities and lymph nodes (supraclavicular and infraclavicular nodes and axillary also examined and noted if abn), chest exam included symmetry and effort and fremitus and percussion and auscultation, cardiac exam included rhythm and gallops and murmur and rubs and jvd and edema, abdominal exam for mass and hepatosplenomegaly and tenderness and hernias and bowel sounds, Musculoskeletal exam with muscle tone and posture and mobility/gait and  strength, and skin for rashes and cyanosis and pallor and turgor, extremity for clubbing.  Findings were normal except for pertinent findings listed below:  M2, dry mouth, chest is symmetric, no distress, normal percussion, normal fremitus and good normal breath sounds-- sl dull and decreased bs rll          Radiographs (ct chest and cxr) reviewed: view by direct vision  -- ct chest 8/2/2021 sm right effusion, 8/6/2021 lg empeyma    Labs reviewed            PFT will be done and results to be reviewed       Plan:  Clinical impression is apparently straight forward and impression with management as below.     Faith was seen today for shortness of " breath.    Diagnoses and all orders for this visit:    Chronic cough  -     X-Ray Chest PA And Lateral; Future  -     Complete PFT with bronchodilator; Future  -     albuterol (PROVENTIL/VENTOLIN HFA) 90 mcg/actuation inhaler; 2 puffs every 4 hours as needed for cough, wheeze, or shortness of breath  -     fluticasone-umeclidin-vilanter (TRELEGY ELLIPTA) 200-62.5-25 mcg inhaler; Inhale 1 puff into the lungs once daily.    Wheezes  -     X-Ray Chest PA And Lateral; Future  -     Complete PFT with bronchodilator; Future  -     albuterol (PROVENTIL/VENTOLIN HFA) 90 mcg/actuation inhaler; 2 puffs every 4 hours as needed for cough, wheeze, or shortness of breath  -     fluticasone-umeclidin-vilanter (TRELEGY ELLIPTA) 200-62.5-25 mcg inhaler; Inhale 1 puff into the lungs once daily.    Immune deficiency disorder  -     Humoral Immune Eval (Pneumo Serotypes) With H. Flu; Future  -     Humoral Immune Eval (Pneumo Serotypes) With H. Flu      Follow up in about 4 weeks (around 11/23/2022), or if symptoms worsen or fail to improve.    Discussed with patient above for education the following:      Patient Instructions   Inderal/propranolol would not be good to use with wheezes- you aren't using    Need to follow up chest xray with empyema.    Albuterol needs suggest airway disease      Your esophagus had been problem -- very severe til dilation.  You are having regurg and esophagus problems.  Should have follow up as esophagus may contribute or cause problems?    Would check lung function    Would recommend trial trelegy once daily  200- you may have asthma.  Use albuterol as needed.    Would re check immune system -- no apparent increase infections except around right lung.  Not urgent.  May do external.        Will get back with results over phone.  May  need more evaluation..     Could consider salvia therapy -- need to be cleared by Dr Beard.

## 2022-10-27 ENCOUNTER — PATIENT MESSAGE (OUTPATIENT)
Dept: ORTHOPEDICS | Facility: CLINIC | Age: 57
End: 2022-10-27
Payer: COMMERCIAL

## 2022-10-27 ENCOUNTER — PROCEDURE VISIT (OUTPATIENT)
Dept: UROGYNECOLOGY | Facility: CLINIC | Age: 57
End: 2022-10-27
Payer: COMMERCIAL

## 2022-10-27 VITALS
DIASTOLIC BLOOD PRESSURE: 88 MMHG | HEIGHT: 63 IN | SYSTOLIC BLOOD PRESSURE: 126 MMHG | BODY MASS INDEX: 30.82 KG/M2 | WEIGHT: 173.94 LBS | HEART RATE: 109 BPM

## 2022-10-27 DIAGNOSIS — N39.3 SUI (STRESS URINARY INCONTINENCE, FEMALE): ICD-10-CM

## 2022-10-27 PROCEDURE — 51797 PR VOIDING PRESS STUDY INTRA-ABDOMINAL VOID: ICD-10-PCS | Mod: S$GLB,,, | Performed by: OBSTETRICS & GYNECOLOGY

## 2022-10-27 PROCEDURE — 51741 PR UROFLOWMETRY, COMPLEX: ICD-10-PCS | Mod: 51,S$GLB,, | Performed by: OBSTETRICS & GYNECOLOGY

## 2022-10-27 PROCEDURE — 51797 INTRAABDOMINAL PRESSURE TEST: CPT | Mod: S$GLB,,, | Performed by: OBSTETRICS & GYNECOLOGY

## 2022-10-27 PROCEDURE — 51728 PR COMPLEX CYSTOMETROGRAM VOIDING PRESSURE STUDIES: ICD-10-PCS | Mod: S$GLB,,, | Performed by: OBSTETRICS & GYNECOLOGY

## 2022-10-27 PROCEDURE — 51728 CYSTOMETROGRAM W/VP: CPT | Mod: S$GLB,,, | Performed by: OBSTETRICS & GYNECOLOGY

## 2022-10-27 PROCEDURE — 51784 ANAL/URINARY MUSCLE STUDY: CPT | Mod: 51,S$GLB,, | Performed by: OBSTETRICS & GYNECOLOGY

## 2022-10-27 PROCEDURE — 51784 PR ANAL/URINARY MUSCLE STUDY: ICD-10-PCS | Mod: 51,S$GLB,, | Performed by: OBSTETRICS & GYNECOLOGY

## 2022-10-27 PROCEDURE — 51741 ELECTRO-UROFLOWMETRY FIRST: CPT | Mod: 51,S$GLB,, | Performed by: OBSTETRICS & GYNECOLOGY

## 2022-10-27 NOTE — PROCEDURES
Procedures        SURGEONS NARRATIVE:  A time out was performed in which the patient identity and procedure were confirmed.  Urodynamic evaluation was performed using a computerized system ([employer], [employer]  Uroflowmetry was performed on the patient in the sitting position without catheters in place.  Subsequent urodynamic testing was performed with the patient in the lithotomy position at 45 degrees. Air charged catheters were used with sterile water as the infusion medium. Vesical and abdominal (rectal) pressures were measured, and detrusor pressure was calculated. EMG activity was recorded with surface electrodes. During filling, room temperature sterile water was infused at a rate of 30 cubic centimeters per minute. The patient was asked cough after instillation of each 100cc volume. Two Valsalva leak point pressures and two cough leak point pressures were performed with the catheters in place at 300 cubic centimeters and again at maximum capacity. Valsalva leak point pressure was defined as the difference between vesical pressure at which leakage was noted (visualized at the external urethral meatus) and the baseline vesical pressure. Following urodynamic testing, a pressure flow study was performed with the patient in the sitting position. Vesical and abdominal pressures were monitored and detrusor pressures were calculated. After the pressure flow study, the catheters were then removed. The patient tolerated the procedure well.     Urine dipstick: neg.    1.  VOIDING PHASE:      a.  Uroflowmetry:  Prolapse reduction: No  Voided volume:  40  mL   Voiding time:    seconds  Max flow:   mL/s  Avg flow:    mL/s   PVR:   min mL    The overall configuration of this uroflow study was  very small void difficult to interpret.      b.  Pressure flow:  Prolapse reduction: No  Voided volume:   284 mL  Voiding time:   23.1 seconds  Peak flow:  32.3 mL/s   Avg flow:  12.6 mL/s  Max det pressure:  41.7  cm  H20  Det pressure at max flow: vesic cath avulsion cm H20  Void initiated by  valsalva.    Urethral relaxation (EMG):  no.    PVR (calculated):  min mL    The overall configuration of this pressure flow study was it is actually a proper wave it is continuous but I think it is all abdominal based and there is significant increase in electrical activity dyssynergic.      2.  FILLING PHASE:  1st desire: 111 mL  Normal desire:  138 mL  Strong desire:  215 mL  Urgency:  267 mL  Compliance (calculated)  compliant mL/cm H20  EMG activity during filling:   elevated  Detrusor contractions observed: Yes.  When permission given a void    3.  URETHRAL FUNCTION/STORAGE PHASE:    a.  WITHOUT prolapse reduction:  No leakage of urine seen multiple Valsalva or cough    These findings are consistent with Negative urodynamic stress incontinence .    Assessment:  no UDS FILIBERTO but there is clinical FILIBERTO quite profound it happens throughout the all aspects of life patient coughs a lot secondary to some condition which is being investigated with substantial leakage and coital leakage    Plan:  Patient is a is a candidate for M U.S. but not at this time were going to practice mindful voiding and we will move forward with planning for minutes the urethral sling around December 7 patient will come in the week before and do straight catheterization with full bladder depending on the ratio of postvoid residual to voided amount we will delineated for can forward with sling patient appreciated time

## 2022-10-30 ENCOUNTER — PATIENT MESSAGE (OUTPATIENT)
Dept: ORTHOPEDICS | Facility: CLINIC | Age: 57
End: 2022-10-30
Payer: COMMERCIAL

## 2022-11-09 DIAGNOSIS — S82.872K CLOSED DISPLACED PILON FRACTURE OF LEFT TIBIA WITH NONUNION, SUBSEQUENT ENCOUNTER: Primary | ICD-10-CM

## 2022-11-16 ENCOUNTER — HOSPITAL ENCOUNTER (OUTPATIENT)
Dept: RADIOLOGY | Facility: HOSPITAL | Age: 57
Discharge: HOME | End: 2022-11-16
Attending: ORTHOPAEDIC SURGERY
Payer: COMMERCIAL

## 2022-11-16 ENCOUNTER — OFFICE VISIT (OUTPATIENT)
Dept: ORTHOPEDICS | Facility: CLINIC | Age: 57
End: 2022-11-16
Payer: COMMERCIAL

## 2022-11-16 DIAGNOSIS — S82.872K CLOSED DISPLACED PILON FRACTURE OF LEFT TIBIA WITH NONUNION, SUBSEQUENT ENCOUNTER: Primary | ICD-10-CM

## 2022-11-16 DIAGNOSIS — S82.872K CLOSED DISPLACED PILON FRACTURE OF LEFT TIBIA WITH NONUNION, SUBSEQUENT ENCOUNTER: ICD-10-CM

## 2022-11-16 PROCEDURE — 99212 PR OFFICE/OUTPT VISIT, EST, LEVL II, 10-19 MIN: ICD-10-PCS | Mod: S$GLB,,, | Performed by: ORTHOPAEDIC SURGERY

## 2022-11-16 PROCEDURE — 1159F PR MEDICATION LIST DOCUMENTED IN MEDICAL RECORD: ICD-10-PCS | Mod: CPTII,S$GLB,, | Performed by: ORTHOPAEDIC SURGERY

## 2022-11-16 PROCEDURE — 1159F MED LIST DOCD IN RCRD: CPT | Mod: CPTII,S$GLB,, | Performed by: ORTHOPAEDIC SURGERY

## 2022-11-16 PROCEDURE — 99212 OFFICE O/P EST SF 10 MIN: CPT | Mod: S$GLB,,, | Performed by: ORTHOPAEDIC SURGERY

## 2022-11-16 PROCEDURE — 1160F RVW MEDS BY RX/DR IN RCRD: CPT | Mod: CPTII,S$GLB,, | Performed by: ORTHOPAEDIC SURGERY

## 2022-11-16 PROCEDURE — 99999 PR PBB SHADOW E&M-EST. PATIENT-LVL II: CPT | Mod: PBBFAC,,, | Performed by: ORTHOPAEDIC SURGERY

## 2022-11-16 PROCEDURE — 73610 X-RAY EXAM OF ANKLE: CPT | Mod: TC,LT

## 2022-11-16 PROCEDURE — 73610 X-RAY EXAM OF ANKLE: CPT | Mod: 26,LT,, | Performed by: RADIOLOGY

## 2022-11-16 PROCEDURE — 1160F PR REVIEW ALL MEDS BY PRESCRIBER/CLIN PHARMACIST DOCUMENTED: ICD-10-PCS | Mod: CPTII,S$GLB,, | Performed by: ORTHOPAEDIC SURGERY

## 2022-11-16 PROCEDURE — 99999 PR PBB SHADOW E&M-EST. PATIENT-LVL II: ICD-10-PCS | Mod: PBBFAC,,, | Performed by: ORTHOPAEDIC SURGERY

## 2022-11-16 PROCEDURE — 73610 XR ANKLE COMPLETE 3 VIEW LEFT: ICD-10-PCS | Mod: 26,LT,, | Performed by: RADIOLOGY

## 2022-11-30 ENCOUNTER — OFFICE VISIT (OUTPATIENT)
Dept: UROGYNECOLOGY | Facility: CLINIC | Age: 57
End: 2022-11-30
Payer: COMMERCIAL

## 2022-11-30 ENCOUNTER — TELEPHONE (OUTPATIENT)
Dept: PULMONOLOGY | Facility: CLINIC | Age: 57
End: 2022-11-30
Payer: MEDICARE

## 2022-11-30 VITALS — WEIGHT: 173.94 LBS | BODY MASS INDEX: 30.82 KG/M2 | HEIGHT: 63 IN

## 2022-11-30 DIAGNOSIS — N39.3 SUI (STRESS URINARY INCONTINENCE, FEMALE): Primary | ICD-10-CM

## 2022-11-30 PROCEDURE — 99213 PR OFFICE/OUTPT VISIT, EST, LEVL III, 20-29 MIN: ICD-10-PCS | Mod: S$GLB,,, | Performed by: OBSTETRICS & GYNECOLOGY

## 2022-11-30 PROCEDURE — 3008F BODY MASS INDEX DOCD: CPT | Mod: CPTII,S$GLB,, | Performed by: OBSTETRICS & GYNECOLOGY

## 2022-11-30 PROCEDURE — 1159F MED LIST DOCD IN RCRD: CPT | Mod: CPTII,S$GLB,, | Performed by: OBSTETRICS & GYNECOLOGY

## 2022-11-30 PROCEDURE — 99999 PR PBB SHADOW E&M-EST. PATIENT-LVL III: CPT | Mod: PBBFAC,,, | Performed by: OBSTETRICS & GYNECOLOGY

## 2022-11-30 PROCEDURE — 1159F PR MEDICATION LIST DOCUMENTED IN MEDICAL RECORD: ICD-10-PCS | Mod: CPTII,S$GLB,, | Performed by: OBSTETRICS & GYNECOLOGY

## 2022-11-30 PROCEDURE — 3008F PR BODY MASS INDEX (BMI) DOCUMENTED: ICD-10-PCS | Mod: CPTII,S$GLB,, | Performed by: OBSTETRICS & GYNECOLOGY

## 2022-11-30 PROCEDURE — 99999 PR PBB SHADOW E&M-EST. PATIENT-LVL III: ICD-10-PCS | Mod: PBBFAC,,, | Performed by: OBSTETRICS & GYNECOLOGY

## 2022-11-30 PROCEDURE — 99213 OFFICE O/P EST LOW 20 MIN: CPT | Mod: S$GLB,,, | Performed by: OBSTETRICS & GYNECOLOGY

## 2022-11-30 NOTE — PROGRESS NOTES
Patient ID: Faith Bernard is a 57 y.o. female.     Chief Complaint:  No chief complaint on file.        History of Present Illness  52-year-old who underwent a the abdominal hysterectomy open in back in around 1998 at that time she had some type of concomitant bladder work that was done to help with leakage of urine.    She would now like this repeated as there is leakage of urine with coughing and sneezing                   Past Medical History:   Diagnosis Date    Anxiety      Depression      Encounter for blood transfusion      Esophageal dysphagia      Fibromyalgia      Gastric bypass status for obesity      H/O dental abscess 4/14/2014     drained    Headache(784.0)       migraines.  [facility] (Dr. 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  Pt states took too many then realized her mistake    Polycystic ovaries      S/P JUHI-BSO      Thyroid disease       hypothyroidism               Past Surgical History:   Procedure Laterality Date    ABDOMINAL SURGERY        ANKLE HARDWARE REMOVAL Left 3/28/2022     Procedure: REMOVAL, HARDWARE, ANKLE;  Surgeon: Fer Mcrae MD;  Location: [address];  Service: Orthopedics;  Laterality: Left;    APPLICATION OF LARGE EXTERNAL FIXATION DEVICE TO TIBIA Left 9/7/2020     Procedure: APPLICATION, EXTERNAL FIXATION DEVICE, TIBIA;  Surgeon: Sujata Londono MD;  Location: [facility] OR [address] [place] [address];  Service: Orthopedics;  Laterality: Left;  need paralysis    APPLICATION OF WOUND VACUUM-ASSISTED CLOSURE DEVICE Left 9/18/2020     Procedure:  APPLICATION, WOUND VAC;  Surgeon: Fer Mcrae MD;  [address];  Service: Orthopedics;  Laterality: Left;    BREAST SURGERY   2004     Breast Reduction    CARDIAC CATHETERIZATION        COLONOSCOPY N/A 11/3/2015     Procedure: COLONOSCOPY;  Surgeon: Timothy Barber MD;  Location: [facility];  Service: Endoscopy;  Laterality: N/A;    DENTAL SURGERY         4 teeth removed    GASTRIC BYPASS        HERNIA REPAIR        HYSTERECTOMY        ILIAC CREST BONE GRAFT Left 3/28/2022     Procedure: BONE GRAFT, ILIAC CREST;  Surgeon: Fer Mcrae MD;  [facility];  Service: Orthopedics;  Laterality: Left;    INJECTION OF ANESTHETIC AGENT AROUND NERVE N/A 3/29/2022     Procedure: Block, Nerve;  Surgeon: Alexus Surgeon;  Location: [facility];  Service: Anesthesiology;  Laterality: N/A;    OPEN REDUCTION AND INTERNAL FIXATION (ORIF) OF PILON FRACTURE Left 9/18/2020     Procedure: ORIF, FRACTURE, PILON - left. Diving board, supine, bone foam. [place] anterolateral distal tibial plate, mini frag, long 3.5mm steel screw, CaPhos bone substitute;  Surgeon: Fer Mcrae MD;  Location: [address];  Service: Orthopedics;  Laterality: Left;    OPEN REDUCTION AND INTERNAL FIXATION (ORIF) OF PILON FRACTURE Left 3/28/2022     Procedure: ORIF, FRACTURE, PILON nonunion + Iliac crest bone graft - diving board, supine, bone foam. [place] axsos3 screws, 2.7 mini plates/screws, curettes, [place] medical Augment;  Surgeon: Fer Mcrae MD;  Location: [address];  Service: Orthopedics;  Laterality: Left;  Spinal? + postop catheter (draping out pelvis for bone graft harvest)    OVARIAN CYST REMOVAL         aprox 5 times    REMOVAL OF EXTERNAL FIXATION DEVICE Left 9/18/2020     Procedure: REMOVAL, EXTERNAL FIXATION DEVICE;  Surgeon: Fer Mcrae MD;  Location: [address];  Service: Orthopedics;  Laterality: Left;    ROTATOR CUFF REPAIR Left 01/2019  "   tennis elbow repair Left 2019    UPPER GASTROINTESTINAL ENDOSCOPY             GYN & OB History  No LMP recorded (lmp unknown). Patient has had a hysterectomy.   Date of Last Pap: No result found                      OB History    Para Term  AB Living   4 1 0 1 2 2   SAB IAB Ectopic Multiple Live Births      2 0 0 0 2          # Outcome Date GA Lbr Cipriano/2nd Weight Sex Delivery Anes PTL Lv   4 SAB                     3            CS-Unspec     ESRGIO   2 SAB                     1            Vag-Spont     SERGIO         Health Maintenance           Date Due Completion Date     COVID-19 Vaccine (1) Never done ---     HIV Screening Never done ---     TETANUS VACCINE Never done ---     Mammogram Never done ---     Shingles Vaccine (2 of 2) 2020     Lipid Panel 10/06/2020 10/6/2015     Influenza Vaccine (1) 2022     DEXA Scan 12/10/2023 12/10/2021     Colorectal Cancer Screening 2025 11/3/2015     Pneumococcal Vaccines (Age 0-64) (3 - PPSV23 or PCV20) 2032 10/2/2015                      Family History   Problem Relation Age of Onset    Anxiety disorder Mother      Depression Mother      Suicide Mother      Seizures Mother      Drug abuse Mother      Ovarian cancer Mother      Aortic aneurysm Father      Colon cancer Neg Hx      Breast cancer Neg Hx      Diabetes Neg Hx      Hypertension Neg Hx           Social History            Socioeconomic History    Marital status:    Tobacco Use    Smoking status: Never    Smokeless tobacco: Never   Substance and Sexual Activity    Alcohol use: Yes       Comment: 2 per month    Drug use: Yes       Types: Amphetamines, Barbituates, Benzodiazepines    Sexual activity: Yes       Partners: Male       Birth control/protection: Surgical, None         Review of Systems  Review of Systems  Leakage of urine     Objective:   /87   Pulse 72   Ht 5' 3" (1.6 m)   Wt 77.1 kg (169 lb 15.6 oz)   LMP  (LMP " Unknown)   BMI 30.11 kg/m²      Physical Exam   Actually very well suspended vaginal access it seems as the their mice might of been a urethropexy IC some indentation at the level of the bladder neck no other abnormality noted inspection of the suprapubic region as well as adductor space is did not show any FX at puncture marks for any type of  nor retropubic sling.    Nor has any graft palpated      November 30, 2022 straight catheterization patient for about 175 cc of urine after she voided 250 cc into at  Assessment:      1. FILIBERTO (stress urinary incontinence, female)             Plan:      1. FILIBERTO (stress urinary incontinence, female)       Long discussion with patient at this point do think that the urethropexy he has been done I do not know if he will be possible get the old operative note but if it is that will be of tremendous benefit she is going to try as her  in her in the  of the practice are good friends.  So she is going to try but regardless I am going to carry out a urodynamics with cysto here the ping on results of that we might move forward with a mid urethral sling patient understanding of of it will all be really depend once again on this UDS report that will be generated        UDS was below.    I do think that patient was slightly in a rush today she was late secondary to of parent being in hospital she has profound incontinence is requesting assistance I do think that we are ready to move forward with mid urethral sling patient's understanding that there could be some initial voiding dysfunction after placement of sling but we will be working through the            a.  Uroflowmetry:  Prolapse reduction: No  Voided volume:  40            mL   Voiding time:    seconds  Max flow:   mL/s  Avg flow:    mL/s   PVR:   min mL     The overall configuration of this uroflow study was  very small void difficult to interpret.       b.  Pressure flow:  Prolapse reduction:  No  Voided volume:   284 mL  Voiding time:   23.1 seconds  Peak flow:  32.3 mL/s   Avg flow:  12.6 mL/s  Max det pressure:  41.7  cm H20  Det pressure at max flow: vesic cath avulsion cm H20  Void initiated by  valsalva.    Urethral relaxation (EMG):  no.    PVR (calculated):  min mL     The overall configuration of this pressure flow study was it is actually a proper wave it is continuous but I think it is all abdominal based and there is significant increase in electrical activity dyssynergic.       2.  FILLING PHASE:  1st desire: 111 mL  Normal desire:  138 mL  Strong desire:  215 mL  Urgency:  267 mL  Compliance (calculated)  compliant mL/cm H20  EMG activity during filling:   elevated  Detrusor contractions observed: Yes.  When permission given a void     3.  URETHRAL FUNCTION/STORAGE PHASE:     a.  WITHOUT prolapse reduction:  No leakage of urine seen multiple Valsalva or cough     These findings are consistent with Negative urodynamic stress incontinence .     Assessment:  no UDS FILIBERTO but there is clinical FILIBERTO quite profound it happens throughout the all aspects of life patient coughs a lot secondary to some condition which is being investigated with substantial leakage and coital leakage     Plan:  Patient is a is a candidate for M U.S. but not at this time were going to practice mindful voiding and we will move forward with planning for minutes the urethral sling around December 7 patient will come in the week before and do straight catheterization with full bladder depending on the ratio of postvoid residual to voided amount we will delineated for can forward with sling patient appreciated time

## 2022-11-30 NOTE — TELEPHONE ENCOUNTER
Advised patient if she was not going to be incredibly late patient could still come to the appointment.     ----- Message from Cooper Segovia sent at 11/30/2022 10:27 AM CST -----  Contact: pt at [phone]  Type: Needs Medical Advice  Who Called:  pt   Best Call Back Number: [phone]    Additional Information: pt called in to find out will she be ok if she is running late please call her to advise          Pt returning nurse call re: sx

## 2022-12-01 ENCOUNTER — OFFICE VISIT (OUTPATIENT)
Dept: SURGERY | Facility: CLINIC | Age: 57
End: 2022-12-01
Payer: COMMERCIAL

## 2022-12-01 VITALS — BODY MASS INDEX: 30.81 KG/M2 | HEIGHT: 63 IN

## 2022-12-01 DIAGNOSIS — K64.8 HEMORRHOID PROLAPSE: Primary | ICD-10-CM

## 2022-12-01 PROCEDURE — 3008F PR BODY MASS INDEX (BMI) DOCUMENTED: ICD-10-PCS | Mod: CPTII,95,, | Performed by: SURGERY

## 2022-12-01 PROCEDURE — 99213 OFFICE O/P EST LOW 20 MIN: CPT | Mod: 95,,, | Performed by: SURGERY

## 2022-12-01 PROCEDURE — 1159F MED LIST DOCD IN RCRD: CPT | Mod: CPTII,95,, | Performed by: SURGERY

## 2022-12-01 PROCEDURE — 1160F RVW MEDS BY RX/DR IN RCRD: CPT | Mod: CPTII,95,, | Performed by: SURGERY

## 2022-12-01 PROCEDURE — 1159F PR MEDICATION LIST DOCUMENTED IN MEDICAL RECORD: ICD-10-PCS | Mod: CPTII,95,, | Performed by: SURGERY

## 2022-12-01 PROCEDURE — 3008F BODY MASS INDEX DOCD: CPT | Mod: CPTII,95,, | Performed by: SURGERY

## 2022-12-01 PROCEDURE — 1160F PR REVIEW ALL MEDS BY PRESCRIBER/CLIN PHARMACIST DOCUMENTED: ICD-10-PCS | Mod: CPTII,95,, | Performed by: SURGERY

## 2022-12-01 PROCEDURE — 99213 PR OFFICE/OUTPT VISIT, EST, LEVL III, 20-29 MIN: ICD-10-PCS | Mod: 95,,, | Performed by: SURGERY

## 2022-12-01 NOTE — PROGRESS NOTES
The patient location is: car  The chief complaint leading to consultation is: hemorrhoids    Visit type: audiovisual    Face to Face time with patient: 10 mins  20 minutes of total time spent on the encounter, which includes face to face time and non-face to face time preparing to see the patient (eg, review of tests), Obtaining and/or reviewing separately obtained history, Documenting clinical information in the electronic or other health record, Independently interpreting results (not separately reported) and communicating results to the patient/family/caregiver, or Care coordination (not separately reported).         Each patient to whom he or she provides medical services by telemedicine is:  (1) informed of the relationship between the physician and patient and the respective role of any other health care provider with respect to management of the patient; and (2) notified that he or she may decline to receive medical services by telemedicine and may withdraw from such care at any time.    Notes:   Discussion with patient.  She is scheduled to have surgery with Dr. Sherman in mid December.  She continues to have occasional prolapse of tissue with fecal residue.  She notes this particularly occurs with straining and during intercourse.  She is tried fiber with minimal improvement.  She would like to proceed with a more aggressive route.  Discussion with patient recommended consideration for banding.  This can be done in the office.  She expresses a desire to have the banding performed.  Recommended delaying until recovery from her surgery.  This would likely arrange for banding in mid to late January.
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
## 2022-12-12 ENCOUNTER — ANESTHESIA EVENT (OUTPATIENT)
Dept: SURGERY | Facility: AMBULARY SURGERY CENTER | Age: 57
End: 2022-12-12
Payer: COMMERCIAL

## 2022-12-12 DIAGNOSIS — N39.3 SUI (STRESS URINARY INCONTINENCE, FEMALE): Primary | ICD-10-CM

## 2022-12-12 DIAGNOSIS — Z01.818 PREOP TESTING: Primary | ICD-10-CM

## 2022-12-12 RX ORDER — PRAVASTATIN SODIUM 20 MG/1
TABLET ORAL DAILY
COMMUNITY

## 2022-12-12 RX ORDER — CLINDAMYCIN AND BENZOYL PEROXIDE 10; 50 MG/G; MG/G
GEL TOPICAL 2 TIMES DAILY
COMMUNITY
End: 2023-10-10

## 2022-12-12 RX ORDER — CIPROFLOXACIN 2 MG/ML
400 INJECTION, SOLUTION INTRAVENOUS
Status: CANCELLED | OUTPATIENT
Start: 2022-12-12

## 2022-12-12 RX ORDER — LIDOCAINE HYDROCHLORIDE 20 MG/ML
JELLY TOPICAL ONCE
Status: CANCELLED | OUTPATIENT
Start: 2022-12-12 | End: 2022-12-12

## 2022-12-12 RX ORDER — SERTRALINE HYDROCHLORIDE 50 MG/1
2 TABLET, FILM COATED ORAL DAILY
COMMUNITY
End: 2023-10-10

## 2022-12-12 RX ORDER — MINOCYCLINE HYDROCHLORIDE 100 MG/1
100 CAPSULE ORAL
COMMUNITY
End: 2023-02-17

## 2022-12-12 RX ORDER — ARIPIPRAZOLE 15 MG/1
7.5 TABLET ORAL DAILY
COMMUNITY

## 2022-12-12 NOTE — OR NURSING
Reported patient history to Dr Figueroa. Patient to get an EKG and CXR.  Will reevaluate patient while she is here.

## 2022-12-12 NOTE — DISCHARGE INSTRUCTIONS
Stress Urinary Incontinence: Having Midurethral Sling Surgery    To help treat stress urinary incontinence (FILIBERTO), your surgeon may do midurethral sling surgery. A sling of tissue is placed under the urethra. The sling (tape) is made from a mesh of artificial material. When the tension of the tape is changed, urine should no longer leak. Your surgery will take about 60 minutes. You will be asked to do some things at home to get ready for surgery. Below are guidelines to help you get ready. If you have any questions, call your nurse or doctor.  How should I prepare for surgery?  The weeks before surgery  Have any tests that your doctor orders.  Tell your doctor about aspirin and other medicines, vitamins, or herbs you take. Ask if you should stop taking them before surgery.  Stop smoking to help lower your risks during surgery.  If you have been given any prescriptions to fill, do this before surgery.  The night before surgery  You may be asked to give yourself an enema. This cleans out your bowels for surgery. Youll be told how to do it.  Follow any directions you are given for taking medicines and for not eating or drinking before surgery.  The day of surgery  Arrive at the hospital a few hours before surgery as directed. Have someone drive you there who can also stay during the surgery, and drive you home. At the hospital, the healthcare staff will take your temperature and blood pressure. In some cases, you may have tests. Then the healthcare staff will put in one or more IV (intravenous) lines. These lines give you fluids and medicines before, during, and after surgery. Some of your pubic hair may be removed. The staff may put tight stockings on your legs to help prevent blood clots.  About anesthesia  To keep you pain-free during surgery, yougabrielle get anesthesia. General anesthesia lets you sleep during the surgery. Local anesthesia numbs the area that will be operated on. Before surgery, yougabrielle meet with the  anesthesiologist or nurse anesthetist. He or she can tell you what kind of anesthesia you will get and answer questions you may have.  What happens during the procedure?  The surgeon will make 2 small cuts (incisions) in the lower part of your belly (abdomen), near the pubic hairline or in the inner upper part of your thighs. He or she will make another small incision in the front wall of the vagina.  The surgeon will work through the incisions to place the tape like a hammock under the urethra. The two ends of the tape emerge through the incisions.  If youre given local anesthesia, your surgeon may tell you to cough so that the tension of the tape can be changed.  When the tension is changed, the ends of the tape are cut. They stay below the skin in the tissue of the abdominal wall. The healing process of the incisions holds the ends of the tape in place.  The surgeon will close the incisions in the abdomen and vagina with stitches (sutures).  What are the risks and complications?  The risks and complications of this procedure may include:  Infection  Bleeding  Risks of anesthesia  Blood clots  Damage to nerves, muscles, bladder, or nearby pelvic structures  Trouble urinating  Urinary urgency  Problems with sling (tape)   Date Last Reviewed: 1/1/2017  © 6567-1099 [employer]. [address] [place] [address], [place] [address]. All rights reserved. This information is not intended as a substitute for professional medical care. Always follow your healthcare professional's instructions.    Once youre home:  Drink plenty of fluids.  Rest for 24 hrs then slowly resume normal activities. No lifting > 10 lbs for 6 weeks, No strenuous activity for 6 weeks  You may have burning or light bleeding when you urinate--this is normal.   You may still have occasional incontinence  Medicines may be prescribed to ease any discomfort or prevent infection. Take these as directed.  Call your doctor if you have heavy bleeding or  blood clots, burning that lasts more than a day, a fever over 100°F  (38° C), or trouble urinating.  Vaginal Rest x 3 weeks  Expect Vaginal spotting and discharge for up to 6 weeks, report excessive bleeding to   You may shower in 48hrs, avoid scrubbing 2  incisions on perineum, pat dry perineum. No baths, hot tubs, swimming pools for 3 weeks  Absorbable stitches perineal incisions    After Surgery:  Always be aware that any surgery can cause these symptoms:    Pain- Medication can be prescribed for pain to decrease your pain but may not completely take your pain away.  Over the Counter pain medicine my be enough and you can always use Ice and rest to help ease pain.    Bleeding- a little bleeding after a surgery is usually within normal.  If there is a lot of blood you need to notify your MD.  Emergency treatments of bleeding are cold application, elevation of the bleeding site and compression.    Infection- Infection after surgery is NOT a normal occurrence.  Signs of infection are fever, swelling, hot to touch the incision.  If this occurs notify your MD immediately.    Nausea- this can be common after a surgery especially if you have had anesthesia medicine or are taking pain medicine.  Staying on clear liquids, bland foods, gingerale, or over the counter anti nausea medicines can help.  If you vomit more than once, notify your MD.  Anti Nausea medicines can be prescribed.

## 2022-12-12 NOTE — H&P
Patient ID: Faith Bernard is a 57 y.o. female.     Chief Complaint:  No chief complaint on file.        History of Present Illness  52-year-old who underwent a the abdominal hysterectomy open in back in around 1998 at that time she had some type of concomitant bladder work that was done to help with leakage of urine.    She would now like this repeated as there is leakage of urine with coughing and sneezing                      Past Medical History:   Diagnosis Date    Anxiety      Depression      Encounter for blood transfusion      Esophageal dysphagia      Fibromyalgia      Gastric bypass status for obesity      H/O dental abscess 4/14/2014     drained    Headache(784.0)       migraines.  Treated at [facility] (Dr. 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  Pt states took too many then realized her mistake    Polycystic ovaries      S/P JUHI-BSO      Thyroid disease       hypothyroidism                   Past Surgical History:   Procedure Laterality Date    ABDOMINAL SURGERY        ANKLE HARDWARE REMOVAL Left 3/28/2022     Procedure: REMOVAL, HARDWARE, ANKLE;  Surgeon: Fer Mcrae MD;  Location: [facility] OR [address] [place] [address];  Service: Orthopedics;  Laterality: Left;    APPLICATION OF LARGE EXTERNAL FIXATION DEVICE TO TIBIA Left 9/7/2020     Procedure: APPLICATION, EXTERNAL FIXATION DEVICE, TIBIA;  Surgeon: Sujata Londono MD;  Location: [facility] OR [address] [place] [address];  Service: Orthopedics;  Laterality: Left;  need paralysis    APPLICATION OF WOUND VACUUM-ASSISTED CLOSURE DEVICE Left 9/18/2020      Procedure: APPLICATION, WOUND VAC;  Surgeon: Fer Mcrae MD;  [facility];  Service: Orthopedics;  Laterality: Left;    BREAST SURGERY   2004     Breast Reduction    CARDIAC CATHETERIZATION        COLONOSCOPY N/A 11/3/2015     Procedure: COLONOSCOPY;  Surgeon: Timothy Barber MD;  Location: [facility];  Service: Endoscopy;  Laterality: N/A;    DENTAL SURGERY         4 teeth removed    GASTRIC BYPASS        HERNIA REPAIR        HYSTERECTOMY        ILIAC CREST BONE GRAFT Left 3/28/2022     Procedure: BONE GRAFT, ILIAC CREST;  Surgeon: Fer Mcrae MD;  [facility];  Service: Orthopedics;  Laterality: Left;    INJECTION OF ANESTHETIC AGENT AROUND NERVE N/A 3/29/2022     Procedure: Block, Nerve;  Surgeon: Alexus Surgeon;  Location: [facility];  Service: Anesthesiology;  Laterality: N/A;    OPEN REDUCTION AND INTERNAL FIXATION (ORIF) OF PILON FRACTURE Left 9/18/2020     Procedure: ORIF, FRACTURE, PILON - left. Diving board, supine, bone foam. Seda anterolateral distal tibial plate, mini frag, long 3.5mm steel screw, CaPhos bone substitute;  Surgeon: Fer Mcrae MD;  Location: [address];  Service: Orthopedics;  Laterality: Left;    OPEN REDUCTION AND INTERNAL FIXATION (ORIF) OF PILON FRACTURE Left 3/28/2022     Procedure: ORIF, FRACTURE, PILON nonunion + Iliac crest bone graft - diving board, supine, bone foam. Seda axsos3 screws, 2.7 mini plates/screws, curettes, Seda/Lomas medical Augment;  Surgeon: Fer Mcrae MD;  Location: [address];  Service: Orthopedics;  Laterality: Left;  Spinal? + postop catheter (draping out pelvis for bone graft harvest)    OVARIAN CYST REMOVAL         aprox 5 times    REMOVAL OF EXTERNAL FIXATION DEVICE Left 9/18/2020     Procedure: REMOVAL, EXTERNAL FIXATION DEVICE;  Surgeon: Fer Mcrae MD;  Location: [address];  Service: Orthopedics;  Laterality: Left;    ROTATOR CUFF REPAIR  "Left 2019    tennis elbow repair Left 2019    UPPER GASTROINTESTINAL ENDOSCOPY             GYN & OB History  No LMP recorded (lmp unknown). Patient has had a hysterectomy.   Date of Last Pap: No result found                                     OB History    Para Term  AB Living   4 1 0 1 2 2   SAB IAB Ectopic Multiple Live Births         2 0 0 0 2             # Outcome Date GA Lbr Cipriano/2nd Weight Sex Delivery Anes PTL Lv   4 SAB                     3            CS-Unspec     SERGIO   2 SAB                     1            Vag-Spont     SERGIO         Health Maintenance           Date Due Completion Date     COVID-19 Vaccine (1) Never done ---     HIV Screening Never done ---     TETANUS VACCINE Never done ---     Mammogram Never done ---     Shingles Vaccine (2 of 2) 2020     Lipid Panel 10/06/2020 10/6/2015     Influenza Vaccine (1) 2022     DEXA Scan 12/10/2023 12/10/2021     Colorectal Cancer Screening 2025 11/3/2015     Pneumococcal Vaccines (Age 0-64) (3 - PPSV23 or PCV20) 2032 10/2/2015                          Family History   Problem Relation Age of Onset    Anxiety disorder Mother      Depression Mother      Suicide Mother      Seizures Mother      Drug abuse Mother      Ovarian cancer Mother      Aortic aneurysm Father      Colon cancer Neg Hx      Breast cancer Neg Hx      Diabetes Neg Hx      Hypertension Neg Hx           Social History                Socioeconomic History    Marital status:    Tobacco Use    Smoking status: Never    Smokeless tobacco: Never   Substance and Sexual Activity    Alcohol use: Yes       Comment: 2 per month    Drug use: Yes       Types: Amphetamines, Barbituates, Benzodiazepines    Sexual activity: Yes       Partners: Male       Birth control/protection: Surgical, None         Review of Systems  Review of Systems  Leakage of urine     Objective:   /87   Pulse 72   Ht 5' 3" (1.6 m)   Wt " 77.1 kg (169 lb 15.6 oz)   LMP  (LMP Unknown)   BMI 30.11 kg/m²      Physical Exam   Actually very well suspended vaginal access it seems as the their mice might of been a urethropexy IC some indentation at the level of the bladder neck no other abnormality noted inspection of the suprapubic region as well as adductor space is did not show any FX at puncture marks for any type of  nor retropubic sling.    Nor has any graft palpated        November 30, 2022 straight catheterization patient for about 175 cc of urine after she voided 250 cc into at  Assessment:      1. FILIBERTO (stress urinary incontinence, female)             Plan:      1. FILIBERTO (stress urinary incontinence, female)       Long discussion with patient at this point do think that the urethropexy he has been done I do not know if he will be possible get the old operative note but if it is that will be of tremendous benefit she is going to try as her  in her in the  of the practice are good friends.  So she is going to try but regardless I am going to carry out a urodynamics with cysto here the ping on results of that we might move forward with a mid urethral sling patient understanding of of it will all be really depend once again on this UDS report that will be generated           UDS was below.    I do think that patient was slightly in a rush today she was late secondary to of parent being in hospital she has profound incontinence is requesting assistance I do think that we are ready to move forward with mid urethral sling patient's understanding that there could be some initial voiding dysfunction after placement of sling but we will be working through the                 a.  Uroflowmetry:  Prolapse reduction: No  Voided volume:  40            mL   Voiding time:    seconds  Max flow:   mL/s  Avg flow:    mL/s   PVR:   min mL     The overall configuration of this uroflow study was  very small void difficult to interpret.        b.  Pressure flow:  Prolapse reduction: No  Voided volume:   284 mL  Voiding time:   23.1 seconds  Peak flow:  32.3 mL/s   Avg flow:  12.6 mL/s  Max det pressure:  41.7  cm H20  Det pressure at max flow: vesic cath avulsion cm H20  Void initiated by  valsalva.    Urethral relaxation (EMG):  no.    PVR (calculated):  min mL     The overall configuration of this pressure flow study was it is actually a proper wave it is continuous but I think it is all abdominal based and there is significant increase in electrical activity dyssynergic.       2.  FILLING PHASE:  1st desire: 111 mL  Normal desire:  138 mL  Strong desire:  215 mL  Urgency:  267 mL  Compliance (calculated)  compliant mL/cm H20  EMG activity during filling:   elevated  Detrusor contractions observed: Yes.  When permission given a void     3.  URETHRAL FUNCTION/STORAGE PHASE:     a.  WITHOUT prolapse reduction:  No leakage of urine seen multiple Valsalva or cough     These findings are consistent with Negative urodynamic stress incontinence .     Assessment:  no UDS FILIBERTO but there is clinical FILIBERTO quite profound it happens throughout the all aspects of life patient coughs a lot secondary to some condition which is being investigated with substantial leakage and coital leakage     Plan:  Patient is a is a candidate for [facility] but not at this time were going to practice mindful voiding and we will move forward with planning for minutes the urethral sling around December 7 patient will come in the week before and do straight catheterization with full bladder depending on the ratio of postvoid residual to voided amount we will delineated for can forward with sling patient appreciated time

## 2022-12-13 ENCOUNTER — HOSPITAL ENCOUNTER (OUTPATIENT)
Dept: RADIOLOGY | Facility: HOSPITAL | Age: 57
Discharge: HOME | End: 2022-12-13
Attending: ANESTHESIOLOGY
Payer: COMMERCIAL

## 2022-12-13 ENCOUNTER — HOSPITAL ENCOUNTER (OUTPATIENT)
Dept: CARDIOLOGY | Facility: HOSPITAL | Age: 57
Discharge: HOME | End: 2022-12-13
Attending: ANESTHESIOLOGY
Payer: COMMERCIAL

## 2022-12-13 DIAGNOSIS — Z01.818 PREOP TESTING: ICD-10-CM

## 2022-12-13 PROCEDURE — 71046 X-RAY EXAM CHEST 2 VIEWS: CPT | Mod: 26,,, | Performed by: RADIOLOGY

## 2022-12-13 PROCEDURE — 71046 XR CHEST PA AND LATERAL: ICD-10-PCS | Mod: 26,,, | Performed by: RADIOLOGY

## 2022-12-13 PROCEDURE — 71046 X-RAY EXAM CHEST 2 VIEWS: CPT | Mod: TC,FY

## 2022-12-13 PROCEDURE — 93010 EKG 12-LEAD: ICD-10-PCS | Mod: ,,, | Performed by: INTERNAL MEDICINE

## 2022-12-13 PROCEDURE — 93010 ELECTROCARDIOGRAM REPORT: CPT | Mod: ,,, | Performed by: INTERNAL MEDICINE

## 2022-12-13 PROCEDURE — 93005 ELECTROCARDIOGRAM TRACING: CPT

## 2022-12-14 ENCOUNTER — ANESTHESIA (OUTPATIENT)
Dept: SURGERY | Facility: AMBULARY SURGERY CENTER | Age: 57
End: 2022-12-14
Payer: COMMERCIAL

## 2022-12-14 ENCOUNTER — HOSPITAL ENCOUNTER (OUTPATIENT)
Facility: AMBULARY SURGERY CENTER | Age: 57
Discharge: HOME | End: 2022-12-14
Attending: OBSTETRICS & GYNECOLOGY | Admitting: OBSTETRICS & GYNECOLOGY
Payer: COMMERCIAL

## 2022-12-14 DIAGNOSIS — N39.3 SUI (STRESS URINARY INCONTINENCE, FEMALE): ICD-10-CM

## 2022-12-14 PROCEDURE — D9220A PRA ANESTHESIA: ICD-10-PCS | Mod: ANES,,, | Performed by: ANESTHESIOLOGY

## 2022-12-14 PROCEDURE — 57288 REPAIR BLADDER DEFECT: CPT | Mod: ,,, | Performed by: OBSTETRICS & GYNECOLOGY

## 2022-12-14 PROCEDURE — D9220A PRA ANESTHESIA: ICD-10-PCS | Mod: CRNA,,, | Performed by: NURSE ANESTHETIST, CERTIFIED REGISTERED

## 2022-12-14 PROCEDURE — 57288 REPAIR BLADDER DEFECT: CPT | Performed by: OBSTETRICS & GYNECOLOGY

## 2022-12-14 PROCEDURE — 57288 PR SLING OPER STRES INCONTINENCE: ICD-10-PCS | Mod: ,,, | Performed by: OBSTETRICS & GYNECOLOGY

## 2022-12-14 PROCEDURE — D9220A PRA ANESTHESIA: Mod: ANES,,, | Performed by: ANESTHESIOLOGY

## 2022-12-14 PROCEDURE — D9220A PRA ANESTHESIA: Mod: CRNA,,, | Performed by: NURSE ANESTHETIST, CERTIFIED REGISTERED

## 2022-12-14 DEVICE — TRANSVAGINAL MID-URETHRAL SLING
Type: IMPLANTABLE DEVICE | Site: VAGINA | Status: FUNCTIONAL
Brand: ADVANTAGE FIT™  SYSTEM

## 2022-12-14 RX ORDER — PROPOFOL 10 MG/ML
VIAL (ML) INTRAVENOUS
Status: DISCONTINUED | OUTPATIENT
Start: 2022-12-14 | End: 2022-12-14

## 2022-12-14 RX ORDER — LIDOCAINE HYDROCHLORIDE 20 MG/ML
INJECTION INTRAVENOUS
Status: DISCONTINUED | OUTPATIENT
Start: 2022-12-14 | End: 2022-12-14

## 2022-12-14 RX ORDER — SODIUM CHLORIDE, SODIUM LACTATE, POTASSIUM CHLORIDE, CALCIUM CHLORIDE 600; 310; 30; 20 MG/100ML; MG/100ML; MG/100ML; MG/100ML
INJECTION, SOLUTION INTRAVENOUS CONTINUOUS
Status: DISCONTINUED | OUTPATIENT
Start: 2022-12-14 | End: 2022-12-14 | Stop reason: HOSPADM

## 2022-12-14 RX ORDER — LIDOCAINE HYDROCHLORIDE 20 MG/ML
JELLY TOPICAL
Status: DISCONTINUED | OUTPATIENT
Start: 2022-12-14 | End: 2022-12-14 | Stop reason: HOSPADM

## 2022-12-14 RX ORDER — ONDANSETRON 2 MG/ML
4 INJECTION INTRAMUSCULAR; INTRAVENOUS ONCE AS NEEDED
Status: DISCONTINUED | OUTPATIENT
Start: 2022-12-14 | End: 2022-12-14 | Stop reason: HOSPADM

## 2022-12-14 RX ORDER — DEXAMETHASONE SODIUM PHOSPHATE 4 MG/ML
INJECTION, SOLUTION INTRA-ARTICULAR; INTRALESIONAL; INTRAMUSCULAR; INTRAVENOUS; SOFT TISSUE
Status: DISCONTINUED | OUTPATIENT
Start: 2022-12-14 | End: 2022-12-14

## 2022-12-14 RX ORDER — DEXMEDETOMIDINE HYDROCHLORIDE 100 UG/ML
INJECTION, SOLUTION INTRAVENOUS
Status: DISCONTINUED | OUTPATIENT
Start: 2022-12-14 | End: 2022-12-14

## 2022-12-14 RX ORDER — LIDOCAINE HYDROCHLORIDE 10 MG/ML
1 INJECTION, SOLUTION EPIDURAL; INFILTRATION; INTRACAUDAL; PERINEURAL ONCE
Status: DISCONTINUED | OUTPATIENT
Start: 2022-12-14 | End: 2022-12-14 | Stop reason: HOSPADM

## 2022-12-14 RX ORDER — SODIUM CHLORIDE, SODIUM LACTATE, POTASSIUM CHLORIDE, CALCIUM CHLORIDE 600; 310; 30; 20 MG/100ML; MG/100ML; MG/100ML; MG/100ML
125 INJECTION, SOLUTION INTRAVENOUS CONTINUOUS
Status: DISCONTINUED | OUTPATIENT
Start: 2022-12-14 | End: 2022-12-14 | Stop reason: HOSPADM

## 2022-12-14 RX ORDER — PROMETHAZINE HYDROCHLORIDE 25 MG/ML
INJECTION, SOLUTION INTRAMUSCULAR; INTRAVENOUS
Status: DISCONTINUED | OUTPATIENT
Start: 2022-12-14 | End: 2022-12-14

## 2022-12-14 RX ORDER — FENTANYL CITRATE 50 UG/ML
INJECTION, SOLUTION INTRAMUSCULAR; INTRAVENOUS
Status: DISCONTINUED | OUTPATIENT
Start: 2022-12-14 | End: 2022-12-14

## 2022-12-14 RX ORDER — OXYCODONE AND ACETAMINOPHEN 7.5; 325 MG/1; MG/1
1 TABLET ORAL EVERY 8 HOURS PRN
Qty: 21 TABLET | Refills: 0 | Status: SHIPPED | OUTPATIENT
Start: 2022-12-14 | End: 2022-12-21

## 2022-12-14 RX ORDER — ACETAMINOPHEN 10 MG/ML
INJECTION, SOLUTION INTRAVENOUS
Status: DISCONTINUED | OUTPATIENT
Start: 2022-12-14 | End: 2022-12-14

## 2022-12-14 RX ORDER — CLINDAMYCIN PHOSPHATE 900 MG/50ML
900 INJECTION, SOLUTION INTRAVENOUS
Status: COMPLETED | OUTPATIENT
Start: 2022-12-14 | End: 2022-12-14

## 2022-12-14 RX ORDER — PHENYLEPHRINE HYDROCHLORIDE 10 MG/ML
INJECTION INTRAVENOUS
Status: DISCONTINUED | OUTPATIENT
Start: 2022-12-14 | End: 2022-12-14

## 2022-12-14 RX ORDER — LIDOCAINE HYDROCHLORIDE AND EPINEPHRINE 10; 10 MG/ML; UG/ML
INJECTION, SOLUTION INFILTRATION; PERINEURAL
Status: DISCONTINUED | OUTPATIENT
Start: 2022-12-14 | End: 2022-12-14 | Stop reason: HOSPADM

## 2022-12-14 RX ORDER — CIPROFLOXACIN 2 MG/ML
400 INJECTION, SOLUTION INTRAVENOUS
Status: COMPLETED | OUTPATIENT
Start: 2022-12-14 | End: 2022-12-14

## 2022-12-14 RX ORDER — LIDOCAINE HYDROCHLORIDE 10 MG/ML
INJECTION, SOLUTION EPIDURAL; INFILTRATION; INTRACAUDAL; PERINEURAL
Status: DISCONTINUED
Start: 2022-12-14 | End: 2022-12-14 | Stop reason: HOSPADM

## 2022-12-14 RX ORDER — ESTRADIOL 0.1 MG/G
CREAM VAGINAL
Status: DISCONTINUED
Start: 2022-12-14 | End: 2022-12-14 | Stop reason: HOSPADM

## 2022-12-14 RX ORDER — LIDOCAINE HYDROCHLORIDE 20 MG/ML
JELLY TOPICAL ONCE
Status: DISCONTINUED | OUTPATIENT
Start: 2022-12-14 | End: 2022-12-14 | Stop reason: HOSPADM

## 2022-12-14 RX ORDER — ONDANSETRON 2 MG/ML
INJECTION INTRAMUSCULAR; INTRAVENOUS
Status: DISCONTINUED | OUTPATIENT
Start: 2022-12-14 | End: 2022-12-14

## 2022-12-14 RX ORDER — LIDOCAINE HYDROCHLORIDE 20 MG/ML
JELLY TOPICAL
Status: DISCONTINUED
Start: 2022-12-14 | End: 2022-12-14 | Stop reason: HOSPADM

## 2022-12-14 RX ORDER — FENTANYL CITRATE 50 UG/ML
25 INJECTION, SOLUTION INTRAMUSCULAR; INTRAVENOUS EVERY 5 MIN PRN
Status: DISCONTINUED | OUTPATIENT
Start: 2022-12-14 | End: 2022-12-14 | Stop reason: HOSPADM

## 2022-12-14 RX ADMIN — DEXMEDETOMIDINE HYDROCHLORIDE 50 MCG: 100 INJECTION, SOLUTION INTRAVENOUS at 09:12

## 2022-12-14 RX ADMIN — PROMETHAZINE HYDROCHLORIDE 6.25 MG: 25 INJECTION, SOLUTION INTRAMUSCULAR; INTRAVENOUS at 09:12

## 2022-12-14 RX ADMIN — CLINDAMYCIN PHOSPHATE 900 MG: 900 INJECTION, SOLUTION INTRAVENOUS at 09:12

## 2022-12-14 RX ADMIN — Medication 150 MG: at 09:12

## 2022-12-14 RX ADMIN — SODIUM CHLORIDE, SODIUM LACTATE, POTASSIUM CHLORIDE, CALCIUM CHLORIDE: 600; 310; 30; 20 INJECTION, SOLUTION INTRAVENOUS at 08:12

## 2022-12-14 RX ADMIN — DEXAMETHASONE SODIUM PHOSPHATE 4 MG: 4 INJECTION, SOLUTION INTRA-ARTICULAR; INTRALESIONAL; INTRAMUSCULAR; INTRAVENOUS; SOFT TISSUE at 09:12

## 2022-12-14 RX ADMIN — ACETAMINOPHEN 1000 MG: 10 INJECTION, SOLUTION INTRAVENOUS at 09:12

## 2022-12-14 RX ADMIN — PHENYLEPHRINE HYDROCHLORIDE 200 MCG: 10 INJECTION INTRAVENOUS at 09:12

## 2022-12-14 RX ADMIN — FENTANYL CITRATE 50 MCG: 50 INJECTION, SOLUTION INTRAMUSCULAR; INTRAVENOUS at 09:12

## 2022-12-14 RX ADMIN — ONDANSETRON 8 MG: 2 INJECTION INTRAMUSCULAR; INTRAVENOUS at 09:12

## 2022-12-14 RX ADMIN — LIDOCAINE HYDROCHLORIDE 75 MG: 20 INJECTION INTRAVENOUS at 09:12

## 2022-12-14 RX ADMIN — CIPROFLOXACIN 400 MG: 2 INJECTION, SOLUTION INTRAVENOUS at 09:12

## 2022-12-14 NOTE — DISCHARGE SUMMARY
[facility]  Discharge Note  Short Stay    Procedure(s) (LRB):  SURGICAL PROCEDURE, USING TENSION FREE VAGINAL TAPE, FOR STRESS INCONTINENCE (N/A)      OUTCOME: Patient tolerated treatment/procedure well without complication and is now ready for discharge.    DISPOSITION: Home or Self Care    FINAL DIAGNOSIS:  <principal problem not specified>    FOLLOWUP: In clinic    DISCHARGE INSTRUCTIONS:  No discharge procedures on file.     TIME SPENT ON DISCHARGE: 20   minutes

## 2022-12-14 NOTE — PLAN OF CARE
Discharge instructions given to pt/, verbalized understanding.  Tolerating PO fluids.  IV removed.  No c/o pain.  Voiding trial completed; see previous nurses noted.  Prescription sent to pharmacy per dr crane.  Ambulating out with  in no distress.

## 2022-12-14 NOTE — ANESTHESIA POSTPROCEDURE EVALUATION
Anesthesia Post Evaluation    Patient: Faith Bernard    Procedure(s) Performed: Procedure(s) (LRB):  SURGICAL PROCEDURE, USING TENSION FREE VAGINAL TAPE, FOR STRESS INCONTINENCE (N/A)    Final Anesthesia Type: general      Patient location during evaluation: PACU  Patient participation: Yes- Able to Participate  Level of consciousness: awake and alert and oriented  Post-procedure vital signs: reviewed and stable  Pain management: adequate  Airway patency: patent    PONV status at discharge: No PONV  Anesthetic complications: no      Cardiovascular status: blood pressure returned to baseline and stable  Respiratory status: unassisted and spontaneous ventilation  Hydration status: euvolemic  Follow-up not needed.          Vitals Value Taken Time   /64 12/14/22 1115   Temp 36.7 °C (98.1 °F) 12/14/22 1001   Pulse 66 12/14/22 1134   Resp 20 12/14/22 1055   SpO2 99 % 12/14/22 1134   Vitals shown include unvalidated device data.      No case tracking events are documented in the log.      Pain/Ricarda Score: Ricarda Score: 8 (12/14/2022 11:00 AM)

## 2022-12-14 NOTE — TRANSFER OF CARE
Anesthesia Transfer of Care Note    Patient: Faith Bernard    Procedure(s) Performed: Procedure(s) (LRB):  SURGICAL PROCEDURE, USING TENSION FREE VAGINAL TAPE, FOR STRESS INCONTINENCE (N/A)    Patient location: PACU    Anesthesia Type: general    Transport from OR: Transported from OR on 2-3 L/min O2 by NC with adequate spontaneous ventilation    Post pain: adequate analgesia    Post assessment: no apparent anesthetic complications    Post vital signs: stable    Level of consciousness: sedated    Nausea/Vomiting: no nausea/vomiting    Complications: none    Transfer of care protocol was followed      Last vitals:   Visit Vitals  /82   Pulse 73   Temp 36.4 °C (97.5 °F) (Skin)   Resp 20   Wt 78.6 kg (173 lb 4.5 oz)   LMP  (LMP Unknown)   SpO2 99%   Breastfeeding No   BMI 30.70 kg/m²      Home

## 2022-12-14 NOTE — ANESTHESIA PREPROCEDURE EVALUATION
"                                                                                                             12/14/2022  Faith Bernard is a 57 y.o., female.      Pre-op Assessment    I have reviewed the Patient Summary Reports.     I have reviewed the Nursing Notes. I have reviewed the NPO Status.   I have reviewed the Medications.     Review of Systems  Anesthesia Hx:  Idiosyncratic reaction to medication - pt "went crazy" perioperatively and "stood up on bed".  Doesn't want to repeat that.   Social:  Smoker    Cardiovascular:   Hypertension, well controlled    Pulmonary:  Pulmonary Normal    Renal/:  Renal/ Normal  S/p gastric bypass   Hepatic/GI:   Liver Disease, Hepatitis    Musculoskeletal:   Arthritis  Left ankle osteomyelitis and loose hardware & chronic pain problem   Neurological:   Neuromuscular Disease, Headaches   Peripheral Neuropathy    Endocrine:   Hypothyroidism PCOS Obesity / BMI > 30, Morbid Obesity / BMI > 40  Psych:   Psychiatric History anxiety depression          Physical Exam  General: Well nourished, Cooperative, Alert and Oriented    Airway:  Mallampati: II   Mouth Opening: Normal  TM Distance: Normal  Neck ROM: Normal ROM        Anesthesia Plan  Type of Anesthesia, risks & benefits discussed:    Anesthesia Type: Gen ETT, Gen Supraglottic Airway, Gen Natural Airway, MAC  Intra-op Monitoring Plan: Standard ASA Monitors  Post Op Pain Control Plan: multimodal analgesia  Induction:  IV  Airway Plan: Direct, Video and Fiberoptic, Post-Induction  Informed Consent: Informed consent signed with the Patient and all parties understand the risks and agree with anesthesia plan.  All questions answered.   ASA Score: 3  Anesthesia Plan Notes: Discussed idiosyncratic rxn with pt.  Probably versed.  We will avoid but without knowing cause, can't eliminate possibility.    Ready For Surgery From Anesthesia Perspective.     .      "

## 2022-12-14 NOTE — OP NOTE
Operative Note       Surgery Date: 12/14/2022     Surgeon(s) and Role:     * Frandy Sherman MD - Primary    Pre-op Diagnosis:  FILIBERTO (stress urinary incontinence, female) [N39.3]    Post-op Diagnosis: Post-Op Diagnosis Codes:     * FILIBERTO (stress urinary incontinence, female) [N39.3]    Procedure(s) (LRB):  SURGICAL PROCEDURE, USING TENSION FREE VAGINAL TAPE, FOR STRESS INCONTINENCE (N/A)    Anesthesia: General    The patient was identified in the preoperative area where informed consent was confirmed, and she was taken to the operating room where an adequate level of general anesthesia was obtained. The patient was positioned in high lithotomy position with legs in yellow fin stirrups. Care was taken to avoid joint hyperflexion or hyperextension, and all extremity surfaces were carefully padded so as to minimize risk of neurologic injury. Intravenous antibiotics were administered preoperatively. Sequential compression devices were applied to the patient's lower extremities preoperatively VTE prophylaxis. Surgical time-out was performed, where the patient was identified and procedures confirmed. An examination under anesthesia was performed with findings described as above. The patient's abdomen, perineum, and vagina were sterilely prepped and draped. A santos catheter was placed in the bladder for drainage.   Exam under anesthesia revealed normal anatomy stage 2. We then proceeded with placement of the Advantage Fit midurethral sling. The skin was marked just above the symphysis pubis, 2 cm laterally on either side of the midline indicating the exit sites for the trocars. A lone star retractor with blue stays was used. The Santos catheter was palpated at the urethrovesical junction, and 2 Allis clamps were placed at the anterior vaginal wall along the midurethra. The Santos catheter was removed from the patient's bladder. The vaginal epithelium between the two Allis clamps was injected with the 1% lidocaine with  epinephrine. Metzenbaum scissors were used to dissect the vaginal epithelium off the underlying pubocervical muscular tissue in the direction of the angle formed between the ischiopubic ramus and the pubic bone bilaterally. The rigid catheter guide was used to deviate the bladder neck and urethra to the patient's left while the right trocar was advanced to the dissected tract in the patient's right side. Once the urogenital membrane was perforated, the trocar and handle were reoriented in the sagittal plane and the tip of the trocar was advanced through the retropubic space in intimate proximity to the pubic bone. The trocar was then advanced through the skin approximately 2 cm to the right of the midline just above the pubic symphysis. The passage of the Advantage Fit trocar was repeated on the patient's left hand side in a similar fashion, using the catheter guide to deviate the bladder neck to the right. At this point, cystourethroscopy was performed. Cystoscopy was negative for injury after infusion of at least 300 mL in the bladder. The ureteral orifices were noted to have good efflux bilaterally. The bladder was then drained. With a #10 Hegar dilator placed between the sling mesh and the urethra, the arms of the sling were elevated above the pubic symphysis and the plastic sheaths were removed from the mesh arms. Excess mesh was trimmed beneath the suprapubic skin. The Hegar dilator ensured that the mesh sling was placed in a tension-free manner. The vaginal mucosa overlying the sling mesh was closed with a several mattress stitches of 2-0 Vicryl with excellent hemostasis noted. The suprapubic incisions were closed with a 4-0 monocryl and sealed with dermabond.  The patient was transferred to recovery in stable condition. The vagina was packed with guaze.        Estimated Blood Loss: * No values recorded between 12/14/2022  9:35 AM and 12/14/2022  9:53 AM *           Specimens (From admission, onward)       None          Implants:   Implant Name Type Inv. Item Serial No.  Lot No. LRB No. Used Action   SLING FIT ADVANTAGE - [id number]  SLING FIT ADVANTAGE  MAINtag [id number] N/A 1 Implanted              Disposition: PACU - hemodynamically stable.           Condition: Good    Attestation:  I was present and scrubbed for the entire procedure.           Discharge Note    Admit Date: 12/14/2022    Attending Physician: Frandy Sherman MD     Discharge Physician: Frandy Sherman MD    Final Diagnosis: Post-Op Diagnosis Codes:     * FILIBERTO (stress urinary incontinence, female) [N39.3]    Disposition: Home or Self Care    Patient Instructions:   Current Discharge Medication List        CONTINUE these medications which have NOT CHANGED    Details   AIMOVIG AUTOINJECTOR 140 mg/mL autoinjector       cholecalciferol, vitamin D3, 50,000 unit capsule Take 50,000 Units by mouth every 7 days. Patient takes on Saturdays  Refills: 99      CYANOCOBALAMIN, VITAMIN B-12, (VITAMIN B-12 INJ) Inject 1 mL as directed every 30 days.       dextroamphetamine-amphetamine 30 mg Tab Take 1 tablet by mouth 2 (two) times daily.      fluticasone-umeclidin-vilanter (TRELEGY ELLIPTA) 200-62.5-25 mcg inhaler Inhale 1 puff into the lungs once daily.  Qty: 60 each, Refills: 11    Associated Diagnoses: Chronic cough; Wheezes      gabapentin (NEURONTIN) 600 MG tablet Take 1,200 mg by mouth 3 (three) times daily.      hydroCHLOROthiazide (HYDRODIURIL) 25 MG tablet Take 25 mg by mouth every morning.    Comments: .      levothyroxine (SYNTHROID) 100 MCG tablet Take 100 mcg by mouth before breakfast.      oxyCODONE-acetaminophen (PERCOCET)  mg per tablet Take 1 tablet by mouth every 8 (eight) hours as needed for Pain.  Refills: 0    Comments: Prescribed by pain management  Associated Diagnoses: Closed displaced pilon fracture of left tibia with routine healing, subsequent encounter      progesterone (PROMETRIUM) 200 MG capsule Take 200 mg  by mouth every evening.      rizatriptan (MAXALT-MLT) 10 MG disintegrating tablet DISSOLVE ONE TABLET BY MOUTH allow TO dissolve ONCE daily AS NEEDED  Refills: 2      sertraline (ZOLOFT) 50 MG tablet Take 50 mg by mouth once daily.      tizanidine (ZANAFLEX) 4 MG tablet Take 4 mg by mouth every evening. May take up to 12mg qhs  Refills: 4      topiramate (TOPAMAX) 200 MG Tab Take 400 mg by mouth once daily.      VYVANSE 70 mg capsule Take 70 mg by mouth once daily.  Refills: 0      albuterol (PROVENTIL/VENTOLIN HFA) 90 mcg/actuation inhaler 2 puffs every 4 hours as needed for cough, wheeze, or shortness of breath  Qty: 18 g, Refills: 11    Associated Diagnoses: Chronic cough; Wheezes      ARIPiprazole (ABILIFY) 15 MG Tab Take 7.5 mg by mouth once daily.      clindamycin-benzoyl peroxide (BENZACLIN) gel Apply topically 2 (two) times daily.      minocycline (MINOCIN,DYNACIN) 100 MG capsule Take 100 mg by mouth every 12 (twelve) hours.      pravastatin (PRAVACHOL) 20 MG tablet Take by mouth once daily.      !! UNABLE TO FIND medication name: testerone vaginal cream      !! UNABLE TO FIND Take 50 mg by mouth every morning. medication name: kapspargo sprinkle ER  for high blood pressure       !! - Potential duplicate medications found. Please discuss with provider.          Discharge Procedure Orders (must include Diet, Follow-up, Activity)  No discharge procedures on file.     Discharge Date: No discharge date for patient encounter.

## 2022-12-16 VITALS
WEIGHT: 173.31 LBS | RESPIRATION RATE: 20 BRPM | HEART RATE: 65 BPM | OXYGEN SATURATION: 100 % | BODY MASS INDEX: 30.7 KG/M2 | TEMPERATURE: 98 F | DIASTOLIC BLOOD PRESSURE: 64 MMHG | SYSTOLIC BLOOD PRESSURE: 104 MMHG

## 2023-01-11 ENCOUNTER — TELEPHONE (OUTPATIENT)
Dept: PULMONOLOGY | Facility: CLINIC | Age: 58
End: 2023-01-11
Payer: MEDICARE

## 2023-01-11 ENCOUNTER — TELEPHONE (OUTPATIENT)
Dept: UROGYNECOLOGY | Facility: CLINIC | Age: 58
End: 2023-01-11
Payer: MEDICARE

## 2023-01-11 NOTE — TELEPHONE ENCOUNTER
----- Message from Sumi Greenfield sent at 1/11/2023  8:13 AM CST -----  Contact: self  Patient had to [place] her app[place] today at 1 PM [place] please call back at [phone].  She couldn't make the one at 9:45 tomorrow has to be in afternoon.  Thanks

## 2023-01-11 NOTE — TELEPHONE ENCOUNTER
Spoke to pt. Got her schedule     ----- Message from Sumi Greenfield sent at 1/11/2023  8:17 AM CST -----  Contact: self  Patient states she was to have some kind of a breathing test but I was not sure what she was referring to, so please call back to help her get it shahida at [phone] and thanks

## 2023-01-12 ENCOUNTER — OFFICE VISIT (OUTPATIENT)
Dept: UROGYNECOLOGY | Facility: CLINIC | Age: 58
End: 2023-01-12
Payer: COMMERCIAL

## 2023-01-12 VITALS
HEART RATE: 78 BPM | WEIGHT: 173.31 LBS | BODY MASS INDEX: 30.71 KG/M2 | SYSTOLIC BLOOD PRESSURE: 122 MMHG | HEIGHT: 63 IN | DIASTOLIC BLOOD PRESSURE: 94 MMHG

## 2023-01-12 DIAGNOSIS — Z09 POSTOP CHECK: Primary | ICD-10-CM

## 2023-01-12 PROCEDURE — 99999 PR PBB SHADOW E&M-EST. PATIENT-LVL IV: CPT | Mod: PBBFAC,,, | Performed by: OBSTETRICS & GYNECOLOGY

## 2023-01-12 PROCEDURE — 3080F DIAST BP >= 90 MM HG: CPT | Mod: CPTII,S$GLB,, | Performed by: OBSTETRICS & GYNECOLOGY

## 2023-01-12 PROCEDURE — 3008F BODY MASS INDEX DOCD: CPT | Mod: CPTII,S$GLB,, | Performed by: OBSTETRICS & GYNECOLOGY

## 2023-01-12 PROCEDURE — 3080F PR MOST RECENT DIASTOLIC BLOOD PRESSURE >= 90 MM HG: ICD-10-PCS | Mod: CPTII,S$GLB,, | Performed by: OBSTETRICS & GYNECOLOGY

## 2023-01-12 PROCEDURE — 1159F PR MEDICATION LIST DOCUMENTED IN MEDICAL RECORD: ICD-10-PCS | Mod: CPTII,S$GLB,, | Performed by: OBSTETRICS & GYNECOLOGY

## 2023-01-12 PROCEDURE — 99999 PR PBB SHADOW E&M-EST. PATIENT-LVL IV: ICD-10-PCS | Mod: PBBFAC,,, | Performed by: OBSTETRICS & GYNECOLOGY

## 2023-01-12 PROCEDURE — 99024 PR POST-OP FOLLOW-UP VISIT: ICD-10-PCS | Mod: S$GLB,,, | Performed by: OBSTETRICS & GYNECOLOGY

## 2023-01-12 PROCEDURE — 1159F MED LIST DOCD IN RCRD: CPT | Mod: CPTII,S$GLB,, | Performed by: OBSTETRICS & GYNECOLOGY

## 2023-01-12 PROCEDURE — 99024 POSTOP FOLLOW-UP VISIT: CPT | Mod: S$GLB,,, | Performed by: OBSTETRICS & GYNECOLOGY

## 2023-01-12 PROCEDURE — 3074F PR MOST RECENT SYSTOLIC BLOOD PRESSURE < 130 MM HG: ICD-10-PCS | Mod: CPTII,S$GLB,, | Performed by: OBSTETRICS & GYNECOLOGY

## 2023-01-12 PROCEDURE — 3074F SYST BP LT 130 MM HG: CPT | Mod: CPTII,S$GLB,, | Performed by: OBSTETRICS & GYNECOLOGY

## 2023-01-12 PROCEDURE — 3008F PR BODY MASS INDEX (BMI) DOCUMENTED: ICD-10-PCS | Mod: CPTII,S$GLB,, | Performed by: OBSTETRICS & GYNECOLOGY

## 2023-01-12 RX ORDER — ESTRADIOL 0.1 MG/G
1 CREAM VAGINAL
Qty: 42 G | Refills: 4 | Status: SHIPPED | OUTPATIENT
Start: 2023-01-13 | End: 2023-10-10

## 2023-01-12 NOTE — PROGRESS NOTES
Subjective:      Patient ID: Faith Bernard is a 57 y.o. female.    Chief Complaint:  No chief complaint on file.      History of Present Illness  Through weeks postop doing very well minimal leakage          Past Medical History:   Diagnosis Date    Anxiety     Depression     Empyema of right pleural space 2021    Encounter for blood transfusion     Esophageal dysphagia     Fibromyalgia     Gastric bypass status for obesity     H/O dental abscess 04/14/2014    drained    Headache(784.0)     migraines.  Treated at [facility] (Dr. 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  Pt states took too many then realized her mistake    Polycystic ovaries     S/P JUHI-BSO     Thyroid disease     hypothyroidism    Wears partial dentures     upper       Past Surgical History:   Procedure Laterality Date    ABDOMINAL SURGERY      ANKLE HARDWARE REMOVAL Left 3/28/2022    Procedure: REMOVAL, HARDWARE, ANKLE;  Surgeon: Fer Mcrae MD;  Location: [address];  Service: Orthopedics;  Laterality: Left;    APPLICATION OF LARGE EXTERNAL FIXATION DEVICE TO TIBIA Left 9/7/2020    Procedure: APPLICATION, EXTERNAL FIXATION DEVICE, TIBIA;  Surgeon: Sujata Londono MD;  Location: [address];  Service: Orthopedics;  Laterality: Left;  need paralysis    APPLICATION OF WOUND VACUUM-ASSISTED CLOSURE DEVICE Left 9/18/2020    Procedure: APPLICATION, WOUND VAC;  Surgeon: Fer Mcrae MD;  Location: [address];  Service: Orthopedics;  Laterality: Left;    BREAST SURGERY  2004    Breast Reduction     CARDIAC CATHETERIZATION      COLONOSCOPY N/A 11/3/2015    Procedure: COLONOSCOPY;  Surgeon: Timothy Barber MD;  Location: [facility];  Service: Endoscopy;  Laterality: N/A;    DENTAL SURGERY      4 teeth removed    GASTRIC BYPASS      HERNIA REPAIR      HYSTERECTOMY      ILIAC CREST BONE GRAFT Left 3/28/2022    Procedure: BONE GRAFT, ILIAC CREST;  Surgeon: Fer Mcrae MD;  Location: [facility] OR [address] [place] [address];  Service: Orthopedics;  Laterality: Left;    INJECTION OF ANESTHETIC AGENT AROUND NERVE N/A 3/29/2022    Procedure: Block, Nerve;  Surgeon: Alexus Surgeon;  Location: [facility];  Service: Anesthesiology;  Laterality: N/A;    OPEN REDUCTION AND INTERNAL FIXATION (ORIF) OF PILON FRACTURE Left 9/18/2020    Procedure: ORIF, FRACTURE, PILON - left. Diving board, supine, bone foam. [place] anterolateral distal tibial plate, mini frag, long 3.5mm steel screw, CaPhos bone substitute;  Surgeon: Fer Mcrae MD;  Location: [address];  Service: Orthopedics;  Laterality: Left;    OPEN REDUCTION AND INTERNAL FIXATION (ORIF) OF PILON FRACTURE Left 3/28/2022    Procedure: ORIF, FRACTURE, PILON nonunion + Iliac crest bone graft - diving board, supine, bone foam. [place] axsos3 screws, 2.7 mini plates/screws, curettes, [place] medical Augment;  Surgeon: Fer Mcrae MD;  Location: [address];  Service: Orthopedics;  Laterality: Left;  Spinal? + postop catheter (draping out pelvis for bone graft harvest)    OVARIAN CYST REMOVAL      aprox 5 times    REMOVAL OF EXTERNAL FIXATION DEVICE Left 9/18/2020    Procedure: REMOVAL, EXTERNAL FIXATION DEVICE;  Surgeon: Fer Mcrae MD;  Location: [facility] OR [address] [place] [address];  Service: Orthopedics;  Laterality: Left;    ROTATOR CUFF REPAIR Left 01/2019    SURGICAL PROCEDURE FOR STRESS INCONTINENCE USING TENSION FREE VAGINAL TAPE N/A 12/14/2022    Procedure: SURGICAL PROCEDURE, USING TENSION FREE VAGINAL TAPE, FOR STRESS INCONTINENCE;  Surgeon: Frandy  "MD Lane;  Location: [facility] OR;  Service: OB/GYN;  Laterality: N/A;  cysto    tennis elbow repair Left 2019    UPPER GASTROINTESTINAL ENDOSCOPY         GYN & OB History  No LMP recorded (lmp unknown). Patient has had a hysterectomy.   Date of Last Pap: No result found    OB History    Para Term  AB Living   4 1 0 1 2 2   SAB IAB Ectopic Multiple Live Births   2 0 0 0 2      # Outcome Date GA Lbr Cipriano/2nd Weight Sex Delivery Anes PTL Lv   4 SAB            3       CS-Unspec   SERGIO   2 SAB            1       Vag-Spont   SERGIO       Health Maintenance         Date Due Completion Date    COVID-19 Vaccine (1) Never done ---    HIV Screening Never done ---    TETANUS VACCINE Never done ---    Mammogram Never done ---    Shingles Vaccine (2 of 2) 2020    Lipid Panel 10/06/2020 10/6/2015    Hemoglobin A1c (Diabetic Prevention Screening) 2023    DEXA Scan 12/10/2023 12/10/2021    Colorectal Cancer Screening 2025 11/3/2015            Family History   Problem Relation Age of Onset    Anxiety disorder Mother     Depression Mother     Suicide Mother     Seizures Mother     Drug abuse Mother     Ovarian cancer Mother     Aortic aneurysm Father     Colon cancer Neg Hx     Breast cancer Neg Hx     Diabetes Neg Hx     Hypertension Neg Hx        Social History     Socioeconomic History    Marital status:    Tobacco Use    Smoking status: Some Days     Types: Cigarettes    Smokeless tobacco: Never    Tobacco comments:     marjuana   Substance and Sexual Activity    Alcohol use: Yes     Comment: rarely    Drug use: Yes     Types: Amphetamines, Barbituates, Benzodiazepines, Marijuana    Sexual activity: Yes     Partners: Male     Birth control/protection: Surgical, None       Review of Systems  Review of Systems  Minimal leakage     Objective:   BP (!) 122/94   Pulse 78   Ht 5' 3" (1.6 m)   Wt 78.6 kg (173 lb 4.5 oz)   LMP  (LMP Unknown)   BMI 30.70 kg/m² "     Physical Exam   Excellent healing  Assessment:     1. Postop check            Plan:     1. Postop check      Patient doing very well no issues no complaints were going to move forward with hormone supplementation I will see her back in 3 weeks  There are no Patient Instructions on file for this visit.

## 2023-01-16 ENCOUNTER — PATIENT MESSAGE (OUTPATIENT)
Dept: ORTHOPEDICS | Facility: CLINIC | Age: 58
End: 2023-01-16
Payer: MEDICARE

## 2023-01-17 DIAGNOSIS — S82.872K CLOSED DISPLACED PILON FRACTURE OF LEFT TIBIA WITH NONUNION, SUBSEQUENT ENCOUNTER: Primary | ICD-10-CM

## 2023-01-24 ENCOUNTER — OFFICE VISIT (OUTPATIENT)
Dept: PULMONOLOGY | Facility: CLINIC | Age: 58
End: 2023-01-24
Payer: COMMERCIAL

## 2023-01-24 ENCOUNTER — TELEPHONE (OUTPATIENT)
Dept: UROGYNECOLOGY | Facility: CLINIC | Age: 58
End: 2023-01-24
Payer: MEDICARE

## 2023-01-24 VITALS
BODY MASS INDEX: 31.27 KG/M2 | DIASTOLIC BLOOD PRESSURE: 80 MMHG | HEART RATE: 89 BPM | OXYGEN SATURATION: 98 % | WEIGHT: 176.5 LBS | SYSTOLIC BLOOD PRESSURE: 123 MMHG | HEIGHT: 63 IN

## 2023-01-24 DIAGNOSIS — R05.3 CHRONIC COUGH: ICD-10-CM

## 2023-01-24 DIAGNOSIS — D84.9 IMMUNE DEFICIENCY DISORDER: ICD-10-CM

## 2023-01-24 DIAGNOSIS — J40 BRONCHITIS: Primary | ICD-10-CM

## 2023-01-24 PROCEDURE — 1159F MED LIST DOCD IN RCRD: CPT | Mod: CPTII,S$GLB,, | Performed by: NURSE PRACTITIONER

## 2023-01-24 PROCEDURE — 3008F BODY MASS INDEX DOCD: CPT | Mod: CPTII,S$GLB,, | Performed by: NURSE PRACTITIONER

## 2023-01-24 PROCEDURE — 99999 PR PBB SHADOW E&M-EST. PATIENT-LVL V: ICD-10-PCS | Mod: PBBFAC,,, | Performed by: NURSE PRACTITIONER

## 2023-01-24 PROCEDURE — 3008F PR BODY MASS INDEX (BMI) DOCUMENTED: ICD-10-PCS | Mod: CPTII,S$GLB,, | Performed by: NURSE PRACTITIONER

## 2023-01-24 PROCEDURE — 3074F SYST BP LT 130 MM HG: CPT | Mod: CPTII,S$GLB,, | Performed by: NURSE PRACTITIONER

## 2023-01-24 PROCEDURE — 99214 PR OFFICE/OUTPT VISIT, EST, LEVL IV, 30-39 MIN: ICD-10-PCS | Mod: S$GLB,,, | Performed by: NURSE PRACTITIONER

## 2023-01-24 PROCEDURE — 3074F PR MOST RECENT SYSTOLIC BLOOD PRESSURE < 130 MM HG: ICD-10-PCS | Mod: CPTII,S$GLB,, | Performed by: NURSE PRACTITIONER

## 2023-01-24 PROCEDURE — 99999 PR PBB SHADOW E&M-EST. PATIENT-LVL V: CPT | Mod: PBBFAC,,, | Performed by: NURSE PRACTITIONER

## 2023-01-24 PROCEDURE — 3079F DIAST BP 80-89 MM HG: CPT | Mod: CPTII,S$GLB,, | Performed by: NURSE PRACTITIONER

## 2023-01-24 PROCEDURE — 3079F PR MOST RECENT DIASTOLIC BLOOD PRESSURE 80-89 MM HG: ICD-10-PCS | Mod: CPTII,S$GLB,, | Performed by: NURSE PRACTITIONER

## 2023-01-24 PROCEDURE — 1159F PR MEDICATION LIST DOCUMENTED IN MEDICAL RECORD: ICD-10-PCS | Mod: CPTII,S$GLB,, | Performed by: NURSE PRACTITIONER

## 2023-01-24 PROCEDURE — 99214 OFFICE O/P EST MOD 30 MIN: CPT | Mod: S$GLB,,, | Performed by: NURSE PRACTITIONER

## 2023-01-24 RX ORDER — AMOXICILLIN AND CLAVULANATE POTASSIUM 875; 125 MG/1; MG/1
1 TABLET, FILM COATED ORAL 2 TIMES DAILY
COMMUNITY
Start: 2023-01-21 | End: 2023-01-24

## 2023-01-24 RX ORDER — LEVOFLOXACIN 750 MG/1
750 TABLET ORAL DAILY
Qty: 7 TABLET | Refills: 0 | Status: SHIPPED | OUTPATIENT
Start: 2023-01-24 | End: 2023-02-17

## 2023-01-24 RX ORDER — PREDNISONE 20 MG/1
TABLET ORAL
Qty: 18 TABLET | Refills: 0 | Status: SHIPPED | OUTPATIENT
Start: 2023-01-24 | End: 2023-02-17

## 2023-01-24 RX ORDER — ALBUTEROL SULFATE 90 UG/1
2 AEROSOL, METERED RESPIRATORY (INHALATION) EVERY 6 HOURS PRN
Qty: 18 G | Refills: 11 | Status: SHIPPED | OUTPATIENT
Start: 2023-01-24 | End: 2023-09-11 | Stop reason: SDUPTHER

## 2023-01-24 NOTE — PATIENT INSTRUCTIONS
Prednisone - take as prescribed.    Can stop Augmentin and switch to Levaquin - take as prescribed.    Chest xray from [facility] from today does not show acute lung disease. Shows great improvement in comparison to CXR from Aug 2021.    Repeat CXR in 1 week.    Start taking Trelegy once per day.    Continue to use Albuterol as needed for shortness of breath, wheezing, cough.    Continue current medication regiment. Keep follow up appointment as scheduled. Please call the office if you have any questions or concerns.

## 2023-01-24 NOTE — TELEPHONE ENCOUNTER
Spoke with pt, she has a bad respiratory infection and has been doing a lot of hard coughing, she seems to thing she has ruin her sx due to the ant of leakage she is having, she also states that her bottom is raw from her having lots of diarrhea, she was advised to put desitin  on her raw area. Pt may be too sick to come into the office, will speak with  to find out what he suggest

## 2023-01-24 NOTE — PROGRESS NOTES
"1/24/2023    Faith Bernard  In office visit     Chief Complaint   Patient presents with    Medication Problem     Pt states that she has been taking AMOXCLAV and pt states she has sever diarrhea.      Cough     Pt states she coughs yellow mucus .     Wheezing     Pt states that she has sever wheezing.     Shortness of Breath     Pt states that she has been having SOB because it has been keeping her up at night.     Headache     Pt states that its been sever.        HPI:   1/24/2023:  States has been sick for approx 2 weeks now - tested negative on 1/21 for COVID and Flu on Saturday. States  came home sick from work, in which COVID was spreading.   Reports recent fever - TMAX 101 - states had fever last night.   Cough: Persistent - productive with yellow mucous - states was brown in color however has now turned yellow. Worse in the evening.   Shortness of breath: Worsening from baseline - states she is requiring inhaler use every two hours.   Was seen at local  on 1/21 and was prescribed Augmentin - states she has since developed severe diarrhea.   Had a bladder sling placed a few weeks ago - states the cough has been so severe causing her to "bust it."         10/26/2022: pt was healthy - dental assistant.  Had mrsa post breast reduction in 2004.  Had low igg -- pt had injections and immunizations and got off rx.    In 2006 - 7 of 14 good titers for pneumococcus. Was 3/14 prior to vaccines?    Family has asthma-- no childhood asthma.    Pt had gastric bypass prior to 2006.  Monitors intake/vitamens.   Pt had fall off rope swing 2 yrs ago with subsequent fx left ankle needing numerous surgeries.  Pt had empyema 8/2021 -- -- pt had gastric ulcer cautery and eso dilation -- later returned with right effusion and later right chest tube --- pt eventually went to [facility] with VATs to thoracotomy/decortication-- pt had not had recent cxr or swallow evaluation.    No fever nor night sweats.  No " aspiration. Has regurg into mouth 5 /wk.  Sleeps hob elevated.  No osas but snores.  Sleep study prior to bypass.   No asthma but uses albuterol inhaler prn when very sickly every couple yrs or so.  Uses albuterol once a wk or less.  Will have noctural arousal with wheezes.  Inderal started last yr with min improvement wrt migranes-- skips inderal   Chr dry mouth on numerous   Pt said to have narcolepsy-- dextroamphetamine ppt cough and wheezes per pt. No cataplexy.  No eds now.  Uses aderal and vvyanse.  Post thoracotomy with cough/sob, occ noc wheezes, more albuterol use.  Vague swallow problems with regurg-- no aspiration.  Patient Instructions   Inderal/propranolol would not be good to use with wheezes- you aren't using  Need to follow up chest xray with empyema.  Albuterol needs suggest airway disease  Your esophagus had been problem -- very severe til dilation.  You are having regurg and esophagus problems.  Should have follow up as esophagus may contribute or cause problems?  Would check lung function  Would recommend trial trelegy once daily  200- you may have asthma.Use albuterol as needed.  Would re check immune system -- no apparent increase infections except around right lung.  Not urgent.  May do external.   Will get back with results over phone.  May  need more evaluation..   Could consider salvia therapy -- need to be cleared by Dr Beard.          PFSH: The patient is new to me; previously seen per Dr. Warner.   Past Medical History:   Diagnosis Date    Anxiety     Depression     Empyema of right pleural space 2021    Encounter for blood transfusion     Esophageal dysphagia     Fibromyalgia     Gastric bypass status for obesity     H/O dental abscess 04/14/2014    drained    Headache(784.0)     migraines.  Treated at [facility] (Dr. 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  Pt states took too many then realized her mistake    Polycystic ovaries     S/P JUHI-BSO     Thyroid disease     hypothyroidism    Wears partial dentures     upper         Past Surgical History:   Procedure Laterality Date    ABDOMINAL SURGERY      ANKLE HARDWARE REMOVAL Left 3/28/2022    Procedure: REMOVAL, HARDWARE, ANKLE;  Surgeon: Fer Mcrae MD;  Location: [address];  Service: Orthopedics;  Laterality: Left;    APPLICATION OF LARGE EXTERNAL FIXATION DEVICE TO TIBIA Left 9/7/2020    Procedure: APPLICATION, EXTERNAL FIXATION DEVICE, TIBIA;  Surgeon: Sujata Londono MD;  Location: [address];  Service: Orthopedics;  Laterality: Left;  need paralysis    APPLICATION OF WOUND VACUUM-ASSISTED CLOSURE DEVICE Left 9/18/2020    Procedure: APPLICATION, WOUND VAC;  Surgeon: Fer Mcrae MD;  Location: [address];  Service: Orthopedics;  Laterality: Left;    BREAST SURGERY  2004    Breast Reduction    CARDIAC CATHETERIZATION      COLONOSCOPY N/A 11/3/2015    Procedure: COLONOSCOPY;  Surgeon: Timothy Barber MD;  Location: [facility];  Service: Endoscopy;  Laterality: N/A;    DENTAL SURGERY      4 teeth removed    GASTRIC BYPASS      HERNIA REPAIR      HYSTERECTOMY      ILIAC CREST BONE GRAFT Left 3/28/2022    Procedure: BONE GRAFT, ILIAC CREST;  Surgeon: Fer Mcrae MD;  Location: [address];  Service: Orthopedics;  Laterality: Left;    INJECTION OF ANESTHETIC AGENT AROUND NERVE N/A 3/29/2022    Procedure: Block, Nerve;  Surgeon: Alexus Surgeon;  Location: [facility];  Service: Anesthesiology;  Laterality: N/A;    OPEN REDUCTION AND INTERNAL FIXATION (ORIF) OF PILON FRACTURE Left  9/18/2020    Procedure: ORIF, FRACTURE, PILON - left. Diving board, supine, bone foam. [place] anterolateral distal tibial plate, mini frag, long 3.5mm steel screw, CaPhos bone substitute;  Surgeon: Fer Mcrae MD;  Location: [address];  Service: Orthopedics;  Laterality: Left;    OPEN REDUCTION AND INTERNAL FIXATION (ORIF) OF PILON FRACTURE Left 3/28/2022    Procedure: ORIF, FRACTURE, PILON nonunion + Iliac crest bone graft - diving board, supine, bone foam. [place] axsos3 screws, 2.7 mini plates/screws, curettes, [place] medical Augment;  Surgeon: Fer Mcrae MD;  Location: [address];  Service: Orthopedics;  Laterality: Left;  Spinal? + postop catheter (draping out pelvis for bone graft harvest)    OVARIAN CYST REMOVAL      aprox 5 times    REMOVAL OF EXTERNAL FIXATION DEVICE Left 9/18/2020    Procedure: REMOVAL, EXTERNAL FIXATION DEVICE;  Surgeon: Fer Mcrae MD;  Location: [address];  Service: Orthopedics;  Laterality: Left;    ROTATOR CUFF REPAIR Left 01/2019    SURGICAL PROCEDURE FOR STRESS INCONTINENCE USING TENSION FREE VAGINAL TAPE N/A 12/14/2022    Procedure: SURGICAL PROCEDURE, USING TENSION FREE VAGINAL TAPE, FOR STRESS INCONTINENCE;  Surgeon: Frandy Sherman MD;  Location: [facility];  Service: OB/GYN;  Laterality: N/A;  cysto    tennis elbow repair Left 01/2019    UPPER GASTROINTESTINAL ENDOSCOPY       Social History     Tobacco Use    Smoking status: Some Days     Types: Cigarettes    Smokeless tobacco: Never    Tobacco comments:     marjuana   Substance Use Topics    Alcohol use: Yes     Comment: rarely    Drug use: Yes     Types: Amphetamines, Barbituates, Benzodiazepines, Marijuana     Family History   Problem Relation Age of Onset    Anxiety disorder Mother     Depression Mother     Suicide Mother     Seizures Mother     Drug abuse Mother     Ovarian cancer Mother     Aortic aneurysm Father     Colon cancer Neg Hx     Breast cancer Neg  "Hx     Diabetes Neg Hx     Hypertension Neg Hx      Review of patient's allergies indicates:   Allergen Reactions    Cephalexin Anaphylaxis    Codeine Itching    Nsaids (non-steroidal anti-inflammatory drug)      Has a history of bleeding ulcers          Review of Systems:  a review of eleven systems covering constitutional, Eye, HEENT, Psych, Respiratory, Cardiac, GI, , Musculoskeletal, Endocrine, Dermatologic was negative except for pertinent findings as listed ABOVE and below:  pertinent positive as above, rest is good       Exam:Comprehensive exam done. /80 (BP Location: Left arm, Patient Position: Sitting, BP Method: Medium (Automatic))   Pulse 89   Ht 5' 3" (1.6 m)   Wt 80 kg (176 lb 7.7 oz)   LMP  (LMP Unknown)   SpO2 98% Comment: room air at rest  BMI 31.26 kg/m²   Exam included Vitals as listed, and patient's appearance and affect and alertness and mood, oral exam for yeast and hygiene and pharynx lesions and Mallapatti (M) score, neck with inspection for jvd and masses and thyroid abnormalities and lymph nodes (supraclavicular and infraclavicular nodes and axillary also examined and noted if abn), chest exam included symmetry and effort and fremitus and percussion and auscultation, cardiac exam included rhythm and gallops and murmur and rubs and jvd and edema, abdominal exam for mass and hepatosplenomegaly and tenderness and hernias and bowel sounds, Musculoskeletal exam with muscle tone and posture and mobility/gait and  strength, and skin for rashes and cyanosis and pallor and turgor, extremity for clubbing.  Findings were normal except for pertinent findings listed below:  M2, dry mouth, chest is symmetric, no distress, decreased breath sounds to bilateral lower lung fields; wheezing to RUL field, on room air.       Radiographs (ct chest and cxr) reviewed: view by direct vision  -- ct chest 8/2/2021 sm right effusion, 8/6/2021 lg empeyma    Chest Xray 1/24/2023:  Image viewed from disk " from [facility] - radiology interpretation as below  IMPRESSION  Mild interstitial prominence appearing chronic with no acute findings in the chest.  Stable mild elevation of the right hemidiaphragm.         Labs reviewed          Lab Results   Component Value Date    WBC 17.30 (H) 03/29/2022    HGB 11.1 (L) 03/29/2022    HCT 37.0 03/29/2022    MCV 86 03/29/2022     03/29/2022       CMP  Sodium   Date Value Ref Range Status   03/29/2022 139 136 - 145 mmol/L Final     Potassium   Date Value Ref Range Status   03/29/2022 4.2 3.5 - 5.1 mmol/L Final     Chloride   Date Value Ref Range Status   03/29/2022 110 95 - 110 mmol/L Final     CO2   Date Value Ref Range Status   03/29/2022 21 (L) 23 - 29 mmol/L Final     Glucose   Date Value Ref Range Status   03/29/2022 183 (H) 70 - 110 mg/dL Final     BUN   Date Value Ref Range Status   03/29/2022 11 6 - 20 mg/dL Final     Creatinine   Date Value Ref Range Status   03/29/2022 0.8 0.5 - 1.4 mg/dL Final   01/03/2013 0.8 0.5 - 1.4 mg/dL Final     Calcium   Date Value Ref Range Status   03/29/2022 9.1 8.7 - 10.5 mg/dL Final   01/03/2013 8.6 (L) 8.7 - 10.5 mg/dL Final     Total Protein   Date Value Ref Range Status   03/29/2022 6.1 6.0 - 8.4 g/dL Final     Albumin   Date Value Ref Range Status   03/29/2022 3.2 (L) 3.5 - 5.2 g/dL Final     Total Bilirubin   Date Value Ref Range Status   03/29/2022 0.2 0.1 - 1.0 mg/dL Final     Comment:     For infants and newborns, interpretation of results should be based  on gestational age, weight and in agreement with clinical  observations.    Premature Infant recommended reference ranges:  Up to 24 hours.............<8.0 mg/dL  Up to 48 hours............<12.0 mg/dL  3-5 days..................<15.0 mg/dL  6-29 days.................<15.0 mg/dL       Alkaline Phosphatase   Date Value Ref Range Status   03/29/2022 102 55 - 135 U/L Final   12/31/2012 77 55 - 135 U/L Final     AST   Date Value Ref Range Status   03/29/2022 12 10 - 40 U/L Final    12/31/2012 14 10 - 40 U/L Final     ALT   Date Value Ref Range Status   03/29/2022 11 10 - 44 U/L Final     Anion Gap   Date Value Ref Range Status   03/29/2022 8 8 - 16 mmol/L Final   01/03/2013 12 5 - 15 meq/L Final         PFT will be done and results to be reviewed       Plan:  Clinical impression is apparently straight forward and impression with management as below.     Faith was seen today for medication problem, cough, wheezing, shortness of breath and headache.    Diagnoses and all orders for this visit:    Bronchitis  -     levoFLOXacin (LEVAQUIN) 750 MG tablet; Take 1 tablet (750 mg total) by mouth once daily.  -     predniSONE (DELTASONE) 20 MG tablet; Take three tablets per day for three days. Two tablets per day for three days. One pill per day for three days.  -     X-Ray Chest PA And Lateral; Future  -     albuterol (PROVENTIL/VENTOLIN HFA) 90 mcg/actuation inhaler; Inhale 2 puffs into the lungs every 6 (six) hours as needed for Wheezing or Shortness of Breath. Rescue    Chronic cough    Immune deficiency disorder          Follow up in about 4 weeks (around 2/21/2023), or if symptoms worsen or fail to improve.    Discussed with patient above for education the following:      Patient Instructions   Prednisone - take as prescribed.    Can stop Augmentin and switch to Levaquin - take as prescribed.    Chest xray from [facility] from today does not show acute lung disease. Shows great improvement in comparison to CXR from Aug 2021.    Repeat CXR in 1 week.    Start taking Trelegy once per day.    Continue to use Albuterol as needed for shortness of breath, wheezing, cough.    Continue current medication regiment. Keep follow up appointment as scheduled. Please call the office if you have any questions or concerns.

## 2023-01-25 NOTE — TELEPHONE ENCOUNTER
----- Message from Yris Fernandez sent at 1/25/2023  9:41 AM CST -----  Contact: patient  Type: Needs Medical Advice  Who Called:  patient  Best Call Back Number: [phone] (home)   Additional Information: patient requesting a call back regarding her procedure- she was sick and she messed something up while coughing

## 2023-02-01 ENCOUNTER — OFFICE VISIT (OUTPATIENT)
Dept: UROGYNECOLOGY | Facility: CLINIC | Age: 58
End: 2023-02-01
Payer: COMMERCIAL

## 2023-02-01 VITALS
SYSTOLIC BLOOD PRESSURE: 123 MMHG | BODY MASS INDEX: 31.25 KG/M2 | HEART RATE: 73 BPM | HEIGHT: 63 IN | DIASTOLIC BLOOD PRESSURE: 87 MMHG | WEIGHT: 176.38 LBS

## 2023-02-01 DIAGNOSIS — N39.3 SUI (STRESS URINARY INCONTINENCE, FEMALE): Primary | ICD-10-CM

## 2023-02-01 PROCEDURE — 3079F PR MOST RECENT DIASTOLIC BLOOD PRESSURE 80-89 MM HG: ICD-10-PCS | Mod: CPTII,S$GLB,, | Performed by: OBSTETRICS & GYNECOLOGY

## 2023-02-01 PROCEDURE — 3074F PR MOST RECENT SYSTOLIC BLOOD PRESSURE < 130 MM HG: ICD-10-PCS | Mod: CPTII,S$GLB,, | Performed by: OBSTETRICS & GYNECOLOGY

## 2023-02-01 PROCEDURE — 1159F MED LIST DOCD IN RCRD: CPT | Mod: CPTII,S$GLB,, | Performed by: OBSTETRICS & GYNECOLOGY

## 2023-02-01 PROCEDURE — 1159F PR MEDICATION LIST DOCUMENTED IN MEDICAL RECORD: ICD-10-PCS | Mod: CPTII,S$GLB,, | Performed by: OBSTETRICS & GYNECOLOGY

## 2023-02-01 PROCEDURE — 3008F BODY MASS INDEX DOCD: CPT | Mod: CPTII,S$GLB,, | Performed by: OBSTETRICS & GYNECOLOGY

## 2023-02-01 PROCEDURE — 3079F DIAST BP 80-89 MM HG: CPT | Mod: CPTII,S$GLB,, | Performed by: OBSTETRICS & GYNECOLOGY

## 2023-02-01 PROCEDURE — 99999 PR PBB SHADOW E&M-EST. PATIENT-LVL III: CPT | Mod: PBBFAC,,, | Performed by: OBSTETRICS & GYNECOLOGY

## 2023-02-01 PROCEDURE — 3074F SYST BP LT 130 MM HG: CPT | Mod: CPTII,S$GLB,, | Performed by: OBSTETRICS & GYNECOLOGY

## 2023-02-01 PROCEDURE — 99024 PR POST-OP FOLLOW-UP VISIT: ICD-10-PCS | Mod: S$GLB,,, | Performed by: OBSTETRICS & GYNECOLOGY

## 2023-02-01 PROCEDURE — 3008F PR BODY MASS INDEX (BMI) DOCUMENTED: ICD-10-PCS | Mod: CPTII,S$GLB,, | Performed by: OBSTETRICS & GYNECOLOGY

## 2023-02-01 PROCEDURE — 99024 POSTOP FOLLOW-UP VISIT: CPT | Mod: S$GLB,,, | Performed by: OBSTETRICS & GYNECOLOGY

## 2023-02-01 PROCEDURE — 99999 PR PBB SHADOW E&M-EST. PATIENT-LVL III: ICD-10-PCS | Mod: PBBFAC,,, | Performed by: OBSTETRICS & GYNECOLOGY

## 2023-02-01 NOTE — PROGRESS NOTES
Subjective:      Patient ID: Faith Bernard is a 57 y.o. female.    Chief Complaint:  follow up (States  everytime she coughs and  has been leaking , cut in vaginal area and rectum still there is not healed)      History of Present Illness  Patient returns she is recovered from her upper respiratory infection however there is substantial leakage with Valsalva at 1 time the sling was proving to be with great efficacy but that is not the case this time          Past Medical History:   Diagnosis Date    Anxiety     Depression     Empyema of right pleural space 2021    Encounter for blood transfusion     Esophageal dysphagia     Fibromyalgia     Gastric bypass status for obesity     H/O dental abscess 04/14/2014    drained    Headache(784.0)     migraines.  Treated at [facility] (Dr. 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  Pt states took too many then realized her mistake    Polycystic ovaries     S/P JUHI-BSO     Thyroid disease     hypothyroidism    Wears partial dentures     upper       Past Surgical History:   Procedure Laterality Date    ABDOMINAL SURGERY      ANKLE HARDWARE REMOVAL Left 3/28/2022    Procedure: REMOVAL, HARDWARE, ANKLE;  Surgeon: Fer Mcrae MD;  Location: [address];  Service: Orthopedics;  Laterality: Left;    APPLICATION OF LARGE EXTERNAL FIXATION DEVICE TO TIBIA Left 9/7/2020    Procedure: APPLICATION, EXTERNAL FIXATION DEVICE, TIBIA;  Surgeon: Sujata Londono MD;  Location: [address];  Service: Orthopedics;  Laterality: Left;  need paralysis     APPLICATION OF WOUND VACUUM-ASSISTED CLOSURE DEVICE Left 9/18/2020    Procedure: APPLICATION, WOUND VAC;  Surgeon: Fer Mcrae MD;  [facility];  Service: Orthopedics;  Laterality: Left;    BREAST SURGERY  2004    Breast Reduction    CARDIAC CATHETERIZATION      COLONOSCOPY N/A 11/3/2015    Procedure: COLONOSCOPY;  Surgeon: Timothy Barber MD;  Location: [facility];  Service: Endoscopy;  Laterality: N/A;    DENTAL SURGERY      4 teeth removed    GASTRIC BYPASS      HERNIA REPAIR      HYSTERECTOMY      ILIAC CREST BONE GRAFT Left 3/28/2022    Procedure: BONE GRAFT, ILIAC CREST;  Surgeon: Fer Mcrae MD;  [facility];  Service: Orthopedics;  Laterality: Left;    INJECTION OF ANESTHETIC AGENT AROUND NERVE N/A 3/29/2022    Procedure: Block, Nerve;  Surgeon: Alexus Surgeon;  Location: [facility];  Service: Anesthesiology;  Laterality: N/A;    OPEN REDUCTION AND INTERNAL FIXATION (ORIF) OF PILON FRACTURE Left 9/18/2020    Procedure: ORIF, FRACTURE, PILON - left. Diving board, supine, bone foam. [place] anterolateral distal tibial plate, mini frag, long 3.5mm steel screw, CaPhos bone substitute;  Surgeon: Fer Mcrae MD;  Location: [address];  Service: Orthopedics;  Laterality: Left;    OPEN REDUCTION AND INTERNAL FIXATION (ORIF) OF PILON FRACTURE Left 3/28/2022    Procedure: ORIF, FRACTURE, PILON nonunion + Iliac crest bone graft - diving board, supine, bone foam. Seda axsos3 screws, 2.7 mini plates/screws, curettes, [place] medical Augment;  Surgeon: Fer Mcrae MD;  Location: [address];  Service: Orthopedics;  Laterality: Left;  Spinal? + postop catheter (draping out pelvis for bone graft harvest)    OVARIAN CYST REMOVAL      aprox 5 times    REMOVAL OF EXTERNAL FIXATION DEVICE Left 9/18/2020    Procedure: REMOVAL, EXTERNAL FIXATION DEVICE;  Surgeon: Fer Mcrae MD;  Location: [facility] OR [address] [place] [address];  Service:  Orthopedics;  Laterality: Left;    ROTATOR CUFF REPAIR Left 2019    SURGICAL PROCEDURE FOR STRESS INCONTINENCE USING TENSION FREE VAGINAL TAPE N/A 2022    Procedure: SURGICAL PROCEDURE, USING TENSION FREE VAGINAL TAPE, FOR STRESS INCONTINENCE;  Surgeon: Frandy Sherman MD;  Location: [facility] OR;  Service: OB/GYN;  Laterality: N/A;  cysto    tennis elbow repair Left 2019    UPPER GASTROINTESTINAL ENDOSCOPY         GYN & OB History  No LMP recorded (lmp unknown). Patient has had a hysterectomy.   Date of Last Pap: No result found    OB History    Para Term  AB Living   4 1 0 1 2 2   SAB IAB Ectopic Multiple Live Births   2 0 0 0 2      # Outcome Date GA Lbr Cipriano/2nd Weight Sex Delivery Anes PTL Lv   4 SAB            3       CS-Unspec   SERGIO   2 SAB            1       Vag-Spont   SERGIO       Health Maintenance         Date Due Completion Date    COVID-19 Vaccine (1) Never done ---    HIV Screening Never done ---    TETANUS VACCINE Never done ---    Mammogram Never done ---    Shingles Vaccine (2 of 2) 2020    Lipid Panel 10/06/2020 10/6/2015    Hemoglobin A1c (Diabetic Prevention Screening) 2023    DEXA Scan 12/10/2023 12/10/2021    Colorectal Cancer Screening 2025 11/3/2015            Family History   Problem Relation Age of Onset    Anxiety disorder Mother     Depression Mother     Suicide Mother     Seizures Mother     Drug abuse Mother     Ovarian cancer Mother     Aortic aneurysm Father     Colon cancer Neg Hx     Breast cancer Neg Hx     Diabetes Neg Hx     Hypertension Neg Hx        Social History     Socioeconomic History    Marital status:    Tobacco Use    Smoking status: Some Days     Types: Cigarettes    Smokeless tobacco: Never    Tobacco comments:     marjuana   Substance and Sexual Activity    Alcohol use: Yes     Comment: rarely    Drug use: Yes     Types: Amphetamines, Barbituates, Benzodiazepines, Marijuana    Sexual  "activity: Yes     Partners: Male     Birth control/protection: Surgical, None       Review of Systems  Review of Systems  Leakage of urine leakage of urine     Objective:   /87   Pulse 73   Ht 5' 3" (1.6 m)   Wt 80 kg (176 lb 5.9 oz)   LMP  (LMP Unknown)   BMI 31.24 kg/m²     Physical Exam   Well healed there is no UV mobility  Assessment:     1. FILIBERTO (stress urinary incontinence, female)            Plan:     1. FILIBERTO (stress urinary incontinence, female)      Recurrent FILIBERTO before moving forward to redo in entire mid urethral sling retropubic I am wondering if a colleague in [place] can offer her other minimally invasive techniques possibly salvage this I do feel as though the sling is in perfect tension or was at the perfect tension before invasive approach I would like another opinion    I have sent a message to my colleague once I hear back from I will then inform patient  There are no Patient Instructions on file for this visit.        "

## 2023-02-10 ENCOUNTER — OFFICE VISIT (OUTPATIENT)
Dept: UROLOGY | Facility: CLINIC | Age: 58
End: 2023-02-10
Payer: COMMERCIAL

## 2023-02-10 ENCOUNTER — TELEPHONE (OUTPATIENT)
Dept: UROLOGY | Facility: CLINIC | Age: 58
End: 2023-02-10
Payer: MEDICARE

## 2023-02-10 VITALS
DIASTOLIC BLOOD PRESSURE: 90 MMHG | HEART RATE: 83 BPM | BODY MASS INDEX: 31.61 KG/M2 | SYSTOLIC BLOOD PRESSURE: 133 MMHG | WEIGHT: 178.38 LBS | HEIGHT: 63 IN

## 2023-02-10 DIAGNOSIS — N39.3 STRESS INCONTINENCE (FEMALE) (MALE): Primary | ICD-10-CM

## 2023-02-10 PROCEDURE — 99204 PR OFFICE/OUTPT VISIT, NEW, LEVL IV, 45-59 MIN: ICD-10-PCS | Mod: S$GLB,,, | Performed by: UROLOGY

## 2023-02-10 PROCEDURE — 3075F PR MOST RECENT SYSTOLIC BLOOD PRESS GE 130-139MM HG: ICD-10-PCS | Mod: CPTII,S$GLB,, | Performed by: UROLOGY

## 2023-02-10 PROCEDURE — 99999 PR PBB SHADOW E&M-EST. PATIENT-LVL IV: CPT | Mod: PBBFAC,,, | Performed by: UROLOGY

## 2023-02-10 PROCEDURE — 3008F BODY MASS INDEX DOCD: CPT | Mod: CPTII,S$GLB,, | Performed by: UROLOGY

## 2023-02-10 PROCEDURE — 3080F DIAST BP >= 90 MM HG: CPT | Mod: CPTII,S$GLB,, | Performed by: UROLOGY

## 2023-02-10 PROCEDURE — 1159F MED LIST DOCD IN RCRD: CPT | Mod: CPTII,S$GLB,, | Performed by: UROLOGY

## 2023-02-10 PROCEDURE — 3080F PR MOST RECENT DIASTOLIC BLOOD PRESSURE >= 90 MM HG: ICD-10-PCS | Mod: CPTII,S$GLB,, | Performed by: UROLOGY

## 2023-02-10 PROCEDURE — 99204 OFFICE O/P NEW MOD 45 MIN: CPT | Mod: S$GLB,,, | Performed by: UROLOGY

## 2023-02-10 PROCEDURE — 3075F SYST BP GE 130 - 139MM HG: CPT | Mod: CPTII,S$GLB,, | Performed by: UROLOGY

## 2023-02-10 PROCEDURE — 99999 PR PBB SHADOW E&M-EST. PATIENT-LVL IV: ICD-10-PCS | Mod: PBBFAC,,, | Performed by: UROLOGY

## 2023-02-10 PROCEDURE — 3008F PR BODY MASS INDEX (BMI) DOCUMENTED: ICD-10-PCS | Mod: CPTII,S$GLB,, | Performed by: UROLOGY

## 2023-02-10 PROCEDURE — 1159F PR MEDICATION LIST DOCUMENTED IN MEDICAL RECORD: ICD-10-PCS | Mod: CPTII,S$GLB,, | Performed by: UROLOGY

## 2023-02-10 NOTE — PROGRESS NOTES
[facility]      New Urinary Incontinence Note    2/10/2023    Referred by:  No ref. provider found    HPI: Faith Bernard is a very pleasant 57 y.o. female who is a new patient to our department referred for evaluation of urinary incontinence of several years duration. She reports stress incontinence associated with coughing or other exertional activities. She reports urgency incontinence which is reported to be less bothersome than stress incontinence. She does not require daily pad use. She reports urinary incontinence is only during the day. She reports that urgency incontinence is relatively rare. Though she does not use pads, she leaks several times per day.     Last December, she underwent a retropubic synthetic mid-urethral sling for treatment of FILIBERTO. She now notes that leakage is only with coughing (previously with laughing, sneezing or other actiivity) but she reports that the leak with coughing seems to be greater than before. There may be a component of stress-induced urgency as she can leak a great deal with just a cough.     She denies symptoms of irritative voiding including dysuria. She denies symptoms of obstructive voiding including decreased stream, hesitancy, intermittency, post void dribbling, and sense of incomplete emptying. Bladder scan PVR was 2 mL.  Her history includes previous incontinence procedure (retropubic synthetic sling) but excludes a history of urolithiasis, hematuria, neurological symptoms/diagnoses, and prolapse surgeries. She denies all other prior pelvic surgeries or neurologic diagnoses. She does not report symptoms suggestive of advanced POP. .        Previous treatments for her stress incontinence have included:   pelvic floor exercises guided by PFPT    A review of 10+ systems was conducted with pertinent positive and negative findings documented in HPI with all other systems reviewed and negative.    Past medical, family, surgical and social history  reviewed as documented in chart with pertinent positive medical, family, surgical and social history detailed in HPI.    Exam Findings:    Vaginal Mucosa: normal  Anterior: none  Apical: no apical descent  Posterior: none  FILIBERTO: mild FILIBERTO  Urethral Mobility: no hypermobility  Urethral Lesions: no lesions or masses Const: no acute distress, conversant and alert  Eyes: anicteric, extraocular muscles intact  ENMT: normocephalic, Nl oral membranes  Cardio: no cyanosis, nl cap refill  Pulm: no tachypnea; no resp distress  Abd: soft, no tenderness  Musc: no laceration, no tenderness  Neuro: alert; oriented x 3  Skin: warm, dry; no petichiae  Psych: no anxiety; normal speech       Assessment/Plan:    Stress Urinary Incontinence (new, addt'l workup): On exam today, there is no evidence of persistent urethral hypermobility, though the urethra does not appear fixed either. Instead, I am able to feel the urethra engaging with the mid-urethral sling as intended. We will plan of urodynamic evaluation but I began discussing possible options including transurethral bulking, autologous PVS or a clinical trial of the ACT. I would not recommend repeat MUS. She is very interested in ACT; we will discuss further following her VUDS.

## 2023-02-10 NOTE — TELEPHONE ENCOUNTER
I called the patient to scheduled for surgery. No Answer. I left a voicemail offering dates and left a call back number. [phone]

## 2023-02-13 ENCOUNTER — TELEPHONE (OUTPATIENT)
Dept: UROLOGY | Facility: CLINIC | Age: 58
End: 2023-02-13
Payer: MEDICARE

## 2023-02-13 ENCOUNTER — TELEPHONE (OUTPATIENT)
Dept: ORTHOPEDICS | Facility: CLINIC | Age: 58
End: 2023-02-13
Payer: MEDICARE

## 2023-02-13 DIAGNOSIS — Z98.890 POST-OPERATIVE STATE: ICD-10-CM

## 2023-02-13 DIAGNOSIS — S82.872K CLOSED DISPLACED PILON FRACTURE OF LEFT TIBIA WITH NONUNION, SUBSEQUENT ENCOUNTER: Primary | ICD-10-CM

## 2023-02-13 NOTE — TELEPHONE ENCOUNTER
Left a detailed voice mail for pt to return my call regarding her appointment for xrays at [facility] prior to her appointment with Dr Mcrae on 2/16  3

## 2023-02-13 NOTE — TELEPHONE ENCOUNTER
Called the patient to get scheduled for her FUDS, 2nd attempt. No Answer. I left a voicemail for pt to call back when she is ready to schedule. [phone]

## 2023-02-14 ENCOUNTER — TELEPHONE (OUTPATIENT)
Dept: UROLOGY | Facility: CLINIC | Age: 58
End: 2023-02-14
Payer: MEDICARE

## 2023-02-14 DIAGNOSIS — R32 URINARY INCONTINENCE, UNSPECIFIED TYPE: Primary | ICD-10-CM

## 2023-02-14 NOTE — PROGRESS NOTES
OBSTETRICS AND GYNECOLOGY    Subjective:      Chief Complaint:  Establish Care, Thin Vulvar Skin    HPI:  Faith Bernard is an 57 y.o.  presenting with concerns of thin vulvar skin at perineum. Put on apple cider vinegar which improved the area nearly instantly. History of prior similar episode. Dr. Ochoa gave patient PO antibiotics at that time, within this last year. No bleeding, tender. No associated dyspareunia. No dysuria, hematuria. First noticed several weeks ago, no pruritus, described as feels cracked. Had MUS done in 2022. Had a complication secondary to persistent coughing from URTI. Sling is still present.     Review of systems:  Denies abnormal vaginal bleeding or discharge, or dysuria.     OB History    Para Term  AB Living   4 1 0 1 2 2   SAB IAB Ectopic Multiple Live Births   2 0 0 0 2      # Outcome Date GA Lbr Cipriano/2nd Weight Sex Delivery Anes PTL Lv   4 SAB            3       CS-Unspec   SERGIO   2 SAB            1       Vag-Spont   SERGIO     Past Medical History:   Diagnosis Date    Anxiety     Depression     Empyema of right pleural space     Encounter for blood transfusion     Esophageal dysphagia     Fibromyalgia     Gastric bypass status for obesity     H/O dental abscess 2014    drained    Headache(784.0)     migraines.  Treated at [facility] (Dr. 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  Pt states took too many then realized her mistake    Polycystic ovaries     S/P JUHI-BSO     Thyroid disease     hypothyroidism    Wears partial  dentures     upper     Past Surgical History:   Procedure Laterality Date    ABDOMINAL SURGERY      ANKLE HARDWARE REMOVAL Left 3/28/2022    Procedure: REMOVAL, HARDWARE, ANKLE;  Surgeon: Fer Mcrae MD;  [facility];  Service: Orthopedics;  Laterality: Left;    APPLICATION OF LARGE EXTERNAL FIXATION DEVICE TO TIBIA Left 9/7/2020    Procedure: APPLICATION, EXTERNAL FIXATION DEVICE, TIBIA;  Surgeon: Sujata Londono MD;  [facility];  Service: Orthopedics;  Laterality: Left;  need paralysis    APPLICATION OF WOUND VACUUM-ASSISTED CLOSURE DEVICE Left 9/18/2020    Procedure: APPLICATION, WOUND VAC;  Surgeon: Fer Mcrae MD;  Location: [facility] OR [address] [place] [address];  Service: Orthopedics;  Laterality: Left;    BREAST SURGERY  2004    Breast Reduction    CARDIAC CATHETERIZATION      COLONOSCOPY N/A 11/3/2015    Procedure: COLONOSCOPY;  Surgeon: Timothy Barber MD;  Location: [facility] END[facility];  Service: Endoscopy;  Laterality: N/A;    DENTAL SURGERY      4 teeth removed    GASTRIC BYPASS      HERNIA REPAIR      HYSTERECTOMY      ILIAC CREST BONE GRAFT Left 3/28/2022    Procedure: BONE GRAFT, ILIAC CREST;  Surgeon: Fer Mcrae MD;  Location: [address];  Service: Orthopedics;  Laterality: Left;    INJECTION OF ANESTHETIC AGENT AROUND NERVE N/A 3/29/2022    Procedure: Block, Nerve;  Surgeon: Alexus Surgeon;  Location: [facility];  Service: Anesthesiology;  Laterality: N/A;    OPEN REDUCTION AND INTERNAL FIXATION (ORIF) OF PILON FRACTURE Left 9/18/2020    Procedure: ORIF, FRACTURE, PILON - left. Diving board, supine, bone foam. [place] anterolateral distal tibial plate, mini frag, long 3.5mm steel screw, CaPhos bone substitute;  Surgeon: Fer Mcrae MD;  Location: [address];  Service: Orthopedics;  Laterality: Left;    OPEN REDUCTION AND INTERNAL FIXATION (ORIF) OF PILON FRACTURE Left 3/28/2022    Procedure: ORIF, FRACTURE, PILON nonunion + Iliac crest bone  graft - diving board, supine, bone foam. Seda axsos3 screws, 2.7 mini plates/screws, curettes, [place] medical Augment;  Surgeon: Fer Mcrae MD;  Location: [facility] OR [address] [place] [address];  Service: Orthopedics;  Laterality: Left;  Spinal? + postop catheter (draping out pelvis for bone graft harvest)    OVARIAN CYST REMOVAL      aprox 5 times    REMOVAL OF EXTERNAL FIXATION DEVICE Left 9/18/2020    Procedure: REMOVAL, EXTERNAL FIXATION DEVICE;  Surgeon: Fer Mcrae MD;  Location: [facility] OR [address] [place] [address];  Service: Orthopedics;  Laterality: Left;    ROTATOR CUFF REPAIR Left 01/2019    SURGICAL PROCEDURE FOR STRESS INCONTINENCE USING TENSION FREE VAGINAL TAPE N/A 12/14/2022    Procedure: SURGICAL PROCEDURE, USING TENSION FREE VAGINAL TAPE, FOR STRESS INCONTINENCE;  Surgeon: Frandy Sherman MD;  Location: [facility] OR;  Service: OB/GYN;  Laterality: N/A;  cysto    tennis elbow repair Left 01/2019    UPPER GASTROINTESTINAL ENDOSCOPY       Family History   Problem Relation Age of Onset    Anxiety disorder Mother     Depression Mother     Suicide Mother     Seizures Mother     Drug abuse Mother     Ovarian cancer Mother     Aortic aneurysm Father     Colon cancer Neg Hx     Breast cancer Neg Hx     Diabetes Neg Hx     Hypertension Neg Hx        Allergies:   Review of patient's allergies indicates:   Allergen Reactions    Cephalexin Anaphylaxis    Codeine Itching    Nsaids (non-steroidal anti-inflammatory drug)      Has a history of bleeding ulcers       Medications:   Current Outpatient Medications   Medication Sig Dispense Refill    AIMOVIG AUTOINJECTOR 140 mg/mL autoinjector       albuterol (PROVENTIL/VENTOLIN HFA) 90 mcg/actuation inhaler 2 puffs every 4 hours as needed for cough, wheeze, or shortness of breath 18 g 11    albuterol (PROVENTIL/VENTOLIN HFA) 90 mcg/actuation inhaler Inhale 2 puffs into the lungs every 6 (six) hours as needed for Wheezing or Shortness of Breath. Rescue 18 g 11    ARIPiprazole  (ABILIFY) 15 MG Tab Take 7.5 mg by mouth once daily.      benzonatate (TESSALON) 200 MG capsule Take 200 mg by mouth.      cholecalciferol, vitamin D3, 50,000 unit capsule Take 50,000 Units by mouth every 7 days. Patient takes on Saturdays  99    CLARAVIS 30 mg capsule Take 30 mg by mouth 2 (two) times daily.      clindamycin (CLEOCIN T) 1 % lotion Apply topically 2 (two) times daily.      clindamycin-benzoyl peroxide (BENZACLIN) gel Apply topically 2 (two) times daily.      CYANOCOBALAMIN, VITAMIN B-12, (VITAMIN B-12 INJ) Inject 1 mL as directed every 30 days.       dextroamphetamine-amphetamine 30 mg Tab Take 1 tablet by mouth 2 (two) times daily.      erenumab-aooe (AIMOVIG AUTOINJECTOR) 140 mg/mL autoinjector INJECT 1 ml      ergocalciferol (ERGOCALCIFEROL) 50,000 unit Cap Take 50,000 Units by mouth every 7 days.      estradioL (ESTRACE) 0.01 % (0.1 mg/gram) vaginal cream Place 1 g vaginally every Mon, Wed, Fri. 42 g 4    fluconazole (DIFLUCAN) 150 MG Tab Take 150 mg by mouth every 7 days.      fluticasone-umeclidin-vilanter (TRELEGY ELLIPTA) 200-62.5-25 mcg inhaler Inhale 1 puff into the lungs once daily. 60 each 11    gabapentin (NEURONTIN) 600 MG tablet Take 1,200 mg by mouth 3 (three) times daily.      gabapentin (NEURONTIN) 600 MG tablet 2 tablets.      hydroCHLOROthiazide (HYDRODIURIL) 25 MG tablet Take 25 mg by mouth every morning.      hydroCHLOROthiazide (HYDRODIURIL) 25 MG tablet 1 tablet once daily.      levothyroxine (SYNTHROID) 100 MCG tablet Take 100 mcg by mouth before breakfast.      levothyroxine (SYNTHROID) 100 MCG tablet 1 tablet in the morning on an empty stomach      metoprolol succinate (KAPSPARGO SPRINKLE) 50 mg CSpX 1 capsule.      onabotulinumtoxina (BOTOX) 200 unit SolR as directed      oxyCODONE-acetaminophen (PERCOCET)  mg per tablet Take 1 tablet by mouth every 8 (eight) hours as needed for Pain.  0    oxyCODONE-acetaminophen (PERCOCET)  mg per tablet       pravastatin  "(PRAVACHOL) 20 MG tablet Take by mouth once daily.      rizatriptan (MAXALT) 10 MG tablet 1 dissolvable tablet      rizatriptan (MAXALT-MLT) 10 MG disintegrating tablet DISSOLVE ONE TABLET BY MOUTH allow TO dissolve ONCE daily AS NEEDED  2    sertraline (ZOLOFT) 50 MG tablet Take 50 mg by mouth once daily.      tizanidine (ZANAFLEX) 4 MG tablet Take 4 mg by mouth every evening. May take up to 12mg qhs  4    tiZANidine (ZANAFLEX) 4 MG tablet 1 tablet as needed.      topiramate (TOPAMAX) 200 MG Tab Take 400 mg by mouth once daily.      topiramate (TOPAMAX) 200 MG Tab Take 2 tablets by mouth 2 (two) times daily.      VYVANSE 70 mg capsule Take 70 mg by mouth once daily.  0    clobetasol 0.05% (TEMOVATE) 0.05 % Oint Apply topically every evening. Use small, pea-sized amount at the site, every night before bed for 2 weeks. After that, use only 2-3 times per week at night before bed. 30 g 1     No current facility-administered medications for this visit.     Facility-Administered Medications Ordered in Other Visits   Medication Dose Route Frequency Provider Last Rate Last Admin    midazolam (VERSED) 1 mg/mL injection 0.5 mg  0.5 mg Intravenous PRN Myrtle Linares MD   2 mg at 09/18/20 1155       Social History     Tobacco Use    Smoking status: Never    Smokeless tobacco: Never   Substance Use Topics    Alcohol use: Yes     Comment: rarely       Objective:   /84 (BP Location: Left arm, Patient Position: Sitting)   Ht 5' 3" (1.6 m)   Wt 80 kg (176 lb 5.9 oz)   LMP  (LMP Unknown)   BMI 31.24 kg/m²   Physical Exam    GENERAL: Alert, well dressed, well nourished. Appropriate mood and affect. Pleasant.  HEENT: Normocephalic, atraumatic. Extraocular movements intact. Hearing and vision grossly intact.   PULMONARY: No respiratory distress. No use of accessory muscles of respiration. No cyanosis. Speaking comfortably in full sentences.   CARDIAC: Well perfused.    ABDOMEN: Soft.   EXTREMITIES: No edema. No visible " rashes.     PELVIC:   EXTERNAL GENITALIA: No lesions or masses, non-erythematous. Mucosa at medial labia minora bilaterally with some hyperpigmentation. Skin at perineum white, thin, crinkled, symmetric bilaterally, extending full length of perineum, no scarring, not bleeding or cracked currently.  URETHRA: Patent, no discharge.   VAGINA: Pink, moist, rugated, no discharge. Cuff intact.   ADNEXA: Non-tender, non-palpable.    Assessment/Plan:     Problem List Items Addressed This Visit    None  Visit Diagnoses       Vulvovaginal discomfort    -  Primary    Lichen sclerosus        Relevant Medications    clobetasol 0.05% (TEMOVATE) 0.05 % Oint          - Exam as above, leading ddx lichen sclerosus  - Reviewed lubrication, vaseline, coconut oil can be used for symptomatic relief  - Consider supplementing with vaginal topical estrogen/hyalauronic acid options PRN  - Receives hormone pellets  - Consider affirm, GCCT if no improvement  - When mentioned, patient reports being told about possibly having Lichen Sclerosus in the past  States had a biopsy with prior doctor, benign per patient, several years ago  Plan:  start steroid cream, instructed on use/how to apply, rx sent  Explained MOA/SCCa risk/need for biopsy if no improvement  RTC 3 months  - Record request for Dr. Ochoa     Follow up for WWE + steroid med check          As of April 1, 2021, the Cures Act has been passed nationally. This new law requires that all doctors progress notes, lab results, pathology reports and radiology reports be released IMMEDIATELY to the patient in the patient portal. That means that the results are released to you at the EXACT same time they are released to me. Therefore, with all of the patients that I have I am not able to reply to each patient exactly when the results come in. So there will be a delay from when you see the results to when I see them and have time to come up with a response to send you. Also I only see these  results when I am on the computer at work. So if the results come in over the weekend or after 5 pm of a work day, I will not see them until the next business day. As you can tell, this is a challenge as a physician to give every patient the quick response they hope for and deserve. So please be patient!   Thanks for your understanding and patience.

## 2023-02-16 ENCOUNTER — OFFICE VISIT (OUTPATIENT)
Dept: ORTHOPEDICS | Facility: CLINIC | Age: 58
End: 2023-02-16
Payer: COMMERCIAL

## 2023-02-16 ENCOUNTER — HOSPITAL ENCOUNTER (OUTPATIENT)
Dept: RADIOLOGY | Facility: HOSPITAL | Age: 58
Discharge: HOME | End: 2023-02-16
Attending: ORTHOPAEDIC SURGERY
Payer: COMMERCIAL

## 2023-02-16 VITALS — BODY MASS INDEX: 31.61 KG/M2 | HEIGHT: 63 IN | WEIGHT: 178.38 LBS

## 2023-02-16 DIAGNOSIS — Z98.890 POST-OPERATIVE STATE: ICD-10-CM

## 2023-02-16 DIAGNOSIS — M25.532 LEFT WRIST PAIN: Primary | ICD-10-CM

## 2023-02-16 DIAGNOSIS — S82.872K CLOSED DISPLACED PILON FRACTURE OF LEFT TIBIA WITH NONUNION, SUBSEQUENT ENCOUNTER: ICD-10-CM

## 2023-02-16 DIAGNOSIS — M25.532 LEFT WRIST PAIN: ICD-10-CM

## 2023-02-16 PROCEDURE — 99999 PR PBB SHADOW E&M-EST. PATIENT-LVL IV: CPT | Mod: PBBFAC,,, | Performed by: ORTHOPAEDIC SURGERY

## 2023-02-16 PROCEDURE — 73610 X-RAY EXAM OF ANKLE: CPT | Mod: 26,LT,, | Performed by: RADIOLOGY

## 2023-02-16 PROCEDURE — 73610 XR ANKLE COMPLETE 3 VIEW LEFT: ICD-10-PCS | Mod: 26,LT,, | Performed by: RADIOLOGY

## 2023-02-16 PROCEDURE — 73110 X-RAY EXAM OF WRIST: CPT | Mod: TC,LT

## 2023-02-16 PROCEDURE — 73110 X-RAY EXAM OF WRIST: CPT | Mod: 26,LT,, | Performed by: RADIOLOGY

## 2023-02-16 PROCEDURE — 99212 OFFICE O/P EST SF 10 MIN: CPT | Mod: S$GLB,,, | Performed by: ORTHOPAEDIC SURGERY

## 2023-02-16 PROCEDURE — 99212 PR OFFICE/OUTPT VISIT, EST, LEVL II, 10-19 MIN: ICD-10-PCS | Mod: S$GLB,,, | Performed by: ORTHOPAEDIC SURGERY

## 2023-02-16 PROCEDURE — 73610 X-RAY EXAM OF ANKLE: CPT | Mod: TC,LT

## 2023-02-16 PROCEDURE — 73110 XR WRIST COMPLETE 3 VIEWS LEFT: ICD-10-PCS | Mod: 26,LT,, | Performed by: RADIOLOGY

## 2023-02-16 PROCEDURE — 99999 PR PBB SHADOW E&M-EST. PATIENT-LVL IV: ICD-10-PCS | Mod: PBBFAC,,, | Performed by: ORTHOPAEDIC SURGERY

## 2023-02-16 PROCEDURE — 99214 OFFICE O/P EST MOD 30 MIN: CPT | Mod: PBBFAC | Performed by: ORTHOPAEDIC SURGERY

## 2023-02-17 ENCOUNTER — OFFICE VISIT (OUTPATIENT)
Dept: OBSTETRICS AND GYNECOLOGY | Facility: CLINIC | Age: 58
End: 2023-02-17
Payer: COMMERCIAL

## 2023-02-17 VITALS
DIASTOLIC BLOOD PRESSURE: 84 MMHG | HEIGHT: 63 IN | WEIGHT: 176.38 LBS | SYSTOLIC BLOOD PRESSURE: 122 MMHG | BODY MASS INDEX: 31.25 KG/M2

## 2023-02-17 DIAGNOSIS — L90.0 LICHEN SCLEROSUS: ICD-10-CM

## 2023-02-17 DIAGNOSIS — R10.2 VULVOVAGINAL DISCOMFORT: Primary | ICD-10-CM

## 2023-02-17 PROCEDURE — 1159F PR MEDICATION LIST DOCUMENTED IN MEDICAL RECORD: ICD-10-PCS | Mod: CPTII,S$GLB,, | Performed by: STUDENT IN AN ORGANIZED HEALTH CARE EDUCATION/TRAINING PROGRAM

## 2023-02-17 PROCEDURE — 99213 PR OFFICE/OUTPT VISIT, EST, LEVL III, 20-29 MIN: ICD-10-PCS | Mod: S$GLB,,, | Performed by: STUDENT IN AN ORGANIZED HEALTH CARE EDUCATION/TRAINING PROGRAM

## 2023-02-17 PROCEDURE — 3008F PR BODY MASS INDEX (BMI) DOCUMENTED: ICD-10-PCS | Mod: CPTII,S$GLB,, | Performed by: STUDENT IN AN ORGANIZED HEALTH CARE EDUCATION/TRAINING PROGRAM

## 2023-02-17 PROCEDURE — 1159F MED LIST DOCD IN RCRD: CPT | Mod: CPTII,S$GLB,, | Performed by: STUDENT IN AN ORGANIZED HEALTH CARE EDUCATION/TRAINING PROGRAM

## 2023-02-17 PROCEDURE — 99213 OFFICE O/P EST LOW 20 MIN: CPT | Mod: S$GLB,,, | Performed by: STUDENT IN AN ORGANIZED HEALTH CARE EDUCATION/TRAINING PROGRAM

## 2023-02-17 PROCEDURE — 3074F PR MOST RECENT SYSTOLIC BLOOD PRESSURE < 130 MM HG: ICD-10-PCS | Mod: CPTII,S$GLB,, | Performed by: STUDENT IN AN ORGANIZED HEALTH CARE EDUCATION/TRAINING PROGRAM

## 2023-02-17 PROCEDURE — 3079F PR MOST RECENT DIASTOLIC BLOOD PRESSURE 80-89 MM HG: ICD-10-PCS | Mod: CPTII,S$GLB,, | Performed by: STUDENT IN AN ORGANIZED HEALTH CARE EDUCATION/TRAINING PROGRAM

## 2023-02-17 PROCEDURE — 3079F DIAST BP 80-89 MM HG: CPT | Mod: CPTII,S$GLB,, | Performed by: STUDENT IN AN ORGANIZED HEALTH CARE EDUCATION/TRAINING PROGRAM

## 2023-02-17 PROCEDURE — 3074F SYST BP LT 130 MM HG: CPT | Mod: CPTII,S$GLB,, | Performed by: STUDENT IN AN ORGANIZED HEALTH CARE EDUCATION/TRAINING PROGRAM

## 2023-02-17 PROCEDURE — 3008F BODY MASS INDEX DOCD: CPT | Mod: CPTII,S$GLB,, | Performed by: STUDENT IN AN ORGANIZED HEALTH CARE EDUCATION/TRAINING PROGRAM

## 2023-02-17 PROCEDURE — 99999 PR PBB SHADOW E&M-EST. PATIENT-LVL IV: ICD-10-PCS | Mod: PBBFAC,,, | Performed by: STUDENT IN AN ORGANIZED HEALTH CARE EDUCATION/TRAINING PROGRAM

## 2023-02-17 PROCEDURE — 99999 PR PBB SHADOW E&M-EST. PATIENT-LVL IV: CPT | Mod: PBBFAC,,, | Performed by: STUDENT IN AN ORGANIZED HEALTH CARE EDUCATION/TRAINING PROGRAM

## 2023-02-17 RX ORDER — ONABOTULINUMTOXINA 200 [USP'U]/1
INJECTION, POWDER, LYOPHILIZED, FOR SOLUTION INTRADERMAL; INTRAMUSCULAR
COMMUNITY

## 2023-02-17 RX ORDER — ISOTRETINOIN 30 MG/1
30 CAPSULE, LIQUID FILLED ORAL 2 TIMES DAILY
COMMUNITY
Start: 2023-01-05

## 2023-02-17 RX ORDER — RIZATRIPTAN BENZOATE 10 MG/1
TABLET ORAL
COMMUNITY
End: 2023-09-18

## 2023-02-17 RX ORDER — HYDROCHLOROTHIAZIDE 25 MG/1
1 TABLET ORAL EVERY MORNING
COMMUNITY
End: 2023-09-18

## 2023-02-17 RX ORDER — ERENUMAB-AOOE 140 MG/ML
INJECTION, SOLUTION SUBCUTANEOUS
COMMUNITY
End: 2023-09-11

## 2023-02-17 RX ORDER — OXYCODONE AND ACETAMINOPHEN 5; 325 MG/1; MG/1
TABLET ORAL
COMMUNITY
End: 2023-02-17

## 2023-02-17 RX ORDER — BENZONATATE 200 MG/1
200 CAPSULE ORAL
COMMUNITY
Start: 2023-01-21 | End: 2023-09-11

## 2023-02-17 RX ORDER — CLINDAMYCIN PHOSPHATE 10 UG/ML
LOTION TOPICAL 2 TIMES DAILY
COMMUNITY
Start: 2022-11-18 | End: 2023-09-11

## 2023-02-17 RX ORDER — CLOBETASOL PROPIONATE 0.5 MG/G
OINTMENT TOPICAL NIGHTLY
Qty: 30 G | Refills: 1 | Status: SHIPPED | OUTPATIENT
Start: 2023-02-17 | End: 2023-02-17

## 2023-02-17 RX ORDER — FLUCONAZOLE 150 MG/1
150 TABLET ORAL
COMMUNITY
Start: 2022-12-29 | End: 2023-09-11

## 2023-02-17 RX ORDER — LEVOTHYROXINE SODIUM 100 UG/1
TABLET ORAL
COMMUNITY
End: 2023-09-18 | Stop reason: SDUPTHER

## 2023-02-17 RX ORDER — TIZANIDINE 4 MG/1
1 TABLET ORAL
COMMUNITY
End: 2023-09-18

## 2023-02-17 RX ORDER — CLOBETASOL PROPIONATE 0.5 MG/G
OINTMENT TOPICAL NIGHTLY
Qty: 30 G | Refills: 1 | Status: SHIPPED | OUTPATIENT
Start: 2023-02-17

## 2023-02-17 RX ORDER — METOPROLOL SUCCINATE 50 MG/1
1 CAPSULE, EXTENDED RELEASE ORAL
COMMUNITY
End: 2023-10-10

## 2023-02-17 RX ORDER — OXYCODONE AND ACETAMINOPHEN 10; 325 MG/1; MG/1
TABLET ORAL
COMMUNITY
End: 2023-09-18

## 2023-02-17 RX ORDER — TOPIRAMATE 200 MG/1
2 TABLET ORAL 2 TIMES DAILY
COMMUNITY
End: 2023-09-18

## 2023-02-17 RX ORDER — ERGOCALCIFEROL 1.25 MG/1
50000 CAPSULE ORAL
COMMUNITY
Start: 2023-01-15

## 2023-02-17 RX ORDER — GABAPENTIN 600 MG/1
2 TABLET ORAL
COMMUNITY
End: 2023-09-11

## 2023-02-20 ENCOUNTER — PATIENT MESSAGE (OUTPATIENT)
Dept: ORTHOPEDICS | Facility: CLINIC | Age: 58
End: 2023-02-20
Payer: MEDICARE

## 2023-03-10 ENCOUNTER — TELEPHONE (OUTPATIENT)
Dept: PULMONOLOGY | Facility: CLINIC | Age: 58
End: 2023-03-10

## 2023-03-10 ENCOUNTER — OFFICE VISIT (OUTPATIENT)
Dept: PULMONOLOGY | Facility: CLINIC | Age: 58
End: 2023-03-10
Payer: COMMERCIAL

## 2023-03-10 ENCOUNTER — LAB VISIT (OUTPATIENT)
Dept: LAB | Facility: HOSPITAL | Age: 58
End: 2023-03-10
Attending: NURSE PRACTITIONER
Payer: COMMERCIAL

## 2023-03-10 DIAGNOSIS — R06.02 SOB (SHORTNESS OF BREATH): ICD-10-CM

## 2023-03-10 DIAGNOSIS — Z87.09 HISTORY OF PLEURAL EMPYEMA: ICD-10-CM

## 2023-03-10 DIAGNOSIS — D84.9 IMMUNE DEFICIENCY DISORDER: Primary | ICD-10-CM

## 2023-03-10 DIAGNOSIS — R06.02 SOB (SHORTNESS OF BREATH): Primary | ICD-10-CM

## 2023-03-10 DIAGNOSIS — R05.3 CHRONIC COUGH: ICD-10-CM

## 2023-03-10 LAB
ALBUMIN SERPL BCP-MCNC: 3.5 G/DL (ref 3.5–5.2)
ALP SERPL-CCNC: 79 U/L (ref 55–135)
ALT SERPL W/O P-5'-P-CCNC: 23 U/L (ref 10–44)
ANION GAP SERPL CALC-SCNC: 7 MMOL/L (ref 8–16)
AST SERPL-CCNC: 16 U/L (ref 10–40)
BILIRUB SERPL-MCNC: 0.2 MG/DL (ref 0.1–1)
BNP SERPL-MCNC: 30 PG/ML (ref 0–99)
BUN SERPL-MCNC: 11 MG/DL (ref 6–20)
CALCIUM SERPL-MCNC: 9.2 MG/DL (ref 8.7–10.5)
CHLORIDE SERPL-SCNC: 113 MMOL/L (ref 95–110)
CO2 SERPL-SCNC: 20 MMOL/L (ref 23–29)
CREAT SERPL-MCNC: 0.7 MG/DL (ref 0.5–1.4)
D DIMER PPP IA.FEU-MCNC: 0.47 MG/L FEU
EST. GFR  (NO RACE VARIABLE): >60 ML/MIN/1.73 M^2
GLUCOSE SERPL-MCNC: 98 MG/DL (ref 70–110)
POTASSIUM SERPL-SCNC: 4.1 MMOL/L (ref 3.5–5.1)
PROT SERPL-MCNC: 6.7 G/DL (ref 6–8.4)
SODIUM SERPL-SCNC: 140 MMOL/L (ref 136–145)

## 2023-03-10 PROCEDURE — 83880 ASSAY OF NATRIURETIC PEPTIDE: CPT | Performed by: NURSE PRACTITIONER

## 2023-03-10 PROCEDURE — 99214 PR OFFICE/OUTPT VISIT, EST, LEVL IV, 30-39 MIN: ICD-10-PCS | Mod: 95,,, | Performed by: NURSE PRACTITIONER

## 2023-03-10 PROCEDURE — 36415 COLL VENOUS BLD VENIPUNCTURE: CPT | Performed by: NURSE PRACTITIONER

## 2023-03-10 PROCEDURE — 85379 FIBRIN DEGRADATION QUANT: CPT | Performed by: NURSE PRACTITIONER

## 2023-03-10 PROCEDURE — 80053 COMPREHEN METABOLIC PANEL: CPT | Performed by: NURSE PRACTITIONER

## 2023-03-10 PROCEDURE — 99214 OFFICE O/P EST MOD 30 MIN: CPT | Mod: 95,,, | Performed by: NURSE PRACTITIONER

## 2023-03-10 NOTE — TELEPHONE ENCOUNTER
----- Message from Patience Mckeon NP sent at 3/10/2023  4:30 PM CST -----  Regarding: Update  Let patient know her D-Dimer is normal which is typically not indicative of a blood clot. I have ordered a CT Chest to be done, no IV no dye required. Please call and schedule this CT Chest for her.  Thank you.

## 2023-03-10 NOTE — PATIENT INSTRUCTIONS
Etiology of SOB is unknown at this time.     Check BNP, CMP, and D-Dimer STAT.    Depending on results, will order CTA vs CT Chest.    I ordered a Lung Function Test to evaluate lung strength. Please complete prior to next appointment.     Check six minute walk test to see if you qualify for supplemental oxygen.    Continue Trelegy and Albuterol as prescribed.    Continue current medication regiment. Keep follow up appointment as scheduled. Please call the office if you have any questions or concerns.

## 2023-03-14 ENCOUNTER — TELEPHONE (OUTPATIENT)
Dept: ORTHOPEDICS | Facility: CLINIC | Age: 58
End: 2023-03-14
Payer: COMMERCIAL

## 2023-03-14 NOTE — TELEPHONE ENCOUNTER
Spoke with pt briefly as she was using her friend's phone.  Pt wants an appointment with Dr Mcrae for a CSI in her right knee.  Scheduled next available but pt had to disconnect call before appointment could be completed due to friend's boss was calling.   Will call pt back later with appointment info

## 2023-03-14 NOTE — TELEPHONE ENCOUNTER
----- Message from Etelvina Hoyos sent at 3/14/2023 10:59 AM CDT -----  Regarding: appt  Contact: [phone]  Pt is requesting injection type appt for right knee pain. Pt will be in town tomorrow or Thursday. Pls call to discuss. Pt is also open to seeing Kevin.

## 2023-03-14 NOTE — TELEPHONE ENCOUNTER
----- Message from Faheem Taylor sent at 3/14/2023  8:16 AM CDT -----  Regarding: Appt  Contact: [phone]  Patient is calling to schedule appt for injection in her knee. Please contact pt

## 2023-03-14 NOTE — TELEPHONE ENCOUNTER
Spoke with pt.  Advised of appointment with Dr Mcrae on 3/28.  Pt demanded an appointment tomorrow or Thursday with either Kevin Moody NP or Dr Mcrae when she will be in town.  Advised pt that I am happy to check the schedules of both providers but cannot guarantee availability.   Scheduled pt with Kevin on Thursday at 830 am   Pt was grateful

## 2023-03-16 ENCOUNTER — PATIENT MESSAGE (OUTPATIENT)
Dept: PULMONOLOGY | Facility: CLINIC | Age: 58
End: 2023-03-16
Payer: COMMERCIAL

## 2023-03-16 ENCOUNTER — TELEPHONE (OUTPATIENT)
Dept: PULMONOLOGY | Facility: CLINIC | Age: 58
End: 2023-03-16
Payer: COMMERCIAL

## 2023-03-16 ENCOUNTER — OFFICE VISIT (OUTPATIENT)
Dept: ORTHOPEDICS | Facility: CLINIC | Age: 58
End: 2023-03-16
Payer: COMMERCIAL

## 2023-03-16 ENCOUNTER — PATIENT MESSAGE (OUTPATIENT)
Dept: ENDOCRINOLOGY | Facility: CLINIC | Age: 58
End: 2023-03-16
Payer: COMMERCIAL

## 2023-03-16 ENCOUNTER — HOSPITAL ENCOUNTER (OUTPATIENT)
Dept: RADIOLOGY | Facility: HOSPITAL | Age: 58
Discharge: HOME | End: 2023-03-16
Attending: NURSE PRACTITIONER
Payer: COMMERCIAL

## 2023-03-16 VITALS — WEIGHT: 176.38 LBS | BODY MASS INDEX: 31.25 KG/M2 | HEIGHT: 63 IN

## 2023-03-16 DIAGNOSIS — R52 PAIN: Primary | ICD-10-CM

## 2023-03-16 DIAGNOSIS — M25.562 PAIN IN BOTH KNEES, UNSPECIFIED CHRONICITY: Primary | ICD-10-CM

## 2023-03-16 DIAGNOSIS — M25.561 PAIN IN BOTH KNEES, UNSPECIFIED CHRONICITY: Primary | ICD-10-CM

## 2023-03-16 DIAGNOSIS — R52 PAIN: ICD-10-CM

## 2023-03-16 DIAGNOSIS — S82.872K CLOSED DISPLACED PILON FRACTURE OF LEFT TIBIA WITH NONUNION, SUBSEQUENT ENCOUNTER: ICD-10-CM

## 2023-03-16 PROCEDURE — 99999 PR PBB SHADOW E&M-EST. PATIENT-LVL IV: ICD-10-PCS | Mod: PBBFAC,,, | Performed by: NURSE PRACTITIONER

## 2023-03-16 PROCEDURE — 20610 LARGE JOINT ASPIRATION/INJECTION: R KNEE: ICD-10-PCS | Mod: RT,S$GLB,, | Performed by: NURSE PRACTITIONER

## 2023-03-16 PROCEDURE — 99999 PR PBB SHADOW E&M-EST. PATIENT-LVL IV: CPT | Mod: PBBFAC,,, | Performed by: NURSE PRACTITIONER

## 2023-03-16 PROCEDURE — 73564 XR KNEE ORTHO BILAT WITH FLEXION: ICD-10-PCS | Mod: 26,50,, | Performed by: RADIOLOGY

## 2023-03-16 PROCEDURE — 99214 OFFICE O/P EST MOD 30 MIN: CPT | Mod: 25,S$GLB,, | Performed by: NURSE PRACTITIONER

## 2023-03-16 PROCEDURE — 73610 XR ANKLE COMPLETE 3 VIEW LEFT: ICD-10-PCS | Mod: 26,LT,, | Performed by: RADIOLOGY

## 2023-03-16 PROCEDURE — 73564 X-RAY EXAM KNEE 4 OR MORE: CPT | Mod: 26,50,, | Performed by: RADIOLOGY

## 2023-03-16 PROCEDURE — 20610 DRAIN/INJ JOINT/BURSA W/O US: CPT | Mod: PBBFAC,RT | Performed by: NURSE PRACTITIONER

## 2023-03-16 PROCEDURE — 73610 X-RAY EXAM OF ANKLE: CPT | Mod: 26,LT,, | Performed by: RADIOLOGY

## 2023-03-16 PROCEDURE — 73610 X-RAY EXAM OF ANKLE: CPT | Mod: TC,LT

## 2023-03-16 PROCEDURE — 99214 PR OFFICE/OUTPT VISIT, EST, LEVL IV, 30-39 MIN: ICD-10-PCS | Mod: 25,S$GLB,, | Performed by: NURSE PRACTITIONER

## 2023-03-16 PROCEDURE — 99214 OFFICE O/P EST MOD 30 MIN: CPT | Mod: PBBFAC,25 | Performed by: NURSE PRACTITIONER

## 2023-03-16 PROCEDURE — 73564 X-RAY EXAM KNEE 4 OR MORE: CPT | Mod: TC,50

## 2023-03-16 PROCEDURE — 20610 DRAIN/INJ JOINT/BURSA W/O US: CPT | Mod: RT,S$GLB,, | Performed by: NURSE PRACTITIONER

## 2023-03-16 RX ORDER — LIDOCAINE HYDROCHLORIDE 10 MG/ML
4 INJECTION INFILTRATION; PERINEURAL
Status: DISCONTINUED | OUTPATIENT
Start: 2023-03-16 | End: 2023-03-16 | Stop reason: HOSPADM

## 2023-03-16 RX ORDER — TRIAMCINOLONE ACETONIDE 40 MG/ML
40 INJECTION, SUSPENSION INTRA-ARTICULAR; INTRAMUSCULAR
Status: DISCONTINUED | OUTPATIENT
Start: 2023-03-16 | End: 2023-03-16 | Stop reason: HOSPADM

## 2023-03-16 RX ADMIN — LIDOCAINE HYDROCHLORIDE 4 ML: 10 INJECTION, SOLUTION INFILTRATION; PERINEURAL at 08:03

## 2023-03-16 RX ADMIN — TRIAMCINOLONE ACETONIDE 40 MG: 40 INJECTION, SUSPENSION INTRA-ARTICULAR; INTRAMUSCULAR at 08:03

## 2023-03-16 NOTE — TELEPHONE ENCOUNTER
----- Message from Ubaldo Riley MA sent at 3/10/2023  4:44 PM CST -----  SARAH BETH Vargas Staff  Let patient know her D-Dimer is normal which is typically not indicative of a blood clot. I have ordered a CT Chest to be done, no IV no dye required. Please call and schedule this CT Chest for her.   Thank you.

## 2023-03-16 NOTE — PROCEDURES
Large Joint Aspiration/Injection: R knee    Date/Time: 3/16/2023 8:30 AM  Performed by: Kevin Modoy NP  Authorized by: Kevin Moody NP     Consent Done?:  Yes (Verbal)  Indications:  Pain  Site marked: the procedure site was marked    Timeout: prior to procedure the correct patient, procedure, and site was verified      Local anesthesia used?: Yes    Local anesthetic:  Lidocaine spray    Details:  Needle Size:  22 G  Ultrasonic Guidance for needle placement?: No    Approach:  Anterolateral  Location:  Knee  Site:  R knee  Medications:  4 mL LIDOcaine HCL 10 mg/ml (1%) 10 mg/mL (1 %); 40 mg triamcinolone acetonide 40 mg/mL  Patient tolerance:  Patient tolerated the procedure well with no immediate complications
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
## 2023-03-21 ENCOUNTER — PATIENT MESSAGE (OUTPATIENT)
Dept: SURGERY | Facility: HOSPITAL | Age: 58
End: 2023-03-21
Payer: MEDICARE

## 2023-03-22 RX ORDER — VIBEGRON 75 MG/1
75 TABLET, FILM COATED ORAL DAILY
Qty: 90 TABLET | Refills: 3 | Status: SHIPPED | OUTPATIENT
Start: 2023-03-22 | End: 2023-03-31
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
## 2023-03-23 ENCOUNTER — PATIENT MESSAGE (OUTPATIENT)
Dept: UROGYNECOLOGY | Facility: CLINIC | Age: 58
End: 2023-03-23
Payer: MEDICARE

## 2023-03-23 ENCOUNTER — PATIENT MESSAGE (OUTPATIENT)
Dept: SURGERY | Facility: HOSPITAL | Age: 58
End: 2023-03-23
Payer: MEDICARE

## 2023-03-29 ENCOUNTER — PATIENT MESSAGE (OUTPATIENT)
Dept: SURGERY | Facility: HOSPITAL | Age: 58
End: 2023-03-29
Payer: MEDICARE

## 2023-03-30 ENCOUNTER — PATIENT MESSAGE (OUTPATIENT)
Dept: PULMONOLOGY | Facility: CLINIC | Age: 58
End: 2023-03-30
Payer: MEDICARE

## 2023-03-30 ENCOUNTER — TELEPHONE (OUTPATIENT)
Dept: PULMONOLOGY | Facility: CLINIC | Age: 58
End: 2023-03-30
Payer: MEDICARE

## 2023-03-30 NOTE — TELEPHONE ENCOUNTER
Patient's insurance has been changed in system to reflect 's insurance first. Patient has been rescheduled.     ----- Message from Lemuel Oseguera sent at 3/30/2023  4:38 PM CDT -----  Contact: @[phone]  Pt is calling in stating her insurance is updated, please call to discuss further.

## 2023-03-30 NOTE — TELEPHONE ENCOUNTER
Spoke to [facility] to see if they could schedule pts testing because [facility] denying testing due to insurance.[facility] told me if the pt would of seen a pulmonary Dr. Over there they could accept it but since she was seen by an [facility], they can not accept her. Related the message to the pt and she states she has never heard nor seen [employer] insurance, she only has her  insurance.

## 2023-04-06 ENCOUNTER — TELEPHONE (OUTPATIENT)
Dept: PHARMACY | Facility: CLINIC | Age: 58
End: 2023-04-06
Payer: MEDICARE

## 2023-04-14 ENCOUNTER — PATIENT MESSAGE (OUTPATIENT)
Dept: UROLOGY | Facility: CLINIC | Age: 58
End: 2023-04-14
Payer: MEDICARE

## 2023-04-17 ENCOUNTER — HOSPITAL ENCOUNTER (OUTPATIENT)
Facility: HOSPITAL | Age: 58
Discharge: HOME | End: 2023-04-17
Attending: UROLOGY | Admitting: UROLOGY
Payer: COMMERCIAL

## 2023-04-17 ENCOUNTER — PATIENT MESSAGE (OUTPATIENT)
Dept: SURGERY | Facility: HOSPITAL | Age: 58
End: 2023-04-17
Payer: MEDICARE

## 2023-04-17 VITALS
SYSTOLIC BLOOD PRESSURE: 123 MMHG | HEART RATE: 73 BPM | BODY MASS INDEX: 31.18 KG/M2 | DIASTOLIC BLOOD PRESSURE: 85 MMHG | RESPIRATION RATE: 16 BRPM | OXYGEN SATURATION: 98 % | TEMPERATURE: 98 F | WEIGHT: 176 LBS

## 2023-04-17 DIAGNOSIS — N32.9 BLADDER DISORDER: ICD-10-CM

## 2023-04-17 PROCEDURE — 52000 CYSTOURETHROSCOPY: CPT | Mod: 59,,, | Performed by: UROLOGY

## 2023-04-17 PROCEDURE — 51600 INJECTION FOR BLADDER X-RAY: CPT | Mod: 51,,, | Performed by: UROLOGY

## 2023-04-17 PROCEDURE — 51741 PR UROFLOWMETRY, COMPLEX: ICD-10-PCS | Mod: 26,51,, | Performed by: UROLOGY

## 2023-04-17 PROCEDURE — 51784 ANAL/URINARY MUSCLE STUDY: CPT | Mod: 26,51,, | Performed by: UROLOGY

## 2023-04-17 PROCEDURE — 74455 X-RAY URETHRA/BLADDER: CPT | Mod: 26,,, | Performed by: UROLOGY

## 2023-04-17 PROCEDURE — 51797 PR VOIDING PRESS STUDY INTRA-ABDOMINAL VOID: ICD-10-PCS | Mod: 26,,, | Performed by: UROLOGY

## 2023-04-17 PROCEDURE — 51600 PR INJECTION FOR BLADDER X-RAY: ICD-10-PCS | Mod: 51,,, | Performed by: UROLOGY

## 2023-04-17 PROCEDURE — 27200973 HC CYSTO SUPPLY IV (URODYNAMICS): Performed by: UROLOGY

## 2023-04-17 PROCEDURE — 51728 PR COMPLEX CYSTOMETROGRAM VOIDING PRESSURE STUDIES: ICD-10-PCS | Mod: 26,,, | Performed by: UROLOGY

## 2023-04-17 PROCEDURE — 74455 PR X-RAY URETHROCYSTOGRAM+VOIDING: ICD-10-PCS | Mod: 26,,, | Performed by: UROLOGY

## 2023-04-17 PROCEDURE — 51741 ELECTRO-UROFLOWMETRY FIRST: CPT | Mod: 26,51,, | Performed by: UROLOGY

## 2023-04-17 PROCEDURE — 51797 INTRAABDOMINAL PRESSURE TEST: CPT | Mod: 26,,, | Performed by: UROLOGY

## 2023-04-17 PROCEDURE — 51784 PR ANAL/URINARY MUSCLE STUDY: ICD-10-PCS | Mod: 26,51,, | Performed by: UROLOGY

## 2023-04-17 PROCEDURE — 36000707: Performed by: UROLOGY

## 2023-04-17 PROCEDURE — 51728 CYSTOMETROGRAM W/VP: CPT | Mod: 26,,, | Performed by: UROLOGY

## 2023-04-17 PROCEDURE — 36000706: Performed by: UROLOGY

## 2023-04-17 PROCEDURE — 52000 PR CYSTOURETHROSCOPY: ICD-10-PCS | Mod: 59,,, | Performed by: UROLOGY

## 2023-04-17 NOTE — OP NOTE
Urodynamic Report    [facility]       Referring Physician:  No ref. provider found    YOB: 1965  Date of Exam: 4/17/2023    HPI: Faith Bernard is a very pleasant 58 y.o. female who is a new patient to our department referred for evaluation of urinary incontinence of several years duration. She reports stress incontinence associated with coughing or other exertional activities. She reports urgency incontinence which is reported to be less bothersome than stress incontinence. She does not require daily pad use. She reports urinary incontinence is only during the day. She reports that urgency incontinence is relatively rare. Though she does not use pads, she leaks several times per day.      Last December, she underwent a retropubic synthetic mid-urethral sling for treatment of FILIBERTO. She now notes that leakage is only with coughing (previously with laughing, sneezing or other actiivity) but she reports that the leak with coughing seems to be greater than before. There may be a component of stress-induced urgency as she can leak a great deal with just a cough.      She denies symptoms of irritative voiding including dysuria. She denies symptoms of obstructive voiding including decreased stream, hesitancy, intermittency, post void dribbling, and sense of incomplete emptying. Bladder scan PVR was 2 mL.  Her history includes previous incontinence procedure (retropubic synthetic sling) but excludes a history of urolithiasis, hematuria, neurological symptoms/diagnoses, and prolapse surgeries. She denies all other prior pelvic surgeries or neurologic diagnoses. She does not report symptoms suggestive of advanced POP.      Cystometrogram:    Position: Sitting  Filling Rate: 30 mL/sec   Catheter: 7F  Fluid:Conray       Cath PVR prior: 0 mL Voiding Diary Capacity: Not Available   First Sensation: 46 mL First Desire: 46 mL   Strong Desire: 115 mL Cystometric Capacity: 286 mL       [place] [facility] [place] Compliance: Normal       Detrusor Overactivity (DO): Absent  Volume first DO: No DO   Max DO Pressure: No DO Urgency Incontinence: Absent        Leak Point Pressure Testing:        Volume Tested: 150 mL  Abdominal Leak Point Pressure:  62 cmH2O   Stress Induced DO: Absent          Voiding Study:        Voided Volume: 20 mL PVR: 266 mL   Maximum Flow: 1 mL/sec Average Flow 1 mL/sec   Max Pdet: 0 cmH2O  Pdet at Max Flow: 0 cmH2O   EMG Storage: Normal Recruitment  EMG Voiding: Flaring with valsalva       Fluroscopic Imaging:        Bladder Contour: Smooth Vesicoureteral Reflux:No VUR   Bladder Neck at Rest: closed Bladder Neck Voiding:Poorly seen/low flow       Impression:        Sensation:Early    Capacity: Normal    Compliance:Normal    Detrusor Overactivity:Absent    Continence: FILIBERTO (no ISD)    Contractility: Hypocontractility (see Summary)    Emptying:Unsatisfactory - Hypocontractility    Coordination:Dysfunctional (Valsalva) Voiding          Summary:  This study was performed in accordance with the current AUA/SUFU Guideline on Adult Urodynamics. On filling phase she demonstrates early first and strong desire with a normal bladder capacity. There is no detrusor overactivity (DO) demonstrated on this exam (though absence of DO on urodynamic evaluation does not exclude it as causative agent of urgency symptoms). Bladder compliance at capacity is normal. On fluoroscopy, the bladder contour is smooth. There is no VUR demonstrated on this exam.     On stress testing, with adequate cough and valsalva effort, there is FILIBERTO demonstrated at pressures above those traditionally associated with intrinsic sphincter deficiency (ISD), though still in the "equivocal" range.    On voiding phase, dysfunctional voiding is present with no underlying detrusor contraction demonstrated. Voiding is with valsalva voiding only. The patient had difficulty mounting a voluntary detrusor contraction and voiding spontaneously during the " study. Thus, the urethra could not be evaluated during voiding on this study. Urethral patency was inferred from performance on the subsequent flow rate. . The patient is unable to void to completion during the study but voids to completion on independent flow rate. The patient does not typically report any voiding difficulty. The patient reports this is not their typical voiding pattern and voiding pattern appeared normal on independent flow rate.     With detrusor hypocontractility, the definitive evaluation of bladder outlet obstruction is difficult and additional factors such as prior history, independent flow rate, timing of symptoms and post-void residual should be considered. She does not typically report this much difficulty voiding and voided normally once the study was completed. This is likely to represent artifact from anxiety surrounding the study.     We discussed options including PVS (likely with sling excision) and transurethral bulking. ACT trial is not an option as we recently discovered that the investigator will not cover costs of the procedure and the patient is not going to pay out of pocket for an investigational device.       Cystoscopy Details: Informed consent was obtained and she was sterily prepped and 1% lidocaine jelly was injected per urethra. A flexible cystoscope was inserted into the bladder via the urethra. There was no evidence of stricture, stenosis, lesions, other obstruction, or diverticulum. Significant findings included a normal unobstructed urethra only. Cystoscopic examination of the bladder revealed orthotopically positioned, normal bilateral ureteral orifices with clear yellow urine effluxing from each orifice. All mucosal surfaces were examined with no apparent stones, tumors, foreign bodies, erythema, trabeculation, diverticula, or ulcers. The procedure was concluded without complications. The patient was not administered a post-procedure antibiotic.       No evidence  of sling perforation.

## 2023-04-17 NOTE — H&P
[facility]        History and Physical     4/17/2023     Referred by:  No ref. provider found     HPI: Faith Bernard is a very pleasant 58 y.o. female who is a new patient to our department referred for evaluation of urinary incontinence of several years duration. She reports stress incontinence associated with coughing or other exertional activities. She reports urgency incontinence which is reported to be less bothersome than stress incontinence. She does not require daily pad use. She reports urinary incontinence is only during the day. She reports that urgency incontinence is relatively rare. Though she does not use pads, she leaks several times per day.      Last December, she underwent a retropubic synthetic mid-urethral sling for treatment of FILIBERTO. She now notes that leakage is only with coughing (previously with laughing, sneezing or other actiivity) but she reports that the leak with coughing seems to be greater than before. There may be a component of stress-induced urgency as she can leak a great deal with just a cough.      She denies symptoms of irritative voiding including dysuria. She denies symptoms of obstructive voiding including decreased stream, hesitancy, intermittency, post void dribbling, and sense of incomplete emptying. Bladder scan PVR was 2 mL.  Her history includes previous incontinence procedure (retropubic synthetic sling) but excludes a history of urolithiasis, hematuria, neurological symptoms/diagnoses, and prolapse surgeries. She denies all other prior pelvic surgeries or neurologic diagnoses. She does not report symptoms suggestive of advanced POP. .        Previous treatments for her stress incontinence have included:   pelvic floor exercises guided by PFPT     A review of 10+ systems was conducted with pertinent positive and negative findings documented in HPI with all other systems reviewed and negative.     Past medical, family, surgical and social history  reviewed as documented in chart with pertinent positive medical, family, surgical and social history detailed in HPI.     Exam Findings:     Vaginal Mucosa: normal  Anterior: none  Apical: no apical descent  Posterior: none  FILIBERTO: mild FILIBERTO  Urethral Mobility: no hypermobility  Urethral Lesions: no lesions or masses Const: no acute distress, conversant and alert  Eyes: anicteric, extraocular muscles intact  ENMT: normocephalic, Nl oral membranes  Cardio: no cyanosis, nl cap refill  Pulm: no tachypnea; no resp distress  Abd: soft, no tenderness  Musc: no laceration, no tenderness  Neuro: alert; oriented x 3  Skin: warm, dry; no petichiae  Psych: no anxiety; normal speech         Assessment/Plan:     Stress Urinary Incontinence (new, addt'l workup): On exam today, there is no evidence of persistent urethral hypermobility, though the urethra does not appear fixed either. Instead, I am able to feel the urethra engaging with the mid-urethral sling as intended. We will plan of urodynamic evaluation but I began discussing possible options including transurethral bulking, autologous PVS or a clinical trial of the ACT. I would not recommend repeat MUS. She is very interested in ACT; we will discuss further following her VUDS.

## 2023-04-18 ENCOUNTER — TELEPHONE (OUTPATIENT)
Dept: UROLOGY | Facility: CLINIC | Age: 58
End: 2023-04-18
Payer: MEDICARE

## 2023-04-18 DIAGNOSIS — N39.3 STRESS INCONTINENCE (FEMALE) (MALE): Primary | ICD-10-CM

## 2023-04-18 NOTE — TELEPHONE ENCOUNTER
I called the patient to get her scheduled for her surgery. No Answer. I left a voicemail letting her know I scheduled her with the Dr's first available on 04-. I left my direct number for her to call to verify his surgery date. [phone]

## 2023-05-10 ENCOUNTER — PATIENT MESSAGE (OUTPATIENT)
Dept: ORTHOPEDICS | Facility: CLINIC | Age: 58
End: 2023-05-10
Payer: MEDICARE

## 2023-05-10 ENCOUNTER — TELEPHONE (OUTPATIENT)
Dept: UROLOGY | Facility: CLINIC | Age: 58
End: 2023-05-10
Payer: MEDICARE

## 2023-05-10 DIAGNOSIS — N39.3 STRESS INCONTINENCE (FEMALE) (MALE): Primary | ICD-10-CM

## 2023-05-11 DIAGNOSIS — G89.4 CHRONIC PAIN SYNDROME: ICD-10-CM

## 2023-05-11 DIAGNOSIS — M25.561 PAIN IN BOTH KNEES, UNSPECIFIED CHRONICITY: ICD-10-CM

## 2023-05-11 DIAGNOSIS — M25.562 PAIN IN BOTH KNEES, UNSPECIFIED CHRONICITY: ICD-10-CM

## 2023-05-11 DIAGNOSIS — S82.872K CLOSED DISPLACED PILON FRACTURE OF LEFT TIBIA WITH NONUNION, SUBSEQUENT ENCOUNTER: Primary | ICD-10-CM

## 2023-05-11 NOTE — TELEPHONE ENCOUNTER
I spoke with the patient, advised her she can only go to 1 therapy location that time.  I advised her insurance companies normally do not allow her to go to 2 separate therapy locations.  Patient verbalized understanding and wishes to proceed to go to the [facility] number [phone].  In the meantime she is requesting to see if she can get a steroid injection into her ankle I advised her she will have to make an appointment with the physician performed.  She said she will make the appointment.  Orders will be faxed for therapy today.

## 2023-05-15 ENCOUNTER — TELEPHONE (OUTPATIENT)
Dept: UROLOGY | Facility: CLINIC | Age: 58
End: 2023-05-15
Payer: MEDICARE

## 2023-05-15 ENCOUNTER — HOSPITAL ENCOUNTER (OUTPATIENT)
Dept: PULMONOLOGY | Facility: HOSPITAL | Age: 58
Discharge: HOME | End: 2023-05-15
Attending: NURSE PRACTITIONER
Payer: COMMERCIAL

## 2023-05-15 ENCOUNTER — PATIENT MESSAGE (OUTPATIENT)
Dept: PULMONOLOGY | Facility: CLINIC | Age: 58
End: 2023-05-15
Payer: MEDICARE

## 2023-05-15 ENCOUNTER — HOSPITAL ENCOUNTER (OUTPATIENT)
Dept: RADIOLOGY | Facility: HOSPITAL | Age: 58
Discharge: HOME | End: 2023-05-15
Attending: NURSE PRACTITIONER
Payer: COMMERCIAL

## 2023-05-15 VITALS — HEART RATE: 88 BPM | RESPIRATION RATE: 12 BRPM

## 2023-05-15 DIAGNOSIS — N39.3 STRESS INCONTINENCE (FEMALE) (MALE): Primary | ICD-10-CM

## 2023-05-15 DIAGNOSIS — R06.02 SOB (SHORTNESS OF BREATH): ICD-10-CM

## 2023-05-15 DIAGNOSIS — R06.02 SOB (SHORTNESS OF BREATH): Primary | ICD-10-CM

## 2023-05-15 PROCEDURE — 71250 CT CHEST WITHOUT CONTRAST: ICD-10-PCS | Mod: 26,,, | Performed by: RADIOLOGY

## 2023-05-15 PROCEDURE — 94729 DIFFUSING CAPACITY: CPT

## 2023-05-15 PROCEDURE — 71250 CT THORAX DX C-: CPT | Mod: TC

## 2023-05-15 PROCEDURE — 94618 PULMONARY STRESS TESTING: CPT

## 2023-05-15 PROCEDURE — 25000242 PHARM REV CODE 250 ALT 637 W/ HCPCS: Performed by: NURSE PRACTITIONER

## 2023-05-15 PROCEDURE — 94060 EVALUATION OF WHEEZING: CPT

## 2023-05-15 PROCEDURE — 94726 PLETHYSMOGRAPHY LUNG VOLUMES: CPT

## 2023-05-15 PROCEDURE — 71250 CT THORAX DX C-: CPT | Mod: 26,,, | Performed by: RADIOLOGY

## 2023-05-15 RX ORDER — ALBUTEROL SULFATE 5 MG/ML
2.5 SOLUTION RESPIRATORY (INHALATION) ONCE
Status: COMPLETED | OUTPATIENT
Start: 2023-05-15 | End: 2023-05-15

## 2023-05-15 RX ADMIN — ALBUTEROL SULFATE 2.5 MG: 2.5 SOLUTION RESPIRATORY (INHALATION) at 11:05

## 2023-05-15 NOTE — TELEPHONE ENCOUNTER
Sp/w pt  states on Friday night she hit her face and has some bruising but now she is having eye and ear pain and her jaw is starting to hurt  she thought that since she had other appts that Ms Patience could order other xrays  explained that Ms Patience was only pulmonology and that she would need to go to urgent care  pt v/u she thought Ms Patience was also ENT

## 2023-05-17 ENCOUNTER — HOSPITAL ENCOUNTER (OUTPATIENT)
Dept: RADIOLOGY | Facility: HOSPITAL | Age: 58
Discharge: HOME | End: 2023-05-17
Attending: EMERGENCY MEDICINE
Payer: COMMERCIAL

## 2023-05-17 DIAGNOSIS — S09.90XA HEAD INJURY: Primary | ICD-10-CM

## 2023-05-17 DIAGNOSIS — S09.90XA HEAD INJURY: ICD-10-CM

## 2023-05-17 LAB
BR6MWT: NORMAL
BRPFT: ABNORMAL
DLCO SINGLE BREATH LLN: 16.98
DLCO SINGLE BREATH PRE REF: 68 %
DLCO SINGLE BREATH REF: 22.71
DLCOC SBVA LLN: 3.2
DLCOC SBVA REF: 4.76
DLCOC SINGLE BREATH LLN: 16.98
DLCOC SINGLE BREATH REF: 22.71
DLCOVA LLN: 3.2
DLCOVA PRE REF: 97 %
DLCOVA PRE: 4.62 ML/(MIN*MMHG*L) (ref 3.2–6.32)
DLCOVA REF: 4.76
ERV LLN: -16449.15
ERV PRE REF: 68.6 %
ERV REF: 0.85
FEF 25 75 CHG: 19.4 %
FEF 25 75 LLN: 1.27
FEF 25 75 POST REF: 80.6 %
FEF 25 75 PRE REF: 67.5 %
FEF 25 75 REF: 2.39
FET100 CHG: 1.9 %
FEV1 CHG: 3.5 %
FEV1 FVC CHG: 2.4 %
FEV1 FVC LLN: 68
FEV1 FVC POST REF: 100 %
FEV1 FVC PRE REF: 97.6 %
FEV1 FVC REF: 80
FEV1 LLN: 1.93
FEV1 POST REF: 75.3 %
FEV1 PRE REF: 72.8 %
FEV1 REF: 2.52
FRCPLETH LLN: 1.82
FRCPLETH PREREF: 77.8 %
FRCPLETH REF: 2.64
FVC CHG: 1 %
FVC LLN: 2.43
FVC POST REF: 74.9 %
FVC PRE REF: 74.2 %
FVC REF: 3.17
IVC PRE: 2.37 L (ref 2.43–3.93)
IVC SINGLE BREATH LLN: 2.43
IVC SINGLE BREATH PRE REF: 74.7 %
IVC SINGLE BREATH REF: 3.17
MVV LLN: 79
MVV PRE REF: 88.5 %
MVV REF: 93
PEF CHG: -12.4 %
PEF LLN: 4.68
PEF POST REF: 75.9 %
PEF PRE REF: 86.7 %
PEF REF: 6.34
POST FEF 25 75: 1.93 L/S (ref 1.27–3.86)
POST FET 100: 9.26 SEC
POST FEV1 FVC: 79.92 % (ref 68.27–89.79)
POST FEV1: 1.9 L (ref 1.93–3.09)
POST FVC: 2.37 L (ref 2.43–3.93)
POST PEF: 4.82 L/S (ref 4.68–8)
PRE DLCO: 15.44 ML/(MIN*MMHG) (ref 16.98–28.44)
PRE ERV: 0.58 L (ref -16449.15–16450.85)
PRE FEF 25 75: 1.61 L/S (ref 1.27–3.86)
PRE FET 100: 9.08 SEC
PRE FEV1 FVC: 78.03 % (ref 68.27–89.79)
PRE FEV1: 1.83 L (ref 1.93–3.09)
PRE FRC PL: 2.05 L (ref 1.82–3.46)
PRE FVC: 2.35 L (ref 2.43–3.93)
PRE MVV: 82.17 L/MIN (ref 78.91–106.76)
PRE PEF: 5.5 L/S (ref 4.68–8)
PRE RV: 1.47 L (ref 1.22–2.37)
PRE TLC: 3.82 L (ref 3.78–5.76)
RAW LLN: 3.06
RAW PRE REF: 105.8 %
RAW PRE: 3.24 CMH2O*S/L (ref 3.06–3.06)
RAW REF: 3.06
RV LLN: 1.22
RV PRE REF: 82.2 %
RV REF: 1.79
RVTLC LLN: 28
RVTLC PRE REF: 101.4 %
RVTLC PRE: 38.53 % (ref 28.41–47.59)
RVTLC REF: 38
TLC LLN: 3.78
TLC PRE REF: 80.1 %
TLC REF: 4.77
VA PRE: 3.34 L (ref 4.62–4.62)
VA SINGLE BREATH LLN: 4.62
VA SINGLE BREATH PRE REF: 72.4 %
VA SINGLE BREATH REF: 4.62
VC LLN: 2.43
VC PRE REF: 74.2 %
VC PRE: 2.35 L (ref 2.43–3.93)
VC REF: 3.17

## 2023-05-17 PROCEDURE — 70486 CT MAXILLOFACIAL W/O DYE: CPT | Mod: TC

## 2023-05-17 PROCEDURE — 94729 PR C02/MEMBANE DIFFUSE CAPACITY: ICD-10-PCS | Mod: 26,,, | Performed by: INTERNAL MEDICINE

## 2023-05-17 PROCEDURE — 94729 DIFFUSING CAPACITY: CPT | Mod: 26,,, | Performed by: INTERNAL MEDICINE

## 2023-05-17 PROCEDURE — 94726 PULM FUNCT TST PLETHYSMOGRAP: ICD-10-PCS | Mod: 26,,, | Performed by: INTERNAL MEDICINE

## 2023-05-17 PROCEDURE — 94618 PULMONARY STRESS TESTING: ICD-10-PCS | Mod: 26,,, | Performed by: INTERNAL MEDICINE

## 2023-05-17 PROCEDURE — 94618 PULMONARY STRESS TESTING: CPT | Mod: 26,,, | Performed by: INTERNAL MEDICINE

## 2023-05-17 PROCEDURE — 94726 PLETHYSMOGRAPHY LUNG VOLUMES: CPT | Mod: 26,,, | Performed by: INTERNAL MEDICINE

## 2023-05-17 PROCEDURE — 94060 EVALUATION OF WHEEZING: CPT | Mod: 26,59,, | Performed by: INTERNAL MEDICINE

## 2023-05-17 PROCEDURE — 94060 PR EVAL OF BRONCHOSPASM: ICD-10-PCS | Mod: 26,59,, | Performed by: INTERNAL MEDICINE

## 2023-05-19 ENCOUNTER — HOSPITAL ENCOUNTER (OUTPATIENT)
Dept: RADIOLOGY | Facility: HOSPITAL | Age: 58
Discharge: HOME | End: 2023-05-19
Attending: EMERGENCY MEDICINE
Payer: COMMERCIAL

## 2023-05-19 DIAGNOSIS — S09.90XA HEAD INJURY: ICD-10-CM

## 2023-05-19 PROCEDURE — 70450 CT HEAD/BRAIN W/O DYE: CPT | Mod: TC

## 2023-05-22 ENCOUNTER — OFFICE VISIT (OUTPATIENT)
Dept: PULMONOLOGY | Facility: CLINIC | Age: 58
End: 2023-05-22
Payer: COMMERCIAL

## 2023-05-22 DIAGNOSIS — R06.02 SOB (SHORTNESS OF BREATH): Primary | ICD-10-CM

## 2023-05-22 DIAGNOSIS — J45.20 MILD INTERMITTENT ASTHMA WITHOUT COMPLICATION: ICD-10-CM

## 2023-05-22 DIAGNOSIS — D84.9 IMMUNE DEFICIENCY DISORDER: ICD-10-CM

## 2023-05-22 DIAGNOSIS — Z87.09 HISTORY OF PLEURAL EMPYEMA: ICD-10-CM

## 2023-05-22 DIAGNOSIS — R05.3 CHRONIC COUGH: ICD-10-CM

## 2023-05-22 DIAGNOSIS — R06.2 WHEEZES: ICD-10-CM

## 2023-05-22 PROCEDURE — 99214 PR OFFICE/OUTPT VISIT, EST, LEVL IV, 30-39 MIN: ICD-10-PCS | Mod: 95,,, | Performed by: NURSE PRACTITIONER

## 2023-05-22 PROCEDURE — 99214 OFFICE O/P EST MOD 30 MIN: CPT | Mod: 95,,, | Performed by: NURSE PRACTITIONER

## 2023-05-22 PROCEDURE — 1159F MED LIST DOCD IN RCRD: CPT | Mod: CPTII,95,, | Performed by: NURSE PRACTITIONER

## 2023-05-22 PROCEDURE — 1159F PR MEDICATION LIST DOCUMENTED IN MEDICAL RECORD: ICD-10-PCS | Mod: CPTII,95,, | Performed by: NURSE PRACTITIONER

## 2023-05-22 RX ORDER — FLUTICASONE FUROATE, UMECLIDINIUM BROMIDE AND VILANTEROL TRIFENATATE 200; 62.5; 25 UG/1; UG/1; UG/1
1 POWDER RESPIRATORY (INHALATION) DAILY
Qty: 60 EACH | Refills: 11 | Status: SHIPPED | OUTPATIENT
Start: 2023-05-22

## 2023-05-22 RX ORDER — CODEINE PHOSPHATE AND GUAIFENESIN 10; 100 MG/5ML; MG/5ML
5 SOLUTION ORAL NIGHTLY PRN
Qty: 118 ML | Refills: 0 | Status: SHIPPED | OUTPATIENT
Start: 2023-05-22 | End: 2023-10-10

## 2023-05-22 RX ORDER — ALBUTEROL SULFATE 90 UG/1
AEROSOL, METERED RESPIRATORY (INHALATION)
Qty: 18 G | Refills: 11 | Status: SHIPPED | OUTPATIENT
Start: 2023-05-22 | End: 2024-01-30 | Stop reason: SDUPTHER

## 2023-05-22 RX ORDER — PREDNISONE 20 MG/1
TABLET ORAL
Qty: 12 TABLET | Refills: 0 | Status: SHIPPED | OUTPATIENT
Start: 2023-05-22 | End: 2023-11-08

## 2023-05-22 NOTE — PROGRESS NOTES
"5/22/2023    Faith Bernard  Virtual Appointment - Audio/Visual   Time Spent: 18 minutes     Chief Complaint   Patient presents with    Follow-up    Wheezing       HPI:   05/22/2023:  Overall doing better since last appointment. Underwent BNP and D-Dimer blood test - both normal. CT Chest obtained revealing mild atelectasis. Underwent PFT revealing mild restrictive process, 19% bronchodilator response to the smaller airways. Six minute walk test revealed no need for supplemental oxygen.  Shortness of breath is intermittent, worse with exertion, consuming large meals, associated with wheezing - worse with certain movements.  Denies current mucous production - cough is intermittent, worse at night time, overall improved in severity in comparison to prior. Prior asthma flare with severe cough - patient states she ruined her prior bladder suspension surgery s/t coughing violently - is currently following clinical trial for repeat surgery, states expected in June.  Taking Mucinex PRN with benefit.  Using Trelegy - however not on regular basis, states using approx 2x per week. Using Albuterol PRN - using approx 1-2x per week with great benefit.         03/10/2023:  Completed Levaquin and Prednisone regiment since prior office visit - reports benefit with Prednisone use. States she had resolution of symptoms with medications however she did have a prolonged course to recover, states it took weeks for cough to improve.  Shortness of breath has been intermittent over the last two years however is occurring more frequently over the last two weeks. Occurs with conversation, walking short distances throughout the home and to the mailbox. Improves with prolonged rest. States "It feels like my lungs are a balloon with a belt tied around them." States she can only eat small meals before her stomach gets bloated, distended causing worsening shortness of breath. Does endorse 50# weight gain over the last couple of years due to " "prolonged recovery requiring wheelchair, walker. Patient would like to be more active, wants to start swimming again however is hesitant due to breathing.  Using Trelegy once per day, doesn't appreciate much benefit. Using Albuterol as needed with benefit, approx 3x per day.   States "Something is wrong."   Patient Instructions   Etiology of SOB is unknown at this time.   Check BNP, CMP, and D-Dimer STAT.  Depending on results, will order CTA vs CT Chest.  I ordered a Lung Function Test to evaluate lung strength. Please complete prior to next appointment.   Check six minute walk test to see if you qualify for supplemental oxygen.  Continue Trelegy and Albuterol as prescribed.  Continue current medication regiment. Keep follow up appointment as scheduled. Please call the office if you have any questions or concerns.           1/24/2023:   has been sick for approx 2 weeks now - tested negative on 1/21 for COVID and Flu on Saturday.   came home sick from work, in which COVID was spreading.   Reports recent fever - TMAX 101 - states had fever last night.   Cough: Persistent - productive with yellow mucous - states was brown in color however has now turned yellow. Worse in the evening.   Shortness of breath: Worsening from baseline - states she is requiring inhaler use every two hours.   Was seen at local  on 1/21 and was prescribed Augmentin - states she has since developed severe diarrhea.   Had a bladder sling placed a few weeks ago - states the cough has been so severe causing her to "bust it."   Patient Instructions   Prednisone - take as prescribed.  Can stop Augmentin and switch to Levaquin - take as prescribed.  Chest xray from [facility] from today does not show acute lung disease. Shows great improvement in comparison to CXR from Aug 2021.  Repeat CXR in 1 week.  Start taking Trelegy once per day.  Continue to use Albuterol as needed for shortness of breath, wheezing, cough.  Continue current " medication regiment. Keep follow up appointment as scheduled. Please call the office if you have any questions or concerns.         10/26/2022: pt was healthy - dental assistant.  Had mrsa post breast reduction in 2004.  Had low igg -- pt had injections and immunizations and got off rx.    In 2006 - 7 of 14 good titers for pneumococcus. Was 3/14 prior to vaccines?    Family has asthma-- no childhood asthma.    Pt had gastric bypass prior to 2006.  Monitors intake/vitamens.   Pt had fall off rope swing 2 yrs ago with subsequent fx left ankle needing numerous surgeries.  Pt had empyema 8/2021 -- -- pt had gastric ulcer cautery and eso dilation -- later returned with right effusion and later right chest tube --- pt eventually went to [facility] with VAT[facility] to thoracotomy/decortication-- pt had not had recent cxr or swallow evaluation.    No fever nor night sweats.  No aspiration. Has regurg into mouth 5 /wk.  Sleeps hob elevated.  No osas but snores.  Sleep study prior to bypass.   No asthma but uses albuterol inhaler prn when very sickly every couple yrs or so.  Uses albuterol once a wk or less.  Will have noctural arousal with wheezes.  Inderal started last yr with min improvement wrt migranes-- skips inderal   Chr dry mouth on numerous   Pt said to have narcolepsy-- dextroamphetamine ppt cough and wheezes per pt. No cataplexy.  No eds now.  Uses aderal and vvyanse.  Post thoracotomy with cough/sob, occ noc wheezes, more albuterol use.  Vague swallow problems with regurg-- no aspiration.  Patient Instructions   Inderal/propranolol would not be good to use with wheezes- you aren't using  Need to follow up chest xray with empyema.  Albuterol needs suggest airway disease  Your esophagus had been problem -- very severe til dilation.  You are having regurg and esophagus problems.  Should have follow up as esophagus may contribute or cause problems?  Would check lung function  Would recommend trial trelegy once daily   200- you may have asthma.Use albuterol as needed.  Would re check immune system -- no apparent increase infections except around right lung.  Not urgent.  May do external.   Will get back with results over phone.  May  need more evaluation..   Could consider salvia therapy -- need to be cleared by Dr Beard.              PFSH: The patient varies with instability at times.   Past Medical History:   Diagnosis Date    Anxiety     Depression     Empyema of right pleural space 2021    Encounter for blood transfusion     Esophageal dysphagia     Fibromyalgia     Gastric bypass status for obesity     H/O dental abscess 04/14/2014    drained    Headache(784.0)     migraines.  Treated at [facility] (Dr. 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  Pt states took too many then realized her mistake    Polycystic ovaries     S/P [id number]     Thyroid disease     hypothyroidism    Wears partial dentures     upper         Past Surgical History:   Procedure Laterality Date    ABDOMINAL SURGERY      ANKLE HARDWARE REMOVAL Left 3/28/2022    Procedure: REMOVAL, HARDWARE, ANKLE;  Surgeon: Fer Mcrae MD;  Location: [facility] OR [address] [place] [address];  Service: Orthopedics;  Laterality: Left;    APPLICATION OF LARGE EXTERNAL FIXATION DEVICE TO TIBIA Left 9/7/2020    Procedure: APPLICATION, EXTERNAL FIXATION DEVICE, TIBIA;  Surgeon: Sujata Londono MD;  Location: [facility] OR [address] [place] [address];  Service: Orthopedics;  Laterality: Left;  need paralysis    APPLICATION OF WOUND VACUUM-ASSISTED CLOSURE DEVICE Left 9/18/2020    Procedure: APPLICATION,  WOUND VAC;  Surgeon: Fer Mcrae MD;  [facility];  Service: Orthopedics;  Laterality: Left;    BREAST SURGERY  2004    Breast Reduction    CARDIAC CATHETERIZATION      COLONOSCOPY N/A 11/3/2015    Procedure: COLONOSCOPY;  Surgeon: Timothy Barber MD;  Location: [facility];  Service: Endoscopy;  Laterality: N/A;    CYSTOSCOPY WITH URODYNAMIC TESTING N/A 4/17/2023    Procedure: CYSTOSCOPY, WITH URODYNAMIC TESTING;  Surgeon: David Perez MD;  Location: [address];  Service: Urology;  Laterality: N/A;  1 hr    DENTAL SURGERY      4 teeth removed    GASTRIC BYPASS      HERNIA REPAIR      HYSTERECTOMY      ILIAC CREST BONE GRAFT Left 3/28/2022    Procedure: BONE GRAFT, ILIAC CREST;  Surgeon: Fer Mcrae MD;  Location: [facility] OR [address] [place] [address];  Service: Orthopedics;  Laterality: Left;    INJECTION OF ANESTHETIC AGENT AROUND NERVE N/A 3/29/2022    Procedure: Block, Nerve;  Surgeon: Alexus Surgeon;  Location: [facility];  Service: Anesthesiology;  Laterality: N/A;    OPEN REDUCTION AND INTERNAL FIXATION (ORIF) OF PILON FRACTURE Left 9/18/2020    Procedure: ORIF, FRACTURE, PILON - left. Diving board, supine, bone foam. [place] anterolateral distal tibial plate, mini frag, long 3.5mm steel screw, CaPhos bone substitute;  Surgeon: Fer Mcrae MD;  Location: [address];  Service: Orthopedics;  Laterality: Left;    OPEN REDUCTION AND INTERNAL FIXATION (ORIF) OF PILON FRACTURE Left 3/28/2022    Procedure: ORIF, FRACTURE, PILON nonunion + Iliac crest bone graft - diving board, supine, bone foam. [place] axsos3 screws, 2.7 mini plates/screws, curettes, Seda/Lomas medical Augment;  Surgeon: Fer Mcrae MD;  Location: [address];  Service: Orthopedics;  Laterality: Left;  Spinal? + postop catheter (draping out pelvis for bone graft harvest)    OVARIAN CYST REMOVAL      aprox 5 times    REMOVAL OF EXTERNAL FIXATION DEVICE Left 9/18/2020    Procedure:  REMOVAL, EXTERNAL FIXATION DEVICE;  Surgeon: Fer Mcrae MD;  Location: [facility] OR [address] [place] [address];  Service: Orthopedics;  Laterality: Left;    ROTATOR CUFF REPAIR Left 01/2019    SURGICAL PROCEDURE FOR STRESS INCONTINENCE USING TENSION FREE VAGINAL TAPE N/A 12/14/2022    Procedure: SURGICAL PROCEDURE, USING TENSION FREE VAGINAL TAPE, FOR STRESS INCONTINENCE;  Surgeon: Frandy Sherman MD;  Location: [facility];  Service: OB/GYN;  Laterality: N/A;  cysto    tennis elbow repair Left 01/2019    UPPER GASTROINTESTINAL ENDOSCOPY       Social History     Tobacco Use    Smoking status: Never    Smokeless tobacco: Never   Substance Use Topics    Alcohol use: Yes     Comment: rarely    Drug use: Yes     Types: Amphetamines, Barbituates, Marijuana     Family History   Problem Relation Age of Onset    Anxiety disorder Mother     Depression Mother     Suicide Mother     Seizures Mother     Drug abuse Mother     Ovarian cancer Mother     Aortic aneurysm Father     Colon cancer Neg Hx     Breast cancer Neg Hx     Diabetes Neg Hx     Hypertension Neg Hx      Review of patient's allergies indicates:   Allergen Reactions    Cephalexin Anaphylaxis    Codeine Itching    Nsaids (non-steroidal anti-inflammatory drug)      Has a history of bleeding ulcers          Review of Systems:  a review of eleven systems covering constitutional, Eye, HEENT, Psych, Respiratory, Cardiac, GI, , Musculoskeletal, Endocrine, Dermatologic was negative except for pertinent findings as listed ABOVE and below:  pertinent positive as above, rest is good       Exam: Physical Exam limited due to appointment being virtual, audio only.      Radiographs (ct chest and cxr) reviewed: view by direct vision  -- ct chest 8/2/2021 sm right effusion, 8/6/2021 lg empeyma    Chest Xray 1/24/2023:  Image viewed from disk from [facility] - radiology interpretation as below  IMPRESSION  Mild interstitial prominence appearing chronic with no acute findings in the chest.  Stable  mild elevation of the right hemidiaphragm.         Labs reviewed          Lab Results   Component Value Date    WBC 17.30 (H) 03/29/2022    HGB 11.1 (L) 03/29/2022    HCT 37.0 03/29/2022    MCV 86 03/29/2022     03/29/2022       CMP  Sodium   Date Value Ref Range Status   03/10/2023 140 136 - 145 mmol/L Final     Potassium   Date Value Ref Range Status   03/10/2023 4.1 3.5 - 5.1 mmol/L Final     Chloride   Date Value Ref Range Status   03/10/2023 113 (H) 95 - 110 mmol/L Final     CO2   Date Value Ref Range Status   03/10/2023 20 (L) 23 - 29 mmol/L Final     Glucose   Date Value Ref Range Status   03/10/2023 98 70 - 110 mg/dL Final     BUN   Date Value Ref Range Status   03/10/2023 11 6 - 20 mg/dL Final     Creatinine   Date Value Ref Range Status   03/10/2023 0.7 0.5 - 1.4 mg/dL Final   01/03/2013 0.8 0.5 - 1.4 mg/dL Final     Calcium   Date Value Ref Range Status   03/10/2023 9.2 8.7 - 10.5 mg/dL Final   01/03/2013 8.6 (L) 8.7 - 10.5 mg/dL Final     Total Protein   Date Value Ref Range Status   03/10/2023 6.7 6.0 - 8.4 g/dL Final     Albumin   Date Value Ref Range Status   03/10/2023 3.5 3.5 - 5.2 g/dL Final     Total Bilirubin   Date Value Ref Range Status   03/10/2023 0.2 0.1 - 1.0 mg/dL Final     Comment:     For infants and newborns, interpretation of results should be based  on gestational age, weight and in agreement with clinical  observations.    Premature Infant recommended reference ranges:  Up to 24 hours.............<8.0 mg/dL  Up to 48 hours............<12.0 mg/dL  3-5 days..................<15.0 mg/dL  6-29 days.................<15.0 mg/dL       Alkaline Phosphatase   Date Value Ref Range Status   03/10/2023 79 55 - 135 U/L Final   12/31/2012 77 55 - 135 U/L Final     AST   Date Value Ref Range Status   03/10/2023 16 10 - 40 U/L Final   12/31/2012 14 10 - 40 U/L Final     ALT   Date Value Ref Range Status   03/10/2023 23 10 - 44 U/L Final     Anion Gap   Date Value Ref Range Status   03/10/2023  7 (L) 8 - 16 mmol/L Final   01/03/2013 12 5 - 15 meq/L Final     eGFR   Date Value Ref Range Status   03/10/2023 >60 >60 mL/min/1.73 m^2 Final         PFT will be done and results to be reviewed       Plan:  Clinical impression is apparently straight forward and impression with management as below.     Faith was seen today for follow-up and wheezing.    Diagnoses and all orders for this visit:    SOB (shortness of breath)    Chronic cough  -     albuterol (PROVENTIL/VENTOLIN HFA) 90 mcg/actuation inhaler; 2 puffs every 4 hours as needed for cough, wheeze, or shortness of breath  -     fluticasone-umeclidin-vilanter (TRELEGY ELLIPTA) 200-62.5-25 mcg inhaler; Inhale 1 puff into the lungs once daily.  -     guaiFENesin-codeine 100-10 mg/5 ml (TUSSI-ORGANIDIN NR)  mg/5 mL syrup; Take 5 mLs by mouth nightly as needed for Cough.    Wheezes  -     albuterol (PROVENTIL/VENTOLIN HFA) 90 mcg/actuation inhaler; 2 puffs every 4 hours as needed for cough, wheeze, or shortness of breath  -     fluticasone-umeclidin-vilanter (TRELEGY ELLIPTA) 200-62.5-25 mcg inhaler; Inhale 1 puff into the lungs once daily.    Immune deficiency disorder    History of pleural empyema    Mild intermittent asthma without complication  -     predniSONE (DELTASONE) 20 MG tablet; Take one pill per day for three days. Can repeat as needed for shortness of breath, wheezing, cough.  -     albuterol (PROVENTIL/VENTOLIN HFA) 90 mcg/actuation inhaler; 2 puffs every 4 hours as needed for cough, wheeze, or shortness of breath  -     fluticasone-umeclidin-vilanter (TRELEGY ELLIPTA) 200-62.5-25 mcg inhaler; Inhale 1 puff into the lungs once daily.  -     guaiFENesin-codeine 100-10 mg/5 ml (TUSSI-ORGANIDIN NR)  mg/5 mL syrup; Take 5 mLs by mouth nightly as needed for Cough.              Follow up in about 3 months (around 8/22/2023), or if symptoms worsen or fail to improve.    Discussed with patient above for education the following:      Patient  Instructions   SOB suspected to be secondary to asthma - you had 19% bronchodilator response on your PFT to smaller airways.    Recommend taking Trelegy once per day. This inhaler contains an inhaled steroid component. Rinse mouth after each use due to risk for thrush development. If mouth or tongue develops white sores please contact the clinic and I will order a prescription mouth wash.     Continue to use Albuterol as needed.    Keep Prednisone on hand - use sparingly. Take only as needed.    Codeine cough syrup ordered - you are high sedation risk due to other narcotic medications. Please do not mix codeine cough syrup with other sedating medications (Percocet, Gabapentin, etc). Do not mix with alcohol. Do not drive after use, will make you drowsy.    Continue current medication regiment. Keep follow up appointment as scheduled. Please call the office if you have any questions or concerns.

## 2023-05-22 NOTE — PATIENT INSTRUCTIONS
SOB suspected to be secondary to asthma - you had 19% bronchodilator response on your PFT to smaller airways.    Recommend taking Trelegy once per day. This inhaler contains an inhaled steroid component. Rinse mouth after each use due to risk for thrush development. If mouth or tongue develops white sores please contact the clinic and I will order a prescription mouth wash.     Continue to use Albuterol as needed.    Keep Prednisone on hand - use sparingly. Take only as needed.    Codeine cough syrup ordered - you are high sedation risk due to other narcotic medications. Please do not mix codeine cough syrup with other sedating medications (Percocet, Gabapentin, etc). Do not mix with alcohol. Do not drive after use, will make you drowsy.    Continue current medication regiment. Keep follow up appointment as scheduled. Please call the office if you have any questions or concerns.

## 2023-05-23 ENCOUNTER — TELEPHONE (OUTPATIENT)
Dept: UROLOGY | Facility: CLINIC | Age: 58
End: 2023-05-23
Payer: MEDICARE

## 2023-06-01 ENCOUNTER — EXTERNAL HOME HEALTH (OUTPATIENT)
Dept: HOME HEALTH SERVICES | Facility: HOSPITAL | Age: 58
End: 2023-06-01
Payer: COMMERCIAL

## 2023-06-02 ENCOUNTER — OFFICE VISIT (OUTPATIENT)
Dept: UROLOGY | Facility: CLINIC | Age: 58
End: 2023-06-02
Payer: MEDICARE

## 2023-06-02 ENCOUNTER — RESEARCH ENCOUNTER (OUTPATIENT)
Dept: RESEARCH | Facility: HOSPITAL | Age: 58
End: 2023-06-02
Payer: COMMERCIAL

## 2023-06-02 VITALS
HEART RATE: 92 BPM | BODY MASS INDEX: 31.48 KG/M2 | HEIGHT: 63 IN | WEIGHT: 177.69 LBS | SYSTOLIC BLOOD PRESSURE: 116 MMHG | DIASTOLIC BLOOD PRESSURE: 86 MMHG

## 2023-06-02 DIAGNOSIS — N39.3 STRESS INCONTINENCE (FEMALE) (MALE): Primary | ICD-10-CM

## 2023-06-02 PROCEDURE — 99999 PR PBB SHADOW E&M-EST. PATIENT-LVL V: ICD-10-PCS | Mod: PBBFAC,,, | Performed by: UROLOGY

## 2023-06-02 PROCEDURE — 99214 PR OFFICE/OUTPT VISIT, EST, LEVL IV, 30-39 MIN: ICD-10-PCS | Mod: S$PBB,,, | Performed by: UROLOGY

## 2023-06-02 PROCEDURE — 99214 OFFICE O/P EST MOD 30 MIN: CPT | Mod: S$PBB,,, | Performed by: UROLOGY

## 2023-06-02 PROCEDURE — 99999 PR PBB SHADOW E&M-EST. PATIENT-LVL V: CPT | Mod: PBBFAC,,, | Performed by: UROLOGY

## 2023-06-02 PROCEDURE — 99215 OFFICE O/P EST HI 40 MIN: CPT | Mod: PBBFAC | Performed by: UROLOGY

## 2023-06-02 NOTE — PROGRESS NOTES
[facility]        Urology Note     6/2/2023     Referred by:  No ref. provider found     HPI: Faith Bernard is a very pleasant 58 y.o. female who returns for a screening visit for evaluation of urinary incontinence of several years duration and the ACT trial. She reports stress incontinence associated with coughing or other exertional activities. She reports urgency incontinence which is reported to be less bothersome than stress incontinence. She does not require daily pad use. She reports urinary incontinence is only during the day. She reports that urgency incontinence is relatively rare. Though she does not use pads, she leaks several times per day.      Last December, she underwent a retropubic synthetic mid-urethral sling for treatment of FILIBERTO. She now notes that leakage is only with coughing (previously with laughing, sneezing or other actiivity) but she reports that the leak with coughing seems to be greater than before. There may be a component of stress-induced urgency as she can leak a great deal with just a cough.      She denies symptoms of irritative voiding including dysuria. She denies symptoms of obstructive voiding including decreased stream, hesitancy, intermittency, post void dribbling, and sense of incomplete emptying. Bladder scan PVR was 2 mL.  Her history includes previous incontinence procedure (retropubic synthetic sling) but excludes a history of urolithiasis, hematuria, neurological symptoms/diagnoses, and prolapse surgeries. She denies all other prior pelvic surgeries or neurologic diagnoses. She does not report symptoms suggestive of advanced POP. .        Previous treatments for her stress incontinence have included:   pelvic floor exercises guided by PFPT    Urodynamic and cystoscopy evaluations are detailed in previous notes. In short, she had valsalva voiding on that study but reports that is not her typical voiding patterns. Futhermore, on independent flow rate  remote from the study, she had a normal pattern of flow with normal flow rate not indicative of significant voiding dysfunction or obstruction.     A review of 10+ systems was conducted with pertinent positive and negative findings documented in HPI with all other systems reviewed and negative.     Past medical, family, surgical and social history reviewed as documented in chart with pertinent positive medical, family, surgical and social history detailed in HPI.     Exam Findings:     Vaginal Mucosa: normal  Anterior: none  Apical: no apical descent  Posterior: none  FILIBERTO: mild FILIBERTO  Urethral Mobility: no hypermobility  Urethral Lesions: no lesions or masses Const: no acute distress, conversant and alert  Eyes: anicteric, extraocular muscles intact  ENMT: normocephalic, Nl oral membranes  Cardio: no cyanosis, nl cap refill  Pulm: no tachypnea; no resp distress  Abd: soft, no tenderness  Musc: no laceration, no tenderness  Neuro: alert; oriented x 3  Skin: warm, dry; no petichiae  Psych: no anxiety; normal speech         Assessment/Plan:     Stress Urinary Incontinence (established,not meeting goals): Will screen today for ACT trial.

## 2023-06-02 NOTE — PROGRESS NOTES
IRB# [id number]  PI: Dr. David Perez  Subject Number: 5502     Date: 02Jun2023     ICF version (date of IRB approval stamp): [id number]  Clinical Investigation Plan version: [id number]  Protocol version for cohort: 5.0     The patient was consented for the ACT trial study in clinic today. Patient was not accompanied by anyone.     The patient read through the consent form and the  went through it with them. The purpose of the study, length of the study, study visit and procedures, risks, benefits, responsibilities, alternative treatments, costs, compensation for injury, withdrawal notices, HIPAA authorization were fully discussed.      Patient was given ample time to ask questions. All questions were answered and the patient agreed to participate in the study. Patient then signed the consent form. A copy of the signed consent was given to the patient for their records and a copy has been scanned in to Epic, see Media tab. No study related procedures were performed prior to consent.

## 2023-06-16 ENCOUNTER — PATIENT MESSAGE (OUTPATIENT)
Dept: UROLOGY | Facility: CLINIC | Age: 58
End: 2023-06-16
Payer: COMMERCIAL

## 2023-06-29 ENCOUNTER — PATIENT MESSAGE (OUTPATIENT)
Dept: UROLOGY | Facility: CLINIC | Age: 58
End: 2023-06-29
Payer: COMMERCIAL

## 2023-06-30 ENCOUNTER — PATIENT MESSAGE (OUTPATIENT)
Dept: UROLOGY | Facility: CLINIC | Age: 58
End: 2023-06-30
Payer: COMMERCIAL

## 2023-07-10 NOTE — ADDENDUM NOTE
Addended by: CISCO CABRERA on: 7/10/2023 10:56 AM     Modules accepted: Orders, Level of Service

## 2023-07-20 ENCOUNTER — PATIENT MESSAGE (OUTPATIENT)
Dept: ORTHOPEDICS | Facility: CLINIC | Age: 58
End: 2023-07-20
Payer: COMMERCIAL

## 2023-07-22 ENCOUNTER — PATIENT MESSAGE (OUTPATIENT)
Dept: UROLOGY | Facility: CLINIC | Age: 58
End: 2023-07-22
Payer: COMMERCIAL

## 2023-07-24 ENCOUNTER — PATIENT MESSAGE (OUTPATIENT)
Dept: PULMONOLOGY | Facility: CLINIC | Age: 58
End: 2023-07-24
Payer: COMMERCIAL

## 2023-07-31 ENCOUNTER — PATIENT MESSAGE (OUTPATIENT)
Dept: ORTHOPEDICS | Facility: CLINIC | Age: 58
End: 2023-07-31
Payer: COMMERCIAL

## 2023-08-12 ENCOUNTER — PATIENT MESSAGE (OUTPATIENT)
Dept: UROLOGY | Facility: CLINIC | Age: 58
End: 2023-08-12
Payer: COMMERCIAL

## 2023-08-15 ENCOUNTER — PATIENT MESSAGE (OUTPATIENT)
Dept: UROLOGY | Facility: CLINIC | Age: 58
End: 2023-08-15
Payer: COMMERCIAL

## 2023-08-21 ENCOUNTER — PATIENT MESSAGE (OUTPATIENT)
Dept: ORTHOPEDICS | Facility: CLINIC | Age: 58
End: 2023-08-21
Payer: COMMERCIAL

## 2023-08-25 ENCOUNTER — TELEPHONE (OUTPATIENT)
Dept: ORTHOPEDICS | Facility: CLINIC | Age: 58
End: 2023-08-25
Payer: COMMERCIAL

## 2023-08-25 NOTE — TELEPHONE ENCOUNTER
----- Message from Luz Elena Cagle sent at 8/25/2023  8:33 AM CDT -----  Regarding: same day appt  Name of Who is Calling:JERROD WILKS [[id number]]          What is the request in detail: pt is in pain from surgery and would like a same day appt           Can the clinic reply by [facility]: no           [facility]: [phone] (home)

## 2023-08-30 ENCOUNTER — PATIENT MESSAGE (OUTPATIENT)
Dept: ORTHOPEDICS | Facility: CLINIC | Age: 58
End: 2023-08-30
Payer: COMMERCIAL

## 2023-08-30 DIAGNOSIS — S82.872K CLOSED DISPLACED PILON FRACTURE OF LEFT TIBIA WITH NONUNION, SUBSEQUENT ENCOUNTER: Primary | ICD-10-CM

## 2023-08-31 ENCOUNTER — OFFICE VISIT (OUTPATIENT)
Dept: ORTHOPEDICS | Facility: CLINIC | Age: 58
End: 2023-08-31
Payer: COMMERCIAL

## 2023-08-31 ENCOUNTER — HOSPITAL ENCOUNTER (OUTPATIENT)
Dept: RADIOLOGY | Facility: HOSPITAL | Age: 58
Discharge: HOME | End: 2023-08-31
Attending: ORTHOPAEDIC SURGERY
Payer: COMMERCIAL

## 2023-08-31 DIAGNOSIS — R52 PAIN: ICD-10-CM

## 2023-08-31 DIAGNOSIS — R52 PAIN: Primary | ICD-10-CM

## 2023-08-31 DIAGNOSIS — M25.572 PAIN OF JOINT OF LEFT ANKLE AND FOOT: ICD-10-CM

## 2023-08-31 DIAGNOSIS — S82.872K CLOSED DISPLACED PILON FRACTURE OF LEFT TIBIA WITH NONUNION, SUBSEQUENT ENCOUNTER: ICD-10-CM

## 2023-08-31 DIAGNOSIS — G89.4 CHRONIC PAIN SYNDROME: ICD-10-CM

## 2023-08-31 DIAGNOSIS — S82.872K CLOSED DISPLACED PILON FRACTURE OF LEFT TIBIA WITH NONUNION, SUBSEQUENT ENCOUNTER: Primary | ICD-10-CM

## 2023-08-31 PROCEDURE — 73630 X-RAY EXAM OF FOOT: CPT | Mod: TC,LT

## 2023-08-31 PROCEDURE — 73610 XR ANKLE COMPLETE 3 VIEW LEFT: ICD-10-PCS | Mod: 26,LT,, | Performed by: RADIOLOGY

## 2023-08-31 PROCEDURE — 73630 X-RAY EXAM OF FOOT: CPT | Mod: 26,LT,, | Performed by: RADIOLOGY

## 2023-08-31 PROCEDURE — 73610 X-RAY EXAM OF ANKLE: CPT | Mod: TC,LT

## 2023-08-31 PROCEDURE — 73610 X-RAY EXAM OF ANKLE: CPT | Mod: 26,LT,, | Performed by: RADIOLOGY

## 2023-08-31 PROCEDURE — 99215 OFFICE O/P EST HI 40 MIN: CPT | Mod: PBBFAC | Performed by: ORTHOPAEDIC SURGERY

## 2023-08-31 PROCEDURE — 99214 PR OFFICE/OUTPT VISIT, EST, LEVL IV, 30-39 MIN: ICD-10-PCS | Mod: S$GLB,,, | Performed by: ORTHOPAEDIC SURGERY

## 2023-08-31 PROCEDURE — 99999 PR PBB SHADOW E&M-EST. PATIENT-LVL V: CPT | Mod: PBBFAC,,, | Performed by: ORTHOPAEDIC SURGERY

## 2023-08-31 PROCEDURE — 99214 OFFICE O/P EST MOD 30 MIN: CPT | Mod: S$GLB,,, | Performed by: ORTHOPAEDIC SURGERY

## 2023-08-31 PROCEDURE — 73630 XR FOOT COMPLETE 3 VIEW LEFT: ICD-10-PCS | Mod: 26,LT,, | Performed by: RADIOLOGY

## 2023-08-31 PROCEDURE — 99999 PR PBB SHADOW E&M-EST. PATIENT-LVL V: ICD-10-PCS | Mod: PBBFAC,,, | Performed by: ORTHOPAEDIC SURGERY

## 2023-08-31 RX ORDER — PREGABALIN 150 MG/1
150 CAPSULE ORAL 2 TIMES DAILY
COMMUNITY
End: 2023-09-18 | Stop reason: SDUPTHER
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
## 2023-09-01 ENCOUNTER — PATIENT MESSAGE (OUTPATIENT)
Dept: UROLOGY | Facility: CLINIC | Age: 58
End: 2023-09-01
Payer: COMMERCIAL

## 2023-09-07 ENCOUNTER — PATIENT MESSAGE (OUTPATIENT)
Dept: UROLOGY | Facility: CLINIC | Age: 58
End: 2023-09-07

## 2023-09-07 ENCOUNTER — OFFICE VISIT (OUTPATIENT)
Dept: UROLOGY | Facility: CLINIC | Age: 58
End: 2023-09-07
Payer: COMMERCIAL

## 2023-09-07 DIAGNOSIS — N39.3 STRESS INCONTINENCE (FEMALE) (MALE): Primary | ICD-10-CM

## 2023-09-07 PROCEDURE — 99214 PR OFFICE/OUTPT VISIT, EST, LEVL IV, 30-39 MIN: ICD-10-PCS | Mod: 95,,, | Performed by: UROLOGY

## 2023-09-07 PROCEDURE — 99214 OFFICE O/P EST MOD 30 MIN: CPT | Mod: 95,,, | Performed by: UROLOGY

## 2023-09-07 NOTE — H&P (VIEW-ONLY)
[facility]        Urology Note     9/7/2023     Referred by:  No ref. provider found     HPI: Faith Bernard is a very pleasant 58 y.o. female who returns for evaluation of urinary incontinence of several years duration and the ACT trial. She reports stress incontinence associated with coughing or other exertional activities. She reports urgency incontinence which is reported to be less bothersome than stress incontinence. She does not require daily pad use. She reports urinary incontinence is only during the day. She reports that urgency incontinence is relatively rare. Though she does not use pads, she leaks several times per day.      Last December, she underwent a retropubic synthetic mid-urethral sling for treatment of FILIBERTO. She now notes that leakage is only with coughing (previously with laughing, sneezing or other actiivity) but she reports that the leak with coughing seems to be greater than before. There may be a component of stress-induced urgency as she can leak a great deal with just a cough.      She denies symptoms of irritative voiding including dysuria. She denies symptoms of obstructive voiding including decreased stream, hesitancy, intermittency, post void dribbling, and sense of incomplete emptying. Bladder scan PVR was 2 mL.  Her history includes previous incontinence procedure (retropubic synthetic sling) but excludes a history of urolithiasis, hematuria, neurological symptoms/diagnoses, and prolapse surgeries. She denies all other prior pelvic surgeries or neurologic diagnoses. She does not report symptoms suggestive of advanced POP. .        Previous treatments for her stress incontinence have included:   pelvic floor exercises guided by PFPT     Urodynamic and cystoscopy evaluations are detailed in previous notes. In short, she had valsalva voiding on that study but reports that is not her typical voiding patterns. Futhermore, on independent flow rate remote from the  study, she had a normal pattern of flow with normal flow rate not indicative of significant voiding dysfunction or obstruction.      A review of 10+ systems was conducted with pertinent positive and negative findings documented in HPI with all other systems reviewed and negative.     Past medical, family, surgical and social history reviewed as documented in chart with pertinent positive medical, family, surgical and social history detailed in HPI.     Exam Findings:     Vaginal Mucosa: normal  Anterior: none  Apical: no apical descent  Posterior: none  FILIBERTO: mild FILIBERTO  Urethral Mobility: no hypermobility  Urethral Lesions: no lesions or masses Const: no acute distress, conversant and alert  Eyes: anicteric, extraocular muscles intact  ENMT: normocephalic, Nl oral membranes  Cardio: no cyanosis, nl cap refill  Pulm: no tachypnea; no resp distress  Abd: soft, no tenderness  Musc: no laceration, no tenderness  Neuro: alert; oriented x 3  Skin: warm, dry; no petichiae  Psych: no anxiety; normal speech         Assessment/Plan:     Stress Urinary Incontinence (established,not meeting goals): Our site is withdrawing from the ACT trial, unfortunately. We discussed options today of referring her to the nearest active site (will pose this to sponsor) versus transurethral bulking vs autologous PVS.

## 2023-09-08 ENCOUNTER — TELEPHONE (OUTPATIENT)
Dept: UROLOGY | Facility: CLINIC | Age: 58
End: 2023-09-08
Payer: COMMERCIAL

## 2023-09-08 DIAGNOSIS — N39.3 STRESS INCONTINENCE (FEMALE) (MALE): Primary | ICD-10-CM
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
## 2023-09-11 ENCOUNTER — LAB VISIT (OUTPATIENT)
Dept: LAB | Facility: HOSPITAL | Age: 58
End: 2023-09-11
Attending: INTERNAL MEDICINE
Payer: COMMERCIAL

## 2023-09-11 ENCOUNTER — OFFICE VISIT (OUTPATIENT)
Dept: ENDOCRINOLOGY | Facility: CLINIC | Age: 58
End: 2023-09-11
Payer: COMMERCIAL

## 2023-09-11 ENCOUNTER — PATIENT MESSAGE (OUTPATIENT)
Dept: RESEARCH | Facility: HOSPITAL | Age: 58
End: 2023-09-11
Payer: COMMERCIAL

## 2023-09-11 VITALS
OXYGEN SATURATION: 99 % | WEIGHT: 166.25 LBS | HEART RATE: 78 BPM | DIASTOLIC BLOOD PRESSURE: 80 MMHG | SYSTOLIC BLOOD PRESSURE: 115 MMHG | BODY MASS INDEX: 29.45 KG/M2

## 2023-09-11 DIAGNOSIS — E03.9 ACQUIRED HYPOTHYROIDISM: ICD-10-CM

## 2023-09-11 DIAGNOSIS — E21.3 HYPERPARATHYROIDISM, UNSPECIFIED: ICD-10-CM

## 2023-09-11 DIAGNOSIS — E21.3 HYPERPARATHYROIDISM, UNSPECIFIED: Primary | ICD-10-CM

## 2023-09-11 DIAGNOSIS — R79.89 ELEVATED PARATHYROID HORMONE: ICD-10-CM

## 2023-09-11 LAB
25(OH)D3+25(OH)D2 SERPL-MCNC: 33 NG/ML (ref 30–96)
ALBUMIN SERPL BCP-MCNC: 3.8 G/DL (ref 3.5–5.2)
CALCIUM SERPL-MCNC: 9.3 MG/DL (ref 8.7–10.5)
PHOSPHATE SERPL-MCNC: 2.4 MG/DL (ref 2.7–4.5)
PTH-INTACT SERPL-MCNC: 171.3 PG/ML (ref 9–77)
T4 FREE SERPL-MCNC: 1.07 NG/DL (ref 0.71–1.51)
TSH SERPL DL<=0.005 MIU/L-ACNC: 0.04 UIU/ML (ref 0.4–4)

## 2023-09-11 PROCEDURE — 82306 VITAMIN D 25 HYDROXY: CPT | Performed by: INTERNAL MEDICINE

## 2023-09-11 PROCEDURE — 84100 ASSAY OF PHOSPHORUS: CPT | Performed by: INTERNAL MEDICINE

## 2023-09-11 PROCEDURE — 99214 PR OFFICE/OUTPT VISIT, EST, LEVL IV, 30-39 MIN: ICD-10-PCS | Mod: S$GLB,,, | Performed by: INTERNAL MEDICINE

## 2023-09-11 PROCEDURE — 83970 ASSAY OF PARATHORMONE: CPT | Performed by: INTERNAL MEDICINE

## 2023-09-11 PROCEDURE — 99214 OFFICE O/P EST MOD 30 MIN: CPT | Mod: S$GLB,,, | Performed by: INTERNAL MEDICINE

## 2023-09-11 PROCEDURE — 99999 PR PBB SHADOW E&M-EST. PATIENT-LVL IV: ICD-10-PCS | Mod: PBBFAC,,, | Performed by: INTERNAL MEDICINE

## 2023-09-11 PROCEDURE — 82310 ASSAY OF CALCIUM: CPT | Performed by: INTERNAL MEDICINE

## 2023-09-11 PROCEDURE — 82040 ASSAY OF SERUM ALBUMIN: CPT | Performed by: INTERNAL MEDICINE

## 2023-09-11 PROCEDURE — 84443 ASSAY THYROID STIM HORMONE: CPT | Performed by: INTERNAL MEDICINE

## 2023-09-11 PROCEDURE — 99999 PR PBB SHADOW E&M-EST. PATIENT-LVL IV: CPT | Mod: PBBFAC,,, | Performed by: INTERNAL MEDICINE

## 2023-09-11 PROCEDURE — 36415 COLL VENOUS BLD VENIPUNCTURE: CPT | Performed by: INTERNAL MEDICINE

## 2023-09-11 PROCEDURE — 84439 ASSAY OF FREE THYROXINE: CPT | Performed by: INTERNAL MEDICINE

## 2023-09-11 PROCEDURE — 99214 OFFICE O/P EST MOD 30 MIN: CPT | Mod: PBBFAC | Performed by: INTERNAL MEDICINE

## 2023-09-11 NOTE — ASSESSMENT & PLAN NOTE
Currently on levothyroxine 100 mcg daily.  Patient wants to switch to Synthroid brand name.  Patient is clinically euthyroid today.    Her TSH today with suppressed with a normal free T4.  Decrease Synthroid to 100 mcg daily for 6 days and take half tablet on Sunday.  Take the Synthroid on empty stomach in the morning with water and wait for at least 30 minutes before taking other medications, food or coffee.  Repeat thyroid labs in 8 weeks.    Lab Results   Component Value Date    TSH 0.037 (L) 09/11/2023    FREET4 1.07 09/11/2023

## 2023-09-13 RX ORDER — PREGABALIN 200 MG/1
200 CAPSULE ORAL 2 TIMES DAILY
COMMUNITY
End: 2023-11-08

## 2023-09-13 NOTE — PRE-PROCEDURE INSTRUCTIONS
The following was discussed with pt via phone and sent to pt portal. Pt verbalized understanding.    Dear Faith ,    You are scheduled for a procedure with Dr. Perez on 9/14/2023. Your scheduled arrival time is 9:40am.  This arrival time is roughly 2 hours before your anticipated procedure time to allow sufficient time for pre-op.  Please wear comfortable clothes.  This procedure will take place at the [facility] at the corner of [facility] and [facility].  [facility] [place] [facility] to [facility].  The address is:    [address] [place] [address].  [place] [id number]    After entering the building, you will proceed to the second floor where you can check in with registration. You should take any medications that you routinely take for blood pressure (other than those listed below), heart medications, thyroid, cholesterol, etc.     If you wear contact lenses, please wear glasses to your procedure.    Your fasting instructions are as follow:  Nothing to eat or drink after midnight tonight. You may have small amounts of water to take medications in the morning. You MUST have a responsible adult to bring you home.      This evening (9/13/2023) and tomorrow morning, please hold the following medications:  -Aspirin and Aspirin-containing products (Goody's powder, Excedrin)  -NSAIDs (Advil, Ibuprofen, Aleve, Diclofenac)  -Vitamins/Supplements  -Herbal remedies/Teas  -Stimulants (Adderall, Vyvanse, Adipex)  -Diabetic medication (Please bring with you day of procedure)  -CALCIFEROL  -BENZACLIN GEL  -TEMOVATE OINTMENT  -ESTRADIOL CREAM  -ADDERALL  -VYVANSE    -May take Tylenol      This evening (9/13/2023) and tomorrow morning, take a shower using antibacterial soap (ex: Hibiclens or Dial antibacterial soap). DO NOT apply deodorant, lotion, cologne, or anything else to the skin. Wear loose, comfortable fitting clothing. Do not wear jewelry or bring any valuables with you. If you  wear dentures or contacts, please bring your case with you or leave them at home.    If you have any procedure-specific questions, please call your surgeon's office. Any other questions, don't hesitate to call at [phone].    Thanks,  JOHN Serrano  Pre-Admit Dept OC

## 2023-09-14 ENCOUNTER — ANESTHESIA EVENT (OUTPATIENT)
Dept: SURGERY | Facility: HOSPITAL | Age: 58
End: 2023-09-14
Payer: COMMERCIAL

## 2023-09-14 ENCOUNTER — ANESTHESIA (OUTPATIENT)
Dept: SURGERY | Facility: HOSPITAL | Age: 58
End: 2023-09-14
Payer: COMMERCIAL

## 2023-09-14 ENCOUNTER — PATIENT MESSAGE (OUTPATIENT)
Dept: SURGERY | Facility: HOSPITAL | Age: 58
End: 2023-09-14
Payer: COMMERCIAL

## 2023-09-14 ENCOUNTER — HOSPITAL ENCOUNTER (OUTPATIENT)
Facility: HOSPITAL | Age: 58
Discharge: HOME | End: 2023-09-14
Attending: UROLOGY | Admitting: UROLOGY
Payer: COMMERCIAL

## 2023-09-14 VITALS
TEMPERATURE: 98 F | RESPIRATION RATE: 14 BRPM | SYSTOLIC BLOOD PRESSURE: 134 MMHG | HEART RATE: 79 BPM | DIASTOLIC BLOOD PRESSURE: 95 MMHG | OXYGEN SATURATION: 97 %

## 2023-09-14 DIAGNOSIS — N39.3 STRESS INCONTINENCE: Primary | ICD-10-CM

## 2023-09-14 PROCEDURE — 37000009 HC ANESTHESIA EA ADD 15 MINS: Performed by: UROLOGY

## 2023-09-14 PROCEDURE — 71000033 HC RECOVERY, INTIAL HOUR: Performed by: UROLOGY

## 2023-09-14 PROCEDURE — D9220A PRA ANESTHESIA: Mod: ,,, | Performed by: NURSE ANESTHETIST, CERTIFIED REGISTERED

## 2023-09-14 PROCEDURE — 94761 N-INVAS EAR/PLS OXIMETRY MLT: CPT

## 2023-09-14 PROCEDURE — 63600175 PHARM REV CODE 636 W HCPCS: Performed by: NURSE ANESTHETIST, CERTIFIED REGISTERED

## 2023-09-14 PROCEDURE — D9220A PRA ANESTHESIA: ICD-10-PCS | Mod: ,,, | Performed by: NURSE ANESTHETIST, CERTIFIED REGISTERED

## 2023-09-14 PROCEDURE — 25000003 PHARM REV CODE 250: Performed by: UROLOGY

## 2023-09-14 PROCEDURE — 63600175 PHARM REV CODE 636 W HCPCS: Performed by: UROLOGY

## 2023-09-14 PROCEDURE — 71000015 HC POSTOP RECOV 1ST HR: Performed by: UROLOGY

## 2023-09-14 PROCEDURE — 36000707: Performed by: UROLOGY

## 2023-09-14 PROCEDURE — 36000706: Performed by: UROLOGY

## 2023-09-14 PROCEDURE — L8606 SYNTHETIC IMPLNT URINARY 1ML: HCPCS | Performed by: UROLOGY

## 2023-09-14 PROCEDURE — 37000008 HC ANESTHESIA 1ST 15 MINUTES: Performed by: UROLOGY

## 2023-09-14 PROCEDURE — 51715 ENDOSCOPIC INJECTION/IMPLANT: CPT | Mod: ,,, | Performed by: UROLOGY

## 2023-09-14 PROCEDURE — 99900035 HC TECH TIME PER 15 MIN (STAT)

## 2023-09-14 PROCEDURE — 51715 PR ENDOSCOPIC INJECTION/IMPLANT: ICD-10-PCS | Mod: ,,, | Performed by: UROLOGY

## 2023-09-14 DEVICE — IMPLANT MACROPLASTIQUE: Type: IMPLANTABLE DEVICE | Site: URETHRA | Status: FUNCTIONAL

## 2023-09-14 RX ORDER — LIDOCAINE HYDROCHLORIDE 20 MG/ML
INJECTION INTRAVENOUS
Status: DISCONTINUED | OUTPATIENT
Start: 2023-09-14 | End: 2023-09-14

## 2023-09-14 RX ORDER — ACETAMINOPHEN 325 MG/1
325 TABLET ORAL EVERY 6 HOURS PRN
COMMUNITY

## 2023-09-14 RX ORDER — SODIUM CHLORIDE 9 MG/ML
INJECTION, SOLUTION INTRAVENOUS CONTINUOUS
Status: DISCONTINUED | OUTPATIENT
Start: 2023-09-14 | End: 2023-09-14 | Stop reason: HOSPADM

## 2023-09-14 RX ORDER — LIDOCAINE 40 MG/G
CREAM TOPICAL
COMMUNITY
End: 2023-11-08

## 2023-09-14 RX ORDER — SUCCINYLCHOLINE CHLORIDE 20 MG/ML
INJECTION INTRAMUSCULAR; INTRAVENOUS
Status: DISCONTINUED | OUTPATIENT
Start: 2023-09-14 | End: 2023-09-14

## 2023-09-14 RX ORDER — PROPOFOL 10 MG/ML
VIAL (ML) INTRAVENOUS
Status: DISCONTINUED | OUTPATIENT
Start: 2023-09-14 | End: 2023-09-14

## 2023-09-14 RX ORDER — CIPROFLOXACIN 2 MG/ML
400 INJECTION, SOLUTION INTRAVENOUS ONCE
Status: COMPLETED | OUTPATIENT
Start: 2023-09-14 | End: 2023-09-14

## 2023-09-14 RX ORDER — METOPROLOL TARTRATE 50 MG/1
50 TABLET ORAL 2 TIMES DAILY
COMMUNITY

## 2023-09-14 RX ORDER — ONDANSETRON 2 MG/ML
INJECTION INTRAMUSCULAR; INTRAVENOUS
Status: DISCONTINUED | OUTPATIENT
Start: 2023-09-14 | End: 2023-09-14

## 2023-09-14 RX ORDER — PHENAZOPYRIDINE HYDROCHLORIDE 200 MG/1
200 TABLET, FILM COATED ORAL 3 TIMES DAILY PRN
Qty: 15 TABLET | Refills: 0 | Status: SHIPPED | OUTPATIENT
Start: 2023-09-14 | End: 2023-09-19

## 2023-09-14 RX ORDER — LIDOCAINE HYDROCHLORIDE 10 MG/ML
1 INJECTION, SOLUTION EPIDURAL; INFILTRATION; INTRACAUDAL; PERINEURAL ONCE AS NEEDED
Status: DISCONTINUED | OUTPATIENT
Start: 2023-09-14 | End: 2023-09-14 | Stop reason: HOSPADM

## 2023-09-14 RX ORDER — MIDAZOLAM HYDROCHLORIDE 1 MG/ML
INJECTION INTRAMUSCULAR; INTRAVENOUS
Status: DISCONTINUED | OUTPATIENT
Start: 2023-09-14 | End: 2023-09-14

## 2023-09-14 RX ORDER — FENTANYL CITRATE 50 UG/ML
INJECTION, SOLUTION INTRAMUSCULAR; INTRAVENOUS
Status: DISCONTINUED | OUTPATIENT
Start: 2023-09-14 | End: 2023-09-14

## 2023-09-14 RX ORDER — FENTANYL CITRATE 50 UG/ML
25 INJECTION, SOLUTION INTRAMUSCULAR; INTRAVENOUS EVERY 5 MIN PRN
Status: DISCONTINUED | OUTPATIENT
Start: 2023-09-14 | End: 2023-09-14 | Stop reason: HOSPADM

## 2023-09-14 RX ORDER — DEXAMETHASONE SODIUM PHOSPHATE 4 MG/ML
INJECTION, SOLUTION INTRA-ARTICULAR; INTRALESIONAL; INTRAMUSCULAR; INTRAVENOUS; SOFT TISSUE
Status: DISCONTINUED | OUTPATIENT
Start: 2023-09-14 | End: 2023-09-14

## 2023-09-14 RX ORDER — ONDANSETRON 2 MG/ML
4 INJECTION INTRAMUSCULAR; INTRAVENOUS DAILY PRN
Status: DISCONTINUED | OUTPATIENT
Start: 2023-09-14 | End: 2023-09-14 | Stop reason: HOSPADM

## 2023-09-14 RX ORDER — PROCHLORPERAZINE EDISYLATE 5 MG/ML
5 INJECTION INTRAMUSCULAR; INTRAVENOUS EVERY 30 MIN PRN
Status: DISCONTINUED | OUTPATIENT
Start: 2023-09-14 | End: 2023-09-14 | Stop reason: HOSPADM

## 2023-09-14 RX ORDER — SODIUM CHLORIDE, SODIUM LACTATE, POTASSIUM CHLORIDE, CALCIUM CHLORIDE 600; 310; 30; 20 MG/100ML; MG/100ML; MG/100ML; MG/100ML
INJECTION, SOLUTION INTRAVENOUS CONTINUOUS PRN
Status: DISCONTINUED | OUTPATIENT
Start: 2023-09-14 | End: 2023-09-14

## 2023-09-14 RX ADMIN — SUCCINYLCHOLINE CHLORIDE 160 MG: 20 INJECTION, SOLUTION INTRAMUSCULAR; INTRAVENOUS at 11:09

## 2023-09-14 RX ADMIN — FENTANYL CITRATE 100 MCG: 50 INJECTION, SOLUTION INTRAMUSCULAR; INTRAVENOUS at 11:09

## 2023-09-14 RX ADMIN — ONDANSETRON 4 MG: 2 INJECTION INTRAMUSCULAR; INTRAVENOUS at 12:09

## 2023-09-14 RX ADMIN — MIDAZOLAM HYDROCHLORIDE 2 MG: 1 INJECTION INTRAMUSCULAR; INTRAVENOUS at 11:09

## 2023-09-14 RX ADMIN — LIDOCAINE HYDROCHLORIDE 100 MG: 20 INJECTION INTRAVENOUS at 11:09

## 2023-09-14 RX ADMIN — DEXAMETHASONE SODIUM PHOSPHATE 4 MG: 4 INJECTION INTRA-ARTICULAR; INTRALESIONAL; INTRAMUSCULAR; INTRAVENOUS; SOFT TISSUE at 12:09

## 2023-09-14 RX ADMIN — PROPOFOL 100 MG: 10 INJECTION, EMULSION INTRAVENOUS at 11:09

## 2023-09-14 RX ADMIN — SODIUM CHLORIDE, SODIUM LACTATE, POTASSIUM CHLORIDE, AND CALCIUM CHLORIDE: 600; 310; 30; 20 INJECTION, SOLUTION INTRAVENOUS at 11:09

## 2023-09-14 RX ADMIN — CIPROFLOXACIN 400 MG: 2 INJECTION, SOLUTION INTRAVENOUS at 11:09

## 2023-09-14 NOTE — BRIEF OP NOTE
[facility]  Brief Operative Note    Surgery Date: 9/14/2023     Surgeon(s) and Role:     * David Perez MD - Primary    Assisting Surgeon: None    Pre-op Diagnosis:  Stress incontinence (female) (male) [N39.3]    Post-op Diagnosis:  Post-Op Diagnosis Codes:     * Stress incontinence (female) (male) [N39.3]    Procedure(s) (LRB):  CYSTOSCOPY, WITH PERIURETHRAL BULKING AGENT INJECTION (N/A)    Anesthesia: General    Operative Findings:   Status post injection of Macroplastique at 12, 5, and 7 o'clock    Estimated Blood Loss: * No values recorded between 9/14/2023 11:51 AM and 9/14/2023 12:13 PM *         Specimens:   Specimen (24h ago, onward)      None              Discharge Note    OUTCOME: Patient tolerated treatment/procedure well without complication and is now ready for discharge.    DISPOSITION: Home or Self Care    FINAL DIAGNOSIS:  Stress Urinary Incontinence    FOLLOWUP: In clinic    DISCHARGE INSTRUCTIONS:  No discharge procedures on file.

## 2023-09-14 NOTE — DISCHARGE INSTRUCTIONS
Post Cystoscopy Instructions  Do not strain to have a bowel movement  No strenuous exercise x 7 days  No driving while you are on narcotic pain medications or if your santos  catheter is in place    You can expect:  To pass stone fragments if you had a stone procedure  Have pain when you void from your stent if you have a stent in place  See blood in your urine if you have a stent in place    If you have a catheter, please return to the ER if your catheter stops draining or you are having abdominal pain.    Call the doctor if:  Temperature is greater than 101F  Persistent vomiting and inability to keep food down  Inability to void if you do not have a catheter    Bulk Amid Patient Post-Operative Instructions     Congratulations on starting your journey towards symptom relief! Below you will find basic post-operative instructions.     Activity and Lifestyle Instructions     After your procedure you may be allowed to return to normal non-strenuous activity. If you had sedation, you may be groggy and your physician may encourage you to rest.   You may resume intercourse.     If you are on your menstrual cycle, you may use tampons.     You can resume your normal exercise mprogram 24 hours following the procedure if blood has cleared from your urine. If you engage in highly strenuous exercise such as CrossFit, you may want to wait 48-72 hours.     You may feel discomfort or irritation to the bladder and urethra following the procedure. If this occurs, the discomfort should subside within 24 hours. Your physician may recommend a warm bath and/or an over-the-counter pain medication to reduce discomfort.      If you had sedation your physician may recommend the following:     Do not drive for 24 hours or longer if you feel groggy.     Do not drink alcoholic beverages for 24 hours.     Bladder     It is normal to have blood in your urine for 24 hours after the procedure.     Drink plenty of water to stay hydrated.     Do not  rush or strain to urinate; relax your bladder and give it time to empty.     Slow urinary stream for the first several days is not unusual.     If you are unable to urinate, contact your doctor.     Contact your physicians office if you experience trouble urinating, heavy bleeding, fever greater than 100.8 degrees, chills, confusion, disorientation, severe abdominal or any other unusual reaction.     Once again, congratulations on beginning your journey towards symptom relief!     In the following months, if you feel your symptom relief decrease inform your physician to explore further treatment options.

## 2023-09-14 NOTE — ANESTHESIA PREPROCEDURE EVALUATION
09/14/2023  Faith Brenard is a 58 y.o., female with FILIBERTO for Cysto/bulking agent injection.    Patient is on Mounjaro SC for weight loss therapy.  Has been on it for 4 weeks. Last SC dose 9/13/23.  She has early satiety with her therapy. No associated nausea or vomiting.     Had extensive discussion with patient regarding the usage of GLP-1 agonists and anesthesia. Given her symptoms, her surgical history, her last dosage and body habitus, she is at higher risk of an aspiration event during her procedure today.     Patient is amenable to undergoing her procedure under GETA with RSI.    Hx of anesthetics in past without complications  NPO>8 hours  Amenable to proceed with scheduled procedure      Pre-op Assessment    I have reviewed the Patient Summary Reports.    I have reviewed the Nursing Notes. I have reviewed the NPO Status.   I have reviewed the Medications.     Review of Systems  Anesthesia Hx:  No problems with previous Anesthesia  History of prior surgery of interest to airway management or planning: Denies Family Hx of Anesthesia complications.   Denies Personal Hx of Anesthesia complications.   Cardiovascular:   Hypertension    Pulmonary:   Sleep Apnea    Hepatic/GI:   GERD    Musculoskeletal:   Arthritis     Neurological:   Headaches    Endocrine:   Hypothyroidism  Morbid Obesity / BMI > 40  Psych:   Psychiatric History anxiety depression  Psychotic Disorder and Bipolar disorder.          Physical Exam  General: Well nourished, Cooperative, Oriented and Alert    Airway:  Mallampati: II / II  Mouth Opening: Small, but > 3cm  TM Distance: Normal  Neck ROM: Normal ROM    Dental:  Periodontal disease, Partial Dentures, Intact        Anesthesia Plan  Type of Anesthesia, risks & benefits discussed:    Anesthesia Type: Gen ETT  Intra-op Monitoring Plan: Standard ASA Monitors  Post Op Pain Control  Plan: multimodal analgesia and IV/PO Opioids PRN  Induction:  IV  Airway Plan: , Post-Induction  Informed Consent: Informed consent signed with the Patient and all parties understand the risks and agree with anesthesia plan.  All questions answered. Patient consented to blood products? No  ASA Score: 3    Ready For Surgery From Anesthesia Perspective.     .

## 2023-09-14 NOTE — INTERVAL H&P NOTE
The patient has been examined and the H&P has been reviewed:    Patient would like to proceed with transurethral bulking.

## 2023-09-14 NOTE — OP NOTE
[facility]  Operative Note    Date: 09/14/2023    Pre-Op Diagnosis: FILIBERTO, ISD    Post-Op Diagnosis: same    Procedure(s) Performed:   1.  Cystoscopy with transurethral injection of urethral bulking agent (Bulkamid)    Specimen(s): none    Staff Surgeon: Toya Kebede MD    Assistant Surgeon: Yoshi Rice MD    Anesthesia: General LMA anesthesia    Indications: Faith Bernard is a 58 y.o. female with stress urinary incontinence, despite prior MUS.      Findings: good coaptation of the urethra    Estimated Blood Loss: min    Drains: none    Procedure in Detail: After informed consent was obtained, the patient was taken to the cystoscopy suite and placed in the supine position. SCDs were applied and working. Anesthesia was administered. Once the patient was adequately sedated she was placed in dorsal lithotomy position and prepped and draped in the usual sterile fashion. Preoperative timeout was performed, and preoperative antibiotics were confirmed.    A 0 degree hysteroscope was inserted into the urethra and advanced into the urinary bladder. Formal cystoscopy revealed normal bladder mucosa. The ureteral orifices were in the normal anatomic position bilaterally, effluxing clear urine. The rigid needle was inserted into the scope and the scope was backed out into the urethra. Keeping the needle parallel to the urethra, the needle was inserted into the submucosa. Macroplastique was injected at the 5 and 7 o'clock regions transurethrally. This was repeated at the 12 o'clock position. Good coaptation of the urethra was seen after injection. The scope was removed from the bladder.    The patient tolerated the procedure well and was transferred to recovery in stable condition.       Plan:  The patient will void prior to discharge.  She will follow up with Dr. Perez.      Yoshi Rice MD

## 2023-09-14 NOTE — PLAN OF CARE
Pt states that she took Mounjaro yesterday. Anesthesia made aware! RN spoke to Pt's ride who states that she is downstairs and will be available at 12 for pickup.

## 2023-09-14 NOTE — TRANSFER OF CARE
Anesthesia Transfer of Care Note    Patient: Faith Bernard    Procedure(s) Performed: Procedure(s) (LRB):  CYSTOSCOPY, WITH PERIURETHRAL BULKING AGENT INJECTION (N/A)    Patient location: PACU    Anesthesia Type: general    Transport from OR: Transported from OR on room air with adequate spontaneous ventilation    Post pain: adequate analgesia    Post assessment: no apparent anesthetic complications and tolerated procedure well    Post vital signs: stable    Level of consciousness: sedated and responds to stimulation    Nausea/Vomiting: no nausea/vomiting    Complications: none    Transfer of care protocol was followed      Last vitals:   Visit Vitals  /75 (BP Location: Left arm, Patient Position: Lying)   Pulse 82   Temp 36.9 °C (98.4 °F) (Temporal)   Resp 17   LMP  (LMP Unknown)   SpO2 97%   Breastfeeding No

## 2023-09-14 NOTE — PLAN OF CARE
Discharge instructions given and explained to patient and family with verbalization of understanding all instructions. Prescription given and explained next time and doses of each medication. Patient voided prior to DC. Dr. Perez aware. Patients v/s stable, denies n/v and tolerating po, rates pain level tolerable, IV removed, and family at bedside for patient discharge home.

## 2023-09-15 ENCOUNTER — PATIENT MESSAGE (OUTPATIENT)
Dept: UROLOGY | Facility: CLINIC | Age: 58
End: 2023-09-15
Payer: COMMERCIAL

## 2023-09-15 NOTE — ANESTHESIA POSTPROCEDURE EVALUATION
Anesthesia Post Evaluation    Patient: Faith Bernard    Procedure(s) Performed: Procedure(s) (LRB):  CYSTOSCOPY, WITH PERIURETHRAL BULKING AGENT INJECTION (N/A)    Final Anesthesia Type: general      Patient location during evaluation: PACU  Patient participation: Yes- Able to Participate  Level of consciousness: awake and alert and oriented  Post-procedure vital signs: reviewed and stable  Pain management: adequate  Airway patency: patent    PONV status at discharge: No PONV  Anesthetic complications: no      Cardiovascular status: hemodynamically stable  Respiratory status: unassisted  Hydration status: euvolemic  Follow-up not needed.          Vitals Value Taken Time   /95 09/14/23 1320   Temp 36.7 °C (98 °F) 09/14/23 1219   Pulse 71 09/14/23 1319   Resp 14 09/14/23 1315   SpO2 100 % 09/14/23 1319   Vitals shown include unvalidated device data.      Event Time   Out of Recovery 12:50:00         Pain/Ricarda Score: Ricarda Score: 10 (9/14/2023 12:45 PM)

## 2023-09-18 ENCOUNTER — TELEPHONE (OUTPATIENT)
Dept: ENDOCRINOLOGY | Facility: CLINIC | Age: 58
End: 2023-09-18
Payer: COMMERCIAL

## 2023-09-18 DIAGNOSIS — R79.89 ELEVATED PARATHYROID HORMONE: ICD-10-CM

## 2023-09-18 DIAGNOSIS — E03.9 ACQUIRED HYPOTHYROIDISM: Primary | ICD-10-CM

## 2023-09-18 RX ORDER — LEVOTHYROXINE SODIUM 100 UG/1
TABLET ORAL
Qty: 90 TABLET | Refills: 3 | Status: SHIPPED | OUTPATIENT
Start: 2023-09-18 | End: 2023-11-14 | Stop reason: SDUPTHER

## 2023-09-18 NOTE — TELEPHONE ENCOUNTER
Called patient. Scheduled for labs, will  specimen container for 24hr urine test at her closest lab.    ----- Message from Rossy SHORT Rai, MD sent at 9/18/2023  7:40 AM CDT -----    Please schedule her 24 hour urine calcium and creatinine to be done now.    Please schedule her TSH and free T4 to be done in 8 weeks.

## 2023-09-18 NOTE — ASSESSMENT & PLAN NOTE
Evaluation for primary versus secondary hyperparathyroidism.  Patient has history of gastric bypass surgery.  Patient's corrected calcium has been normal.  Patient had a PTH on 10/19/2021 was 119 with a normal calcium of 9.3 and a vitamin D of 44.   Patient gives history of kidney stones.  No history of fragility fractures and a DXA scan in 2021 showed normal T-scores.  24 hour urine calcium not done.  Patient takes calcium supplements on and off.    Avoid calcium supplements/TUMS, take calcium from diet.  Continue vitamin D 56161 IU weekly.    Repeat labs and will get a 24 hour urine calcium further evaluation.  Repeat DXA scan to assess her bone mineral density.    Lab Results   Component Value Date    .3 (H) 09/11/2023    CALCIUM 9.3 09/11/2023    ALBUMIN 3.8 09/11/2023    PHOS 2.4 (L) 09/11/2023    [id number] 33 09/11/2023    CREATININE 0.7 03/10/2023

## 2023-09-18 NOTE — TELEPHONE ENCOUNTER
Called patient, advised to wait a couple of weeks before doing her 24 hr urine.        ----- Message from Rossy SHORT Rai, MD sent at 9/18/2023  4:30 PM CDT -----    I would recommend doing the test in a few weeks after she has recovered. She can call us to reschedule the lab. Thanks.     ----- Message -----  From: Berkley Cain MA  Sent: 9/18/2023  10:42 AM CDT  To: Rossy SHORT Rai, MD    Spoke to patient regarding her 24 hr urine test. Patient had bladder surgery a week ago and wanted to know if it's ok to do the urine test. Patient also wants a call back regarding her last lab results.

## 2023-10-03 ENCOUNTER — PATIENT MESSAGE (OUTPATIENT)
Dept: ORTHOPEDICS | Facility: CLINIC | Age: 58
End: 2023-10-03
Payer: COMMERCIAL

## 2023-10-09 ENCOUNTER — TELEPHONE (OUTPATIENT)
Dept: RHEUMATOLOGY | Facility: CLINIC | Age: 58
End: 2023-10-09
Payer: COMMERCIAL

## 2023-10-10 ENCOUNTER — OFFICE VISIT (OUTPATIENT)
Dept: RHEUMATOLOGY | Facility: CLINIC | Age: 58
End: 2023-10-10
Payer: COMMERCIAL

## 2023-10-10 ENCOUNTER — PATIENT MESSAGE (OUTPATIENT)
Dept: ORTHOPEDICS | Facility: CLINIC | Age: 58
End: 2023-10-10
Payer: COMMERCIAL

## 2023-10-10 VITALS
WEIGHT: 165.88 LBS | DIASTOLIC BLOOD PRESSURE: 76 MMHG | BODY MASS INDEX: 29.39 KG/M2 | HEIGHT: 63 IN | SYSTOLIC BLOOD PRESSURE: 107 MMHG

## 2023-10-10 DIAGNOSIS — M25.50 ARTHRALGIA, UNSPECIFIED JOINT: Primary | ICD-10-CM

## 2023-10-10 DIAGNOSIS — S82.872K CLOSED DISPLACED PILON FRACTURE OF LEFT TIBIA WITH NONUNION, SUBSEQUENT ENCOUNTER: ICD-10-CM

## 2023-10-10 DIAGNOSIS — M25.572 PAIN OF JOINT OF LEFT ANKLE AND FOOT: ICD-10-CM

## 2023-10-10 DIAGNOSIS — G89.4 CHRONIC PAIN SYNDROME: ICD-10-CM

## 2023-10-10 PROCEDURE — 99999 PR PBB SHADOW E&M-EST. PATIENT-LVL V: ICD-10-PCS | Mod: PBBFAC,,, | Performed by: STUDENT IN AN ORGANIZED HEALTH CARE EDUCATION/TRAINING PROGRAM

## 2023-10-10 PROCEDURE — 3008F PR BODY MASS INDEX (BMI) DOCUMENTED: ICD-10-PCS | Mod: CPTII,S$GLB,, | Performed by: STUDENT IN AN ORGANIZED HEALTH CARE EDUCATION/TRAINING PROGRAM

## 2023-10-10 PROCEDURE — 1159F PR MEDICATION LIST DOCUMENTED IN MEDICAL RECORD: ICD-10-PCS | Mod: CPTII,S$GLB,, | Performed by: STUDENT IN AN ORGANIZED HEALTH CARE EDUCATION/TRAINING PROGRAM

## 2023-10-10 PROCEDURE — 1159F MED LIST DOCD IN RCRD: CPT | Mod: CPTII,S$GLB,, | Performed by: STUDENT IN AN ORGANIZED HEALTH CARE EDUCATION/TRAINING PROGRAM

## 2023-10-10 PROCEDURE — 99204 OFFICE O/P NEW MOD 45 MIN: CPT | Mod: S$GLB,,, | Performed by: STUDENT IN AN ORGANIZED HEALTH CARE EDUCATION/TRAINING PROGRAM

## 2023-10-10 PROCEDURE — 99999 PR PBB SHADOW E&M-EST. PATIENT-LVL V: CPT | Mod: PBBFAC,,, | Performed by: STUDENT IN AN ORGANIZED HEALTH CARE EDUCATION/TRAINING PROGRAM

## 2023-10-10 PROCEDURE — 3074F SYST BP LT 130 MM HG: CPT | Mod: CPTII,S$GLB,, | Performed by: STUDENT IN AN ORGANIZED HEALTH CARE EDUCATION/TRAINING PROGRAM

## 2023-10-10 PROCEDURE — 3078F PR MOST RECENT DIASTOLIC BLOOD PRESSURE < 80 MM HG: ICD-10-PCS | Mod: CPTII,S$GLB,, | Performed by: STUDENT IN AN ORGANIZED HEALTH CARE EDUCATION/TRAINING PROGRAM

## 2023-10-10 PROCEDURE — 99204 PR OFFICE/OUTPT VISIT, NEW, LEVL IV, 45-59 MIN: ICD-10-PCS | Mod: S$GLB,,, | Performed by: STUDENT IN AN ORGANIZED HEALTH CARE EDUCATION/TRAINING PROGRAM

## 2023-10-10 PROCEDURE — 3078F DIAST BP <80 MM HG: CPT | Mod: CPTII,S$GLB,, | Performed by: STUDENT IN AN ORGANIZED HEALTH CARE EDUCATION/TRAINING PROGRAM

## 2023-10-10 PROCEDURE — 3008F BODY MASS INDEX DOCD: CPT | Mod: CPTII,S$GLB,, | Performed by: STUDENT IN AN ORGANIZED HEALTH CARE EDUCATION/TRAINING PROGRAM

## 2023-10-10 PROCEDURE — 3074F PR MOST RECENT SYSTOLIC BLOOD PRESSURE < 130 MM HG: ICD-10-PCS | Mod: CPTII,S$GLB,, | Performed by: STUDENT IN AN ORGANIZED HEALTH CARE EDUCATION/TRAINING PROGRAM

## 2023-10-10 RX ORDER — ALPRAZOLAM 1 MG/1
TABLET ORAL
COMMUNITY

## 2023-10-10 RX ORDER — OXYCODONE AND ACETAMINOPHEN 10; 325 MG/1; MG/1
TABLET ORAL
COMMUNITY

## 2023-10-10 RX ORDER — DULOXETIN HYDROCHLORIDE 30 MG/1
60 CAPSULE, DELAYED RELEASE ORAL DAILY
COMMUNITY
Start: 2023-10-04

## 2023-10-10 NOTE — PROGRESS NOTES
RHEUMATOLOGY OUTPATIENT CLINIC NOTE    10/10/2023    Attending Rheumatologist: Kalie William  Primary Care Provider: Rah Canela MD   Physician Requesting Consultation: Fer Mcrae MD  [address] [place] [address]  Chief Complaint/Reason For Consultation:  Joint Pain and Rheumatoid Arthritis (/)      Subjective:       HPI  Faith Bernard is a 58 y.o. White female with history of hypertension, hyperparathyroidism, history of upper GI bleed, anxiety, migraines who comes for evaluation of joint pain.    She has history of OA and was managed by rheumatologist many years ago.     In 2020 she had fracture in left distal tibia and ankle that required ORIF.     She has been noticing that lately she is having diffuse joint pain.  Denies any myalgias    She has chronic pain in her hands with deformities in the hips.  However she reports the form the last month she is having swelling and redness in all her PIP and MCPs.  Pain can wake him up in the middle of the night.  Having difficulty opening jars, holding her toothbrush.  Lidocaine cream helps.    She reports pain in elbows, no swelling.  Reports pain in bilateral shoulders, no difficulty lifting above her head.  Reports neck pain with limited range of motion, feels like a burning sensation.  It is not too severe    Reports low back pain for many years associated with sciatica in both size.  Sees pain management.  Has had ablation in the past.     Reports bilateral lateral hip pain, intermittent.  Difficulty getting up from the car.  Reports history of bilateral knee pain, noted occasional swelling.  Has been noticing pain in bilateral ankles and feet    She reports morning stiffness of about 5-10 minutes    For pain control she currently takes oxycodone as needed, pregabalin prescribed by pain management.  She also takes Cymbalta per her psychiatrist    She denies any dry eyes, dry mouth, skin rashes, photosensitivity,  oral ulcers, Raynaud's, abdominal pain.  She reports a history of mild psoriasis in her scalp    She tries to avoid NSAIDs due to history of upper GI bleeding secondary to ulcers.     She denies history of rheumatoid arthritis.  However reports history of psoriasis in her family    Chronic comorbid conditions affecting medical decision making today:  Past Medical History:   Diagnosis Date    Anxiety     Depression     Empyema of right pleural space 2021    Encounter for blood transfusion     Esophageal dysphagia     Fibromyalgia     Gastric bypass status for obesity     H/O dental abscess 04/14/2014    drained    Headache(784.0)     migraines.  Treated at [facility] (Dr. 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  Pt states took too many then realized her mistake    Polycystic ovaries     S/P [id number]     Thyroid disease     hypothyroidism    Wears partial dentures     upper     Past Surgical History:   Procedure Laterality Date    ABDOMINAL SURGERY      ANKLE HARDWARE REMOVAL Left 3/28/2022    Procedure: REMOVAL, HARDWARE, ANKLE;  Surgeon: Fer Mcrae MD;  Location: [address];  Service: Orthopedics;  Laterality: Left;    APPLICATION OF LARGE EXTERNAL FIXATION DEVICE TO TIBIA Left 9/7/2020    Procedure: APPLICATION, EXTERNAL FIXATION DEVICE, TIBIA;  Surgeon: Sujata Londono MD;  Location: [facility] OR [address] [place] [address];  Service: Orthopedics;  Laterality: Left;  need paralysis    APPLICATION OF WOUND VACUUM-ASSISTED CLOSURE DEVICE Left 9/18/2020    Procedure: APPLICATION, WOUND VAC;  Surgeon: Fer  RAHEL Mcrae MD;  Location: [facility] OR 2ND FLR;  Service: Orthopedics;  Laterality: Left;    BREAST SURGERY  2004    Breast Reduction    CARDIAC CATHETERIZATION      COLONOSCOPY N/A 11/3/2015    Procedure: COLONOSCOPY;  Surgeon: Timothy Barber MD;  Location: [facility];  Service: Endoscopy;  Laterality: N/A;    CYSTOSCOPY WITH INJECTION OF PERIURETHRAL BULKING AGENT N/A 9/14/2023    Procedure: CYSTOSCOPY, WITH PERIURETHRAL BULKING AGENT INJECTION;  Surgeon: David Perez MD;  Location: [facility] OR;  Service: Urology;  Laterality: N/A;    CYSTOSCOPY WITH URODYNAMIC TESTING N/A 4/17/2023    Procedure: CYSTOSCOPY, WITH URODYNAMIC TESTING;  Surgeon: David Perez MD;  Location: [facility] 1ST FLR;  Service: Urology;  Laterality: N/A;  1 hr    DENTAL SURGERY      4 teeth removed    GASTRIC BYPASS      HERNIA REPAIR      HYSTERECTOMY      ILIAC CREST BONE GRAFT Left 3/28/2022    Procedure: BONE GRAFT, ILIAC CREST;  Surgeon: Fer Mcrae MD;  [address];  Service: Orthopedics;  Laterality: Left;    INJECTION OF ANESTHETIC AGENT AROUND NERVE N/A 3/29/2022    Procedure: Block, Nerve;  Surgeon: Alexus Surgeon;  Location: [facility];  Service: Anesthesiology;  Laterality: N/A;    OPEN REDUCTION AND INTERNAL FIXATION (ORIF) OF PILON FRACTURE Left 9/18/2020    Procedure: ORIF, FRACTURE, PILON - left. Diving board, supine, bone foam. [place] anterolateral distal tibial plate, mini frag, long 3.5mm steel screw, CaPhos bone substitute;  Surgeon: Fer Mcrae MD;  Location: [address];  Service: Orthopedics;  Laterality: Left;    OPEN REDUCTION AND INTERNAL FIXATION (ORIF) OF PILON FRACTURE Left 3/28/2022    Procedure: ORIF, FRACTURE, PILON nonunion + Iliac crest bone graft - diving board, supine, bone foam. Seda axsos3 screws, 2.7 mini plates/screws, curettes, [place]/[employer] medical Augment;  Surgeon: Fer Mcrae MD;  [address];  Service: Orthopedics;   Laterality: Left;  Spinal? + postop catheter (draping out pelvis for bone graft harvest)    OVARIAN CYST REMOVAL      aprox 5 times    REMOVAL OF EXTERNAL FIXATION DEVICE Left 9/18/2020    Procedure: REMOVAL, EXTERNAL FIXATION DEVICE;  Surgeon: Fer Mcrae MD;  Location: [facility] OR [address] [place] [address];  Service: Orthopedics;  Laterality: Left;    ROTATOR CUFF REPAIR Left 01/2019    SURGICAL PROCEDURE FOR STRESS INCONTINENCE USING TENSION FREE VAGINAL TAPE N/A 12/14/2022    Procedure: SURGICAL PROCEDURE, USING TENSION FREE VAGINAL TAPE, FOR STRESS INCONTINENCE;  Surgeon: Frandy Sherman MD;  Location: [facility] OR;  Service: OB/GYN;  Laterality: N/A;  cysto    tennis elbow repair Left 01/2019    UPPER GASTROINTESTINAL ENDOSCOPY       Family History   Problem Relation Age of Onset    Anxiety disorder Mother     Depression Mother     Suicide Mother     Seizures Mother     Drug abuse Mother     Ovarian cancer Mother     Aortic aneurysm Father     Colon cancer Neg Hx     Breast cancer Neg Hx     Diabetes Neg Hx     Hypertension Neg Hx      Social History     Substance and Sexual Activity   Alcohol Use Yes    Comment: rarely     Social History     Tobacco Use   Smoking Status Never   Smokeless Tobacco Never   Tobacco Comments    marijuana     Social History     Substance and Sexual Activity   Drug Use Yes    Types: Amphetamines, Barbituates, Marijuana       Current Outpatient Medications:     acetaminophen (TYLENOL) 325 MG tablet, Take 325 mg by mouth every 6 (six) hours as needed for Pain., Disp: , Rfl:     albuterol (PROVENTIL/VENTOLIN HFA) 90 mcg/actuation inhaler, 2 puffs every 4 hours as needed for cough, wheeze, or shortness of breath, Disp: 18 g, Rfl: 11    ARIPiprazole (ABILIFY) 15 MG Tab, Take 7.5 mg by mouth once daily., Disp: , Rfl:     cholecalciferol, vitamin D3, 50,000 unit capsule, Take 50,000 Units by mouth every 7 days. Patient takes on Saturdays, Disp: , Rfl: 99    CLARAVIS 30 mg capsule, Take 30 mg by mouth 2  (two) times daily., Disp: , Rfl:     clobetasol 0.05% (TEMOVATE) 0.05 % Oint, Apply topically every evening. Use small, pea-sized amount at the site, every night before bed for 2 weeks. After that, use only 2-3 times per week at night before bed., Disp: 30 g, Rfl: 1    CYANOCOBALAMIN, VITAMIN B-12, (VITAMIN B-12 INJ), Inject 1 mL as directed every 30 days. , Disp: , Rfl:     dextroamphetamine-amphetamine 30 mg Tab, Take 1 tablet by mouth 2 (two) times daily., Disp: , Rfl:     erenumab-aooe (AIMOVIG) 140 mg/mL autoinjector, Inject 140 mg into the skin every 28 days., Disp: , Rfl:     ergocalciferol (ERGOCALCIFEROL) 50,000 unit Cap, Take 50,000 Units by mouth every 7 days., Disp: , Rfl:     fluticasone-umeclidin-vilanter (TRELEGY ELLIPTA) 200-62.5-25 mcg inhaler, Inhale 1 puff into the lungs once daily., Disp: 60 each, Rfl: 11    levothyroxine (SYNTHROID) 100 MCG tablet, Take 1 tablet daily for 6 days and take half tablet on Sunday., Disp: 90 tablet, Rfl: 3    LIDOcaine (LMX) 4 % cream, Apply topically as needed., Disp: , Rfl:     metoprolol tartrate (LOPRESSOR) 50 MG tablet, Take 50 mg by mouth 2 (two) times daily., Disp: , Rfl:     onabotulinumtoxina (BOTOX) 200 unit SolR, as directed, Disp: , Rfl:     pravastatin (PRAVACHOL) 20 MG tablet, Take by mouth once daily., Disp: , Rfl:     predniSONE (DELTASONE) 20 MG tablet, Take one pill per day for three days. Can repeat as needed for shortness of breath, wheezing, cough., Disp: 12 tablet, Rfl: 0    pregabalin (LYRICA) 200 MG Cap, Take 200 mg by mouth 2 (two) times daily., Disp: , Rfl:     ALPRAZolam (XANAX) 1 MG tablet, , Disp: , Rfl:     DULoxetine (CYMBALTA) 30 MG capsule, Take 30 mg by mouth once daily., Disp: , Rfl:     oxyCODONE-acetaminophen (PERCOCET)  mg per tablet, 5 (five) times daily. Take 1 tab 5x a day as needed, Disp: , Rfl:   No current facility-administered medications for this visit.    Facility-Administered Medications Ordered in Other Visits:      midazolam (VERSED) 1 mg/mL injection 0.5 mg, 0.5 mg, Intravenous, PRN, Myrtle Linares MD, 2 mg at 09/18/20 1155     Objective:         Vitals:    10/10/23 1419   BP: 107/76     Physical Exam   Constitutional: She is oriented to person, place, and time. She appears well-developed.   HENT:   Head: Normocephalic.   Mouth/Throat: Mucous membranes are moist.   Salivary pool adequate  Oral aperture is normal   Cardiovascular: Normal rate and normal heart sounds.   Pulmonary/Chest: Effort normal and breath sounds normal.   Abdominal: Soft.   Musculoskeletal:      Cervical back: Neck supple.      Comments: [place] nodes in multiple DIPs.  PIPs and MCPs without tenderness or synovitis.  Bilateral wrists with no synovitis.  Left elbow with mild tenderness, no swelling.  Bilateral shoulder with normal range of motion.  Bilateral knees with no swelling, no crepitus. Left ankle with mild swelling on the lateral malleolus.  No synovitis or tenderness on feet.     Lymphadenopathy:     She has no cervical adenopathy.   Neurological: She is alert and oriented to person, place, and time.   Skin: No rash noted.     No skin rashes noted.  Normal capillaroscopy   Vitals reviewed.      Reviewed old and all outside pertinent medical records available.    All lab results personally reviewed and interpreted by me.  Lab Results   Component Value Date    WBC 17.30 (H) 03/29/2022    HGB 11.1 (L) 03/29/2022    HCT 37.0 03/29/2022    MCV 86 03/29/2022    MCH 25.9 (L) 03/29/2022    MCHC 30.0 (L) 03/29/2022    RDW 17.7 (H) 03/29/2022     03/29/2022    MPV 10.3 03/29/2022    NEUTROPCT 51.1 01/03/2013    MONOPCT 7.3 01/03/2013    PLTEST Normal 06/14/2006       Lab Results   Component Value Date     03/10/2023    K 4.1 03/10/2023     (H) 03/10/2023    CO2 20 (L) 03/10/2023    GLU 98 03/10/2023    BUN 11 03/10/2023    CALCIUM 9.3 09/11/2023    PROT 6.7 03/10/2023    ALBUMIN 3.8 09/11/2023    BILITOT 0.2 03/10/2023    AST  "16 03/10/2023    ALKPHOS 79 03/10/2023    ALT 23 03/10/2023    GFRNONAA 93 08/16/2021       Lab Results   Component Value Date    COLORU Yellow 08/03/2021    APPEARANCEUA Clear 08/03/2021    SPECGRAV >1.030 (A) 08/03/2021    PHUR 6.0 08/03/2021    PROTEINUA Negative 08/03/2021    GLUCOSEU NEG 01/01/2013    KETONESU Negative 08/03/2021    BLOODU NEG 01/01/2013    LEUKOCYTESUR Negative 08/03/2021    NITRITE Negative 08/03/2021    UROBILINOGEN Negative 08/03/2021       Lab Results   Component Value Date    CRP 2.2 08/04/2022       Lab Results   Component Value Date    SEDRATE 24 08/04/2022       No components found for: "25OHVITDTOT", "13TOGYYX0", "32ZQCSMW2", "METHODNOTE"    No results found for: "URICACID"    Lab Results   Component Value Date    HEPBSAG Negative 10/04/2015    HEPCAB Negative 10/04/2015       Imaging:  All imaging reviewed and independently interpreted by me.         ASSESSMENT / PLAN:     Faith Bernard is a 58 y.o. White female with history of hypertension, hyperparathyroidism, history of upper GI bleed, anxiety, migraines who comes for evaluation of joint pain.    # polyarthralgia  -Patient has been having chronic joint pain for many years.  However most recently she started having pain and swelling in her hands along with worsening pain in her ankles, knees.  It is reasonable to check rheumatoid arthritis serologies and markers of inflammation.  -I discussed with patient that I do not think that there is 1 diagnosis that will explain all her symptoms.  She most likely has osteoarthritis in multiple sites and that can contribute to her pain.  -we will obtain x-rays of the hands and the knees  -we will keep in consideration history of mild psoriasis.    #Chronic pain  -She follows pain management.  Takes oxycodone as needed and Lyrica twice a day.  -she also takes Cymbalta from her psychiatrist.    # hyperparathyroidism  -follows with endocrinology.    Follow up in about 4 weeks (around " 11/7/2023).    Method of contact with patient concerns: [facility]    Disclaimer:  This note is prepared using voice recognition software and as such is likely to have errors and has not been proof read. Please contact me for questions.     Time spent: 45 minutes in face to face discussion concerning diagnosis, prognosis, review of lab and test results, benefits of treatment as well as management of disease, counseling of patient and coordination of care between various health care providers.  Greater than half the time spent was used for coordination of care and counseling of patient.    Kalie William M.D.  Rheumatology  [facility]

## 2023-10-12 ENCOUNTER — PATIENT MESSAGE (OUTPATIENT)
Dept: UROLOGY | Facility: CLINIC | Age: 58
End: 2023-10-12
Payer: COMMERCIAL

## 2023-10-13 ENCOUNTER — PATIENT MESSAGE (OUTPATIENT)
Dept: ORTHOPEDICS | Facility: CLINIC | Age: 58
End: 2023-10-13
Payer: COMMERCIAL

## 2023-10-13 ENCOUNTER — OFFICE VISIT (OUTPATIENT)
Dept: UROLOGY | Facility: CLINIC | Age: 58
End: 2023-10-13
Payer: COMMERCIAL

## 2023-10-13 VITALS
BODY MASS INDEX: 29.23 KG/M2 | DIASTOLIC BLOOD PRESSURE: 86 MMHG | HEART RATE: 97 BPM | SYSTOLIC BLOOD PRESSURE: 130 MMHG | WEIGHT: 165 LBS

## 2023-10-13 DIAGNOSIS — M25.572 PAIN OF JOINT OF LEFT ANKLE AND FOOT: Primary | ICD-10-CM

## 2023-10-13 DIAGNOSIS — N39.3 STRESS INCONTINENCE (FEMALE) (MALE): Primary | ICD-10-CM

## 2023-10-13 DIAGNOSIS — S82.872K CLOSED DISPLACED PILON FRACTURE OF LEFT TIBIA WITH NONUNION, SUBSEQUENT ENCOUNTER: ICD-10-CM

## 2023-10-13 DIAGNOSIS — G89.4 CHRONIC PAIN SYNDROME: ICD-10-CM

## 2023-10-13 PROCEDURE — 3008F PR BODY MASS INDEX (BMI) DOCUMENTED: ICD-10-PCS | Mod: CPTII,S$GLB,, | Performed by: UROLOGY

## 2023-10-13 PROCEDURE — 3008F BODY MASS INDEX DOCD: CPT | Mod: CPTII,S$GLB,, | Performed by: UROLOGY

## 2023-10-13 PROCEDURE — 99999 PR PBB SHADOW E&M-EST. PATIENT-LVL IV: CPT | Mod: PBBFAC,,, | Performed by: UROLOGY

## 2023-10-13 PROCEDURE — 1159F MED LIST DOCD IN RCRD: CPT | Mod: CPTII,S$GLB,, | Performed by: UROLOGY

## 2023-10-13 PROCEDURE — 3075F PR MOST RECENT SYSTOLIC BLOOD PRESS GE 130-139MM HG: ICD-10-PCS | Mod: CPTII,S$GLB,, | Performed by: UROLOGY

## 2023-10-13 PROCEDURE — 3079F PR MOST RECENT DIASTOLIC BLOOD PRESSURE 80-89 MM HG: ICD-10-PCS | Mod: CPTII,S$GLB,, | Performed by: UROLOGY

## 2023-10-13 PROCEDURE — 99214 OFFICE O/P EST MOD 30 MIN: CPT | Mod: S$GLB,,, | Performed by: UROLOGY

## 2023-10-13 PROCEDURE — 3079F DIAST BP 80-89 MM HG: CPT | Mod: CPTII,S$GLB,, | Performed by: UROLOGY

## 2023-10-13 PROCEDURE — 99999 PR PBB SHADOW E&M-EST. PATIENT-LVL IV: ICD-10-PCS | Mod: PBBFAC,,, | Performed by: UROLOGY

## 2023-10-13 PROCEDURE — 99214 PR OFFICE/OUTPT VISIT, EST, LEVL IV, 30-39 MIN: ICD-10-PCS | Mod: S$GLB,,, | Performed by: UROLOGY

## 2023-10-13 PROCEDURE — 3075F SYST BP GE 130 - 139MM HG: CPT | Mod: CPTII,S$GLB,, | Performed by: UROLOGY

## 2023-10-13 PROCEDURE — 1159F PR MEDICATION LIST DOCUMENTED IN MEDICAL RECORD: ICD-10-PCS | Mod: CPTII,S$GLB,, | Performed by: UROLOGY

## 2023-10-13 RX ORDER — FLUCONAZOLE 150 MG/1
150 TABLET ORAL EVERY OTHER DAY
Qty: 1 TABLET | Refills: 2 | Status: SHIPPED | OUTPATIENT
Start: 2023-10-13 | End: 2023-10-15

## 2023-10-13 NOTE — PROGRESS NOTES
[facility]        Urology Note     10/13/2023     Referred by:  No ref. provider found     HPI: Faith Bernard is a very pleasant 58 y.o. female who had transurethral bulking last month. She initially had voiding difficulty, though this has largely resolved and PVR today is 0 mL. She also has resolution of her FILIBERTO.     2 weeks ago, she developed dysuria and was treated for a lower urinary tract infection. Those symptoms have resolved but she developed a vaginal yeast infection as a result.       Previous treatments for her stress incontinence have included:   pelvic floor exercises guided by PFPT  Transurethral bulking - Doing well with no FILIBERTO right now     Urodynamic and cystoscopy evaluations are detailed in previous notes. In short, she had valsalva voiding on that study but reports that is not her typical voiding patterns. Futhermore, on independent flow rate remote from the study, she had a normal pattern of flow with normal flow rate not indicative of significant voiding dysfunction or obstruction.      A review of 10+ systems was conducted with pertinent positive and negative findings documented in HPI with all other systems reviewed and negative.     Past medical, family, surgical and social history reviewed as documented in chart with pertinent positive medical, family, surgical and social history detailed in HPI.     Exam Findings:     Vaginal Mucosa: normal  Anterior: none  Apical: no apical descent  Posterior: none  FILIBERTO: mild FILIBERTO  Urethral Mobility: no hypermobility  Urethral Lesions: no lesions or masses Const: no acute distress, conversant and alert  Eyes: anicteric, extraocular muscles intact  ENMT: normocephalic, Nl oral membranes  Cardio: no cyanosis, nl cap refill  Pulm: no tachypnea; no resp distress  Abd: soft, no tenderness  Musc: no laceration, no tenderness  Neuro: alert; oriented x 3  Skin: warm, dry; no petichiae  Psych: no anxiety; normal speech          Assessment/Plan:     Stress Urinary Incontinence (established,not meeting goals): Much better from FILIBERTO. Initially had voiding difficulty leading to sUTI. Those symptoms have all resolved but she is left with a vaginal yeast infection.     Diflucan    Return in 3 months.

## 2023-10-27 ENCOUNTER — PATIENT MESSAGE (OUTPATIENT)
Dept: UROLOGY | Facility: CLINIC | Age: 58
End: 2023-10-27
Payer: COMMERCIAL

## 2023-10-27 RX ORDER — FLUCONAZOLE 150 MG/1
150 TABLET ORAL EVERY OTHER DAY
Qty: 2 TABLET | Refills: 2 | Status: SHIPPED | OUTPATIENT
Start: 2023-10-27 | End: 2023-10-30

## 2023-10-31 ENCOUNTER — PATIENT MESSAGE (OUTPATIENT)
Dept: RHEUMATOLOGY | Facility: CLINIC | Age: 58
End: 2023-10-31
Payer: COMMERCIAL

## 2023-11-07 ENCOUNTER — OFFICE VISIT (OUTPATIENT)
Dept: UROLOGY | Facility: CLINIC | Age: 58
End: 2023-11-07
Payer: COMMERCIAL

## 2023-11-07 VITALS — HEART RATE: 87 BPM | SYSTOLIC BLOOD PRESSURE: 113 MMHG | DIASTOLIC BLOOD PRESSURE: 83 MMHG

## 2023-11-07 DIAGNOSIS — R30.0 DYSURIA: Primary | ICD-10-CM

## 2023-11-07 PROCEDURE — 87798 DETECT AGENT NOS DNA AMP: CPT | Performed by: UROLOGY

## 2023-11-07 PROCEDURE — 99999 PR PBB SHADOW E&M-EST. PATIENT-LVL IV: ICD-10-PCS | Mod: PBBFAC,,, | Performed by: UROLOGY

## 2023-11-07 PROCEDURE — 3079F PR MOST RECENT DIASTOLIC BLOOD PRESSURE 80-89 MM HG: ICD-10-PCS | Mod: CPTII,S$GLB,, | Performed by: UROLOGY

## 2023-11-07 PROCEDURE — 3074F PR MOST RECENT SYSTOLIC BLOOD PRESSURE < 130 MM HG: ICD-10-PCS | Mod: CPTII,S$GLB,, | Performed by: UROLOGY

## 2023-11-07 PROCEDURE — 87086 URINE CULTURE/COLONY COUNT: CPT | Performed by: UROLOGY

## 2023-11-07 PROCEDURE — 3079F DIAST BP 80-89 MM HG: CPT | Mod: CPTII,S$GLB,, | Performed by: UROLOGY

## 2023-11-07 PROCEDURE — 87798 DETECT AGENT NOS DNA AMP: CPT | Mod: 59 | Performed by: UROLOGY

## 2023-11-07 PROCEDURE — 1159F PR MEDICATION LIST DOCUMENTED IN MEDICAL RECORD: ICD-10-PCS | Mod: CPTII,S$GLB,, | Performed by: UROLOGY

## 2023-11-07 PROCEDURE — 3074F SYST BP LT 130 MM HG: CPT | Mod: CPTII,S$GLB,, | Performed by: UROLOGY

## 2023-11-07 PROCEDURE — 99214 OFFICE O/P EST MOD 30 MIN: CPT | Mod: S$GLB,,, | Performed by: UROLOGY

## 2023-11-07 PROCEDURE — 99214 PR OFFICE/OUTPT VISIT, EST, LEVL IV, 30-39 MIN: ICD-10-PCS | Mod: S$GLB,,, | Performed by: UROLOGY

## 2023-11-07 PROCEDURE — 99999 PR PBB SHADOW E&M-EST. PATIENT-LVL IV: CPT | Mod: PBBFAC,,, | Performed by: UROLOGY

## 2023-11-07 PROCEDURE — 1159F MED LIST DOCD IN RCRD: CPT | Mod: CPTII,S$GLB,, | Performed by: UROLOGY

## 2023-11-07 NOTE — PROGRESS NOTES
[facility]        Urology Note     11/7/2023     Referred by:  No ref. provider found     HPI: Faith Bernard is a very pleasant 58 y.o. female who had transurethral bulking last month. She initially had voiding difficulty, though this has largely resolved and PVR today is 0 mL. She also has resolution of her FILIBERTO.      2 weeks ago, she developed dysuria and was treated for a lower urinary tract infection. Those symptoms have resolved but she developed a vaginal yeast infection as a result.       Previous treatments for her stress incontinence have included:   pelvic floor exercises guided by PFPT  Transurethral bulking - Doing well with no FILIBERTO right now     Reports recent episode of dysuria.     Urodynamic and cystoscopy evaluations are detailed in previous notes. In short, she had valsalva voiding on that study but reports that is not her typical voiding patterns. Futhermore, on independent flow rate remote from the study, she had a normal pattern of flow with normal flow rate not indicative of significant voiding dysfunction or obstruction.      A review of 10+ systems was conducted with pertinent positive and negative findings documented in HPI with all other systems reviewed and negative.     Past medical, family, surgical and social history reviewed as documented in chart with pertinent positive medical, family, surgical and social history detailed in HPI.     Exam Findings:     Vaginal Mucosa: normal  Anterior: none  Apical: no apical descent  Posterior: none  FILIBERTO: mild FILIBERTO  Urethral Mobility: no hypermobility  Urethral Lesions: no lesions or masses Const: no acute distress, conversant and alert  Eyes: anicteric, extraocular muscles intact  ENMT: normocephalic, Nl oral membranes  Cardio: no cyanosis, nl cap refill  Pulm: no tachypnea; no resp distress  Abd: soft, no tenderness  Musc: no laceration, no tenderness  Neuro: alert; oriented x 3  Skin: warm, dry; no petichiae  Psych: no anxiety;  normal speech         Assessment/Plan:     Stress Urinary Incontinence (established,not meeting goals): Much better from FILIBERTO.     Dysuria: Urine for culture sent as well as atypical organisms.

## 2023-11-08 ENCOUNTER — OFFICE VISIT (OUTPATIENT)
Dept: RHEUMATOLOGY | Facility: CLINIC | Age: 58
End: 2023-11-08
Payer: COMMERCIAL

## 2023-11-08 ENCOUNTER — TELEPHONE (OUTPATIENT)
Dept: OPHTHALMOLOGY | Facility: CLINIC | Age: 58
End: 2023-11-08
Payer: COMMERCIAL

## 2023-11-08 VITALS
HEIGHT: 63 IN | BODY MASS INDEX: 29.63 KG/M2 | HEART RATE: 61 BPM | DIASTOLIC BLOOD PRESSURE: 82 MMHG | SYSTOLIC BLOOD PRESSURE: 112 MMHG | WEIGHT: 167.25 LBS

## 2023-11-08 DIAGNOSIS — M15.4 EROSIVE OSTEOARTHRITIS OF HANDS, BILATERAL: Primary | ICD-10-CM

## 2023-11-08 DIAGNOSIS — D84.821 DRUG-INDUCED IMMUNODEFICIENCY: ICD-10-CM

## 2023-11-08 DIAGNOSIS — Z79.899 DRUG-INDUCED IMMUNODEFICIENCY: ICD-10-CM

## 2023-11-08 PROCEDURE — 3074F PR MOST RECENT SYSTOLIC BLOOD PRESSURE < 130 MM HG: ICD-10-PCS | Mod: CPTII,S$GLB,, | Performed by: STUDENT IN AN ORGANIZED HEALTH CARE EDUCATION/TRAINING PROGRAM

## 2023-11-08 PROCEDURE — 1159F MED LIST DOCD IN RCRD: CPT | Mod: CPTII,S$GLB,, | Performed by: STUDENT IN AN ORGANIZED HEALTH CARE EDUCATION/TRAINING PROGRAM

## 2023-11-08 PROCEDURE — 3008F PR BODY MASS INDEX (BMI) DOCUMENTED: ICD-10-PCS | Mod: CPTII,S$GLB,, | Performed by: STUDENT IN AN ORGANIZED HEALTH CARE EDUCATION/TRAINING PROGRAM

## 2023-11-08 PROCEDURE — 3008F BODY MASS INDEX DOCD: CPT | Mod: CPTII,S$GLB,, | Performed by: STUDENT IN AN ORGANIZED HEALTH CARE EDUCATION/TRAINING PROGRAM

## 2023-11-08 PROCEDURE — 99214 OFFICE O/P EST MOD 30 MIN: CPT | Mod: S$GLB,,, | Performed by: STUDENT IN AN ORGANIZED HEALTH CARE EDUCATION/TRAINING PROGRAM

## 2023-11-08 PROCEDURE — 3079F DIAST BP 80-89 MM HG: CPT | Mod: CPTII,S$GLB,, | Performed by: STUDENT IN AN ORGANIZED HEALTH CARE EDUCATION/TRAINING PROGRAM

## 2023-11-08 PROCEDURE — 3074F SYST BP LT 130 MM HG: CPT | Mod: CPTII,S$GLB,, | Performed by: STUDENT IN AN ORGANIZED HEALTH CARE EDUCATION/TRAINING PROGRAM

## 2023-11-08 PROCEDURE — 1159F PR MEDICATION LIST DOCUMENTED IN MEDICAL RECORD: ICD-10-PCS | Mod: CPTII,S$GLB,, | Performed by: STUDENT IN AN ORGANIZED HEALTH CARE EDUCATION/TRAINING PROGRAM

## 2023-11-08 PROCEDURE — 99999 PR PBB SHADOW E&M-EST. PATIENT-LVL V: ICD-10-PCS | Mod: PBBFAC,,, | Performed by: STUDENT IN AN ORGANIZED HEALTH CARE EDUCATION/TRAINING PROGRAM

## 2023-11-08 PROCEDURE — 99214 PR OFFICE/OUTPT VISIT, EST, LEVL IV, 30-39 MIN: ICD-10-PCS | Mod: S$GLB,,, | Performed by: STUDENT IN AN ORGANIZED HEALTH CARE EDUCATION/TRAINING PROGRAM

## 2023-11-08 PROCEDURE — 99999 PR PBB SHADOW E&M-EST. PATIENT-LVL V: CPT | Mod: PBBFAC,,, | Performed by: STUDENT IN AN ORGANIZED HEALTH CARE EDUCATION/TRAINING PROGRAM

## 2023-11-08 PROCEDURE — 3079F PR MOST RECENT DIASTOLIC BLOOD PRESSURE 80-89 MM HG: ICD-10-PCS | Mod: CPTII,S$GLB,, | Performed by: STUDENT IN AN ORGANIZED HEALTH CARE EDUCATION/TRAINING PROGRAM

## 2023-11-08 RX ORDER — CYANOCOBALAMIN 1000 UG/ML
INJECTION, SOLUTION INTRAMUSCULAR; SUBCUTANEOUS
COMMUNITY
Start: 2023-11-01

## 2023-11-08 RX ORDER — GABAPENTIN 600 MG/1
1200 TABLET ORAL 3 TIMES DAILY
COMMUNITY
Start: 2023-10-30

## 2023-11-08 RX ORDER — DICLOFENAC SODIUM 10 MG/G
2 GEL TOPICAL 4 TIMES DAILY
Qty: 100 G | Refills: 5 | Status: SHIPPED | OUTPATIENT
Start: 2023-11-08

## 2023-11-08 RX ORDER — RIMEGEPANT SULFATE 75 MG/75MG
TABLET, ORALLY DISINTEGRATING ORAL
COMMUNITY
Start: 2023-10-24 | End: 2024-03-08 | Stop reason: SDUPTHER

## 2023-11-08 RX ORDER — DEXTROAMPHETAMINE SACCHARATE, AMPHETAMINE ASPARTATE MONOHYDRATE, DEXTROAMPHETAMINE SULFATE AND AMPHETAMINE SULFATE 7.5; 7.5; 7.5; 7.5 MG/1; MG/1; MG/1; MG/1
CAPSULE, EXTENDED RELEASE ORAL EVERY MORNING
COMMUNITY
Start: 2023-10-24

## 2023-11-08 RX ORDER — HYDROXYCHLOROQUINE SULFATE 200 MG/1
TABLET, FILM COATED ORAL
Qty: 52 TABLET | Refills: 3 | Status: SHIPPED | OUTPATIENT
Start: 2023-11-08 | End: 2024-03-21 | Stop reason: SDUPTHER

## 2023-11-08 RX ORDER — PREDNISONE 5 MG/1
5 TABLET ORAL DAILY
Qty: 30 TABLET | Refills: 0 | Status: SHIPPED | OUTPATIENT
Start: 2023-11-08 | End: 2023-12-08

## 2023-11-08 RX ORDER — LIDOCAINE 50 MG/G
OINTMENT TOPICAL 2 TIMES DAILY
COMMUNITY
Start: 2023-11-03

## 2023-11-08 RX ORDER — TOPIRAMATE 200 MG/1
400 TABLET ORAL 2 TIMES DAILY
COMMUNITY
Start: 2023-11-07 | End: 2024-03-08 | Stop reason: SDUPTHER

## 2023-11-08 RX ORDER — TIZANIDINE 4 MG/1
4 TABLET ORAL 2 TIMES DAILY
COMMUNITY
Start: 2023-11-07

## 2023-11-08 RX ORDER — ISOTRETINOIN 40 MG/1
40 CAPSULE ORAL 2 TIMES DAILY
COMMUNITY
Start: 2023-10-29

## 2023-11-08 NOTE — PROGRESS NOTES
RHEUMATOLOGY OUTPATIENT CLINIC NOTE    11/8/2023    Attending Rheumatologist: Kalie William  Primary Care Provider: Rah Canela MD   Physician Requesting Consultation: No referring provider defined for this encounter.  Chief Complaint/Reason For Consultation:  Follow-up (Joint pain 4wk follow up appointment/)      Subjective:       HPI  Faith Bernard is a 58 y.o. White female with history of hypertension, hyperparathyroidism, history of upper GI bleed, anxiety, migraines who comes for follow up on joint pain      Interim history  -Initial consult of 10/10.   -She had been doing ok. Last week was a rough week with pain in elbows, knees and hands. She felt very tired, could not stay awake, felt as if she was having a fever.   -She is been having burning sensation with urination. Saw urology yesterday and ordered culture for atypical microorganism.   -Labs ordered after last visit showed negative RF, CCP and normal ESR and CRP.   -XR of the knees showed DJD. XR of the hands showed significant osteoarthritis in DIPs, with erosions ? Erosive OA.     Initial consult HPI    She has history of OA and was managed by rheumatologist many years ago.     In 2020 she had fracture in left distal tibia and ankle that required ORIF.     She has been noticing that lately she is having diffuse joint pain.  Denies any myalgias    She has chronic pain in her hands with deformities in the hips.  However she reports the form the last month she is having swelling and redness in all her PIP and MCPs.  Pain can wake him up in the middle of the night.  Having difficulty opening jars, holding her toothbrush.  Lidocaine cream helps.    She reports pain in elbows, no swelling.  Reports pain in bilateral shoulders, no difficulty lifting above her head.  Reports neck pain with limited range of motion, feels like a burning sensation.  It is not too severe    Reports low back pain for many years associated with sciatica in  both size.  Sees pain management.  Has had ablation in the past.     Reports bilateral lateral hip pain, intermittent.  Difficulty getting up from the car.  Reports history of bilateral knee pain, noted occasional swelling.  Has been noticing pain in bilateral ankles and feet    She reports morning stiffness of about 5-10 minutes    For pain control she currently takes oxycodone as needed, pregabalin prescribed by pain management.  She also takes Cymbalta per her psychiatrist    She denies any dry eyes, dry mouth, skin rashes, photosensitivity, oral ulcers, Raynaud's, abdominal pain.  She reports a history of mild psoriasis in her scalp    She tries to avoid NSAIDs due to history of upper GI bleeding secondary to ulcers.     She denies family history of rheumatoid arthritis.  However reports history of psoriasis in her family    Labs  10/10/2023  RF, CCP negative  ESR and CRP negative    2015  MARY JO negative    Imaging    XR bilateral knee: OA bilaterally   XR bilateral hands: joint space narrowing in bilateral 2-3 DIP with erosions  XR left foot with hardware failure    Chronic comorbid conditions affecting medical decision making today:  Past Medical History:   Diagnosis Date    Allergy     Anxiety     Depression     Empyema of right pleural space 2021    Encounter for blood transfusion     Esophageal dysphagia     Fibromyalgia     Gastric bypass status for obesity     H/O dental abscess 04/14/2014    drained    Headache(784.0)     migraines.  Treated at [facility] (Dr. 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  Pt states took too many then realized her mistake    Polycystic ovaries     S/P [id number]     Thyroid disease     hypothyroidism    Wears partial dentures     upper     Past Surgical History:   Procedure Laterality Date    ABDOMINAL SURGERY      ANKLE HARDWARE REMOVAL Left 3/28/2022    Procedure: REMOVAL, HARDWARE, ANKLE;  Surgeon: Fer Mcrae MD;  Location: [facility] OR [address] [place] [address];  Service: Orthopedics;  Laterality: Left;    APPLICATION OF LARGE EXTERNAL FIXATION DEVICE TO TIBIA Left 9/7/2020    Procedure: APPLICATION, EXTERNAL FIXATION DEVICE, TIBIA;  Surgeon: Sujata Londono MD;  [facility];  Service: Orthopedics;  Laterality: Left;  need paralysis    APPLICATION OF WOUND VACUUM-ASSISTED CLOSURE DEVICE Left 9/18/2020    Procedure: APPLICATION, WOUND VAC;  Surgeon: Fer Mcrae MD;  Location: [address];  Service: Orthopedics;  Laterality: Left;    BREAST SURGERY  2004    Breast Reduction    CARDIAC CATHETERIZATION      COLONOSCOPY N/A 11/3/2015    Procedure: COLONOSCOPY;  Surgeon: Timothy Barber MD;  Location: [facility];  Service: Endoscopy;  Laterality: N/A;    CYSTOSCOPY WITH INJECTION OF PERIURETHRAL BULKING AGENT N/A 9/14/2023    Procedure: CYSTOSCOPY, WITH PERIURETHRAL BULKING AGENT INJECTION;  Surgeon: David Perez MD;  Location: [facility];  Service: Urology;  Laterality: N/A;    CYSTOSCOPY WITH URODYNAMIC TESTING N/A 4/17/2023    Procedure: CYSTOSCOPY, WITH URODYNAMIC TESTING;  Surgeon: David Perez MD;  Location: [address];  Service: Urology;  Laterality: N/A;  1 hr    DENTAL SURGERY      4 teeth removed    GASTRIC BYPASS      HERNIA REPAIR      HYSTERECTOMY      ILIAC CREST BONE GRAFT Left 3/28/2022    Procedure: BONE GRAFT, ILIAC CREST;  Surgeon: Fer Mcrae MD;  Location: [facility] OR [address] [place] [address];  Service: Orthopedics;  Laterality: Left;    INJECTION OF ANESTHETIC AGENT AROUND NERVE N/A 3/29/2022    Procedure: Block, Nerve;   Surgeon: Alexus Surgeon;  Location: [facility];  Service: Anesthesiology;  Laterality: N/A;    OPEN REDUCTION AND INTERNAL FIXATION (ORIF) OF PILON FRACTURE Left 9/18/2020    Procedure: ORIF, FRACTURE, PILON - left. Diving board, supine, bone foam. Seda anterolateral distal tibial plate, mini frag, long 3.5mm steel screw, CaPhos bone substitute;  Surgeon: Fer Mcrae MD;  Location: [address];  Service: Orthopedics;  Laterality: Left;    OPEN REDUCTION AND INTERNAL FIXATION (ORIF) OF PILON FRACTURE Left 3/28/2022    Procedure: ORIF, FRACTURE, PILON nonunion + Iliac crest bone graft - diving board, supine, bone foam. [place] axsos3 screws, 2.7 mini plates/screws, curettes, Seda/Lomas medical Augment;  Surgeon: Fer Mcrae MD;  Location: [address];  Service: Orthopedics;  Laterality: Left;  Spinal? + postop catheter (draping out pelvis for bone graft harvest)    OVARIAN CYST REMOVAL      aprox 5 times    REMOVAL OF EXTERNAL FIXATION DEVICE Left 9/18/2020    Procedure: REMOVAL, EXTERNAL FIXATION DEVICE;  Surgeon: Fer Mcrae MD;  Location: [address];  Service: Orthopedics;  Laterality: Left;    ROTATOR CUFF REPAIR Left 01/2019    SURGICAL PROCEDURE FOR STRESS INCONTINENCE USING TENSION FREE VAGINAL TAPE N/A 12/14/2022    Procedure: SURGICAL PROCEDURE, USING TENSION FREE VAGINAL TAPE, FOR STRESS INCONTINENCE;  Surgeon: Frandy Sherman MD;  Location: [facility];  Service: OB/GYN;  Laterality: N/A;  cysto    tennis elbow repair Left 01/2019    UPPER GASTROINTESTINAL ENDOSCOPY       Family History   Problem Relation Age of Onset    Anxiety disorder Mother     Depression Mother     Suicide Mother     Seizures Mother     Drug abuse Mother     Ovarian cancer Mother     Aortic aneurysm Father     Colon cancer Neg Hx     Breast cancer Neg Hx     Diabetes Neg Hx     Hypertension Neg Hx      Social History     Substance and Sexual Activity   Alcohol Use Yes    Comment:  rarely     Social History     Tobacco Use   Smoking Status Never   Smokeless Tobacco Never   Tobacco Comments    marijuana     Social History     Substance and Sexual Activity   Drug Use Yes    Types: Amphetamines, Barbituates, Marijuana       Current Outpatient Medications:     acetaminophen (TYLENOL) 325 MG tablet, Take 325 mg by mouth every 6 (six) hours as needed for Pain., Disp: , Rfl:     albuterol (PROVENTIL/VENTOLIN HFA) 90 mcg/actuation inhaler, 2 puffs every 4 hours as needed for cough, wheeze, or shortness of breath, Disp: 18 g, Rfl: 11    ALPRAZolam (XANAX) 1 MG tablet, , Disp: , Rfl:     ARIPiprazole (ABILIFY) 15 MG Tab, Take 7.5 mg by mouth once daily., Disp: , Rfl:     cholecalciferol, vitamin D3, 50,000 unit capsule, Take 50,000 Units by mouth every 7 days. Patient takes on Saturdays, Disp: , Rfl: 99    CLARAVIS 30 mg capsule, Take 30 mg by mouth 2 (two) times daily., Disp: , Rfl:     clobetasol 0.05% (TEMOVATE) 0.05 % Oint, Apply topically every evening. Use small, pea-sized amount at the site, every night before bed for 2 weeks. After that, use only 2-3 times per week at night before bed., Disp: 30 g, Rfl: 1    cyanocobalamin 1,000 mcg/mL injection, INJECT 1 ML IN THE MUSCLE EVERY 28 DAYS AS DIRECTED, Disp: , Rfl:     CYANOCOBALAMIN, VITAMIN B-12, (VITAMIN B-12 INJ), Inject 1 mL as directed every 30 days. , Disp: , Rfl:     dextroamphetamine-amphetamine (ADDERALL XR) 30 MG 24 hr capsule, Take by mouth every morning., Disp: , Rfl:     DULoxetine (CYMBALTA) 30 MG capsule, Take 30 mg by mouth once daily., Disp: , Rfl:     erenumab-aooe (AIMOVIG) 140 mg/mL autoinjector, Inject 140 mg into the skin every 28 days., Disp: , Rfl:     ergocalciferol (ERGOCALCIFEROL) 50,000 unit Cap, Take 50,000 Units by mouth every 7 days., Disp: , Rfl:     fluticasone-umeclidin-vilanter (TRELEGY ELLIPTA) 200-62.5-25 mcg inhaler, Inhale 1 puff into the lungs once daily., Disp: 60 each, Rfl: 11    gabapentin (NEURONTIN) 600  MG tablet, Take 1,200 mg by mouth 3 (three) times daily., Disp: , Rfl:     ISOtretinoin (ACCUTANE) 40 MG capsule, Take 40 mg by mouth 2 (two) times daily., Disp: , Rfl:     levothyroxine (SYNTHROID) 100 MCG tablet, Take 1 tablet daily for 6 days and take half tablet on Sunday., Disp: 90 tablet, Rfl: 3    LIDOcaine (XYLOCAINE) 5 % Oint ointment, Apply topically 2 (two) times daily., Disp: , Rfl:     metoprolol tartrate (LOPRESSOR) 50 MG tablet, Take 50 mg by mouth 2 (two) times daily., Disp: , Rfl:     NURTEC 75 mg odt, , Disp: , Rfl:     onabotulinumtoxina (BOTOX) 200 unit SolR, as directed, Disp: , Rfl:     oxyCODONE-acetaminophen (PERCOCET)  mg per tablet, 5 (five) times daily. Take 1 tab 5x a day as needed, Disp: , Rfl:     pravastatin (PRAVACHOL) 20 MG tablet, Take by mouth once daily., Disp: , Rfl:     tiZANidine (ZANAFLEX) 4 MG tablet, Take 4 mg by mouth 2 (two) times daily., Disp: , Rfl:     topiramate (TOPAMAX) 200 MG Tab, Take 400 mg by mouth 2 (two) times daily., Disp: , Rfl:     diclofenac sodium (VOLTAREN) 1 % Gel, Apply 2 g topically 4 (four) times daily. For hands, Disp: 100 g, Rfl: 5    hydroxychloroquine (PLAQUENIL) 200 mg tablet, Take 2 tablets a day every day except Sundays, Disp: 52 tablet, Rfl: 3    predniSONE (DELTASONE) 5 MG tablet, Take 1 tablet (5 mg total) by mouth once daily., Disp: 30 tablet, Rfl: 0  No current facility-administered medications for this visit.    Facility-Administered Medications Ordered in Other Visits:     midazolam (VERSED) 1 mg/mL injection 0.5 mg, 0.5 mg, Intravenous, PRN, Myrtle Linares MD, 2 mg at 09/18/20 1155     Objective:         Vitals:    11/08/23 1022   BP: 112/82   Pulse: 61     Physical Exam   Constitutional: She is oriented to person, place, and time. She appears well-developed.   HENT:   Head: Normocephalic.   Mouth/Throat: Mucous membranes are moist.   Salivary pool adequate  Oral aperture is normal   Cardiovascular: Normal rate and normal  heart sounds.   Pulmonary/Chest: Effort normal and breath sounds normal.   Abdominal: Soft.   Musculoskeletal:      Cervical back: Neck supple.      Comments: [place] nodes in multiple DIPs that are mildly erythematous and tender  PIPs and MCPs without tenderness or synovitis.  Bilateral wrists with no synovitis.  Left elbow with mild tenderness, no swelling.  Bilateral shoulder with normal range of motion.  Bilateral knees with no swelling, no crepitus. Left ankle with mild swelling on the lateral malleolus.  No synovitis or tenderness on feet.     Lymphadenopathy:     She has no cervical adenopathy.   Neurological: She is alert and oriented to person, place, and time.   Skin: No rash noted.     No skin rashes noted.  Normal capillaroscopy   Vitals reviewed.      Reviewed old and all outside pertinent medical records available.    All lab results personally reviewed and interpreted by me.  Lab Results   Component Value Date    WBC 17.30 (H) 03/29/2022    HGB 11.1 (L) 03/29/2022    HCT 37.0 03/29/2022    MCV 86 03/29/2022    MCH 25.9 (L) 03/29/2022    MCHC 30.0 (L) 03/29/2022    RDW 17.7 (H) 03/29/2022     03/29/2022    MPV 10.3 03/29/2022    NEUTROPCT 51.1 01/03/2013    MONOPCT 7.3 01/03/2013    PLTEST Normal 06/14/2006       Lab Results   Component Value Date     03/10/2023    K 4.1 03/10/2023     (H) 03/10/2023    CO2 20 (L) 03/10/2023    GLU 98 03/10/2023    BUN 11 03/10/2023    CALCIUM 9.3 09/11/2023    PROT 6.7 03/10/2023    ALBUMIN 3.8 09/11/2023    BILITOT 0.2 03/10/2023    AST 16 03/10/2023    ALKPHOS 79 03/10/2023    ALT 23 03/10/2023    GFRNONAA 93 08/16/2021       Lab Results   Component Value Date    COLORU Yellow 08/03/2021    APPEARANCEUA Clear 08/03/2021    SPECGRAV >1.030 (A) 08/03/2021    PHUR 6.0 08/03/2021    PROTEINUA Negative 08/03/2021    GLUCOSEU NEG 01/01/2013    KETONESU Negative 08/03/2021    BLOODU NEG 01/01/2013    LEUKOCYTESUR Negative 08/03/2021    NITRITE Negative  "08/03/2021    UROBILINOGEN Negative 08/03/2021       Lab Results   Component Value Date    CRP 4.9 10/10/2023       Lab Results   Component Value Date    RF <13.0 10/10/2023    SEDRATE 17 10/10/2023    CCPANTIBODIE <0.5 10/10/2023       No components found for: "25OHVITDTOT", "88YAFBYL6", "92NWRIQS2", "METHODNOTE"    No results found for: "URICACID"    Lab Results   Component Value Date    HEPBSAG Negative 10/04/2015    HEPCAB Negative 10/04/2015       Imaging:  All imaging reviewed and independently interpreted by me.    XR bilateral knee: OA bilaterally   XR bilateral hands: joint space narrowing in bilateral 2-3 DIP with erosions  XR left foot with hardware failure     ASSESSMENT / PLAN:     Faith Bernard is a 58 y.o. White female with history of hypertension, hyperparathyroidism, history of upper GI bleed, anxiety, migraines who comes for evaluation of joint pain.    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 She cannot do oral NSAIDs due to prior PUD.   -Discussed that treatment options are limited. We could do trial of  mg BID and assess response in 3-6 months. Discussed SE of retinopathy, upset stomach. She has a remote history of torsade de pointes however most recent EKG in 12/2022 had normal Qtc.   She voiced understanding and agrees with starting medication   -Will start  mg BID daily except on Sundays (below max dose per weight)  -Handout of HCQ given to patient  -labs in 3 months     # polyarthralgia  -Besides hand pain she likely has OA in other sites contributing to her symptoms.   -we will keep in consideration history of mild psoriasis.    #Chronic pain  -She follows pain management.  Takes oxycodone as needed and gabapentin.   -she also takes Cymbalta from her psychiatrist.    # hyperparathyroidism  -follows with " endocrinology.    Follow up in about 3 months (around 2/8/2024).    Method of contact with patient concerns: [facility]    Disclaimer:  This note is prepared using voice recognition software and as such is likely to have errors and has not been proof read. Please contact me for questions.     Time spent: 30 minutes in face to face discussion concerning diagnosis, prognosis, review of lab and test results, benefits of treatment as well as management of disease, counseling of patient and coordination of care between various health care providers.  Greater than half the time spent was used for coordination of care and counseling of patient.    Kalie William M.D.  Rheumatology  [facility]

## 2023-11-08 NOTE — PATIENT INSTRUCTIONS
-Start hydroxychloroquine - 2 tablets per day except on Sundays.   Start taking it twice daily and if tolerated fine, can take both tablets together    -Can use prednisone 5 mg as needed for flares.     -referral to ophthalmology for plaquenil monitoring

## 2023-11-08 NOTE — TELEPHONE ENCOUNTER
Spoke to pt and scheduled HVF and visit with Dr Burkett       ----- Message from Domenica Armendariz sent at 11/8/2023 11:23 AM CST -----  Good morning,    The patient have a referral from rheumatology with a diagnosis of Plaquenil. Please assist with scheduling the patient.    Thank You

## 2023-11-09 ENCOUNTER — PATIENT MESSAGE (OUTPATIENT)
Dept: UROLOGY | Facility: CLINIC | Age: 58
End: 2023-11-09
Payer: COMMERCIAL

## 2023-11-09 LAB
BACTERIA UR CULT: NORMAL
BACTERIA UR CULT: NORMAL

## 2023-11-09 RX ORDER — NITROFURANTOIN 25; 75 MG/1; MG/1
100 CAPSULE ORAL 2 TIMES DAILY
Qty: 10 CAPSULE | Refills: 0 | Status: SHIPPED | OUTPATIENT
Start: 2023-11-09 | End: 2023-11-14

## 2023-11-11 LAB
M HOMINIS DNA SPEC QL NAA+PROBE: NEGATIVE
SPECIMEN SOURCE: ABNORMAL
SPECIMEN SOURCE: NORMAL
U PARVUM DNA SPEC QL NAA+PROBE: POSITIVE
U UREALYTICUM DNA SPEC QL NAA+PROBE: NEGATIVE

## 2023-11-13 ENCOUNTER — LAB VISIT (OUTPATIENT)
Dept: LAB | Facility: HOSPITAL | Age: 58
End: 2023-11-13
Attending: INTERNAL MEDICINE
Payer: COMMERCIAL

## 2023-11-13 DIAGNOSIS — E03.9 ACQUIRED HYPOTHYROIDISM: ICD-10-CM

## 2023-11-13 LAB
T4 FREE SERPL-MCNC: 0.85 NG/DL (ref 0.71–1.51)
TSH SERPL DL<=0.005 MIU/L-ACNC: 0.26 UIU/ML (ref 0.4–4)

## 2023-11-13 PROCEDURE — 36415 COLL VENOUS BLD VENIPUNCTURE: CPT | Mod: PO | Performed by: INTERNAL MEDICINE

## 2023-11-13 PROCEDURE — 84439 ASSAY OF FREE THYROXINE: CPT | Performed by: INTERNAL MEDICINE

## 2023-11-13 PROCEDURE — 84443 ASSAY THYROID STIM HORMONE: CPT | Performed by: INTERNAL MEDICINE

## 2023-11-14 DIAGNOSIS — E03.9 ACQUIRED HYPOTHYROIDISM: ICD-10-CM

## 2023-11-14 RX ORDER — LEVOTHYROXINE SODIUM 100 UG/1
TABLET ORAL
Qty: 90 TABLET | Refills: 3 | Status: SHIPPED | OUTPATIENT
Start: 2023-11-14

## 2023-11-18 ENCOUNTER — PATIENT MESSAGE (OUTPATIENT)
Dept: PULMONOLOGY | Facility: CLINIC | Age: 58
End: 2023-11-18
Payer: COMMERCIAL

## 2023-11-24 ENCOUNTER — PATIENT MESSAGE (OUTPATIENT)
Dept: UROLOGY | Facility: CLINIC | Age: 58
End: 2023-11-24
Payer: COMMERCIAL

## 2023-11-24 RX ORDER — DOXYCYCLINE 100 MG/1
100 CAPSULE ORAL 2 TIMES DAILY
Qty: 10 CAPSULE | Refills: 0 | Status: SHIPPED | OUTPATIENT
Start: 2023-11-24 | End: 2023-11-29

## 2023-12-08 ENCOUNTER — TELEPHONE (OUTPATIENT)
Dept: OPHTHALMOLOGY | Facility: CLINIC | Age: 58
End: 2023-12-08
Payer: COMMERCIAL

## 2023-12-08 NOTE — TELEPHONE ENCOUNTER
----- Message from Lilly Alvarez sent at 12/8/2023  7:36 AM CST -----  Type: Needs Medical Advice  Who Called:  pt  Best Call Back Number: [phone]  Additional Information: pt is calling the office to reschedule her appt today to later today or next week as her mother is currently on a ventilator and they are going to be taking her off and she may not make it. She is asking to speak with the nurse asap today. Please call back to advise Thanks!

## 2023-12-13 ENCOUNTER — TELEPHONE (OUTPATIENT)
Dept: NEUROLOGY | Facility: CLINIC | Age: 58
End: 2023-12-13
Payer: COMMERCIAL

## 2023-12-13 NOTE — TELEPHONE ENCOUNTER
----- Message from Roxana Payne sent at 12/13/2023  1:34 PM CST -----  Contact: self  Type: Sooner Appointment Request        Caller is requesting a sooner appointment. Caller declined first available appointment listed below. Caller will not accept being placed on the waitlist and is requesting a message be sent to doctor.        Name of Caller: Patient   Best Call Back Number: [phone] (plz call pt before making appt)  Additional Information: Pt need Botox injections for bad headaches she is having plz call to get scheduled at any locations is available in [place] or [place]. Thanks

## 2024-01-09 ENCOUNTER — TELEPHONE (OUTPATIENT)
Dept: RHEUMATOLOGY | Facility: CLINIC | Age: 59
End: 2024-01-09
Payer: COMMERCIAL

## 2024-01-09 NOTE — TELEPHONE ENCOUNTER
Call to cancel/reschedule appt on 0212/24 @ 1030am due to provider being out of the office. Pt's  answered and states he will relay the message for his wife to call us back in regards to rescheduling the follow up appt.

## 2024-01-22 ENCOUNTER — CLINICAL SUPPORT (OUTPATIENT)
Dept: REHABILITATION | Facility: HOSPITAL | Age: 59
End: 2024-01-22
Attending: ORTHOPAEDIC SURGERY
Payer: COMMERCIAL

## 2024-01-22 DIAGNOSIS — S82.872K CLOSED DISPLACED PILON FRACTURE OF LEFT TIBIA WITH NONUNION, SUBSEQUENT ENCOUNTER: ICD-10-CM

## 2024-01-22 DIAGNOSIS — G89.4 CHRONIC PAIN SYNDROME: ICD-10-CM

## 2024-01-22 DIAGNOSIS — Z74.09 IMPAIRED FUNCTIONAL MOBILITY, BALANCE, GAIT, AND ENDURANCE: ICD-10-CM

## 2024-01-22 DIAGNOSIS — M25.572 PAIN OF JOINT OF LEFT ANKLE AND FOOT: ICD-10-CM

## 2024-01-22 DIAGNOSIS — M25.672 DECREASED RANGE OF MOTION OF LEFT ANKLE: Primary | ICD-10-CM

## 2024-01-22 PROBLEM — R29.898 ANKLE WEAKNESS: Status: RESOLVED | Noted: 2022-05-18 | Resolved: 2024-01-22

## 2024-01-22 PROBLEM — R26.9 GAIT ABNORMALITY: Status: RESOLVED | Noted: 2020-12-28 | Resolved: 2024-01-22

## 2024-01-22 PROCEDURE — 97161 PT EVAL LOW COMPLEX 20 MIN: CPT | Mod: PO

## 2024-01-22 PROCEDURE — 97110 THERAPEUTIC EXERCISES: CPT | Mod: PO

## 2024-01-22 NOTE — PROGRESS NOTES
[facility]  Physical Therapy Initial Evaluation    [facility]  [facility] Number: [id number]    Therapy Diagnosis:   Encounter Diagnoses   Name Primary?    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, subsequent encounter   Evaluation Date: 1/22/2024  Authorization Period Expiration: 10/12/2024  Plan of Care Expiration: 4/1/2024 or 12v post eval  Visit # (episodes) / Visits authorized: 1/ eval   (POC 0 / 12)   2nd FOTO visit 5  3rd FOTO visit 10  Progress Note Due: 2/22/2024      Time In: 530  Time Out: 614  Total Billable Time: 44 minutes    Precautions: Anxiety; Depression; Fibromyalgia; OA; RA  Insurance: Payor: [employer] / Plan: [place] PPO / Product Type: PPO /     Subjective   Date of onset: 2020  History of current condition - Faith reports: Long history of surgeries on her left ankle/foot post fall in 2020. She wants medical massage and dry needle to mange pain. We discussed this will be part of her POC along with exercises and gait training. Pt reports Nerve pain is so severe it drives her insane. Needs somebody that can rub pain out. 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 Pt tearful during subjective; feels she has not been heard about what works for her. Reports her left foot now has Uncontrolled movement.  Reports she Was just dx with OA and RA.     Date of Procedure  Events from Surgeon encounter:   fall from height 09/06/2020 sustaining a closed left pilon fracture.    09/07/2020 - ex fix by 1 of my partners.  09/18/2020 - ORIF left pilon fracture, removal of ex fix   Nonunion, broken hardware, ongoing pain  03/28/2022 - repair nonunion left distal tibia with iliac crest bone graft         Medical History:   Past Medical History:   Diagnosis Date    Allergy     Anxiety     Depression     Empyema of right pleural space 2021    Encounter for blood transfusion     Esophageal dysphagia     Fibromyalgia     Gastric bypass status for obesity     H/O dental abscess 04/14/2014    drained    Headache(784.0)     migraines.  Treated at [facility] (Dr. 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  Pt states took too many then realized her mistake    Polycystic ovaries     S/P JUHI-BSO     Thyroid disease     hypothyroidism    Wears partial dentures     upper       Surgical History:   Faith SIMS Betopaul  has a past surgical history that includes Gastric bypass; Hysterectomy; Abdominal surgery; Cardiac catheterization; Breast surgery (2004); Hernia repair; Ovarian cyst removal; Dental surgery; Colonoscopy (N/A, 11/3/2015); Upper gastrointestinal endoscopy; tennis elbow repair (Left, 01/2019); Rotator cuff repair (Left, 01/2019); Application of large external fixation device to tibia (Left, 9/7/2020); Open reduction and internal fixation (ORIF) of pilon fracture (Left, 9/18/2020); Removal of external fixation device (Left, 9/18/2020); Application of wound vacuum-assisted closure device  (Left, 9/18/2020); Injection of anesthetic agent around nerve (N/A, 3/29/2022); Open reduction and internal fixation (ORIF) of pilon fracture (Left, 3/28/2022); Ankle hardware removal (Left, 3/28/2022); Iliac crest bone graft (Left, 3/28/2022); Surgical procedure for stress incontinence using tension free vaginal tape (N/A, 12/14/2022); Cystoscopy with urodynamic testing (N/A, 4/17/2023); and Cystoscopy with injection of periurethral bulking agent (N/A, 9/14/2023).    Medications:   Faith has a current medication list which includes the following prescription(s): acetaminophen, albuterol, alprazolam, aripiprazole, cholecalciferol (vitamin d3), claravis, clobetasol 0.05%, cyanocobalamin, cyanocobalamin (vitamin b-12), dextroamphetamine-amphetamine, diclofenac sodium, duloxetine, erenumab-aooe, ergocalciferol, trelegy ellipta, gabapentin, hydroxychloroquine, isotretinoin, levothyroxine, lidocaine, metoprolol tartrate, nurtec, botox, oxycodone-acetaminophen, oxycodone-acetaminophen, pravastatin, tizanidine, topiramate, and [DISCONTINUED] aspirin, and the following Facility-Administered Medications: midazolam.    Allergies:   Review of patient's allergies indicates:   Allergen Reactions    Cephalexin Anaphylaxis    Nsaids (non-steroidal anti-inflammatory drug) Other (See Comments)     Has a history of bleeding ulcers    Codeine Itching        Imaging, see in EPIC    Prior Therapy:   Social History: lives with ; 1-story  Current Smoker: marijuana occasional  Cancer Hx: no  Occupation: disabled  Prior Level of Function: independent; has walker and cane if needed  Current Level of Function: difficult/pain with bathing-sit on stool; stand / walk    Pain:  Current 6/10, worst 8/10, best 3/10   Location: anterior joint line and tibia  Description: ache; dull; snap; shoot; n/t  Aggravating Factors: bathing-sit on stool; stand / walk  Easing Factors: medication; massage, stretching; exercises; lidocaine; coconut  oil    Pts goals: to make the pain go away    Objective     Posture Observation: Arches / GV: high arches; supination  Palpation: NT  Sensation: NT  DTRs: NT    Range of Motion/Strength:      Ankle ROM: Left Right   Ankle Dorsiflexion (20 norm) 5°  17°   Ankle Plantarflexion   (50 norm) 55° 60°     Ankle Inversion (35 norm) 10° 30°   Ankle Eversion (25 norm) 30° 30°       Ankle MMT: 5/5 norm Left Right   Ankle Dorsiflexion 3-/5 5/5   Ankle Plantarflexion 2/5 3+/5   Ankle Inversion 3-/5 5/5   Ankle Eversion 3+/5 5/5   Knee MMT: NT  Hip MMT: NT    Girth Measurements Left Right   Figure 8 49 cm 48 cm    :       Evaluation   Single Limb Stance R LE NT seconds  (<10 sec = HIGH FALL RISK)   Single Limb Stance L LE NT seconds  (<10 sec = HIGH FALL RISK)       Gait Without AD   Analysis Wide stance; Bilateral trendelenburg           TREATMENT   Treatment Time In: 600  Treatment Time Out: 614  Total Treatment time separate from Evaluation: 14 minutes    Faith received therapeutic exercises to develop strength, ROM, and flexibility for 14 minutes including:  NMRE utilized to activate appropriate mm to improve balance, proprioception, stability and gait skills: 0 minutes     MD NOTE: prior L pilon fx ORIF + revision ORIF, chronic L ankle pain. Medical massage and Dry needling to assist w/ chronic pain management     POC:  Luciano CEBALLOS 1xwk Dry Needle  Monica SCHULTE 1xwk Manual tx    SEATED: with footstool   Ankle circles, 20x ea direction  Ankle alphabet, 1 set  Ankle AROM: DF/PF; INV/EV (wipers), 30x ea  Seated HR/TR, 30x ea      Add NEXT visit:  Gastroc strap stretch, 3x30s  PF stretch with hands, 3x30s (anterior tib stretch progression)  Arch lifts, 20x5s  Toe ext rolls, 5-4-3-2-1, 20x  Add Lime band to AROM  DF Slideboard, 10x10s  Wobble board, A-P, M-L, Circles, 20x ea  Pre-Gait train: 1 min each (airex optional)  1. M-L weight shift   2. Stagger: Affected fleg in front: Heel contact -> Flat foot with KE (mid stance)  3. Stagger:  Affected leg in back: Roll through -> push off   Manual tx; edema massage; scar mob  Dry needling by Luciano CEBALLOS  FUTURE visit:  Test innominates/LLD  Squats  Stand HR / Toe Raises  Soleus Heel Raises   INV/EV 1/2 foam  FSU / FSD  Eccentric heel raises  Toe taps->March  SLB  Single Heel Raises  Braces      Home Exercises and Patient Education Provided    Education provided:   - daily HEP    Written Home Exercises Provided: yes.  Exercises were reviewed and Faith was able to demonstrate them prior to the end of the session.  Faith demonstrated good  understanding of the education provided.     See EMR under Patient Instructions for exercises provided 1/22/2024.    Assessment   Faith is a 58 y.o. female referred to outpatient Physical Therapy with a medical diagnosis of chronic pain s/p Left pilon fx and revision. Pt presents with chronic pain in anterior ankle joint line into anterior tibialis, decreased left ankle strength and Range of Motion, gait and balance deficits and impaired function    Pt prognosis is Fair.   Pt will benefit from skilled outpatient Physical Therapy to address the deficits stated above and in the chart below, provide pt/family education, and to maximize pt's level of independence.     Plan of care discussed with patient: Yes  Pt's spiritual, cultural and educational needs considered and patient is agreeable to the plan of care and goals as stated below:     Anticipated Barriers for therapy: history of condition      Medical Necessity is demonstrated by the following  History  Co-morbidities and personal factors that may impact the plan of care [] LOW: no personal factors / co-morbidities  [x] MODERATE: 1-2 personal factors / co-morbidities  [] HIGH: 3+ personal factors / co-morbidities    Moderate / High Support Documentation:   Co-morbidities affecting plan of care: Anxiety; Depression; Fibromyalgia; OA; RA    Personal Factors:   coping style     Examination  Body Structures and Functions,  activity limitations and participation restrictions that may impact the plan of care [] LOW: addressing 1-2 elements  [x] MODERATE: 3+ elements  [] HIGH: 4+ elements (please support below)    Moderate / High Support Documentation: Range of Motion, strength, gait     Clinical Presentation [x] LOW: stable  [] MODERATE: Evolving  [] HIGH: Unstable     Decision Making/ Complexity Score: low          Goals:  Short Term Goals: 3 weeks   1. Pt will demonstrate compliance with HEP.  2. Pt will decrease swelling of left ankle by 1 cm to reduce pain performing daily life functions.  3. Pt will increase DF ROM by 10 degrees to improve gait.      Long Term Goals: 6 weeks   1. Pt will demonstrate independence with HEP.  2. Pt will increase left ankle strength by 1/2 grade or more in order to increase WB tolerance.  3. Pt will decrease pain levels to 3/10 while walking for a half hour.  4. Pt will improve FOTO function score to </= 52% to show improvement in condition and functional mobility.     Plan   Plan of care Certification: 1/22/2024 to 4/1/2024.    Outpatient Physical Therapy 2 times weekly for 6 weeks to include the following interventions: Electrical Stimulation ,, Gait Training, Manual Therapy, Moist Heat/ Ice, Neuromuscular Re-ed, Orthotic Management and Training, Patient Education, Self Care, Therapeutic Activities, Therapeutic Exercise, and Ultrasound.     Monica Palmer, PT

## 2024-01-23 PROBLEM — M25.672 DECREASED RANGE OF MOTION OF LEFT ANKLE: Status: ACTIVE | Noted: 2024-01-23

## 2024-01-23 PROBLEM — Z74.09 IMPAIRED FUNCTIONAL MOBILITY, BALANCE, GAIT, AND ENDURANCE: Status: ACTIVE | Noted: 2024-01-23

## 2024-01-23 NOTE — PLAN OF CARE
[facility]  Physical Therapy Initial Evaluation    [facility]  [id number]    Therapy Diagnosis:   Encounter Diagnoses   Name Primary?    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, subsequent encounter   Evaluation Date: 1/22/2024  Authorization Period Expiration: 10/12/2024  Plan of Care Expiration: 4/1/2024 or 12v post eval  Visit # (episodes) / Visits authorized: 1/ eval   (POC 0 / 12)   2nd FOTO visit 5  3rd FOTO visit 10  Progress Note Due: 2/22/2024      Time In: 530  Time Out: 614  Total Billable Time: 44 minutes    Precautions: Anxiety; Depression; Fibromyalgia; OA; RA  Insurance: Payor: [employer] / Plan: [place] PPO / Product Type: PPO /     Subjective   Date of onset: 2020  History of current condition - Faith reports: Long history of surgeries on her left ankle/foot post fall in 2020. She wants medical massage and dry needle to mange pain. We discussed this will be part of her POC along with exercises and gait training. Pt reports Nerve pain is so severe it drives her insane. Needs somebody that can rub pain out. 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 Pt tearful during subjective; feels she has not been heard about what works for her. Reports her left foot now has Uncontrolled movement.  Reports she Was just dx with OA and RA.     Date of Procedure  Events from Surgeon encounter:   fall from height 09/06/2020 sustaining a closed left pilon fracture.    09/07/2020 - ex fix by 1 of my partners.  09/18/2020 - ORIF left pilon fracture, removal of ex fix   Nonunion, broken hardware, ongoing pain  03/28/2022 - repair nonunion left distal tibia with iliac crest bone graft         Medical History:   Past Medical History:   Diagnosis Date    Allergy     Anxiety     Depression     Empyema of right pleural space 2021    Encounter for blood transfusion     Esophageal dysphagia     Fibromyalgia     Gastric bypass status for obesity     H/O dental abscess 04/14/2014    drained    Headache(784.0)     migraines.  Treated at [facility] (Dr. 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  Pt states took too many then realized her mistake    Polycystic ovaries     S/P JUHI-BSO     Thyroid disease     hypothyroidism    Wears partial dentures     upper       Surgical History:   Faith SIMS Betopaul  has a past surgical history that includes Gastric bypass; Hysterectomy; Abdominal surgery; Cardiac catheterization; Breast surgery (2004); Hernia repair; Ovarian cyst removal; Dental surgery; Colonoscopy (N/A, 11/3/2015); Upper gastrointestinal endoscopy; tennis elbow repair (Left, 01/2019); Rotator cuff repair (Left, 01/2019); Application of large external fixation device to tibia (Left, 9/7/2020); Open reduction and internal fixation (ORIF) of pilon fracture (Left, 9/18/2020); Removal of external fixation device (Left, 9/18/2020); Application of wound vacuum-assisted closure device  (Left, 9/18/2020); Injection of anesthetic agent around nerve (N/A, 3/29/2022); Open reduction and internal fixation (ORIF) of pilon fracture (Left, 3/28/2022); Ankle hardware removal (Left, 3/28/2022); Iliac crest bone graft (Left, 3/28/2022); Surgical procedure for stress incontinence using tension free vaginal tape (N/A, 12/14/2022); Cystoscopy with urodynamic testing (N/A, 4/17/2023); and Cystoscopy with injection of periurethral bulking agent (N/A, 9/14/2023).    Medications:   Faith has a current medication list which includes the following prescription(s): acetaminophen, albuterol, alprazolam, aripiprazole, cholecalciferol (vitamin d3), claravis, clobetasol 0.05%, cyanocobalamin, cyanocobalamin (vitamin b-12), dextroamphetamine-amphetamine, diclofenac sodium, duloxetine, erenumab-aooe, ergocalciferol, trelegy ellipta, gabapentin, hydroxychloroquine, isotretinoin, levothyroxine, lidocaine, metoprolol tartrate, nurtec, botox, oxycodone-acetaminophen, oxycodone-acetaminophen, pravastatin, tizanidine, topiramate, and [DISCONTINUED] aspirin, and the following Facility-Administered Medications: midazolam.    Allergies:   Review of patient's allergies indicates:   Allergen Reactions    Cephalexin Anaphylaxis    Nsaids (non-steroidal anti-inflammatory drug) Other (See Comments)     Has a history of bleeding ulcers    Codeine Itching        Imaging, see in EPIC    Prior Therapy:   Social History: lives with ; 1-story  Current Smoker: marijuana occasional  Cancer Hx: no  Occupation: disabled  Prior Level of Function: independent; has walker and cane if needed  Current Level of Function: difficult/pain with bathing-sit on stool; stand / walk    Pain:  Current 6/10, worst 8/10, best 3/10   Location: anterior joint line and tibia  Description: ache; dull; snap; shoot; n/t  Aggravating Factors: bathing-sit on stool; stand / walk  Easing Factors: medication; massage, stretching; exercises; lidocaine; coconut  oil    Pts goals: to make the pain go away    Objective     Posture Observation: Arches / GV: high arches; supination  Palpation: NT  Sensation: NT  DTRs: NT    Range of Motion/Strength:      Ankle ROM: Left Right   Ankle Dorsiflexion (20 norm) 5°  17°   Ankle Plantarflexion   (50 norm) 55° 60°     Ankle Inversion (35 norm) 10° 30°   Ankle Eversion (25 norm) 30° 30°       Ankle MMT: 5/5 norm Left Right   Ankle Dorsiflexion 3-/5 5/5   Ankle Plantarflexion 2/5 3+/5   Ankle Inversion 3-/5 5/5   Ankle Eversion 3+/5 5/5   Knee MMT: NT  Hip MMT: NT    Girth Measurements Left Right   Figure 8 49 cm 48 cm    :       Evaluation   Single Limb Stance R LE NT seconds  (<10 sec = HIGH FALL RISK)   Single Limb Stance L LE NT seconds  (<10 sec = HIGH FALL RISK)       Gait Without AD   Analysis Wide stance; Bilateral trendelenburg           TREATMENT   Treatment Time In: 600  Treatment Time Out: 614  Total Treatment time separate from Evaluation: 14 minutes    Faith received therapeutic exercises to develop strength, ROM, and flexibility for 14 minutes including:  NMRE utilized to activate appropriate mm to improve balance, proprioception, stability and gait skills: 0 minutes     MD NOTE: prior L pilon fx ORIF + revision ORIF, chronic L ankle pain. Medical massage and Dry needling to assist w/ chronic pain management     POC:  Luciano CEBALLOS 1xwk Dry Needle  Monica SCHULTE 1xwk Manual tx    SEATED: with footstool   Ankle circles, 20x ea direction  Ankle alphabet, 1 set  Ankle AROM: DF/PF; INV/EV (wipers), 30x ea  Seated HR/TR, 30x ea      Add NEXT visit:  Gastroc strap stretch, 3x30s  PF stretch with hands, 3x30s (anterior tib stretch progression)  Arch lifts, 20x5s  Toe ext rolls, 5-4-3-2-1, 20x  Add Lime band to AROM  DF Slideboard, 10x10s  Wobble board, A-P, M-L, Circles, 20x ea  Pre-Gait train: 1 min each (airex optional)  1. M-L weight shift   2. Stagger: Affected fleg in front: Heel contact -> Flat foot with KE (mid stance)  3. Stagger:  Affected leg in back: Roll through -> push off   Manual tx; edema massage; scar mob  Dry needling by Luciano CEBALLOS  FUTURE visit:  Test innominates/LLD  Squats  Stand HR / Toe Raises  Soleus Heel Raises   INV/EV 1/2 foam  FSU / FSD  Eccentric heel raises  Toe taps->March  SLB  Single Heel Raises  Braces      Home Exercises and Patient Education Provided    Education provided:   - daily HEP    Written Home Exercises Provided: yes.  Exercises were reviewed and Faith was able to demonstrate them prior to the end of the session.  Faith demonstrated good  understanding of the education provided.     See EMR under Patient Instructions for exercises provided 1/22/2024.    Assessment   Faith is a 58 y.o. female referred to outpatient Physical Therapy with a medical diagnosis of chronic pain s/p Left pilon fx and revision. Pt presents with chronic pain in anterior ankle joint line into anterior tibialis, decreased left ankle strength and Range of Motion, gait and balance deficits and impaired function    Pt prognosis is Fair.   Pt will benefit from skilled outpatient Physical Therapy to address the deficits stated above and in the chart below, provide pt/family education, and to maximize pt's level of independence.     Plan of care discussed with patient: Yes  Pt's spiritual, cultural and educational needs considered and patient is agreeable to the plan of care and goals as stated below:     Anticipated Barriers for therapy: history of condition      Medical Necessity is demonstrated by the following  History  Co-morbidities and personal factors that may impact the plan of care [] LOW: no personal factors / co-morbidities  [x] MODERATE: 1-2 personal factors / co-morbidities  [] HIGH: 3+ personal factors / co-morbidities    Moderate / High Support Documentation:   Co-morbidities affecting plan of care: Anxiety; Depression; Fibromyalgia; OA; RA    Personal Factors:   coping style     Examination  Body Structures and Functions,  activity limitations and participation restrictions that may impact the plan of care [] LOW: addressing 1-2 elements  [x] MODERATE: 3+ elements  [] HIGH: 4+ elements (please support below)    Moderate / High Support Documentation: Range of Motion, strength, gait     Clinical Presentation [x] LOW: stable  [] MODERATE: Evolving  [] HIGH: Unstable     Decision Making/ Complexity Score: low          Goals:  Short Term Goals: 3 weeks   1. Pt will demonstrate compliance with HEP.  2. Pt will decrease swelling of left ankle by 1 cm to reduce pain performing daily life functions.  3. Pt will increase DF ROM by 10 degrees to improve gait.      Long Term Goals: 6 weeks   1. Pt will demonstrate independence with HEP.  2. Pt will increase left ankle strength by 1/2 grade or more in order to increase WB tolerance.  3. Pt will decrease pain levels to 3/10 while walking for a half hour.  4. Pt will improve FOTO function score to </= 52% to show improvement in condition and functional mobility.     Plan   Plan of care Certification: 1/22/2024 to 4/1/2024.    Outpatient Physical Therapy 2 times weekly for 6 weeks to include the following interventions: Electrical Stimulation ,, Gait Training, Manual Therapy, Moist Heat/ Ice, Neuromuscular Re-ed, Orthotic Management and Training, Patient Education, Self Care, Therapeutic Activities, Therapeutic Exercise, and Ultrasound.     Monica Palmer, PT

## 2024-01-30 DIAGNOSIS — J45.20 MILD INTERMITTENT ASTHMA WITHOUT COMPLICATION: ICD-10-CM

## 2024-01-30 DIAGNOSIS — R05.3 CHRONIC COUGH: ICD-10-CM

## 2024-01-30 DIAGNOSIS — R06.2 WHEEZES: ICD-10-CM

## 2024-02-01 RX ORDER — ALBUTEROL SULFATE 90 UG/1
AEROSOL, METERED RESPIRATORY (INHALATION)
Qty: 18 G | Refills: 11 | Status: SHIPPED | OUTPATIENT
Start: 2024-02-01

## 2024-02-16 ENCOUNTER — CLINICAL SUPPORT (OUTPATIENT)
Dept: OPHTHALMOLOGY | Facility: CLINIC | Age: 59
End: 2024-02-16
Payer: COMMERCIAL

## 2024-02-16 DIAGNOSIS — Z79.899 HIGH RISK MEDICATION USE: ICD-10-CM

## 2024-02-16 DIAGNOSIS — M15.4 EROSIVE OSTEOARTHRITIS OF HANDS, BILATERAL: ICD-10-CM

## 2024-02-19 ENCOUNTER — CLINICAL SUPPORT (OUTPATIENT)
Dept: REHABILITATION | Facility: HOSPITAL | Age: 59
End: 2024-02-19
Payer: COMMERCIAL

## 2024-02-19 DIAGNOSIS — M25.672 DECREASED RANGE OF MOTION OF LEFT ANKLE: Primary | ICD-10-CM

## 2024-02-19 DIAGNOSIS — Z74.09 IMPAIRED FUNCTIONAL MOBILITY, BALANCE, GAIT, AND ENDURANCE: ICD-10-CM

## 2024-02-19 PROCEDURE — 97140 MANUAL THERAPY 1/> REGIONS: CPT | Mod: PO

## 2024-02-19 PROCEDURE — 97110 THERAPEUTIC EXERCISES: CPT | Mod: PO

## 2024-02-19 NOTE — PROGRESS NOTES
10-2 HVF done. OCT mac done.         Assessment /Plan     For exam results, see Encounter Report.    There are no diagnoses linked to this encounter.
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
## 2024-02-20 DIAGNOSIS — Z79.899 HIGH RISK MEDICATION USE: ICD-10-CM

## 2024-02-20 DIAGNOSIS — M15.4 EROSIVE OSTEOARTHRITIS OF HANDS, BILATERAL: Primary | ICD-10-CM

## 2024-02-26 ENCOUNTER — CLINICAL SUPPORT (OUTPATIENT)
Dept: REHABILITATION | Facility: HOSPITAL | Age: 59
End: 2024-02-26
Payer: COMMERCIAL

## 2024-02-26 DIAGNOSIS — M25.672 DECREASED RANGE OF MOTION OF LEFT ANKLE: Primary | ICD-10-CM

## 2024-02-26 DIAGNOSIS — Z74.09 IMPAIRED FUNCTIONAL MOBILITY, BALANCE, GAIT, AND ENDURANCE: ICD-10-CM

## 2024-02-26 PROCEDURE — 97112 NEUROMUSCULAR REEDUCATION: CPT | Mod: PO

## 2024-02-26 PROCEDURE — 97140 MANUAL THERAPY 1/> REGIONS: CPT | Mod: PO

## 2024-02-26 NOTE — PROGRESS NOTES
[facility]   Physical Therapy Treatment Note      [facility]  [facility] Number: [id number]    Therapy Diagnosis:   Encounter Diagnoses   Name Primary?    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 Had a flare-up after last manual session; but then felt much better for a few days.    She was not compliant with home exercise program.  Response to previous treatment: positive  Functional change: none reported    Pain: 6/10  Location: Left anterior ankle joint line and tibia regions    Objective      Objective Measures updated at progress report unless specified.     Treatment     MD NOTE: prior L pilon fx ORIF + revision ORIF, chronic L ankle pain. Medical massage and Dry needling to assist w/ chronic pain management      POC:  Luciano CEBALLOS 1xwk Dry Needle  Monica SCHULTE 1xwk Manual tx    Faith received the treatments listed below:      Faith received therapeutic exercises to develop strength, ROM, and flexibility  for 0 minutes including:  NMRE utilized to activate appropriate mm to improve balance, proprioception, stability and gait skills: 15 minutes     STAND:  Pre-Gait training: Stagger mid stance and push off, 20x Bilateral   Airex: Single Leg Balance, [id number] ea     SEATED: with footstool - NOT PERFORMED   Ankle circles, 20x ea direction  Ankle alphabet, 1 set  Gastroc strap stretch, 3x30s  PF stretch with hands, 3x30s (anterior tib stretch progression) - standing instead, dancer's cross    NOT PERFORMED   Orange band Ankle AROM: DF/PF; INV/EV (wipers), 30x ea  Seated HR/TR, 30x ea      Manual tx: 15 minutes  Scar mobilization to multiple scars: spreading / circular / cross friction techniques  Myofascial and TP Release with ART to anterior tibialis, medial gastroc insertions, achilles and medial arch of Left Lower Extremity   Edema massage with elevation - NOT PERFORMED          Add NEXT visit:  Bike for cardio?  Arch lifts, 20x5s  Toe ext rolls, 5-4-3-2-1, 20x  Wobble board, A-P, M-L, Circles, 20x ea  Dry needling by Luciano CEBALLOS  Sciatic / Tibial nerve glides  FUTURE visit:  Test innominates/LLD  Squats  Stand HR / Toe Raises  INV/EV 1/2 foam  FSU / FSD  Eccentric heel raises  Toe taps->March  SLB  Single Heel Raises  Soleus Heel Raises   Hip strengthening  Ankle brace        Patient Education and Home Exercises       Education provided:   - daily HEP  - discussed trying bike for cardio    Written Home Exercises Provided: Patient instructed to cont prior HEP. Exercises were reviewed and Faith was able to demonstrate them prior to the end of the session.  Faith demonstrated good  understanding of the education provided. See EMR under Patient Instructions for exercises provided during therapy sessions    Assessment     Pt demonstrates lower levels of pain, decreased left Range of Motion, weakness, and balance and gait deficits. Noted improved walking quality upon arrival. Pt able to perform all therex given with minimal  pn provocation. Utilized manual tx to break up scar tissue and myofascial restrictions. Added pre-gait training to improve quality of mid stance and push off phases of gait; excellent carryover. Continue to progress as tolerated.     Faith Is progressing well towards her goals.   Pt prognosis is Fair.     Pt will continue to benefit from skilled outpatient physical therapy to address the deficits listed in the problem list box on initial evaluation, provide pt/family education and to maximize pt's level of independence in the home and community environment.     Pt's spiritual, cultural and educational needs considered and pt agreeable to plan of care and goals.     Anticipated barriers to physical therapy: history of condition    Goals:   Short Term Goals: 3 weeks PROGRESS 2/26/2024  1. Pt will demonstrate compliance with HEP.  2. Pt will decrease swelling of left ankle by 1 cm to reduce pain performing daily life functions.  3. Pt will increase DF ROM by 10 degrees to improve gait.      Long Term Goals: 6 weeks   1. Pt will demonstrate independence with HEP.  2. Pt will increase left ankle strength by 1/2 grade or more in order to increase WB tolerance.  3. Pt will decrease pain levels to 3/10 while walking for a half hour.  4. Pt will improve FOTO function score to </= 52% to show improvement in condition and functional mobility.        Plan     PT as deemed per poc: Medical Massage, Dry needling, ankle and hip strengthening, gait and balance training    Monica Palmer, PT

## 2024-02-29 ENCOUNTER — OFFICE VISIT (OUTPATIENT)
Dept: OPTOMETRY | Facility: CLINIC | Age: 59
End: 2024-02-29
Payer: COMMERCIAL

## 2024-02-29 ENCOUNTER — CLINICAL SUPPORT (OUTPATIENT)
Dept: REHABILITATION | Facility: HOSPITAL | Age: 59
End: 2024-02-29
Payer: COMMERCIAL

## 2024-02-29 DIAGNOSIS — M15.4 EROSIVE OSTEOARTHRITIS OF HANDS, BILATERAL: Primary | ICD-10-CM

## 2024-02-29 DIAGNOSIS — Z79.899 HIGH RISK MEDICATION USE: ICD-10-CM

## 2024-02-29 DIAGNOSIS — H26.9 CORTICAL CATARACT: ICD-10-CM

## 2024-02-29 DIAGNOSIS — H52.7 REFRACTIVE ERROR: ICD-10-CM

## 2024-02-29 DIAGNOSIS — M25.672 DECREASED RANGE OF MOTION OF LEFT ANKLE: Primary | ICD-10-CM

## 2024-02-29 DIAGNOSIS — Z74.09 IMPAIRED FUNCTIONAL MOBILITY, BALANCE, GAIT, AND ENDURANCE: ICD-10-CM

## 2024-02-29 DIAGNOSIS — H25.13 NUCLEAR SCLEROSIS, BILATERAL: ICD-10-CM

## 2024-02-29 PROCEDURE — 99204 OFFICE O/P NEW MOD 45 MIN: CPT | Mod: S$GLB,,, | Performed by: OPTOMETRIST

## 2024-02-29 PROCEDURE — 1159F MED LIST DOCD IN RCRD: CPT | Mod: CPTII,S$GLB,, | Performed by: OPTOMETRIST

## 2024-02-29 PROCEDURE — 97140 MANUAL THERAPY 1/> REGIONS: CPT | Mod: PO

## 2024-02-29 PROCEDURE — 97014 ELECTRIC STIMULATION THERAPY: CPT | Mod: PO

## 2024-02-29 PROCEDURE — 99999 PR PBB SHADOW E&M-EST. PATIENT-LVL II: CPT | Mod: PBBFAC,,, | Performed by: OPTOMETRIST

## 2024-02-29 PROCEDURE — 1160F RVW MEDS BY RX/DR IN RCRD: CPT | Mod: CPTII,S$GLB,, | Performed by: OPTOMETRIST

## 2024-02-29 PROCEDURE — 97112 NEUROMUSCULAR REEDUCATION: CPT | Mod: PO

## 2024-02-29 NOTE — PROGRESS NOTES
[facility]   Physical Therapy Treatment Note      [facility]  Clinic Number: [id number]    Therapy Diagnosis:   Encounter Diagnoses   Name Primary?    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 Had a flare-up after last manual session; but then felt much better for a few days.    She was not compliant with home exercise program.  Response to previous treatment: positive  Functional change: none reported    Pain: 7/10  Location: Left anterior ankle joint line and tibia regions    Objective      Objective Measures updated at progress report unless specified.     Treatment     MD NOTE: prior L pilon fx ORIF + revision ORIF, chronic L ankle pain. Medical massage and Dry needling to assist w/ chronic pain management      POC:  Luciano CEBALLOS 1xwk Dry Needle  Monica SCHULTE 1xwk Manual tx    Faith received the treatments listed below:      Faith received therapeutic exercises to develop strength, ROM, and flexibility  for 0 minutes including:    NMRE utilized to activate appropriate mm to improve balance, proprioception, stability and gait skills: 8 minutes including bike x 8'.  manual therapy techniques: dry needling were applied to the: left ankle for 30 minutes, including:tib ant, EDL,EHL  HAs;4,5,6,24.      supervised modalities after being cleared for contradictions: TENS:  Faith received TENS/IMS electrical stimulation for pain to the left ankle,left anterior lower leg. Pt received continuous mode at a rate of 2 pps for 15 minutes. Faith tolerated treatment well without any adverse effects.   Not performed below.  STAND:  Pre-Gait training: Stagger mid stance and push off, 20x Bilateral   Airex: Single Leg Balance, [id number] ea     SEATED: with footstool - NOT PERFORMED   Ankle circles, 20x ea direction  Ankle alphabet, 1 set  Gastroc strap stretch, 3x30s  PF stretch with hands, 3x30s (anterior tib stretch progression) - standing instead, dancer's cross    NOT PERFORMED   Orange band Ankle AROM: DF/PF; INV/EV (wipers), 30x ea  Seated HR/TR, 30x ea      Manual tx: 15 minutes  Scar mobilization to multiple scars: spreading / circular / cross friction techniques  Myofascial and TP Release with ART to anterior tibialis, medial gastroc insertions, achilles and medial arch of Left Lower Extremity   Edema massage with elevation - NOT PERFORMED          Add NEXT visit:  Bike for cardio?  Arch lifts, 20x5s  Toe ext rolls, 5-4-3-2-1, 20x  Wobble board, A-P, M-L, Circles, 20x ea  Dry needling by Luciano CEBALLOS  Sciatic / Tibial nerve glides  FUTURE visit:  Test innominates/LLD  Squats  Stand HR / Toe Raises  INV/EV 1/2 foam  FSU / FSD  Eccentric heel raises  Toe taps->March  SLB  Single Heel Raises  Soleus Heel Raises   Hip strengthening  Ankle brace      Patient Education and Home Exercises       Education provided:   - daily HEP  - discussed trying bike for cardio    Written Home Exercises Provided: Patient instructed to cont prior HEP.  Exercises were reviewed and Faith was able to demonstrate them prior to the end of the session.  Faith demonstrated good  understanding of the education provided. See EMR under Patient Instructions for exercises provided during therapy sessions    Assessment     Pt demonstrates lower levels of pain, decreased left Range of Motion, weakness, and balance and gait deficits. Noted improved walking quality upon arrival. Pt able to perform all therex given with minimal pn provocation. Utilized manual tx to break up scar tissue and myofascial restrictions. Added pre-gait training to improve quality of mid stance and push off phases of gait; excellent carryover. Continue to progress as tolerated.     Faith Is progressing well towards her goals.   Pt prognosis is Fair.     Pt will continue to benefit from skilled outpatient physical therapy to address the deficits listed in the problem list box on initial evaluation, provide pt/family education and to maximize pt's level of independence in the home and community environment.     Pt's spiritual, cultural and educational needs considered and pt agreeable to plan of care and goals.     Anticipated barriers to physical therapy: history of condition    Goals:   Short Term Goals: 3 weeks PROGRESS 2/29/2024  1. Pt will demonstrate compliance with HEP.  2. Pt will decrease swelling of left ankle by 1 cm to reduce pain performing daily life functions.  3. Pt will increase DF ROM by 10 degrees to improve gait.      Long Term Goals: 6 weeks   1. Pt will demonstrate independence with HEP.  2. Pt will increase left ankle strength by 1/2 grade or more in order to increase WB tolerance.  3. Pt will decrease pain levels to 3/10 while walking for a half hour.  4. Pt will improve FOTO function score to </= 52% to show improvement in condition and functional mobility.        Plan     PT as deemed per poc: Medical Massage, Dry needling, ankle and hip strengthening, gait and balance  training    Luciano Tierney, PT

## 2024-02-29 NOTE — PROGRESS NOTES
HPI    New pt here for plaquenil exam.     Pt having trouble w frequent migraines  3-5x a week (behind on botox).     Pt noticing decreasing vision. Pt using +3.00 OTC readers, had   prescription rx but sat on glasses a while back.     Pt notes has issues with swelling lower lid OS x a years, sore tender to   touch    +Ats and gel  +floaters OS>OD and flashes (years, stable)    Plaquenil x 3 months for osteoarthritis   Last edited by Pavan Burkett, OD on 2/29/2024  2:49 PM.            Assessment /Plan     For exam results, see Encounter Report.    Erosive osteoarthritis of hands, bilateral  -     Ambulatory referral/consult to Ophthalmology    High risk medication use    Nuclear sclerosis, bilateral    Cortical cataract    Refractive error      1. Erosive osteoarthritis of hands, bilateral  2. High risk medication use  Plaquenil x 3 months   Current daily dosage 5.3 mg/kg, except for Sundays  [place] 11/11 OD, OS    Reviewed baseline HVF 10-2 SF / mac OCT 02/16/2024  OCT --  OD normal foveal contour  OS normal foveal contour     HVF 10-2 SF --   OD non-specific defects, within normal limits  OS non-specific defects, within normal limits    Ok to continue oral Plaquenil twice daily, except for Sundays  Observe yearly     3. Nuclear sclerosis, bilateral  4. Cortical cataract  Mild, not VS  Discussed possible ocular affects of cataracts. Acceptable BCVA OU.   Discussed treatment options. Surgery not recommended at this time.   Monitor yearly.     5. Refractive error  Declines refraction, will try ot [facility] [employer]  Return for spec rx / cls fitting if desired

## 2024-03-04 ENCOUNTER — CLINICAL SUPPORT (OUTPATIENT)
Dept: REHABILITATION | Facility: HOSPITAL | Age: 59
End: 2024-03-04
Payer: COMMERCIAL

## 2024-03-04 DIAGNOSIS — Z74.09 IMPAIRED FUNCTIONAL MOBILITY, BALANCE, GAIT, AND ENDURANCE: ICD-10-CM

## 2024-03-04 DIAGNOSIS — M25.672 DECREASED RANGE OF MOTION OF LEFT ANKLE: Primary | ICD-10-CM

## 2024-03-04 PROCEDURE — 97112 NEUROMUSCULAR REEDUCATION: CPT | Mod: PO

## 2024-03-04 PROCEDURE — 97110 THERAPEUTIC EXERCISES: CPT | Mod: PO

## 2024-03-04 PROCEDURE — 97140 MANUAL THERAPY 1/> REGIONS: CPT | Mod: PO

## 2024-03-04 NOTE — PROGRESS NOTES
[facility]   Physical Therapy Treatment Note      Name: Faith [facility]  Clinic Number: [id number]    Therapy Diagnosis:   Encounter Diagnoses   Name Primary?    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 Her right knee is killing her.     She was not compliant with home exercise program.  Response to previous treatment: positive  Functional change: none reported    Pain: 4/10  Location: Left anterior ankle joint line and tibia regions    Objective      Objective Measures updated at progress report unless specified.     Treatment     MD NOTE: prior L pilon fx ORIF + revision ORIF, chronic L ankle pain. Medical massage and Dry needling to assist w/ chronic pain management      POC:  Luciano CEBALLOS 1xwk Dry Needle  Monica SCHULTE 1xwk Manual tx    Faith received the treatments listed below:      manual therapy techniques: dry needling were applied to the: left ankle for 0 minutes, including:tib ant, EDL,EHL  HAs;4,5,6,24.  supervised modalities  after being cleared for contradictions: TENS:  Faith received TENS/IMS electrical stimulation for pain to the left ankle,left anterior lower leg. Pt received continuous mode at a rate of 2 pps for 0 minutes. Faith tolerated treatment well without any adverse effects.       Faith received therapeutic exercises to develop strength, ROM, and flexibility for 10 minutes including:  NMRE utilized to activate appropriate mm to improve balance, proprioception, stability and gait skills: 14    Bike, Level 2.4, 10 minutes    STAND:  Pre-Gait training: Stagger mid stance and push off, 20x Bilateral   Airex: +Heel Raises/Toe Raises, 2x10; +INV/EV, 2x10; Single Leg Balance, [id number] ea     SEATED: with footstool - NOT PERFORMED   Ankle circles, 20x ea direction  Ankle alphabet, 1 set  Gastroc strap stretch, 3x30s  PF stretch with hands, 3x30s (anterior tib stretch progression) - standing instead, dancer's cross   Orange band Ankle AROM: DF/PF; INV/EV (wipers), 30x ea  Seated HR/TR, 30x ea      Manual tx: 20 minutes  Scar mobilization to multiple scars: spreading / circular / cross friction techniques  Myofascial and TP Release with ART to anterior tibialis, medial gastroc insertions, achilles and medial arch of Left Lower Extremity   Edema massage with elevation - NOT PERFORMED          Add NEXT visit:  Arch lifts, 20x5s  Toe ext rolls, 5-4-3-2-1, 20x  Wobble board, A-P, M-L, Circles, 20x ea  Sciatic / Tibial nerve glides  FUTURE visit:  Test innominates/LLD  Squats  FSU / FSD  Eccentric heel raises  Toe taps->March  Single Heel Raises  Soleus Heel Raises   Hip strengthening  Ankle brace      Patient Education and Home Exercises       Education provided:   - daily HEP  - discussed trying bike for cardio    Written Home Exercises Provided: Patient instructed to cont prior HEP. Exercises were reviewed and Faith was able to demonstrate them prior to the end of the session.  Faith demonstrated good  understanding of the  education provided. See EMR under Patient Instructions for exercises provided during therapy sessions    Assessment     Pt demonstrates lower levels of pain, decreased left ankle Range of Motion, weakness, and balance and gait deficits. Pt able to advance in standing therex given with minimal pn provocation. Utilized manual tx to break up scar tissue and myofascial restrictions. Reviewed pre-gait training to improve quality of mid stance and push off phases of gait; excellent carryover. Continue to progress as tolerated.     Faith Is progressing well towards her goals.   Pt prognosis is Fair.     Pt will continue to benefit from skilled outpatient physical therapy to address the deficits listed in the problem list box on initial evaluation, provide pt/family education and to maximize pt's level of independence in the home and community environment.     Pt's spiritual, cultural and educational needs considered and pt agreeable to plan of care and goals.     Anticipated barriers to physical therapy: history of condition    Goals:   Short Term Goals: 3 weeks PROGRESS 3/4/2024  1. Pt will demonstrate compliance with HEP.  2. Pt will decrease swelling of left ankle by 1 cm to reduce pain performing daily life functions.  3. Pt will increase DF ROM by 10 degrees to improve gait.      Long Term Goals: 6 weeks   1. Pt will demonstrate independence with HEP.  2. Pt will increase left ankle strength by 1/2 grade or more in order to increase WB tolerance.  3. Pt will decrease pain levels to 3/10 while walking for a half hour.  4. Pt will improve FOTO function score to </= 52% to show improvement in condition and functional mobility.        Plan     PT as deemed per poc: Medical Massage, Dry needling, ankle and hip strengthening, gait and balance training    Monica Palmer, PT

## 2024-03-06 ENCOUNTER — HOSPITAL ENCOUNTER (OUTPATIENT)
Dept: RADIOLOGY | Facility: HOSPITAL | Age: 59
Discharge: HOME | End: 2024-03-06
Attending: NURSE PRACTITIONER
Payer: COMMERCIAL

## 2024-03-06 ENCOUNTER — OFFICE VISIT (OUTPATIENT)
Dept: ORTHOPEDICS | Facility: CLINIC | Age: 59
End: 2024-03-06
Payer: COMMERCIAL

## 2024-03-06 VITALS — HEIGHT: 63 IN | BODY MASS INDEX: 29.61 KG/M2 | WEIGHT: 167.13 LBS

## 2024-03-06 DIAGNOSIS — M25.561 ACUTE PAIN OF RIGHT KNEE: ICD-10-CM

## 2024-03-06 DIAGNOSIS — M25.561 ACUTE PAIN OF RIGHT KNEE: Primary | ICD-10-CM

## 2024-03-06 DIAGNOSIS — M17.11 PRIMARY OSTEOARTHRITIS OF RIGHT KNEE: Primary | ICD-10-CM

## 2024-03-06 PROCEDURE — 1159F MED LIST DOCD IN RCRD: CPT | Mod: CPTII,S$GLB,, | Performed by: NURSE PRACTITIONER

## 2024-03-06 PROCEDURE — 73562 X-RAY EXAM OF KNEE 3: CPT | Mod: TC,59,LT

## 2024-03-06 PROCEDURE — 99213 OFFICE O/P EST LOW 20 MIN: CPT | Mod: 25,S$GLB,, | Performed by: NURSE PRACTITIONER

## 2024-03-06 PROCEDURE — 3008F BODY MASS INDEX DOCD: CPT | Mod: CPTII,S$GLB,, | Performed by: NURSE PRACTITIONER

## 2024-03-06 PROCEDURE — 99999 PR PBB SHADOW E&M-EST. PATIENT-LVL V: CPT | Mod: PBBFAC,,, | Performed by: NURSE PRACTITIONER

## 2024-03-06 PROCEDURE — 1160F RVW MEDS BY RX/DR IN RCRD: CPT | Mod: CPTII,S$GLB,, | Performed by: NURSE PRACTITIONER

## 2024-03-06 PROCEDURE — 73562 X-RAY EXAM OF KNEE 3: CPT | Mod: 26,59,LT, | Performed by: RADIOLOGY

## 2024-03-06 PROCEDURE — 20610 DRAIN/INJ JOINT/BURSA W/O US: CPT | Mod: RT,S$GLB,, | Performed by: NURSE PRACTITIONER

## 2024-03-06 PROCEDURE — 73564 X-RAY EXAM KNEE 4 OR MORE: CPT | Mod: 26,RT,, | Performed by: RADIOLOGY

## 2024-03-06 RX ORDER — LIDOCAINE HYDROCHLORIDE 10 MG/ML
4 INJECTION INFILTRATION; PERINEURAL
Status: DISCONTINUED | OUTPATIENT
Start: 2024-03-06 | End: 2024-03-06 | Stop reason: HOSPADM

## 2024-03-06 RX ORDER — TRIAMCINOLONE ACETONIDE 40 MG/ML
40 INJECTION, SUSPENSION INTRA-ARTICULAR; INTRAMUSCULAR
Status: DISCONTINUED | OUTPATIENT
Start: 2024-03-06 | End: 2024-03-06 | Stop reason: HOSPADM

## 2024-03-06 RX ADMIN — LIDOCAINE HYDROCHLORIDE 4 ML: 10 INJECTION INFILTRATION; PERINEURAL at 11:03

## 2024-03-06 RX ADMIN — TRIAMCINOLONE ACETONIDE 40 MG: 40 INJECTION, SUSPENSION INTRA-ARTICULAR; INTRAMUSCULAR at 11:03

## 2024-03-06 NOTE — PROCEDURES
Large Joint Aspiration/Injection: R knee    Date/Time: 3/6/2024 11:30 AM    Performed by: Kevin Moody NP  Authorized by: Kevin Moody NP    Consent Done?:  Yes (Verbal)  Indications:  Arthritis and pain  Site marked: the procedure site was marked    Timeout: prior to procedure the correct patient, procedure, and site was verified      Local anesthesia used?: Yes    Local anesthetic:  Lidocaine spray    Details:  Needle Size:  22 G  Ultrasonic Guidance for needle placement?: No    Approach:  Anterolateral  Location:  Knee  Site:  R knee  Medications:  4 mL LIDOcaine HCL 10 mg/ml (1%) 10 mg/mL (1 %); 40 mg triamcinolone acetonide 40 mg/mL  Patient tolerance:  Patient tolerated the procedure well with no immediate complications

## 2024-03-06 NOTE — PROGRESS NOTES
SUBJECTIVE:     Chief Complaint & History of Present Illness:  Faith Bernard is a Established 59 y.o. year old female patient here with a history of intermittent right knee pain which started years ago.  There is not a history of trauma.  The pain is located in the medial, lateral aspect of the knee.  The pain is described as achy, 4-5/10.  There is not radiation.  There is catching or locking.  Aggravating factors include going up and down stairs, lateral movements, and walking.  Associated symptoms include none.  There is not numbness or tingling of the lower extremity.  There is back pain. Previous treatments include PT, KT taping, and CSI which have provided good relief.  She would like to repeat her steroid injection since it has helped in the past.  Last given on 3/16/23. There is not a history of previous injury or surgery to the knee.  The patient does not use an assistive device.    Review of patient's allergies indicates:   Allergen Reactions    Cephalexin Anaphylaxis    Nsaids (non-steroidal anti-inflammatory drug) Other (See Comments)     Has a history of bleeding ulcers    Codeine Itching         Current Outpatient Medications   Medication Sig Dispense Refill    acetaminophen (TYLENOL) 325 MG tablet Take 325 mg by mouth every 6 (six) hours as needed for Pain.      albuterol (PROVENTIL/VENTOLIN HFA) 90 mcg/actuation inhaler 2 puffs every 4 hours as needed for cough, wheeze, or shortness of breath 18 g 11    ALPRAZolam (XANAX) 1 MG tablet       ARIPiprazole (ABILIFY) 15 MG Tab Take 7.5 mg by mouth once daily.      cholecalciferol, vitamin D3, 50,000 unit capsule Take 50,000 Units by mouth every 7 days. Patient takes on Saturdays  99    CLARAVIS 30 mg capsule Take 30 mg by mouth 2 (two) times daily.      clobetasol 0.05% (TEMOVATE) 0.05 % Oint Apply topically every evening. Use small, pea-sized amount at the site, every night before bed for 2 weeks. After that, use only 2-3 times per week at night  before bed. 30 g 1    cyanocobalamin 1,000 mcg/mL injection INJECT 1 ML IN THE MUSCLE EVERY 28 DAYS AS DIRECTED      CYANOCOBALAMIN, VITAMIN B-12, (VITAMIN B-12 INJ) Inject 1 mL as directed every 30 days.       dextroamphetamine-amphetamine (ADDERALL XR) 30 MG 24 hr capsule Take by mouth every morning.      diclofenac sodium (VOLTAREN) 1 % Gel Apply 2 g topically 4 (four) times daily. For hands 100 g 5    DULoxetine (CYMBALTA) 30 MG capsule Take 30 mg by mouth once daily.      erenumab-aooe (AIMOVIG) 140 mg/mL autoinjector Inject 140 mg into the skin every 28 days.      ergocalciferol (ERGOCALCIFEROL) 50,000 unit Cap Take 50,000 Units by mouth every 7 days.      fluticasone-umeclidin-vilanter (TRELEGY ELLIPTA) 200-62.5-25 mcg inhaler Inhale 1 puff into the lungs once daily. 60 each 11    gabapentin (NEURONTIN) 600 MG tablet Take 1,200 mg by mouth 3 (three) times daily.      hydroxychloroquine (PLAQUENIL) 200 mg tablet Take 2 tablets a day every day except Sundays 52 tablet 3    ISOtretinoin (ACCUTANE) 40 MG capsule Take 40 mg by mouth 2 (two) times daily.      levothyroxine (SYNTHROID) 100 MCG tablet Take 1 tablet daily for 5 days and take half tablet on Saturday and Sunday. 90 tablet 3    LIDOcaine (XYLOCAINE) 5 % Oint ointment Apply topically 2 (two) times daily.      metoprolol tartrate (LOPRESSOR) 50 MG tablet Take 50 mg by mouth 2 (two) times daily.      NURTEC 75 mg odt       onabotulinumtoxina (BOTOX) 200 unit SolR as directed      oxyCODONE-acetaminophen (PERCOCET)  mg per tablet 5 (five) times daily. Take 1 tab 5x a day as needed      oxyCODONE-acetaminophen (PERCOCET)  mg per tablet take 1 tablet by mouth five times aday as needed for 30 days 150 tablet 0    pravastatin (PRAVACHOL) 20 MG tablet Take by mouth once daily.      tiZANidine (ZANAFLEX) 4 MG tablet Take 4 mg by mouth 2 (two) times daily.      topiramate (TOPAMAX) 200 MG Tab Take 400 mg by mouth 2 (two) times daily.       No current  facility-administered medications for this visit.     Facility-Administered Medications Ordered in Other Visits   Medication Dose Route Frequency Provider Last Rate Last Admin    midazolam (VERSED) 1 mg/mL injection 0.5 mg  0.5 mg Intravenous PRN Myrtle Linares MD   2 mg at 09/18/20 1155       Past Medical History:   Diagnosis Date    Allergy     Anxiety     Depression     Empyema of right pleural space 2021    Encounter for blood transfusion     Esophageal dysphagia     Fibromyalgia     Gastric bypass status for obesity     H/O dental abscess 04/14/2014    drained    Headache(784.0)     migraines.  Treated at [facility] (Dr. 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  Pt states took too many then realized her mistake    Polycystic ovaries     S/P JUHI-BSO     Thyroid disease     hypothyroidism    Wears partial dentures     upper       Past Surgical History:   Procedure Laterality Date    ABDOMINAL SURGERY      ANKLE HARDWARE REMOVAL Left 3/28/2022    Procedure: REMOVAL, HARDWARE, ANKLE;  Surgeon: Fer Mcrae MD;  Location: [address];  Service: Orthopedics;  Laterality: Left;    APPLICATION OF LARGE EXTERNAL FIXATION DEVICE TO TIBIA Left 9/7/2020    Procedure: APPLICATION, EXTERNAL FIXATION DEVICE, TIBIA;  Surgeon: Sujata Londono MD;  Location: [address];  Service: Orthopedics;  Laterality: Left;  need paralysis    APPLICATION OF WOUND VACUUM-ASSISTED CLOSURE DEVICE Left 9/18/2020    Procedure: APPLICATION, WOUND VAC;  Surgeon: Fer Mcrae MD;  Location: [facility]  2ND FLR;  Service: Orthopedics;  Laterality: Left;    BREAST SURGERY  2004    Breast Reduction    CARDIAC CATHETERIZATION      COLONOSCOPY N/A 11/3/2015    Procedure: COLONOSCOPY;  Surgeon: Timothy Barber MD;  Location: [facility];  Service: Endoscopy;  Laterality: N/A;    CYSTOSCOPY WITH INJECTION OF PERIURETHRAL BULKING AGENT N/A 9/14/2023    Procedure: CYSTOSCOPY, WITH PERIURETHRAL BULKING AGENT INJECTION;  Surgeon: David Perez MD;  Location: [facility];  Service: Urology;  Laterality: N/A;    CYSTOSCOPY WITH URODYNAMIC TESTING N/A 4/17/2023    Procedure: CYSTOSCOPY, WITH URODYNAMIC TESTING;  Surgeon: David Perez MD;  Location: [facility] 1ST FLR;  Service: Urology;  Laterality: N/A;  1 hr    DENTAL SURGERY      4 teeth removed    GASTRIC BYPASS      HERNIA REPAIR      HYSTERECTOMY      ILIAC CREST BONE GRAFT Left 3/28/2022    Procedure: BONE GRAFT, ILIAC CREST;  Surgeon: Fer Mcrae MD;  Location: [facility] OR 2ND FLR;  Service: Orthopedics;  Laterality: Left;    INJECTION OF ANESTHETIC AGENT AROUND NERVE N/A 3/29/2022    Procedure: Block, Nerve;  Surgeon: Alexus Surgeon;  Location: [facility];  Service: Anesthesiology;  Laterality: N/A;    OPEN REDUCTION AND INTERNAL FIXATION (ORIF) OF PILON FRACTURE Left 9/18/2020    Procedure: ORIF, FRACTURE, PILON - left. Diving board, supine, bone foam. Willard anterolateral distal tibial plate, mini frag, long 3.5mm steel screw, CaPhos bone substitute;  Surgeon: Fer Mcrae MD;  Location: [facility] OR 2ND FLR;  Service: Orthopedics;  Laterality: Left;    OPEN REDUCTION AND INTERNAL FIXATION (ORIF) OF PILON FRACTURE Left 3/28/2022    Procedure: ORIF, FRACTURE, PILON nonunion + Iliac crest bone graft - diving board, supine, bone foam. Willard axsos3 screws, 2.7 mini plates/screws, curettes, Seda/Lomas medical Augment;  Surgeon: Fer Mcrae MD;  Location: [facility] OR 2ND FLR;  Service: Orthopedics;  Laterality: Left;  Spinal? + postop  "catheter (draping out pelvis for bone graft harvest)    OVARIAN CYST REMOVAL      aprox 5 times    REMOVAL OF EXTERNAL FIXATION DEVICE Left 9/18/2020    Procedure: REMOVAL, EXTERNAL FIXATION DEVICE;  Surgeon: Fer Mcrae MD;  Location: [address];  Service: Orthopedics;  Laterality: Left;    ROTATOR CUFF REPAIR Left 01/2019    SURGICAL PROCEDURE FOR STRESS INCONTINENCE USING TENSION FREE VAGINAL TAPE N/A 12/14/2022    Procedure: SURGICAL PROCEDURE, USING TENSION FREE VAGINAL TAPE, FOR STRESS INCONTINENCE;  Surgeon: Frandy Sherman MD;  [facility];  Service: OB/GYN;  Laterality: N/A;  cysto    tennis elbow repair Left 01/2019    UPPER GASTROINTESTINAL ENDOSCOPY         Family History   Problem Relation Age of Onset    Anxiety disorder Mother     Depression Mother     Suicide Mother     Seizures Mother     Drug abuse Mother     Ovarian cancer Mother     Aortic aneurysm Father     Glaucoma Maternal Grandmother     Colon cancer Neg Hx     Breast cancer Neg Hx     Diabetes Neg Hx     Hypertension Neg Hx      Review of Systems:  ROS:  Constitutional: no fever or chills  Eyes: no visual changes  ENT: no nasal congestion or sore throat  Respiratory: no cough or shortness of breath  Cardiovascular: no chest pain or palpitations  Gastrointestinal: no nausea or vomiting, tolerating diet  Genitourinary: no hematuria or dysuria  Integument/Breast: no rash or pruritis  Hematologic/Lymphatic: no easy bruising or lymphadenopathy  Musculoskeletal: no arthralgias or myalgias  Neurological: no seizures or tremors  Behavioral/Psych: no auditory or visual hallucinations  Endocrine: no heat or cold intolerance      OBJECTIVE:     PHYSICAL EXAM:  Vital Signs (Most Recent)  There were no vitals filed for this visit.  Height: 5' 3" (160 cm) Weight: 75.8 kg (167 lb 1.7 oz),   Estimated body mass index is 29.6 kg/m² as calculated from the following:    Height as of this encounter: 5' 3" (1.6 m).    Weight as of this " encounter: 75.8 kg (167 lb 1.7 oz).   General Appearance: Well nourished, well developed, in no acute distress.  HENT: Normal cephalic, oropharynx pink and moist  Eyes: PERRLA bilaterally and EOM x 4  Respiratory: Even and unlabored  Skin: Warm and Dry.   Psychiatric: AAO x 4, Mood & affect are normal.    right  Knee Exam:  Knee Range of Motion:normal   Effusion:none  Condition of skin:intact  Location of tenderness:Medial joint line and Lateral joint line   Strength:normal  Stability:  stable to testing, Lachman: stable, LCL: stable, MCL: stable, and PCL: stable  Varus /Valgus stress:  normal  Angel:   negative    Hip Examination:  normal    RADIOGRAPHS:  Right knee xray was obtained, findings show fractures.  Patient has mild degenerative joint disease in the tricompartment area.  All radiographs were personally reviewed by me.    ASSESSMENT/PLAN:       ICD-10-CM ICD-9-CM   1. Primary osteoarthritis of right knee  M17.11 715.16       -Faith Bernard presents to clinic today with c/c right knee pain for the past several years.  She has requesting a steroid injection.  Last given on March 162023.   -X-ray as above.  -Recommend RICE therapy.    See procedural note.    I will add additional therapy orders so the therapist can work on her right knee and continue KT taping and consider dry needling.  They may also work on functional strengthening.    Follow-up in Orthopedics p.r.n..

## 2024-03-07 ENCOUNTER — OFFICE VISIT (OUTPATIENT)
Dept: PULMONOLOGY | Facility: CLINIC | Age: 59
End: 2024-03-07
Payer: COMMERCIAL

## 2024-03-07 VITALS
WEIGHT: 168.75 LBS | BODY MASS INDEX: 29.9 KG/M2 | DIASTOLIC BLOOD PRESSURE: 79 MMHG | SYSTOLIC BLOOD PRESSURE: 113 MMHG | HEIGHT: 63 IN | OXYGEN SATURATION: 98 % | HEART RATE: 92 BPM

## 2024-03-07 DIAGNOSIS — D84.9 IMMUNE DEFICIENCY DISORDER: ICD-10-CM

## 2024-03-07 DIAGNOSIS — Z87.09 HISTORY OF PLEURAL EMPYEMA: ICD-10-CM

## 2024-03-07 DIAGNOSIS — B37.9 YEAST INFECTION: ICD-10-CM

## 2024-03-07 DIAGNOSIS — R05.3 CHRONIC COUGH: Primary | ICD-10-CM

## 2024-03-07 DIAGNOSIS — J45.20 MILD INTERMITTENT ASTHMA WITHOUT COMPLICATION: ICD-10-CM

## 2024-03-07 DIAGNOSIS — J40 BRONCHITIS: ICD-10-CM

## 2024-03-07 PROCEDURE — 3078F DIAST BP <80 MM HG: CPT | Mod: CPTII,S$GLB,, | Performed by: NURSE PRACTITIONER

## 2024-03-07 PROCEDURE — 1159F MED LIST DOCD IN RCRD: CPT | Mod: CPTII,S$GLB,, | Performed by: NURSE PRACTITIONER

## 2024-03-07 PROCEDURE — 3074F SYST BP LT 130 MM HG: CPT | Mod: CPTII,S$GLB,, | Performed by: NURSE PRACTITIONER

## 2024-03-07 PROCEDURE — 99214 OFFICE O/P EST MOD 30 MIN: CPT | Mod: S$GLB,,, | Performed by: NURSE PRACTITIONER

## 2024-03-07 PROCEDURE — 3008F BODY MASS INDEX DOCD: CPT | Mod: CPTII,S$GLB,, | Performed by: NURSE PRACTITIONER

## 2024-03-07 PROCEDURE — 99999 PR PBB SHADOW E&M-EST. PATIENT-LVL III: CPT | Mod: PBBFAC,,, | Performed by: NURSE PRACTITIONER

## 2024-03-07 RX ORDER — BENZONATATE 100 MG/1
100 CAPSULE ORAL 3 TIMES DAILY PRN
Qty: 60 CAPSULE | Refills: 0 | Status: SHIPPED | OUTPATIENT
Start: 2024-03-07 | End: 2024-03-17

## 2024-03-07 RX ORDER — FLUCONAZOLE 150 MG/1
150 TABLET ORAL DAILY
Qty: 3 TABLET | Refills: 0 | Status: SHIPPED | OUTPATIENT
Start: 2024-03-07

## 2024-03-07 RX ORDER — AZITHROMYCIN 500 MG/1
TABLET, FILM COATED ORAL
Qty: 3 TABLET | Refills: 0 | Status: SHIPPED | OUTPATIENT
Start: 2024-03-07 | End: 2024-05-31

## 2024-03-07 RX ORDER — PREDNISONE 20 MG/1
TABLET ORAL
Qty: 12 TABLET | Refills: 0 | Status: SHIPPED | OUTPATIENT
Start: 2024-03-07

## 2024-03-07 RX ORDER — CODEINE PHOSPHATE AND GUAIFENESIN 10; 100 MG/5ML; MG/5ML
5 SOLUTION ORAL NIGHTLY PRN
Qty: 237 ML | Refills: 0 | Status: SHIPPED | OUTPATIENT
Start: 2024-03-07 | End: 2024-03-17

## 2024-03-07 NOTE — PATIENT INSTRUCTIONS
Continue current regimen including BREO once per day. This inhaler contains an inhaled steroid component. Rinse mouth after each use due to risk for thrush development. If mouth or tongue develops white sores please contact the clinic and I will order a prescription mouth wash.     Continue to use Albuterol as needed.    Keep Prednisone on hand - use sparingly. Take only as needed.    Azithromycin ordered - this is only to be taken as needed for green/yellow mucous.     Codeine cough syrup ordered - you are high sedation risk due to other narcotic medications. Please do not mix codeine cough syrup with other sedating medications (Percocet, Gabapentin, etc). Do not mix with alcohol. Do not drive after use, will make you drowsy.    Tessalon ordered - only take as needed.     Continue current medication regiment. Keep follow up appointment as scheduled. Please call the office if you have any questions or concerns.

## 2024-03-08 ENCOUNTER — OFFICE VISIT (OUTPATIENT)
Dept: NEUROLOGY | Facility: CLINIC | Age: 59
End: 2024-03-08
Payer: COMMERCIAL

## 2024-03-08 ENCOUNTER — PATIENT MESSAGE (OUTPATIENT)
Dept: OPTOMETRY | Facility: CLINIC | Age: 59
End: 2024-03-08
Payer: COMMERCIAL

## 2024-03-08 VITALS
HEIGHT: 63 IN | HEART RATE: 64 BPM | RESPIRATION RATE: 17 BRPM | WEIGHT: 174.81 LBS | DIASTOLIC BLOOD PRESSURE: 90 MMHG | BODY MASS INDEX: 30.97 KG/M2 | SYSTOLIC BLOOD PRESSURE: 140 MMHG | TEMPERATURE: 98 F

## 2024-03-08 DIAGNOSIS — G43.709 CHRONIC MIGRAINE WITHOUT AURA WITHOUT STATUS MIGRAINOSUS, NOT INTRACTABLE: Primary | ICD-10-CM

## 2024-03-08 PROCEDURE — 1159F MED LIST DOCD IN RCRD: CPT | Mod: CPTII,S$GLB,, | Performed by: PHYSICIAN ASSISTANT

## 2024-03-08 PROCEDURE — 3080F DIAST BP >= 90 MM HG: CPT | Mod: CPTII,S$GLB,, | Performed by: PHYSICIAN ASSISTANT

## 2024-03-08 PROCEDURE — 3008F BODY MASS INDEX DOCD: CPT | Mod: CPTII,S$GLB,, | Performed by: PHYSICIAN ASSISTANT

## 2024-03-08 PROCEDURE — 99205 OFFICE O/P NEW HI 60 MIN: CPT | Mod: S$GLB,,, | Performed by: PHYSICIAN ASSISTANT

## 2024-03-08 PROCEDURE — 1160F RVW MEDS BY RX/DR IN RCRD: CPT | Mod: CPTII,S$GLB,, | Performed by: PHYSICIAN ASSISTANT

## 2024-03-08 PROCEDURE — 99999 PR PBB SHADOW E&M-EST. PATIENT-LVL V: CPT | Mod: PBBFAC,,, | Performed by: PHYSICIAN ASSISTANT

## 2024-03-08 PROCEDURE — 3077F SYST BP >= 140 MM HG: CPT | Mod: CPTII,S$GLB,, | Performed by: PHYSICIAN ASSISTANT

## 2024-03-08 RX ORDER — RIMEGEPANT SULFATE 75 MG/75MG
TABLET, ORALLY DISINTEGRATING ORAL
Qty: 16 TABLET | Refills: 6 | Status: SHIPPED | OUTPATIENT
Start: 2024-03-08

## 2024-03-08 RX ORDER — TOPIRAMATE 200 MG/1
TABLET ORAL
Qty: 60 TABLET | Refills: 5 | Status: SHIPPED | OUTPATIENT
Start: 2024-03-08

## 2024-03-08 NOTE — PROGRESS NOTES
[facility]  [address] [facility] [address]  [place] [address]  Phone:[phone]  Fax: [phone]   New Patient Consultation    Patient Name: Faith Bernard  : 1965  MRN:  [id number]  Today: 3/8/2024   chief complaint: Headache    PCP: Rah Canela MD.       Assessment:   Faith Bernard is a 59 y.o. right handed female with a PMHx of: anxiety/depression, FMG, gastric bypass, HA, neuropathy, osteoarthritis, chronic pain,  and PCOS   whom presents solo in self referral for HA. HA appear to be chronic migrainous and resistant to many therapies. With botox therapy does well, however d/t insurance (per her account), her therapies have lapsed.       Review:    ICD-10-CM ICD-9-CM   1. Chronic migraine without aura without status migrainosus, not intractable  G43.709 346.70         Plan:   Discussed realistic goals of care with patient at length. Discussed medication options, need for lifestyle adjustment. Discussed treatment will take time. Goal will be to reduce frequency/intensity/quantity of HA, not to be completely HA free. Gave copy of [facility] triggers for migraine informational sheet (N.b., a standard I give to patients who come to seek my care in HA clinic, regardless if they have migraines or not) and discussed clinic's non narcotic policy re: HA. Patient voiced understanding and agreement.                  -will have patient work on lifestyle           -if HA change in quality/nature, will get updated imaging study     For HA Prevention:  1 noted came to me on nurtec 1 pill every other day, aimovig 140 mg 1 shot Q28 days and topamax 400 mg BID (though admits to only taking 400 mg Qam). I will continue these medicines for now, discussing this is not dosing/formulations I typically write, but once we have botox for migraines back on track, assuming she continues to do well, we can start reducing the above/simplifying her regimen. She agreed.     Wrote for  nurtec 1 pill every other day, rediscussed adv effects/dosing, she agreed  Topamax at 400 mg Qam, rediscussed adv effects/dosing, she agreed  Aimovig 1 shot at 140 mg  Q28 days, rediscussed adv effects/dosing, she agreed    2 Consider the cefaly device, www.cefaly.us, please research, this is TENs unit designed in [place] and was FDA approved in the early 2010s for migraines.     3 Patient meets the criteria for chronic migraine. In summary, She has  migraines >15 days per month  and last >4 hours if untreated. Specifics of duration, frequency and strength are listed in the HPI (please refer to this section).  This pattern has continued for >3 months.  She has failed at least three preventive medications (full list of medications is listed below in the HPI under Therapies tried in past, but for ease of reference aimovig, vyvanse, verpamil, effexor, valsartan, trazodone, topamax, botox, inderal, nurtec, Mg, toprol, etc )  I have therefore recommended a trial of Botox via the PREEMPT protocol for migraine prophylaxis.   We discussed what to expect on procedure day at length, wear old or loose fitting clothing (if possible, merely to keep work clothes from getting any blood or being wrinkled), no make up (if applicable), eat meals/stay well hydrated and secure a ride if necessary. Also, discussed it can take up to 2-3 sessions of botox to get results desired. Lastly, we discussed procedure at length, 31 injections done in the office, potential complications not limited to muscle weakness, respiratory issues, or worst case scenario-death. The patient voiced understanding and wished to move forward.  Muscles to be injected:   10 units divided in 2 sites  Procerus 5 units in 1 site  Frontalis 20 units divided in 4 sites  Temporalis 40 units divided in 8 sites  Occipitalis 30 units divided in 6 sites  Cervical paraspinal 20 units divided in 4 sites  Trapezius 30 units divided in 6 sites  A total dose of 155 units  of botox to be used with  45 units to be discarded/wasted (unavoidable).     ~Will need to wait 3 months to do injections as she recently did botox for cosmesis. I have asked that she suspend botox for cosmesis if we are doing botox for migraines. She voiced agreement.      For HA :  1 limit pain medicines to <3 days use in week for migraines        To break up Headaches:  May consider nerve blocks in future (not discussed)     Other:   N/a           All test results as well as any necessary instructions were reviewed and discussed with patient.    Review:  Orders Placed This Encounter    Prior authorization Order    topiramate (TOPAMAX) 200 MG Tab    NURTEC 75 mg odt    erenumab-aooe (AIMOVIG) 140 mg/mL autoinjector         Patient to return to PCP/other specialists for all other problems  Patient to continue on all medications as Rx'd   A detailed AVS was provided to the patient with patient readback   RTO- for botox 1 in 3 months   The patient indicates understanding of these issues and agrees to the plan.    HPI:   Faith Bernard is a 59 y.o.right handed, female with a PMHx of: anxiety/depression, FMG, gastric bypass, HA, neuropathy, osteoarthritis, chronic pain,  and PCOS   whom presents solo in self referral for HA.     Has been followed by other neurology groups, d/t insurance changes, would like to transfer her care here.   Presents late to appt, able to still see her     HA HPI:  Start:HA for many years, was on botox therapy and d/t insurance changes this care has lapsed   History of trauma (yes), History of CNS infection (no), History of Stroke (no)  Location:frontalis/neck/shoulders        Last botox: >6  months ago, and for cosmesis 3 weeks ago        Without botox: 20 migraine days a month       With botox: 8 migraine days a month                -uses nurtec, topamax (written for 400 mg BID, states she is only taking 400 mg Qam) and aimovig (states she is due this week) as  preventive with the botox      Associated factors (bolded positive) WITH HA ( or migraine): Nausea, vomiting, photophobia, phonophobia, tinnitus, scalp pain, vision loss, diplopia, scintillations, eye pain, jaw pain, weakness?    Tried:botox, nurtec and aimovig   Triggers (in bold): stress/mood change, lack of sleep, too much caffeine, too little caffeine, weather change, seasonal change, strong odours, bright lights, sunlight,  hot temps, food    Last HA: 3 days ago   Positives in bold: Hx of Kidney Stones, asthma, GI bleed, osteoporosis, CAD/MI, CVA/TIA, DM    Imaging on file: none in past 5 years   Therapies tried in past: (failures to be marked, if known---why did it fail?)  Ambien  voltaren  Vyvanse  Verapamil  Effexor  Valsartan-hctz  Trazodone  Topamax  Zanaflex  Imitrex  Maxalt  Inderal  Lyrica  Deltasone  Oxycodone  Zofran  Botox  Nurtec  Toprol   Robaxin  Mg  Lyrica  Toradol  Vistaril  Hctz  Haldol  Gabapentin  Prozac  Aimovig  Cymbalta  Depakote  Flexeril  Parafon forte  Soma  Abilify                           Medication Reconciliation:   Current Outpatient Medications   Medication Sig Dispense Refill    acetaminophen (TYLENOL) 325 MG tablet Take 325 mg by mouth every 6 (six) hours as needed for Pain.      albuterol (PROVENTIL/VENTOLIN HFA) 90 mcg/actuation inhaler 2 puffs every 4 hours as needed for cough, wheeze, or shortness of breath 18 g 11    ARIPiprazole (ABILIFY) 15 MG Tab Take 7.5 mg by mouth once daily.      azithromycin (ZITHROMAX) 500 MG tablet Take one pill per day for three days as needed for green/yellow mucous production, cough. 3 tablet 0    benzonatate (TESSALON PERLES) 100 MG capsule Take 1 capsule (100 mg total) by mouth 3 (three) times daily as needed for Cough. 60 capsule 0    cholecalciferol, vitamin D3, 50,000 unit capsule Take 50,000 Units by mouth every 7 days. Patient takes on Saturdays  99    CLARAVIS 30 mg capsule Take 30 mg by mouth 2 (two) times daily.      clobetasol 0.05%  (TEMOVATE) 0.05 % Oint Apply topically every evening. Use small, pea-sized amount at the site, every night before bed for 2 weeks. After that, use only 2-3 times per week at night before bed. 30 g 1    cyanocobalamin 1,000 mcg/mL injection INJECT 1 ML IN THE MUSCLE EVERY 28 DAYS AS DIRECTED      CYANOCOBALAMIN, VITAMIN B-12, (VITAMIN B-12 INJ) Inject 1 mL as directed every 30 days.       dextroamphetamine-amphetamine (ADDERALL XR) 30 MG 24 hr capsule Take by mouth every morning.      diclofenac sodium (VOLTAREN) 1 % Gel Apply 2 g topically 4 (four) times daily. For hands 100 g 5    DULoxetine (CYMBALTA) 30 MG capsule Take 30 mg by mouth once daily.      erenumab-aooe (AIMOVIG) 140 mg/mL autoinjector Inject 140 mg into the skin every 28 days.      fluconazole (DIFLUCAN) 150 MG Tab Take 1 tablet (150 mg total) by mouth once daily. 3 tablet 0    fluticasone-umeclidin-vilanter (TRELEGY ELLIPTA) 200-62.5-25 mcg inhaler Inhale 1 puff into the lungs once daily. 60 each 11    gabapentin (NEURONTIN) 600 MG tablet Take 1,200 mg by mouth 3 (three) times daily.      guaiFENesin-codeine 100-10 mg/5 ml (TUSSI-ORGANIDIN NR)  mg/5 mL syrup Take 5 mLs by mouth nightly as needed for Cough. 237 mL 0    hydroxychloroquine (PLAQUENIL) 200 mg tablet Take 2 tablets a day every day except Sundays 52 tablet 3    levothyroxine (SYNTHROID) 100 MCG tablet Take 1 tablet daily for 5 days and take half tablet on Saturday and Sunday. 90 tablet 3    LIDOcaine (XYLOCAINE) 5 % Oint ointment Apply topically 2 (two) times daily.      metoprolol tartrate (LOPRESSOR) 50 MG tablet Take 50 mg by mouth 2 (two) times daily.      NURTEC 75 mg odt       onabotulinumtoxina (BOTOX) 200 unit SolR as directed      oxyCODONE-acetaminophen (PERCOCET)  mg per tablet 5 (five) times daily. Take 1 tab 5x a day as needed      oxyCODONE-acetaminophen (PERCOCET)  mg per tablet take 1 tablet by mouth five times aday as needed for 30 days 150 tablet 0     pravastatin (PRAVACHOL) 20 MG tablet Take by mouth once daily.      predniSONE (DELTASONE) 20 MG tablet Take one pill per day for three days, can repeat as needed for shortness of breath, wheezing, cough. 12 tablet 0    tiZANidine (ZANAFLEX) 4 MG tablet Take 4 mg by mouth 2 (two) times daily.      topiramate (TOPAMAX) 200 MG Tab Take 400 mg by mouth 2 (two) times daily.      ALPRAZolam (XANAX) 1 MG tablet       ergocalciferol (ERGOCALCIFEROL) 50,000 unit Cap Take 50,000 Units by mouth every 7 days.      ISOtretinoin (ACCUTANE) 40 MG capsule Take 40 mg by mouth 2 (two) times daily.       No current facility-administered medications for this visit.     Facility-Administered Medications Ordered in Other Visits   Medication Dose Route Frequency Provider Last Rate Last Admin    midazolam (VERSED) 1 mg/mL injection 0.5 mg  0.5 mg Intravenous PRN Myrtle Linares MD   2 mg at 09/18/20 1155     Review of patient's allergies indicates:   Allergen Reactions    Cephalexin Anaphylaxis    Nsaids (non-steroidal anti-inflammatory drug) Other (See Comments)     Has a history of bleeding ulcers    Codeine Itching       PMHx:  Past Medical History:   Diagnosis Date    Allergy     Anxiety     Depression     Empyema of right pleural space 2021    Encounter for blood transfusion     Esophageal dysphagia     Fibromyalgia     Gastric bypass status for obesity     H/O dental abscess 04/14/2014    drained    Headache(784.0)     migraines.  Treated at [facility] (Dr. 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  Pt states took too  many then realized her mistake    Polycystic ovaries     S/P [id number]     Thyroid disease     hypothyroidism    Wears partial dentures     upper     Past Surgical History:   Procedure Laterality Date    ABDOMINAL SURGERY      ANKLE HARDWARE REMOVAL Left 3/28/2022    Procedure: REMOVAL, HARDWARE, ANKLE;  Surgeon: Fer Mcrae MD;  Location: [facility] OR 2ND FLR;  Service: Orthopedics;  Laterality: Left;    APPLICATION OF LARGE EXTERNAL FIXATION DEVICE TO TIBIA Left 9/7/2020    Procedure: APPLICATION, EXTERNAL FIXATION DEVICE, TIBIA;  Surgeon: Sujata Londono MD;  Location: [facility] OR 2ND FLR;  Service: Orthopedics;  Laterality: Left;  need paralysis    APPLICATION OF WOUND VACUUM-ASSISTED CLOSURE DEVICE Left 9/18/2020    Procedure: APPLICATION, WOUND VAC;  Surgeon: Fer Mcrae MD;  Location: [facility] OR 2ND FLR;  Service: Orthopedics;  Laterality: Left;    BREAST SURGERY  2004    Breast Reduction    CARDIAC CATHETERIZATION      COLONOSCOPY N/A 11/3/2015    Procedure: COLONOSCOPY;  Surgeon: Timothy Barber MD;  Location: [facility];  Service: Endoscopy;  Laterality: N/A;    CYSTOSCOPY WITH INJECTION OF PERIURETHRAL BULKING AGENT N/A 9/14/2023    Procedure: CYSTOSCOPY, WITH PERIURETHRAL BULKING AGENT INJECTION;  Surgeon: David Perez MD;  Location: [facility];  Service: Urology;  Laterality: N/A;    CYSTOSCOPY WITH URODYNAMIC TESTING N/A 4/17/2023    Procedure: CYSTOSCOPY, WITH URODYNAMIC TESTING;  Surgeon: David Perez MD;  [facility];  Service: Urology;  Laterality: N/A;  1 hr    DENTAL SURGERY      4 teeth removed    GASTRIC BYPASS      HERNIA REPAIR      HYSTERECTOMY      ILIAC CREST BONE GRAFT Left 3/28/2022    Procedure: BONE GRAFT, ILIAC CREST;  Surgeon: Fer Mcrae MD;  Location: [facility] OR 2ND FLR;  Service: Orthopedics;  Laterality: Left;    INJECTION OF ANESTHETIC AGENT AROUND NERVE N/A 3/29/2022    Procedure: Block, Nerve;  Surgeon: Alexus Pink;  [facility];  Service: Anesthesiology;  Laterality: N/A;    OPEN REDUCTION AND INTERNAL FIXATION (ORIF) OF PILON FRACTURE Left 9/18/2020    Procedure: ORIF, FRACTURE, PILON - left. Diving board, supine, bone foam. Seda anterolateral distal tibial plate, mini frag, long 3.5mm steel screw, CaPhos bone substitute;  Surgeon: Fer Mcrae MD;  Location: [address];  Service: Orthopedics;  Laterality: Left;    OPEN REDUCTION AND INTERNAL FIXATION (ORIF) OF PILON FRACTURE Left 3/28/2022    Procedure: ORIF, FRACTURE, PILON nonunion + Iliac crest bone graft - diving board, supine, bone foam. Seda axsos3 screws, 2.7 mini plates/screws, curettes, MeroArte/GetMyRx medical Augment;  Surgeon: Fer Mcrae MD;  Location: [address];  Service: Orthopedics;  Laterality: Left;  Spinal? + postop catheter (draping out pelvis for bone graft harvest)    OVARIAN CYST REMOVAL      aprox 5 times    REMOVAL OF EXTERNAL FIXATION DEVICE Left 9/18/2020    Procedure: REMOVAL, EXTERNAL FIXATION DEVICE;  Surgeon: Fer Mcrae MD;  Location: [facility] OR [address] [place] [address];  Service: Orthopedics;  Laterality: Left;    ROTATOR CUFF REPAIR Left 01/2019    SURGICAL PROCEDURE FOR STRESS INCONTINENCE USING TENSION FREE VAGINAL TAPE N/A 12/14/2022    Procedure: SURGICAL PROCEDURE, USING TENSION FREE VAGINAL TAPE, FOR STRESS INCONTINENCE;  Surgeon: Frandy Sherman MD;  Location: [facility];  Service: OB/GYN;  Laterality: N/A;  cysto    tennis elbow repair Left 01/2019    UPPER GASTROINTESTINAL ENDOSCOPY         Fhx:  Family History   Problem Relation Age of Onset    Anxiety disorder Mother     Depression Mother     Suicide Mother     Seizures Mother     Drug abuse Mother     Ovarian cancer Mother     Aortic aneurysm Father     Glaucoma Maternal Grandmother     Colon cancer Neg Hx     Breast cancer Neg Hx     Diabetes Neg Hx     Hypertension Neg Hx        Shx: + medical cannabis,  Social History     Socioeconomic History     "Marital status:    Tobacco Use    Smoking status: Never    Smokeless tobacco: Never    Tobacco comments:     marijuana   Substance and Sexual Activity    Alcohol use: Yes     Comment: rarely    Drug use: Yes     Types: Amphetamines, Barbituates, Marijuana    Sexual activity: Yes     Partners: Male     Birth control/protection: Surgical, None           Labs:   Reviewed in chart     Imaging:   Reviewed in chart       Other testing:  Reviewed in chart     Note: I have independently reviewed any/all imaging/labs/tests and agree with the report (s) as documented.  Any discrepancies will be as noted/demarcated by free text.  BOBBY FLOR 3/8/2024                     ROS:   Review Of Systems (questions asked, positive or additions in BOLD)  Gen: Weight change, fatigue/malaise, pyrexia   HEENT: Tinnitus, headache,  blurred vision, eye pain, diplopia, photophobia,  nose bleeds, congestion, sore throat, jaw pain, scalp pain, neck stiffness   Card: Palpitations, CP   Pulm: SOB, cough   Vas: Easy bruising, easy bleeding   GI: N/V/D/C, incontinence, hematemesis, hematochezia    : incontinence, hematuria   Endocrine: Temp intolerance, polyuria, polydipsia   M/S: Neck pain, myalgia, back pain, joint pain, falls    Neuro: PER HPI   PSY: Memory loss, confusion, depression, anxiety, trouble in sleep          EXAM:   BP (!) 140/90 (BP Location: Right arm, Patient Position: Sitting, BP Method: Medium (Automatic))   Pulse 64   Temp 97.7 °F (36.5 °C) (Temporal)   Resp 17   Ht 5' 3" (1.6 m)   Wt 79.3 kg (174 lb 13.2 oz)   LMP  (LMP Unknown)   BMI 30.97 kg/m²    GEN:  NAD  HEENT: NC/AT, Frontalis was NTTP, temporalis was NTTP,  nares patent, dentition appropriate, neck supple, trachea midline, Occiput and trapezius  TTP  EXTREM:   no edema present. DIP joints dilated in all upper extremities.    NEURO:  Mental Status:  Awake, alert and appropriately oriented to time, place, and person.  Normal attention and concentration.  " Speech is fluent and appropriate language function (I.e., comprehension).     Cranial Nerves:     Pupils are equal and reactive to light.  Extraocular movements are intact and without nystagmus.  Visual fields are full to confrontation testing.    Facial movement is symmetric.  Facial sensation is intact.  Hearing is normal. Uvula in midline. DROM of neck in all (6) directions, shoulder shrug symmetrical. Tongue in midline without fasiculation.     Motor:  RUE:appropriate against gravity and medium force as tested 5/5              LUE: appropriate against gravity and medium force as tested 5/5              RLE:appropriate against gravity and medium force as tested 5/5              LLE: appropriate against gravity and medium force as tested 5/5  Tremor/pronator drift not apparent.        Sensory:  RUE  intact light touch, proprioception, and temperature  LUE intact light touch, proprioception, and temperature    RLE intact light touch  LLE intact light touch      DTR's:                                            R              L  biceps 2+ 2+         brachioradialis 2+ 2+   Knee jerk 2+ 2+        Coordination:  FTN-WNL.       Gait and Stance: Normal manner of stance and gait function testing.          This document has been electronically signed by Mr. Edouard Zamarripa MPA, PA-C on 3/8/2024, I have personally typed this message using the EMR.       Dr Toby MD  was available during today's visit.     Personal Protective Equipment:    Personal Protective Equipment was used during this encounter including: non latex gloves.          CC: Rah Canela MD

## 2024-03-08 NOTE — PATIENT INSTRUCTIONS
Consider the cefaly device, www.cefaly.us, please research, this is TENs unit designed in [place] and was FDA approved in the early 2010s for migraines.

## 2024-03-15 ENCOUNTER — PATIENT MESSAGE (OUTPATIENT)
Dept: OPTOMETRY | Facility: CLINIC | Age: 59
End: 2024-03-15
Payer: COMMERCIAL

## 2024-03-15 ENCOUNTER — PATIENT MESSAGE (OUTPATIENT)
Dept: RHEUMATOLOGY | Facility: CLINIC | Age: 59
End: 2024-03-15
Payer: COMMERCIAL

## 2024-03-18 ENCOUNTER — TELEPHONE (OUTPATIENT)
Dept: RHEUMATOLOGY | Facility: CLINIC | Age: 59
End: 2024-03-18
Payer: COMMERCIAL

## 2024-03-18 NOTE — TELEPHONE ENCOUNTER
Called patient and scheduled her for an appointment March 21 @ 10:00AM. She states that her pain is located in both of her hands as well as her ankle. I informed her that I will route her message to Dr. Shannon.

## 2024-03-20 ENCOUNTER — PATIENT MESSAGE (OUTPATIENT)
Dept: RHEUMATOLOGY | Facility: CLINIC | Age: 59
End: 2024-03-20
Payer: COMMERCIAL

## 2024-03-21 ENCOUNTER — PATIENT MESSAGE (OUTPATIENT)
Dept: RHEUMATOLOGY | Facility: CLINIC | Age: 59
End: 2024-03-21

## 2024-03-21 ENCOUNTER — PATIENT MESSAGE (OUTPATIENT)
Dept: OPTOMETRY | Facility: CLINIC | Age: 59
End: 2024-03-21
Payer: COMMERCIAL

## 2024-03-21 ENCOUNTER — OFFICE VISIT (OUTPATIENT)
Dept: RHEUMATOLOGY | Facility: CLINIC | Age: 59
End: 2024-03-21
Payer: COMMERCIAL

## 2024-03-21 VITALS
HEART RATE: 78 BPM | SYSTOLIC BLOOD PRESSURE: 141 MMHG | BODY MASS INDEX: 30.43 KG/M2 | HEIGHT: 63 IN | DIASTOLIC BLOOD PRESSURE: 89 MMHG | WEIGHT: 171.75 LBS

## 2024-03-21 DIAGNOSIS — M25.572 PAIN OF JOINT OF LEFT ANKLE AND FOOT: ICD-10-CM

## 2024-03-21 DIAGNOSIS — M15.4 EROSIVE OSTEOARTHRITIS OF HANDS, BILATERAL: Primary | ICD-10-CM

## 2024-03-21 DIAGNOSIS — M25.50 ARTHRALGIA, UNSPECIFIED JOINT: ICD-10-CM

## 2024-03-21 DIAGNOSIS — G89.4 CHRONIC PAIN SYNDROME: ICD-10-CM

## 2024-03-21 PROCEDURE — 99214 OFFICE O/P EST MOD 30 MIN: CPT | Mod: S$GLB,,, | Performed by: STUDENT IN AN ORGANIZED HEALTH CARE EDUCATION/TRAINING PROGRAM

## 2024-03-21 PROCEDURE — 99999 PR PBB SHADOW E&M-EST. PATIENT-LVL III: CPT | Mod: PBBFAC,,, | Performed by: STUDENT IN AN ORGANIZED HEALTH CARE EDUCATION/TRAINING PROGRAM

## 2024-03-21 PROCEDURE — 3077F SYST BP >= 140 MM HG: CPT | Mod: CPTII,S$GLB,, | Performed by: STUDENT IN AN ORGANIZED HEALTH CARE EDUCATION/TRAINING PROGRAM

## 2024-03-21 PROCEDURE — 3079F DIAST BP 80-89 MM HG: CPT | Mod: CPTII,S$GLB,, | Performed by: STUDENT IN AN ORGANIZED HEALTH CARE EDUCATION/TRAINING PROGRAM

## 2024-03-21 PROCEDURE — 3008F BODY MASS INDEX DOCD: CPT | Mod: CPTII,S$GLB,, | Performed by: STUDENT IN AN ORGANIZED HEALTH CARE EDUCATION/TRAINING PROGRAM

## 2024-03-21 RX ORDER — HYDROXYCHLOROQUINE SULFATE 200 MG/1
TABLET, FILM COATED ORAL
Qty: 52 TABLET | Refills: 3 | Status: SHIPPED | OUTPATIENT
Start: 2024-03-21

## 2024-03-21 NOTE — PROGRESS NOTES
RHEUMATOLOGY OUTPATIENT CLINIC NOTE    3/21/2024    Attending Rheumatologist: Kalie William  Primary Care Provider: Rah Canela MD   Physician Requesting Consultation: No referring provider defined for this encounter.  Chief Complaint/Reason For Consultation:  No chief complaint on file.      Subjective:       HPI  Faith Bernard is a 59 y.o. White female with history of hypertension, hyperparathyroidism, history of upper GI bleed, anxiety, migraines who comes for follow up on joint pain      Interim history  -Last visit in 11/2023. Started HCQ at that time.   -She has noted that HCQ has helped but still gets flares of pain in her hands especially in left 2nd and 3rd DIP with erythema and pain with light touch.   -Had injection right knee CSI by ortho on 3/6 but still reports pain . Also has been having issues with her left ankle.   -she saw optometry on 2/2024 no HCQ toxicity.   -Labs ordered after last visit showed negative RF, CCP and normal ESR and CRP.   -XR of the knees showed DJD. XR of the hands showed significant osteoarthritis in DIPs, with erosions ? Erosive OA.     Initial consult HPI    She has history of OA and was managed by rheumatologist many years ago.     In 2020 she had fracture in left distal tibia and ankle that required ORIF.     She has been noticing that lately she is having diffuse joint pain.  Denies any myalgias    She has chronic pain in her hands with deformities in the hips.  However she reports the form the last month she is having swelling and redness in all her PIP and MCPs.  Pain can wake him up in the middle of the night.  Having difficulty opening jars, holding her toothbrush.  Lidocaine cream helps.    She reports pain in elbows, no swelling.  Reports pain in bilateral shoulders, no difficulty lifting above her head.  Reports neck pain with limited range of motion, feels like a burning sensation.  It is not too severe    Reports low back pain for many  years associated with sciatica in both size.  Sees pain management.  Has had ablation in the past.     Reports bilateral lateral hip pain, intermittent.  Difficulty getting up from the car.  Reports history of bilateral knee pain, noted occasional swelling.  Has been noticing pain in bilateral ankles and feet    She reports morning stiffness of about 5-10 minutes    For pain control she currently takes oxycodone as needed, pregabalin prescribed by pain management.  She also takes Cymbalta per her psychiatrist    She denies any dry eyes, dry mouth, skin rashes, photosensitivity, oral ulcers, Raynaud's, abdominal pain.  She reports a history of mild psoriasis in her scalp    She tries to avoid NSAIDs due to history of upper GI bleeding secondary to ulcers.     She denies family history of rheumatoid arthritis.  However reports history of psoriasis in her family    Labs  10/10/2023  RF, CCP negative  ESR and CRP negative    2015  MARY JO negative    Imaging    XR bilateral knee: OA bilaterally   XR bilateral hands: joint space narrowing in bilateral 2-3 DIP with erosions  XR left foot with hardware failure    Chronic comorbid conditions affecting medical decision making today:  Past Medical History:   Diagnosis Date    Allergy     Anxiety     Depression     Empyema of right pleural space 2021    Encounter for blood transfusion     Esophageal dysphagia     Fibromyalgia     Gastric bypass status for obesity     H/O dental abscess 04/14/2014    drained    Headache(784.0)     migraines.  Treated at [facility] (Dr. 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  Pt states took too many then realized her mistake    Polycystic ovaries     S/P [id number]     Thyroid disease     hypothyroidism    Wears partial dentures     upper     Past Surgical History:   Procedure Laterality Date    ABDOMINAL SURGERY      ANKLE HARDWARE REMOVAL Left 3/28/2022    Procedure: REMOVAL, HARDWARE, ANKLE;  Surgeon: Fer Mcrae MD;  Location: [address];  Service: Orthopedics;  Laterality: Left;    APPLICATION OF LARGE EXTERNAL FIXATION DEVICE TO TIBIA Left 9/7/2020    Procedure: APPLICATION, EXTERNAL FIXATION DEVICE, TIBIA;  Surgeon: Sujata Londono MD;  [facility];  Service: Orthopedics;  Laterality: Left;  need paralysis    APPLICATION OF WOUND VACUUM-ASSISTED CLOSURE DEVICE Left 9/18/2020    Procedure: APPLICATION, WOUND VAC;  Surgeon: Fer Mcrae MD;  Location: [address];  Service: Orthopedics;  Laterality: Left;    BREAST SURGERY  2004    Breast Reduction    CARDIAC CATHETERIZATION      COLONOSCOPY N/A 11/3/2015    Procedure: COLONOSCOPY;  Surgeon: Timothy Barber MD;  Location: [facility];  Service: Endoscopy;  Laterality: N/A;    CYSTOSCOPY WITH INJECTION OF PERIURETHRAL BULKING AGENT N/A 9/14/2023    Procedure: CYSTOSCOPY, WITH PERIURETHRAL BULKING AGENT INJECTION;  Surgeon: David Perez MD;  Location: [facility];  Service: Urology;  Laterality: N/A;    CYSTOSCOPY WITH URODYNAMIC TESTING N/A 4/17/2023    Procedure: CYSTOSCOPY, WITH URODYNAMIC TESTING;  Surgeon: David Perez MD;  Location: [address];  Service: Urology;  Laterality: N/A;  1 hr    DENTAL SURGERY      4 teeth removed    GASTRIC BYPASS      HERNIA REPAIR      HYSTERECTOMY      ILIAC CREST BONE GRAFT Left 3/28/2022    Procedure: BONE GRAFT, ILIAC CREST;  Surgeon: Fer Mcrae MD;  Location: [facility] OR [address] [place] [address];  Service: Orthopedics;  Laterality: Left;    INJECTION OF ANESTHETIC AGENT AROUND NERVE N/A 3/29/2022     Procedure: Block, Nerve;  Surgeon: Alexus Surgeon;  Location: [facility];  Service: Anesthesiology;  Laterality: N/A;    OPEN REDUCTION AND INTERNAL FIXATION (ORIF) OF PILON FRACTURE Left 9/18/2020    Procedure: ORIF, FRACTURE, PILON - left. Diving board, supine, bone foam. Pickens anterolateral distal tibial plate, mini frag, long 3.5mm steel screw, CaPhos bone substitute;  Surgeon: Fer Mcrae MD;  Location: [address];  Service: Orthopedics;  Laterality: Left;    OPEN REDUCTION AND INTERNAL FIXATION (ORIF) OF PILON FRACTURE Left 3/28/2022    Procedure: ORIF, FRACTURE, PILON nonunion + Iliac crest bone graft - diving board, supine, bone foam. Pickens axsos3 screws, 2.7 mini plates/screws, curettes, Pickens/UZwan medical Augment;  Surgeon: Fer Mcrae MD;  Location: [address];  Service: Orthopedics;  Laterality: Left;  Spinal? + postop catheter (draping out pelvis for bone graft harvest)    OVARIAN CYST REMOVAL      aprox 5 times    REMOVAL OF EXTERNAL FIXATION DEVICE Left 9/18/2020    Procedure: REMOVAL, EXTERNAL FIXATION DEVICE;  Surgeon: Fer Mcrae MD;  Location: [address];  Service: Orthopedics;  Laterality: Left;    ROTATOR CUFF REPAIR Left 01/2019    SURGICAL PROCEDURE FOR STRESS INCONTINENCE USING TENSION FREE VAGINAL TAPE N/A 12/14/2022    Procedure: SURGICAL PROCEDURE, USING TENSION FREE VAGINAL TAPE, FOR STRESS INCONTINENCE;  Surgeon: Frandy Sherman MD;  Location: [facility];  Service: OB/GYN;  Laterality: N/A;  cysto    tennis elbow repair Left 01/2019    UPPER GASTROINTESTINAL ENDOSCOPY       Family History   Problem Relation Age of Onset    Anxiety disorder Mother     Depression Mother     Suicide Mother     Seizures Mother     Drug abuse Mother     Ovarian cancer Mother     Aortic aneurysm Father     Glaucoma Maternal Grandmother     Colon cancer Neg Hx     Breast cancer Neg Hx     Diabetes Neg Hx     Hypertension Neg Hx      Social History      Substance and Sexual Activity   Alcohol Use Yes    Comment: rarely     Social History     Tobacco Use   Smoking Status Never   Smokeless Tobacco Never   Tobacco Comments    marijuana     Social History     Substance and Sexual Activity   Drug Use Yes    Types: Amphetamines, Barbituates, Marijuana       Current Outpatient Medications:     acetaminophen (TYLENOL) 325 MG tablet, Take 325 mg by mouth every 6 (six) hours as needed for Pain., Disp: , Rfl:     albuterol (PROVENTIL/VENTOLIN HFA) 90 mcg/actuation inhaler, 2 puffs every 4 hours as needed for cough, wheeze, or shortness of breath, Disp: 18 g, Rfl: 11    ALPRAZolam (XANAX) 1 MG tablet, , Disp: , Rfl:     ARIPiprazole (ABILIFY) 15 MG Tab, Take 7.5 mg by mouth once daily., Disp: , Rfl:     azithromycin (ZITHROMAX) 500 MG tablet, Take one pill per day for three days as needed for green/yellow mucous production, cough., Disp: 3 tablet, Rfl: 0    cholecalciferol, vitamin D3, 50,000 unit capsule, Take 50,000 Units by mouth every 7 days. Patient takes on Saturdays, Disp: , Rfl: 99    CLARAVIS 30 mg capsule, Take 30 mg by mouth 2 (two) times daily., Disp: , Rfl:     clobetasol 0.05% (TEMOVATE) 0.05 % Oint, Apply topically every evening. Use small, pea-sized amount at the site, every night before bed for 2 weeks. After that, use only 2-3 times per week at night before bed., Disp: 30 g, Rfl: 1    cyanocobalamin 1,000 mcg/mL injection, INJECT 1 ML IN THE MUSCLE EVERY 28 DAYS AS DIRECTED, Disp: , Rfl:     CYANOCOBALAMIN, VITAMIN B-12, (VITAMIN B-12 INJ), Inject 1 mL as directed every 30 days. , Disp: , Rfl:     dextroamphetamine-amphetamine (ADDERALL XR) 30 MG 24 hr capsule, Take by mouth every morning., Disp: , Rfl:     diclofenac sodium (VOLTAREN) 1 % Gel, Apply 2 g topically 4 (four) times daily. For hands, Disp: 100 g, Rfl: 5    DULoxetine (CYMBALTA) 30 MG capsule, Take 60 mg by mouth once daily., Disp: , Rfl:     erenumab-aooe (AIMOVIG) 140 mg/mL autoinjector,  Inject 140 mg into the skin once every 28 days to reduce migraines, Disp: 1 mL, Rfl: 11    ergocalciferol (ERGOCALCIFEROL) 50,000 unit Cap, Take 50,000 Units by mouth every 7 days., Disp: , Rfl:     fluconazole (DIFLUCAN) 150 MG Tab, Take 1 tablet (150 mg total) by mouth once daily., Disp: 3 tablet, Rfl: 0    fluticasone-umeclidin-vilanter (TRELEGY ELLIPTA) 200-62.5-25 mcg inhaler, Inhale 1 puff into the lungs once daily., Disp: 60 each, Rfl: 11    gabapentin (NEURONTIN) 600 MG tablet, Take 1,200 mg by mouth 3 (three) times daily., Disp: , Rfl:     hydroxychloroquine (PLAQUENIL) 200 mg tablet, Take 2 tablets a day every day except Sundays, Disp: 52 tablet, Rfl: 3    ISOtretinoin (ACCUTANE) 40 MG capsule, Take 40 mg by mouth 2 (two) times daily., Disp: , Rfl:     levothyroxine (SYNTHROID) 100 MCG tablet, Take 1 tablet daily for 5 days and take half tablet on Saturday and Sunday., Disp: 90 tablet, Rfl: 3    LIDOcaine (XYLOCAINE) 5 % Oint ointment, Apply topically 2 (two) times daily., Disp: , Rfl:     metoprolol tartrate (LOPRESSOR) 50 MG tablet, Take 50 mg by mouth 2 (two) times daily., Disp: , Rfl:     NURTEC 75 mg odt, Take 1 tablet by mouth every other day to reduce/prevent migraines, Disp: 16 tablet, Rfl: 6    onabotulinumtoxina (BOTOX) 200 unit SolR, as directed, Disp: , Rfl:     oxyCODONE-acetaminophen (PERCOCET)  mg per tablet, 5 (five) times daily. Take 1 tab 5x a day as needed, Disp: , Rfl:     oxyCODONE-acetaminophen (PERCOCET)  mg per tablet, take 1 tablet by mouth five times aday as needed for 30 days, Disp: 150 tablet, Rfl: 0    pravastatin (PRAVACHOL) 20 MG tablet, Take by mouth once daily., Disp: , Rfl:     predniSONE (DELTASONE) 20 MG tablet, Take one pill per day for three days, can repeat as needed for shortness of breath, wheezing, cough., Disp: 12 tablet, Rfl: 0    tiZANidine (ZANAFLEX) 4 MG tablet, Take 4 mg by mouth 2 (two) times daily., Disp: , Rfl:     topiramate (TOPAMAX) 200 MG  Tab, 2 pills every morning, Disp: 60 tablet, Rfl: 5  No current facility-administered medications for this visit.    Facility-Administered Medications Ordered in Other Visits:     midazolam (VERSED) 1 mg/mL injection 0.5 mg, 0.5 mg, Intravenous, PRN, Myrtle Linares MD, 2 mg at 09/18/20 1155     Objective:         Vitals:    03/21/24 1156   BP: (!) 141/89   Pulse: 78       Physical Exam   Constitutional: She is oriented to person, place, and time. She appears well-developed.   HENT:   Head: Normocephalic.   Mouth/Throat: Mucous membranes are moist.   Salivary pool adequate  Oral aperture is normal   Cardiovascular: Normal rate and normal heart sounds.   Pulmonary/Chest: Effort normal and breath sounds normal.   Abdominal: Soft.   Musculoskeletal:      Cervical back: Neck supple.      Comments: Heberden nodes in multiple DIPs that are mildly erythematous and tender  PIPs and MCPs without tenderness or synovitis.  Bilateral wrists with no synovitis.  Left elbow with mild tenderness, no swelling.  Bilateral shoulder with normal range of motion.  Bilateral knees with no swelling, no crepitus. Left ankle with mild swelling on the lateral malleolus.  No synovitis or tenderness on feet.     Lymphadenopathy:     She has no cervical adenopathy.   Neurological: She is alert and oriented to person, place, and time.   Skin: No rash noted.     No skin rashes noted.  Normal capillaroscopy   Vitals reviewed.      Reviewed old and all outside pertinent medical records available.    All lab results personally reviewed and interpreted by me.  Lab Results   Component Value Date    WBC 17.30 (H) 03/29/2022    HGB 11.1 (L) 03/29/2022    HCT 37.0 03/29/2022    MCV 86 03/29/2022    MCH 25.9 (L) 03/29/2022    MCHC 30.0 (L) 03/29/2022    RDW 17.7 (H) 03/29/2022     03/29/2022    MPV 10.3 03/29/2022    NEUTROPCT 51.1 01/03/2013    MONOPCT 7.3 01/03/2013    PLTEST Normal 06/14/2006       Lab Results   Component Value Date      "03/10/2023    K 4.1 03/10/2023     (H) 03/10/2023    CO2 20 (L) 03/10/2023    GLU 98 03/10/2023    BUN 11 03/10/2023    CALCIUM 9.3 09/11/2023    PROT 6.7 03/10/2023    ALBUMIN 3.8 09/11/2023    BILITOT 0.2 03/10/2023    AST 16 03/10/2023    ALKPHOS 79 03/10/2023    ALT 23 03/10/2023    GFRNONAA 93 08/16/2021       Lab Results   Component Value Date    COLORU Yellow 08/03/2021    APPEARANCEUA Clear 08/03/2021    SPECGRAV >1.030 (A) 08/03/2021    PHUR 6.0 08/03/2021    PROTEINUA Negative 08/03/2021    GLUCOSEU NEG 01/01/2013    KETONESU Negative 08/03/2021    BLOODU NEG 01/01/2013    LEUKOCYTESUR Negative 08/03/2021    NITRITE Negative 08/03/2021    UROBILINOGEN Negative 08/03/2021       Lab Results   Component Value Date    CRP 4.9 10/10/2023       Lab Results   Component Value Date    RF <13.0 10/10/2023    SEDRATE 17 10/10/2023    CCPANTIBODIE <0.5 10/10/2023       No components found for: "25OHVITDTOT", "03XUCGJL2", "68QSPEYY8", "METHODNOTE"    No results found for: "URICACID"    Lab Results   Component Value Date    HEPBSAG Negative 10/04/2015    HEPCAB Negative 10/04/2015       Imaging:  All imaging reviewed and independently interpreted by me.    XR bilateral knee: OA bilaterally   XR bilateral hands: joint space narrowing in bilateral 2-3 DIP with erosions  XR left foot with hardware failure     ASSESSMENT / PLAN:     Faith Bernard is a 59 y.o. White female with history of hypertension, hyperparathyroidism, history of upper GI bleed, anxiety, migraines who comes for evaluation of joint pain.    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 She cannot do oral NSAIDs due to prior PUD. Already taking percocet and extra tylenol from pain management  -has noted improvement on  mg BID except Sundays. Will continue. She has a remote history of torsade de " pointes however most recent EKG in 12/2022 had normal Qtc   -refer to hand surgery for possible CSI of the DIPs.   -may consider trial of MTX in the future. Can consider low dos prednisone too.     #Left ankle pain   -XR left ankle with hardware stabilizing distal tib fib fractures and there is hardware removal from the calcaneus. No acute fracture, dislocation, or bone destruction seen. Multiple fixation screws demonstrate metal fatigue fracture/hardware failure.     # polyarthralgia  -Besides hand pain she likely has OA in other sites contributing to her symptoms.   -we will keep in consideration history of mild psoriasis.    #Chronic pain  -She follows pain management.  Takes oxycodone as needed and gabapentin.   -she also takes Cymbalta 60 mg from her psychiatrist.    # hyperparathyroidism  -follows with endocrinology.    Follow up in about 2 months (around 5/21/2024).    Method of contact with patient concerns: [facility]    Disclaimer:  This note is prepared using voice recognition software and as such is likely to have errors and has not been proof read. Please contact me for questions.     Time spent: 30 minutes in face to face discussion concerning diagnosis, prognosis, review of lab and test results, benefits of treatment as well as management of disease, counseling of patient and coordination of care between various health care providers.  Greater than half the time spent was used for coordination of care and counseling of patient.    Kalie William M.D.  Rheumatology  [facility]

## 2024-03-26 ENCOUNTER — OFFICE VISIT (OUTPATIENT)
Dept: ORTHOPEDICS | Facility: CLINIC | Age: 59
End: 2024-03-26
Payer: COMMERCIAL

## 2024-03-26 DIAGNOSIS — M15.1 DEGENERATIVE ARTHRITIS OF DISTAL INTERPHALANGEAL JOINT OF INDEX FINGER OF RIGHT HAND: Primary | ICD-10-CM

## 2024-03-26 DIAGNOSIS — M15.4 EROSIVE OSTEOARTHRITIS OF HANDS, BILATERAL: ICD-10-CM

## 2024-03-26 PROCEDURE — 1159F MED LIST DOCD IN RCRD: CPT | Mod: CPTII,S$GLB,, | Performed by: PHYSICIAN ASSISTANT

## 2024-03-26 PROCEDURE — 99214 OFFICE O/P EST MOD 30 MIN: CPT | Mod: S$GLB,,, | Performed by: PHYSICIAN ASSISTANT

## 2024-03-26 PROCEDURE — 99999 PR PBB SHADOW E&M-EST. PATIENT-LVL IV: CPT | Mod: PBBFAC,,, | Performed by: PHYSICIAN ASSISTANT

## 2024-03-26 PROCEDURE — 1160F RVW MEDS BY RX/DR IN RCRD: CPT | Mod: CPTII,S$GLB,, | Performed by: PHYSICIAN ASSISTANT

## 2024-03-26 NOTE — PROGRESS NOTES
3/26/2024    Chief Complaint:  Chief Complaint   Patient presents with    Left Hand - Pain, Swelling    Right Hand - Pain, Swelling    Arthritis       HPI:  Faith Bernard is a 59 y.o. female, who presents to clinic today for evaluation of her bilateral hand degenerative arthritis.  States she has had bilateral hand arthritis for some time.  States it was mostly of the last joint of the fingers.  States it was very painful and makes activities of daily living difficult.  Denies any acute injuries.  Denies any other complaints this time.    PMHX:  Past Medical History:   Diagnosis Date    Allergy     Anxiety     Depression     Empyema of right pleural space 2021    Encounter for blood transfusion     Esophageal dysphagia     Fibromyalgia     Gastric bypass status for obesity     H/O dental abscess 04/14/2014    drained    Headache(784.0)     migraines.  Treated at [facility] (Dr. 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  Pt states took too many then realized her mistake    Polycystic ovaries     S/P [id number]     Thyroid disease     hypothyroidism    Wears partial dentures     upper       PSHX:  Past Surgical History:   Procedure Laterality Date    ABDOMINAL SURGERY      ANKLE HARDWARE REMOVAL Left 3/28/2022    Procedure: REMOVAL, HARDWARE, ANKLE;  Surgeon: Fer Mcrae MD;  Location: [facility] OR [address] [place] [address];  Service: Orthopedics;  Laterality: Left;    APPLICATION OF LARGE EXTERNAL FIXATION DEVICE TO TIBIA Left 9/7/2020    Procedure: APPLICATION, EXTERNAL FIXATION DEVICE, TIBIA;  Surgeon: Sujata MCCURDY  MD Bry;  Location: [facility] OR 2ND FLR;  Service: Orthopedics;  Laterality: Left;  need paralysis    APPLICATION OF WOUND VACUUM-ASSISTED CLOSURE DEVICE Left 9/18/2020    Procedure: APPLICATION, WOUND VAC;  Surgeon: Fer Mcrae MD;  Location: [facility] OR 2ND FLR;  Service: Orthopedics;  Laterality: Left;    BREAST SURGERY  2004    Breast Reduction    CARDIAC CATHETERIZATION      COLONOSCOPY N/A 11/3/2015    Procedure: COLONOSCOPY;  Surgeon: Timothy Barber MD;  Location: [facility];  Service: Endoscopy;  Laterality: N/A;    CYSTOSCOPY WITH INJECTION OF PERIURETHRAL BULKING AGENT N/A 9/14/2023    Procedure: CYSTOSCOPY, WITH PERIURETHRAL BULKING AGENT INJECTION;  Surgeon: David Perez MD;  Location: [facility];  Service: Urology;  Laterality: N/A;    CYSTOSCOPY WITH URODYNAMIC TESTING N/A 4/17/2023    Procedure: CYSTOSCOPY, WITH URODYNAMIC TESTING;  Surgeon: David Perez MD;  Location: [facility] 1ST FLR;  Service: Urology;  Laterality: N/A;  1 hr    DENTAL SURGERY      4 teeth removed    GASTRIC BYPASS      HERNIA REPAIR      HYSTERECTOMY      ILIAC CREST BONE GRAFT Left 3/28/2022    Procedure: BONE GRAFT, ILIAC CREST;  Surgeon: Fer Mcrae MD;  [address];  Service: Orthopedics;  Laterality: Left;    INJECTION OF ANESTHETIC AGENT AROUND NERVE N/A 3/29/2022    Procedure: Block, Nerve;  Surgeon: Alexus Surgeon;  Location: [facility];  Service: Anesthesiology;  Laterality: N/A;    OPEN REDUCTION AND INTERNAL FIXATION (ORIF) OF PILON FRACTURE Left 9/18/2020    Procedure: ORIF, FRACTURE, PILON - left. Diving board, supine, bone foam. [place] anterolateral distal tibial plate, mini frag, long 3.5mm steel screw, CaPhos bone substitute;  Surgeon: Fer Mcrae MD;  Location: [address];  Service: Orthopedics;  Laterality: Left;    OPEN REDUCTION AND INTERNAL FIXATION (ORIF) OF PILON FRACTURE Left 3/28/2022    Procedure: ORIF, FRACTURE, PILON nonunion + Iliac crest  bone graft - diving board, supine, bone foam. Seda axsos3 screws, 2.7 mini plates/screws, curettes, [place] medical Augment;  Surgeon: Fer Mcrae MD;  Location: [facility] OR [address] [place] [address];  Service: Orthopedics;  Laterality: Left;  Spinal? + postop catheter (draping out pelvis for bone graft harvest)    OVARIAN CYST REMOVAL      aprox 5 times    REMOVAL OF EXTERNAL FIXATION DEVICE Left 9/18/2020    Procedure: REMOVAL, EXTERNAL FIXATION DEVICE;  Surgeon: Fer Mcrae MD;  Location: [facility] OR [address] [place] [address];  Service: Orthopedics;  Laterality: Left;    ROTATOR CUFF REPAIR Left 01/2019    SURGICAL PROCEDURE FOR STRESS INCONTINENCE USING TENSION FREE VAGINAL TAPE N/A 12/14/2022    Procedure: SURGICAL PROCEDURE, USING TENSION FREE VAGINAL TAPE, FOR STRESS INCONTINENCE;  Surgeon: Frandy Sherman MD;  Location: [facility] OR;  Service: OB/GYN;  Laterality: N/A;  cysto    tennis elbow repair Left 01/2019    UPPER GASTROINTESTINAL ENDOSCOPY         FMHX:  Family History   Problem Relation Age of Onset    Anxiety disorder Mother     Depression Mother     Suicide Mother     Seizures Mother     Drug abuse Mother     Ovarian cancer Mother     Aortic aneurysm Father     Glaucoma Maternal Grandmother     Colon cancer Neg Hx     Breast cancer Neg Hx     Diabetes Neg Hx     Hypertension Neg Hx        SOCHX:  Social History     Tobacco Use    Smoking status: Never    Smokeless tobacco: Never    Tobacco comments:     marijuana   Substance Use Topics    Alcohol use: Yes     Comment: rarely       ALLERGIES:  Cephalexin, Nsaids (non-steroidal anti-inflammatory drug), and Codeine    CURRENT MEDICATIONS:  Current Outpatient Medications on File Prior to Visit   Medication Sig Dispense Refill    acetaminophen (TYLENOL) 325 MG tablet Take 325 mg by mouth every 6 (six) hours as needed for Pain.      albuterol (PROVENTIL/VENTOLIN HFA) 90 mcg/actuation inhaler 2 puffs every 4 hours as needed for cough, wheeze, or shortness of breath  18 g 11    ALPRAZolam (XANAX) 1 MG tablet       ARIPiprazole (ABILIFY) 15 MG Tab Take 7.5 mg by mouth once daily.      azithromycin (ZITHROMAX) 500 MG tablet Take one pill per day for three days as needed for green/yellow mucous production, cough. 3 tablet 0    cholecalciferol, vitamin D3, 50,000 unit capsule Take 50,000 Units by mouth every 7 days. Patient takes on Saturdays  99    CLARAVIS 30 mg capsule Take 30 mg by mouth 2 (two) times daily.      clobetasol 0.05% (TEMOVATE) 0.05 % Oint Apply topically every evening. Use small, pea-sized amount at the site, every night before bed for 2 weeks. After that, use only 2-3 times per week at night before bed. 30 g 1    cyanocobalamin 1,000 mcg/mL injection INJECT 1 ML IN THE MUSCLE EVERY 28 DAYS AS DIRECTED      CYANOCOBALAMIN, VITAMIN B-12, (VITAMIN B-12 INJ) Inject 1 mL as directed every 30 days.       dextroamphetamine-amphetamine (ADDERALL XR) 30 MG 24 hr capsule Take by mouth every morning.      diclofenac sodium (VOLTAREN) 1 % Gel Apply 2 g topically 4 (four) times daily. For hands 100 g 5    DULoxetine (CYMBALTA) 30 MG capsule Take 60 mg by mouth once daily.      erenumab-aooe (AIMOVIG) 140 mg/mL autoinjector Inject 140 mg into the skin once every 28 days to reduce migraines 1 mL 11    ergocalciferol (ERGOCALCIFEROL) 50,000 unit Cap Take 50,000 Units by mouth every 7 days.      fluconazole (DIFLUCAN) 150 MG Tab Take 1 tablet (150 mg total) by mouth once daily. 3 tablet 0    fluticasone-umeclidin-vilanter (TRELEGY ELLIPTA) 200-62.5-25 mcg inhaler Inhale 1 puff into the lungs once daily. 60 each 11    gabapentin (NEURONTIN) 600 MG tablet Take 1,200 mg by mouth 3 (three) times daily.      hydroxychloroquine (PLAQUENIL) 200 mg tablet Take 2 tablets a day every day except Sundays 52 tablet 3    ISOtretinoin (ACCUTANE) 40 MG capsule Take 40 mg by mouth 2 (two) times daily.      levothyroxine (SYNTHROID) 100 MCG tablet Take 1 tablet daily for 5 days and take half tablet  on Saturday and Sunday. 90 tablet 3    LIDOcaine (XYLOCAINE) 5 % Oint ointment Apply topically 2 (two) times daily.      metoprolol tartrate (LOPRESSOR) 50 MG tablet Take 50 mg by mouth 2 (two) times daily.      NURTEC 75 mg odt Take 1 tablet by mouth every other day to reduce/prevent migraines 16 tablet 6    onabotulinumtoxina (BOTOX) 200 unit SolR as directed      oxyCODONE-acetaminophen (PERCOCET)  mg per tablet 5 (five) times daily. Take 1 tab 5x a day as needed      oxyCODONE-acetaminophen (PERCOCET)  mg per tablet take 1 tablet by mouth five times aday as needed for 30 days 150 tablet 0    pravastatin (PRAVACHOL) 20 MG tablet Take by mouth once daily.      predniSONE (DELTASONE) 20 MG tablet Take one pill per day for three days, can repeat as needed for shortness of breath, wheezing, cough. 12 tablet 0    tiZANidine (ZANAFLEX) 4 MG tablet Take 4 mg by mouth 2 (two) times daily.      topiramate (TOPAMAX) 200 MG Tab 2 pills every morning 60 tablet 5    [DISCONTINUED] aspirin (ECOTRIN) 81 MG EC tablet Take 1 tablet (81 mg total) by mouth 2 (two) times daily. 84 tablet 0     Current Facility-Administered Medications on File Prior to Visit   Medication Dose Route Frequency Provider Last Rate Last Admin    midazolam (VERSED) 1 mg/mL injection 0.5 mg  0.5 mg Intravenous PRN Myrtle Linares MD   2 mg at 09/18/20 1155       REVIEW OF SYSTEMS:  Review of Systems   Constitutional: Negative.    HENT: Negative.     Eyes: Negative.    Respiratory: Negative.     Cardiovascular: Negative.    Gastrointestinal: Negative.    Genitourinary: Negative.    Musculoskeletal:  Positive for joint pain.   Skin: Negative.    Neurological:  Positive for weakness.   Endo/Heme/Allergies: Negative.    Psychiatric/Behavioral: Negative.       GENERAL PHYSICAL EXAM:   LMP  (LMP Unknown)    GEN: well developed, well nourished, no acute distress   HENT: Normocephalic, atraumatic   EYES: No discharge, conjunctiva normal   NECK:  Supple, non-tender   PULM: No wheezing, no respiratory distress   CV: RRR   ABD: Soft, non-tender    ORTHO EXAM:   Examination of the bilateral hands reveals arthritis deformities of the DIP joints.  Tenderness to palpation of the DIP joints of the bilateral hands, which are most prominent at the index fingers.  Able to flex extend the fingers appropriately.  Sensation is grossly intact in the radial, ulnar, median nerve distributions.  Capillary refill less than 2 seconds.    RADIOLOGY:   X-rays of the bilateral hands were taken approximately 5.5 months ago.  X-rays reviewed by myself in clinic today.  Imaging showed the presence of severe degenerative changes of the bilateral 2nd DIP joints and 3rd DIP joints.  Presence of mild to moderate degenerative changes of the remaining D IP joints of the bilateral hands.  No other significant degenerative changes noted of the bilateral hands.  No other significant bony abnormalities noted.    ASSESSMENT:   Severe degenerative changes of the bilateral 2nd DIP joints.    PLAN:  1. I discussed with Faith Bernard that given the severity of the degenerative changes of the 2nd DIP joints, the likelihood of a steroid injection providing significant long-lasting relief is very low.  We did discuss definitive treatment would be to proceed with surgical intervention.  She elected to discuss the possibility of surgical intervention at this time.  We did discuss is a reasonable course of action     2. I would like to have her follow up in clinic with Dr. Chan to discuss the possibility of surgical intervention.  She was instructed to contact the clinic for any problems or concerns in the interim.

## 2024-04-10 ENCOUNTER — PATIENT MESSAGE (OUTPATIENT)
Dept: ORTHOPEDICS | Facility: CLINIC | Age: 59
End: 2024-04-10
Payer: COMMERCIAL

## 2024-04-24 ENCOUNTER — OFFICE VISIT (OUTPATIENT)
Dept: OPTOMETRY | Facility: CLINIC | Age: 59
End: 2024-04-24
Payer: COMMERCIAL

## 2024-04-24 DIAGNOSIS — H52.7 REFRACTIVE ERROR: ICD-10-CM

## 2024-04-24 DIAGNOSIS — H25.13 NUCLEAR SCLEROSIS, BILATERAL: Primary | ICD-10-CM

## 2024-04-24 DIAGNOSIS — Z46.0 CONTACT LENS/GLASSES FITTING: Primary | ICD-10-CM

## 2024-04-24 PROCEDURE — 1159F MED LIST DOCD IN RCRD: CPT | Mod: CPTII,S$GLB,, | Performed by: OPTOMETRIST

## 2024-04-24 PROCEDURE — 92015 DETERMINE REFRACTIVE STATE: CPT | Mod: S$GLB,,, | Performed by: OPTOMETRIST

## 2024-04-24 PROCEDURE — 99499 UNLISTED E&M SERVICE: CPT | Mod: ,,, | Performed by: OPTOMETRIST

## 2024-04-24 PROCEDURE — 99213 OFFICE O/P EST LOW 20 MIN: CPT | Mod: S$GLB,,, | Performed by: OPTOMETRIST

## 2024-04-24 PROCEDURE — 99999 PR PBB SHADOW E&M-EST. PATIENT-LVL II: CPT | Mod: PBBFAC,,, | Performed by: OPTOMETRIST

## 2024-04-24 PROCEDURE — 92310 CONTACT LENS FITTING OU: CPT | Mod: CSM,S$GLB,, | Performed by: OPTOMETRIST

## 2024-04-24 PROCEDURE — 1160F RVW MEDS BY RX/DR IN RCRD: CPT | Mod: CPTII,S$GLB,, | Performed by: OPTOMETRIST

## 2024-04-24 NOTE — PROGRESS NOTES
HPI    Pt here today for refraction and clfit.  States has tried contacts in the   past but did not stick with wearing them.    Thinks she would like to try monovision since feels like only needs for   near.    Only wears OTC readers now, no distance correction.  Last edited by Gabbi Burns on 4/24/2024  3:06 PM.            Assessment /Plan     For exam results, see Encounter Report.    Nuclear sclerosis, bilateral    Refractive error      1. Nuclear sclerosis, bilateral  Mild OU, not VS  Observe     2. Refractive error  Dispensed updated spectacle Rx. Discussed various spectacle lens options. Discussed adaptation period to new specs.     Discussed cls options -- previous wearer  Dispensed trials distance > near  Dispensed CLs trials: Biofinity MF. Daily wear only, dispose of monthly.   Discussed proper hand hygiene and wear/care of lenses. Do not sleep/swim/shower in lenses.   Discontinue CL wear ASAP and RTC if any redness or discomfort occurs.   Return in 2 weeks for clfu prn  Ok to finalize if doing well

## 2024-04-24 NOTE — PROGRESS NOTES
Assessment /Plan     For exam results, see Encounter Report.    Contact lens/glasses fitting      Patient is here for a comprehensive eye exam and contact lens fit. See other exam visit with same encounter date 04/24/2024 for detailed exam information.

## 2024-04-29 ENCOUNTER — PATIENT MESSAGE (OUTPATIENT)
Dept: OPTOMETRY | Facility: CLINIC | Age: 59
End: 2024-04-29
Payer: COMMERCIAL

## 2024-05-01 ENCOUNTER — HOSPITAL ENCOUNTER (OUTPATIENT)
Dept: RADIOLOGY | Facility: HOSPITAL | Age: 59
Discharge: HOME | End: 2024-05-01
Attending: ORTHOPAEDIC SURGERY
Payer: COMMERCIAL

## 2024-05-01 ENCOUNTER — OFFICE VISIT (OUTPATIENT)
Dept: ORTHOPEDICS | Facility: CLINIC | Age: 59
End: 2024-05-01
Payer: COMMERCIAL

## 2024-05-01 VITALS — WEIGHT: 171.75 LBS | HEIGHT: 63 IN | BODY MASS INDEX: 30.43 KG/M2

## 2024-05-01 DIAGNOSIS — M15.1 DEGENERATIVE ARTHRITIS OF DISTAL INTERPHALANGEAL JOINT OF MIDDLE FINGER OF RIGHT HAND: Primary | ICD-10-CM

## 2024-05-01 DIAGNOSIS — M15.1 DEGENERATIVE ARTHRITIS OF DISTAL INTERPHALANGEAL JOINT OF INDEX FINGER OF RIGHT HAND: ICD-10-CM

## 2024-05-01 DIAGNOSIS — M15.1 DEGENERATIVE ARTHRITIS OF DISTAL INTERPHALANGEAL JOINT OF INDEX FINGER OF LEFT HAND: ICD-10-CM

## 2024-05-01 PROCEDURE — 99999 PR PBB SHADOW E&M-EST. PATIENT-LVL V: CPT | Mod: PBBFAC,,, | Performed by: ORTHOPAEDIC SURGERY

## 2024-05-01 PROCEDURE — 73130 X-RAY EXAM OF HAND: CPT | Mod: TC,PO,RT

## 2024-05-01 PROCEDURE — 3008F BODY MASS INDEX DOCD: CPT | Mod: CPTII,S$GLB,, | Performed by: ORTHOPAEDIC SURGERY

## 2024-05-01 PROCEDURE — 99204 OFFICE O/P NEW MOD 45 MIN: CPT | Mod: S$GLB,,, | Performed by: ORTHOPAEDIC SURGERY

## 2024-05-01 PROCEDURE — 73130 X-RAY EXAM OF HAND: CPT | Mod: 26,RT,, | Performed by: STUDENT IN AN ORGANIZED HEALTH CARE EDUCATION/TRAINING PROGRAM

## 2024-05-01 NOTE — PATIENT INSTRUCTIONS
Surgery Instructions:     Your surgery is scheduled on 06/13/24 [facility], second entrance.    The pre-op department will be in contact with you prior to your procedure to review medications and instructions.       Nothing to eat or drink after midnight prior to day of surgery.    The surgery center will contact you the day prior to surgery to advise you of your arrival time for surgery.     Your post op appointment is scheduled on 06/26/24 @ 10:40am.

## 2024-05-01 NOTE — PROGRESS NOTES
5/1/2024    Chief Complaint:  Chief Complaint   Patient presents with    Right Hand - Pain, Swelling, Numbness     Discuss sx    Left Hand - Pain, Swelling, Numbness       HPI:  Faith Bernard is a 59 y.o. 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  She states that the right index and middle fingers are causing the most problems.  She also has it on left index finger.  She is here today to discuss further treatment options.    PMHX:  Past Medical History:   Diagnosis Date    Allergy     Anxiety     Depression     Empyema of right pleural space 2021    Encounter for blood transfusion     Esophageal dysphagia     Fibromyalgia     Gastric bypass status for obesity     H/O dental abscess 04/14/2014    drained    Headache(784.0)     migraines.  Treated at [facility] (Dr. 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  Pt states took too many then realized her mistake    Polycystic ovaries     S/P [id number]     Thyroid disease     hypothyroidism    Wears partial dentures     upper       PSHX:  Past Surgical History:   Procedure Laterality Date    ABDOMINAL SURGERY      ANKLE HARDWARE REMOVAL Left 3/28/2022    Procedure: REMOVAL, HARDWARE, ANKLE;  Surgeon: Fer Mcrae MD;  Location: [address];  Service: Orthopedics;  Laterality: Left;    APPLICATION OF LARGE EXTERNAL FIXATION DEVICE TO TIBIA Left 9/7/2020    Procedure: APPLICATION, EXTERNAL FIXATION DEVICE, TIBIA;  Surgeon: Sujata Londono MD;  Location: [facility] OR [address] [place] [address];   Service: Orthopedics;  Laterality: Left;  need paralysis    APPLICATION OF WOUND VACUUM-ASSISTED CLOSURE DEVICE Left 9/18/2020    Procedure: APPLICATION, WOUND VAC;  Surgeon: Fer Mcrae MD;  Location: [facility] OR 2ND FLR;  Service: Orthopedics;  Laterality: Left;    BREAST SURGERY  2004    Breast Reduction    CARDIAC CATHETERIZATION      COLONOSCOPY N/A 11/3/2015    Procedure: COLONOSCOPY;  Surgeon: Timothy Barber MD;  Location: [facility] ENDO;  Service: Endoscopy;  Laterality: N/A;    CYSTOSCOPY WITH INJECTION OF PERIURETHRAL BULKING AGENT N/A 9/14/2023    Procedure: CYSTOSCOPY, WITH PERIURETHRAL BULKING AGENT INJECTION;  Surgeon: David Perez MD;  Location: [facility] OR;  Service: Urology;  Laterality: N/A;    CYSTOSCOPY WITH URODYNAMIC TESTING N/A 4/17/2023    Procedure: CYSTOSCOPY, WITH URODYNAMIC TESTING;  Surgeon: David Perez MD;  Location: [facility] OR [facility] FLR;  Service: Urology;  Laterality: N/A;  1 hr    DENTAL SURGERY      4 teeth removed    GASTRIC BYPASS      HERNIA REPAIR      HYSTERECTOMY      ILIAC CREST BONE GRAFT Left 3/28/2022    Procedure: BONE GRAFT, ILIAC CREST;  Surgeon: Fer Mcrae MD;  [facility];  Service: Orthopedics;  Laterality: Left;    INJECTION OF ANESTHETIC AGENT AROUND NERVE N/A 3/29/2022    Procedure: Block, Nerve;  Surgeon: Alexus Surgeon;  Location: [facility] ALEXUS;  Service: Anesthesiology;  Laterality: N/A;    OPEN REDUCTION AND INTERNAL FIXATION (ORIF) OF PILON FRACTURE Left 9/18/2020    Procedure: ORIF, FRACTURE, PILON - left. Diving board, supine, bone foam. [place] anterolateral distal tibial plate, mini frag, long 3.5mm steel screw, CaPhos bone substitute;  Surgeon: Fer Mcrae MD;  Location: [facility] OR 2ND FLR;  Service: Orthopedics;  Laterality: Left;    OPEN REDUCTION AND INTERNAL FIXATION (ORIF) OF PILON FRACTURE Left 3/28/2022    Procedure: ORIF, FRACTURE, PILON nonunion + Iliac crest bone graft - diving board, supine, bone  foam. Seda axsos3 screws, 2.7 mini plates/screws, curettes, Seda/Lomas medical Augment;  Surgeon: Fer Mcrae MD;  Location: [facility] OR [address] [place] [address];  Service: Orthopedics;  Laterality: Left;  Spinal? + postop catheter (draping out pelvis for bone graft harvest)    OVARIAN CYST REMOVAL      aprox 5 times    REMOVAL OF EXTERNAL FIXATION DEVICE Left 9/18/2020    Procedure: REMOVAL, EXTERNAL FIXATION DEVICE;  Surgeon: Fer Mcrae MD;  Location: [facility] OR [address] [place] [address];  Service: Orthopedics;  Laterality: Left;    ROTATOR CUFF REPAIR Left 01/2019    SURGICAL PROCEDURE FOR STRESS INCONTINENCE USING TENSION FREE VAGINAL TAPE N/A 12/14/2022    Procedure: SURGICAL PROCEDURE, USING TENSION FREE VAGINAL TAPE, FOR STRESS INCONTINENCE;  Surgeon: Frandy Sherman MD;  Location: [facility] OR;  Service: OB/GYN;  Laterality: N/A;  cysto    tennis elbow repair Left 01/2019    UPPER GASTROINTESTINAL ENDOSCOPY         FMHX:  Family History   Problem Relation Name Age of Onset    Anxiety disorder Mother      Depression Mother      Suicide Mother      Seizures Mother      Drug abuse Mother      Ovarian cancer Mother      Aortic aneurysm Father      Glaucoma Maternal Grandmother      Colon cancer Neg Hx      Breast cancer Neg Hx      Diabetes Neg Hx      Hypertension Neg Hx         SOCHX:  Social History     Tobacco Use    Smoking status: Never    Smokeless tobacco: Never    Tobacco comments:     marijuana   Substance Use Topics    Alcohol use: Yes     Comment: rarely       ALLERGIES:  Cephalexin, Nsaids (non-steroidal anti-inflammatory drug), and Codeine    CURRENT MEDICATIONS:  Current Outpatient Medications on File Prior to Visit   Medication Sig Dispense Refill    acetaminophen (TYLENOL) 325 MG tablet Take 325 mg by mouth every 6 (six) hours as needed for Pain.      albuterol (PROVENTIL/VENTOLIN HFA) 90 mcg/actuation inhaler 2 puffs every 4 hours as needed for cough, wheeze, or shortness of breath 18 g 11    ALPRAZolam  (XANAX) 1 MG tablet       ARIPiprazole (ABILIFY) 15 MG Tab Take 7.5 mg by mouth once daily.      azithromycin (ZITHROMAX) 500 MG tablet Take one pill per day for three days as needed for green/yellow mucous production, cough. 3 tablet 0    cholecalciferol, vitamin D3, 50,000 unit capsule Take 50,000 Units by mouth every 7 days. Patient takes on Saturdays  99    CLARAVIS 30 mg capsule Take 30 mg by mouth 2 (two) times daily.      clobetasol 0.05% (TEMOVATE) 0.05 % Oint Apply topically every evening. Use small, pea-sized amount at the site, every night before bed for 2 weeks. After that, use only 2-3 times per week at night before bed. 30 g 1    cyanocobalamin 1,000 mcg/mL injection INJECT 1 ML IN THE MUSCLE EVERY 28 DAYS AS DIRECTED      CYANOCOBALAMIN, VITAMIN B-12, (VITAMIN B-12 INJ) Inject 1 mL as directed every 30 days.       dextroamphetamine-amphetamine (ADDERALL XR) 30 MG 24 hr capsule Take by mouth every morning.      diclofenac sodium (VOLTAREN) 1 % Gel Apply 2 g topically 4 (four) times daily. For hands 100 g 5    DULoxetine (CYMBALTA) 30 MG capsule Take 60 mg by mouth once daily.      erenumab-aooe (AIMOVIG) 140 mg/mL autoinjector Inject 140 mg into the skin once every 28 days to reduce migraines 1 mL 11    ergocalciferol (ERGOCALCIFEROL) 50,000 unit Cap Take 50,000 Units by mouth every 7 days.      fluconazole (DIFLUCAN) 150 MG Tab Take 1 tablet (150 mg total) by mouth once daily. 3 tablet 0    fluticasone-umeclidin-vilanter (TRELEGY ELLIPTA) 200-62.5-25 mcg inhaler Inhale 1 puff into the lungs once daily. 60 each 11    gabapentin (NEURONTIN) 600 MG tablet Take 1,200 mg by mouth 3 (three) times daily.      hydroxychloroquine (PLAQUENIL) 200 mg tablet Take 2 tablets a day every day except Sundays 52 tablet 3    ISOtretinoin (ACCUTANE) 40 MG capsule Take 40 mg by mouth 2 (two) times daily.      levothyroxine (SYNTHROID) 100 MCG tablet Take 1 tablet daily for 5 days and take half tablet on Saturday and  "Sunday. 90 tablet 3    LIDOcaine (XYLOCAINE) 5 % Oint ointment Apply topically 2 (two) times daily.      metoprolol tartrate (LOPRESSOR) 50 MG tablet Take 50 mg by mouth 2 (two) times daily.      NURTEC 75 mg odt Take 1 tablet by mouth every other day to reduce/prevent migraines 16 tablet 6    onabotulinumtoxina (BOTOX) 200 unit SolR as directed      oxyCODONE-acetaminophen (PERCOCET)  mg per tablet 5 (five) times daily. Take 1 tab 5x a day as needed      oxyCODONE-acetaminophen (PERCOCET)  mg per tablet take 1 tablet by mouth five times aday as needed for 30 days 150 tablet 0    pravastatin (PRAVACHOL) 20 MG tablet Take by mouth once daily.      predniSONE (DELTASONE) 20 MG tablet Take one pill per day for three days, can repeat as needed for shortness of breath, wheezing, cough. 12 tablet 0    tiZANidine (ZANAFLEX) 4 MG tablet Take 4 mg by mouth 2 (two) times daily.      topiramate (TOPAMAX) 200 MG Tab 2 pills every morning 60 tablet 5    [DISCONTINUED] aspirin (ECOTRIN) 81 MG EC tablet Take 1 tablet (81 mg total) by mouth 2 (two) times daily. 84 tablet 0     Current Facility-Administered Medications on File Prior to Visit   Medication Dose Route Frequency Provider Last Rate Last Admin    midazolam (VERSED) 1 mg/mL injection 0.5 mg  0.5 mg Intravenous PRN Myrtle Linares MD   2 mg at 09/18/20 1155       REVIEW OF SYSTEMS:  ROS    GENERAL PHYSICAL EXAM:   Ht 5' 3" (1.6 m)   Wt 77.9 kg (171 lb 11.8 oz)   LMP  (LMP Unknown)   BMI 30.42 kg/m²    GEN: well developed, well nourished, no acute distress   HENT: Normocephalic, atraumatic   EYES: No discharge, conjunctiva normal   NECK: Supple, non-tender   PULM: No wheezing, no respiratory distress   CV: RRR   ABD: Soft, non-tender    ORTHO EXAM:   Examination of the right hand and wrist reveals that there are no major skin changes.  Has obvious deformities about the tips of multiple fingers.  She has significant deformity of the distal interphalangeal " joint of the index and the middle fingers.  Palpation over those joints does produce a significant amount of tenderness.  There is no erythema or increased warmth.  Flexion and extension of those joints is limited.  She does have capillary refill less than 2 seconds at the tip.    RADIOLOGY:   X-rays of the right hand were taken in clinic today is noted to be significant arthritis of the distal interphalangeal joint of the right index and middle fingers.  There is degenerative arthritis throughout multiple other joints    ASSESSMENT:   Right index and middle finger distal interphalangeal joint arthritis    PLAN:  1. I have discussed treatment options with the patient.  I have discussed the possibility of arthrodesis of the index and the middle finger distal interphalangeal joint.  I did discuss the risks and benefits of the procedure.  Consent has been obtained     2. Will proceed with right index and middle finger distal interphalangeal joint arthrodesis.  This will be performed under general anesthesia with a single-shot supraclavicular block.    3.  The patient is under pain management.  She will continue her 10 mg oxycodone in the postoperative state     Four.  She will follow up 2 weeks after the surgery.

## 2024-05-02 ENCOUNTER — OFFICE VISIT (OUTPATIENT)
Dept: OPTOMETRY | Facility: CLINIC | Age: 59
End: 2024-05-02
Payer: COMMERCIAL

## 2024-05-02 DIAGNOSIS — Z97.3 WEARS CONTACT LENSES: Primary | ICD-10-CM

## 2024-05-02 PROCEDURE — 99499 UNLISTED E&M SERVICE: CPT | Mod: S$GLB,,, | Performed by: OPTOMETRIST

## 2024-05-02 NOTE — PROGRESS NOTES
HPI    Pt here today for cls follow up.  States blurred vision at near with   contacts, distance vision good.  Feels like OS vision is much worse then OD.    C/o glare when wearing contacts which is causing visual discomfort.    (+) Blink rewetting gtts OU qid  Last edited by Gabbi Burns on 5/2/2024  9:03 AM.            Assessment /Plan     For exam results, see Encounter Report.    Wears contact lenses      Good clfu -- discussed daily disposable vs monthly  Pt reports improved when started otc blink gtts  Happy with distance, needs more near    Dispensed set #2: Biofinity MF  Increased ADD power OU  Good BCVA in office -- 20/20 OU, J1 OU  Try lenses x 1-2 weeks, report back with progress     ** addendum 6/11/24 -- pt messaged, finalized trial set #1 per request **

## 2024-05-13 ENCOUNTER — PATIENT MESSAGE (OUTPATIENT)
Dept: UROLOGY | Facility: CLINIC | Age: 59
End: 2024-05-13
Payer: COMMERCIAL

## 2024-05-20 DIAGNOSIS — M15.1 DEGENERATIVE ARTHRITIS OF DISTAL INTERPHALANGEAL JOINT OF MIDDLE FINGER OF RIGHT HAND: Primary | ICD-10-CM

## 2024-05-20 DIAGNOSIS — M15.1 DEGENERATIVE ARTHRITIS OF DISTAL INTERPHALANGEAL JOINT OF INDEX FINGER OF RIGHT HAND: ICD-10-CM

## 2024-05-20 RX ORDER — CLINDAMYCIN PHOSPHATE 600 MG/50ML
600 INJECTION, SOLUTION INTRAVENOUS
OUTPATIENT
Start: 2024-05-20

## 2024-05-20 RX ORDER — MUPIROCIN 20 MG/G
OINTMENT TOPICAL
OUTPATIENT
Start: 2024-05-20

## 2024-05-24 ENCOUNTER — TELEPHONE (OUTPATIENT)
Dept: ORTHOPEDICS | Facility: CLINIC | Age: 59
End: 2024-05-24
Payer: COMMERCIAL

## 2024-05-24 NOTE — TELEPHONE ENCOUNTER
----- Message from Geovanni Rojas sent at 5/24/2024 12:43 PM CDT -----  Patient would like a callback regarding rescheduling her surgery.      [phone]

## 2024-05-31 ENCOUNTER — OFFICE VISIT (OUTPATIENT)
Dept: RHEUMATOLOGY | Facility: CLINIC | Age: 59
End: 2024-05-31
Payer: COMMERCIAL

## 2024-05-31 VITALS
WEIGHT: 161.69 LBS | HEIGHT: 63 IN | SYSTOLIC BLOOD PRESSURE: 114 MMHG | DIASTOLIC BLOOD PRESSURE: 79 MMHG | HEART RATE: 83 BPM | BODY MASS INDEX: 28.65 KG/M2

## 2024-05-31 DIAGNOSIS — M25.50 ARTHRALGIA, UNSPECIFIED JOINT: ICD-10-CM

## 2024-05-31 DIAGNOSIS — G89.4 CHRONIC PAIN SYNDROME: ICD-10-CM

## 2024-05-31 DIAGNOSIS — M25.572 PAIN OF JOINT OF LEFT ANKLE AND FOOT: ICD-10-CM

## 2024-05-31 DIAGNOSIS — M15.4 EROSIVE OSTEOARTHRITIS OF HANDS, BILATERAL: Primary | ICD-10-CM

## 2024-05-31 PROCEDURE — 99213 OFFICE O/P EST LOW 20 MIN: CPT | Mod: PBBFAC,PN | Performed by: STUDENT IN AN ORGANIZED HEALTH CARE EDUCATION/TRAINING PROGRAM

## 2024-05-31 PROCEDURE — 99999 PR PBB SHADOW E&M-EST. PATIENT-LVL III: CPT | Mod: PBBFAC,,, | Performed by: STUDENT IN AN ORGANIZED HEALTH CARE EDUCATION/TRAINING PROGRAM

## 2024-05-31 PROCEDURE — 99214 OFFICE O/P EST MOD 30 MIN: CPT | Mod: S$GLB,,, | Performed by: STUDENT IN AN ORGANIZED HEALTH CARE EDUCATION/TRAINING PROGRAM

## 2024-05-31 RX ORDER — HYDROCHLOROTHIAZIDE 25 MG/1
TABLET ORAL
COMMUNITY
Start: 2024-05-02

## 2024-05-31 RX ORDER — CETIRIZINE HYDROCHLORIDE 10 MG/1
10 TABLET ORAL
COMMUNITY
Start: 2024-05-02

## 2024-05-31 RX ORDER — PREDNISONE 5 MG/1
5 TABLET ORAL DAILY
Qty: 90 TABLET | Refills: 0 | Status: SHIPPED | OUTPATIENT
Start: 2024-05-31 | End: 2024-08-29

## 2024-05-31 NOTE — PROGRESS NOTES
RHEUMATOLOGY OUTPATIENT CLINIC NOTE    5/31/2024    Attending Rheumatologist: Kalie William  Primary Care Provider: Rah Canela MD   Physician Requesting Consultation: No referring provider defined for this encounter.  Chief Complaint/Reason For Consultation:  Joint Pain (R>L Hand, bilateral ankle, bilateral neck)      Subjective:       HPI  Faith Bernard is a 59 y.o. White female with history of hypertension, hyperparathyroidism, history of upper GI bleed, anxiety, migraines who comes for follow up on joint pain      Interim history    -Last visit in March 2023.   -Current meds:  mg BID except Sundays  -Saw hand surgery. Scheduled for R 2nd and 3rd DIP arthodesis on 6/27. She reports that they may consider left 2nd DIP arthodesis in the future.   -she is under a lot of stress with remodeling her house. Has noted that she needs gabapentin 1200 mg four times per day, this has been causing stress. She has also been taking oxycodone/apap around the clock due to worsening pain. She is on cymbalta 60 mg daily, she reports she used to take 120 mg in the past.   -feels that if she misses a dose of HCQ, her joint pain worsens.   -s/p right knee CSI on 3/6    Initial consult HPI    She has history of OA and was managed by rheumatologist many years ago.     In 2020 she had fracture in left distal tibia and ankle that required ORIF.     She has been noticing that lately she is having diffuse joint pain.  Denies any myalgias    She has chronic pain in her hands with deformities in the hips.  However she reports the form the last month she is having swelling and redness in all her PIP and MCPs.  Pain can wake him up in the middle of the night.  Having difficulty opening jars, holding her toothbrush.  Lidocaine cream helps.    She reports pain in elbows, no swelling.  Reports pain in bilateral shoulders, no difficulty lifting above her head.  Reports neck pain with limited range of motion, feels  like a burning sensation.  It is not too severe    Reports low back pain for many years associated with sciatica in both size.  Sees pain management.  Has had ablation in the past.     Reports bilateral lateral hip pain, intermittent.  Difficulty getting up from the car.  Reports history of bilateral knee pain, noted occasional swelling.  Has been noticing pain in bilateral ankles and feet    She reports morning stiffness of about 5-10 minutes    For pain control she currently takes oxycodone as needed, pregabalin prescribed by pain management.  She also takes Cymbalta per her psychiatrist    She denies any dry eyes, dry mouth, skin rashes, photosensitivity, oral ulcers, Raynaud's, abdominal pain.  She reports a history of mild psoriasis in her scalp    She tries to avoid NSAIDs due to history of upper GI bleeding secondary to ulcers.     She denies family history of rheumatoid arthritis.  However reports history of psoriasis in her family    Labs  10/10/2023  RF, CCP negative  ESR and CRP negative    2015  MARY JO negative    Imaging    XR bilateral knee: OA bilaterally   XR bilateral hands: joint space narrowing in bilateral 2-3 DIP with erosions  XR left foot with hardware failure    Chronic comorbid conditions affecting medical decision making today:  Past Medical History:   Diagnosis Date    Allergy     Anxiety     Depression     Empyema of right pleural space 2021    Encounter for blood transfusion     Esophageal dysphagia     Fibromyalgia     Gastric bypass status for obesity     H/O dental abscess 04/14/2014    drained    Headache(784.0)     migraines.  Treated at [facility] (Dr. 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  Pt states took too many then realized her mistake    Polycystic ovaries     S/P [id number]     Thyroid disease     hypothyroidism    Wears partial dentures     upper     Past Surgical History:   Procedure Laterality Date    ABDOMINAL SURGERY      ANKLE HARDWARE REMOVAL Left 3/28/2022    Procedure: REMOVAL, HARDWARE, ANKLE;  Surgeon: Fer Mcrae MD;  Location: [address];  Service: Orthopedics;  Laterality: Left;    APPLICATION OF LARGE EXTERNAL FIXATION DEVICE TO TIBIA Left 9/7/2020    Procedure: APPLICATION, EXTERNAL FIXATION DEVICE, TIBIA;  Surgeon: Sujata Londono MD;  Location: [address];  Service: Orthopedics;  Laterality: Left;  need paralysis    APPLICATION OF WOUND VACUUM-ASSISTED CLOSURE DEVICE Left 9/18/2020    Procedure: APPLICATION, WOUND VAC;  Surgeon: Fer Mcrae MD;  Location: [address];  Service: Orthopedics;  Laterality: Left;    BREAST SURGERY  2004    Breast Reduction    CARDIAC CATHETERIZATION      COLONOSCOPY N/A 11/3/2015    Procedure: COLONOSCOPY;  Surgeon: Timothy Barber MD;  Location: [facility];  Service: Endoscopy;  Laterality: N/A;    CYSTOSCOPY WITH INJECTION OF PERIURETHRAL BULKING AGENT N/A 9/14/2023    Procedure: CYSTOSCOPY, WITH PERIURETHRAL BULKING AGENT INJECTION;  Surgeon: David Perez MD;  Location: [facility];  Service: Urology;  Laterality: N/A;    CYSTOSCOPY WITH URODYNAMIC TESTING N/A 4/17/2023    Procedure: CYSTOSCOPY, WITH URODYNAMIC TESTING;  Surgeon: David Perez MD;  Location: [address];  Service: Urology;  Laterality: N/A;  1 hr    DENTAL SURGERY      4 teeth removed    GASTRIC BYPASS      HERNIA REPAIR      HYSTERECTOMY      ILIAC CREST BONE GRAFT Left 3/28/2022    Procedure: BONE GRAFT, ILIAC CREST;  Surgeon: Fer Mcrae MD;  Location: [address];  Service: Orthopedics;   Laterality: Left;    INJECTION OF ANESTHETIC AGENT AROUND NERVE N/A 3/29/2022    Procedure: Block, Nerve;  Surgeon: Alexus Surgeon;  Location: [facility];  Service: Anesthesiology;  Laterality: N/A;    OPEN REDUCTION AND INTERNAL FIXATION (ORIF) OF PILON FRACTURE Left 9/18/2020    Procedure: ORIF, FRACTURE, PILON - left. Diving board, supine, bone foam. [place] anterolateral distal tibial plate, mini frag, long 3.5mm steel screw, CaPhos bone substitute;  Surgeon: Fer Mcrae MD;  Location: [address];  Service: Orthopedics;  Laterality: Left;    OPEN REDUCTION AND INTERNAL FIXATION (ORIF) OF PILON FRACTURE Left 3/28/2022    Procedure: ORIF, FRACTURE, PILON nonunion + Iliac crest bone graft - diving board, supine, bone foam. Seda axsos3 screws, 2.7 mini plates/screws, curettes, Seda/Lomas medical Augment;  Surgeon: Fer Mcrae MD;  Location: [address];  Service: Orthopedics;  Laterality: Left;  Spinal? + postop catheter (draping out pelvis for bone graft harvest)    OVARIAN CYST REMOVAL      aprox 5 times    REMOVAL OF EXTERNAL FIXATION DEVICE Left 9/18/2020    Procedure: REMOVAL, EXTERNAL FIXATION DEVICE;  Surgeon: Fer Mcrae MD;  Location: [address];  Service: Orthopedics;  Laterality: Left;    ROTATOR CUFF REPAIR Left 01/2019    SURGICAL PROCEDURE FOR STRESS INCONTINENCE USING TENSION FREE VAGINAL TAPE N/A 12/14/2022    Procedure: SURGICAL PROCEDURE, USING TENSION FREE VAGINAL TAPE, FOR STRESS INCONTINENCE;  Surgeon: Frandy Sherman MD;  Location: [facility];  Service: OB/GYN;  Laterality: N/A;  cysto    tennis elbow repair Left 01/2019    UPPER GASTROINTESTINAL ENDOSCOPY       Family History   Problem Relation Name Age of Onset    Anxiety disorder Mother      Depression Mother      Suicide Mother      Seizures Mother      Drug abuse Mother      Ovarian cancer Mother      Aortic aneurysm Father      Glaucoma Maternal Grandmother      Colon cancer Neg Hx       Breast cancer Neg Hx      Diabetes Neg Hx      Hypertension Neg Hx       Social History     Substance and Sexual Activity   Alcohol Use Yes    Comment: rarely     Social History     Tobacco Use   Smoking Status Never   Smokeless Tobacco Never   Tobacco Comments    marijuana     Social History     Substance and Sexual Activity   Drug Use Yes    Types: Amphetamines, Barbituates, Marijuana       Current Outpatient Medications:     acetaminophen (TYLENOL) 325 MG tablet, Take 325 mg by mouth every 6 (six) hours as needed for Pain., Disp: , Rfl:     albuterol (PROVENTIL/VENTOLIN HFA) 90 mcg/actuation inhaler, 2 puffs every 4 hours as needed for cough, wheeze, or shortness of breath, Disp: 18 g, Rfl: 11    ARIPiprazole (ABILIFY) 15 MG Tab, Take 7.5 mg by mouth once daily., Disp: , Rfl:     cetirizine (ZYRTEC) 10 MG tablet, Take 10 mg by mouth., Disp: , Rfl:     cholecalciferol, vitamin D3, 50,000 unit capsule, Take 50,000 Units by mouth every 7 days. Patient takes on Saturdays, Disp: , Rfl: 99    clobetasol 0.05% (TEMOVATE) 0.05 % Oint, Apply topically every evening. Use small, pea-sized amount at the site, every night before bed for 2 weeks. After that, use only 2-3 times per week at night before bed., Disp: 30 g, Rfl: 1    cyanocobalamin 1,000 mcg/mL injection, INJECT 1 ML IN THE MUSCLE EVERY 28 DAYS AS DIRECTED, Disp: , Rfl:     dextroamphetamine-amphetamine (ADDERALL XR) 30 MG 24 hr capsule, Take by mouth every morning., Disp: , Rfl:     DULoxetine (CYMBALTA) 30 MG capsule, Take 60 mg by mouth once daily., Disp: , Rfl:     erenumab-aooe (AIMOVIG) 140 mg/mL autoinjector, Inject 140 mg into the skin once every 28 days to reduce migraines, Disp: 1 mL, Rfl: 11    fluconazole (DIFLUCAN) 150 MG Tab, Take 1 tablet (150 mg total) by mouth once daily., Disp: 3 tablet, Rfl: 0    fluticasone-umeclidin-vilanter (TRELEGY ELLIPTA) 200-62.5-25 mcg inhaler, Inhale 1 puff into the lungs once daily., Disp: 60 each, Rfl: 11     gabapentin (NEURONTIN) 600 MG tablet, Take 1,200 mg by mouth 3 (three) times daily., Disp: , Rfl:     hydroCHLOROthiazide (HYDRODIURIL) 25 MG tablet, TAKE 1 TABLET BY MOUTH DAILY IN THE MORNING, Disp: , Rfl:     hydroxychloroquine (PLAQUENIL) 200 mg tablet, Take 2 tablets a day every day except Sundays, Disp: 52 tablet, Rfl: 3    ISOtretinoin (ACCUTANE) 40 MG capsule, Take 40 mg by mouth 2 (two) times daily., Disp: , Rfl:     levothyroxine (SYNTHROID) 100 MCG tablet, Take 1 tablet daily for 5 days and take half tablet on Saturday and Sunday., Disp: 90 tablet, Rfl: 3    LIDOcaine (XYLOCAINE) 5 % Oint ointment, Apply topically 2 (two) times daily., Disp: , Rfl:     metoprolol tartrate (LOPRESSOR) 50 MG tablet, Take 50 mg by mouth 2 (two) times daily., Disp: , Rfl:     NURTEC 75 mg odt, Take 1 tablet by mouth every other day to reduce/prevent migraines, Disp: 16 tablet, Rfl: 6    onabotulinumtoxina (BOTOX) 200 unit SolR, as directed, Disp: , Rfl:     oxyCODONE-acetaminophen (PERCOCET)  mg per tablet, 5 (five) times daily. Take 1 tab 5x a day as needed, Disp: , Rfl:     pravastatin (PRAVACHOL) 20 MG tablet, Take by mouth once daily., Disp: , Rfl:     predniSONE (DELTASONE) 20 MG tablet, Take one pill per day for three days, can repeat as needed for shortness of breath, wheezing, cough., Disp: 12 tablet, Rfl: 0    tiZANidine (ZANAFLEX) 4 MG tablet, Take 4 mg by mouth 2 (two) times daily., Disp: , Rfl:     topiramate (TOPAMAX) 200 MG Tab, 2 pills every morning, Disp: 60 tablet, Rfl: 5    ALPRAZolam (XANAX) 1 MG tablet, , Disp: , Rfl:     azithromycin (ZITHROMAX) 500 MG tablet, Take one pill per day for three days as needed for green/yellow mucous production, cough., Disp: 3 tablet, Rfl: 0    CLARAVIS 30 mg capsule, Take 30 mg by mouth 2 (two) times daily., Disp: , Rfl:     CYANOCOBALAMIN, VITAMIN B-12, (VITAMIN B-12 INJ), Inject 1 mL as directed every 30 days. , Disp: , Rfl:     diclofenac sodium (VOLTAREN) 1 % Gel,  Apply 2 g topically 4 (four) times daily. For hands, Disp: 100 g, Rfl: 5    predniSONE (DELTASONE) 5 MG tablet, Take 1 tablet (5 mg total) by mouth once daily., Disp: 90 tablet, Rfl: 0  No current facility-administered medications for this visit.    Facility-Administered Medications Ordered in Other Visits:     midazolam (VERSED) 1 mg/mL injection 0.5 mg, 0.5 mg, Intravenous, PRN, Myrtle Linares MD, 2 mg at 09/18/20 1155     Objective:         Vitals:    05/31/24 1024   BP: 114/79   Pulse: 83       Physical Exam   Constitutional: She is oriented to person, place, and time. She appears well-developed.   HENT:   Head: Normocephalic.   Mouth/Throat: Mucous membranes are moist.   Salivary pool adequate  Oral aperture is normal   Pulmonary/Chest: Effort normal.   Musculoskeletal:      Cervical back: Neck supple.      Comments: [place] nodes in multiple DIPs that are tender (no erythema today)   PIPs and MCPs without tenderness or synovitis.  Bilateral wrists with no synovitis.  Left elbow with mild tenderness, no swelling.  Bilateral shoulder with normal range of motion.  Bilateral knees with no swelling, no crepitus. Left ankle with mild swelling on the lateral malleolus.  No synovitis or tenderness on feet.     Neurological: She is alert and oriented to person, place, and time.   Skin: No rash noted.   No skin rashes noted.  Normal capillaroscopy   Vitals reviewed.      Reviewed old and all outside pertinent medical records available.    All lab results personally reviewed and interpreted by me.  Lab Results   Component Value Date    WBC 17.30 (H) 03/29/2022    HGB 11.1 (L) 03/29/2022    HCT 37.0 03/29/2022    MCV 86 03/29/2022    MCH 25.9 (L) 03/29/2022    MCHC 30.0 (L) 03/29/2022    RDW 17.7 (H) 03/29/2022     03/29/2022    MPV 10.3 03/29/2022    NEUTROPCT 51.1 01/03/2013    MONOPCT 7.3 01/03/2013    PLTEST Normal 06/14/2006       Lab Results   Component Value Date     03/10/2023    K 4.1  "03/10/2023     (H) 03/10/2023    CO2 20 (L) 03/10/2023    GLU 98 03/10/2023    BUN 11 03/10/2023    CALCIUM 9.3 09/11/2023    PROT 6.7 03/10/2023    ALBUMIN 3.8 09/11/2023    BILITOT 0.2 03/10/2023    AST 16 03/10/2023    ALKPHOS 79 03/10/2023    ALT 23 03/10/2023    GFRNONAA 93 08/16/2021       Lab Results   Component Value Date    COLORU Yellow 08/03/2021    APPEARANCEUA Clear 08/03/2021    SPECGRAV >1.030 (A) 08/03/2021    PHUR 6.0 08/03/2021    PROTEINUA Negative 08/03/2021    GLUCOSEU NEG 01/01/2013    KETONESU Negative 08/03/2021    BLOODU NEG 01/01/2013    LEUKOCYTESUR Negative 08/03/2021    NITRITE Negative 08/03/2021    UROBILINOGEN Negative 08/03/2021       Lab Results   Component Value Date    CRP 4.9 10/10/2023       Lab Results   Component Value Date    RF <13.0 10/10/2023    SEDRATE 17 10/10/2023    CCPANTIBODIE <0.5 10/10/2023       No components found for: "25OHVITDTOT", "38TBRUIH7", "59FVCUXB0", "METHODNOTE"    No results found for: "URICACID"    Lab Results   Component Value Date    HEPBSAG Negative 10/04/2015    HEPCAB Negative 10/04/2015       Imaging:  All imaging reviewed and independently interpreted by me.    XR bilateral knee: OA bilaterally   XR bilateral hands: joint space narrowing in bilateral 2-3 DIP with erosions  XR left foot with hardware failure     ASSESSMENT / PLAN:     Faith Bernard is a 59 y.o. White female with history of hypertension, hyperparathyroidism, history of upper GI bleed, anxiety, migraines who comes for evaluation of joint pain.    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 She cannot do oral NSAIDs due to prior PUD. Already taking percocet and extra tylenol from pain management  -has noted improvement on  mg BID except Sundays. Will continue. She has a remote history of torsade de pointes however most " recent EKG in 12/2022 had normal Qtc   Eye exam on 2/2024 no HCQ toxicity   -Saw hand surgery. Scheduled for R 2nd and 3rd DIP arthodesis on 6/27. She reports that they may consider left 2nd DIP arthodesis in the future.   -advised her to use prednisone 5 mg daily as needed. Avoid taking it one week prior to surgert   -Can consider MTX in the future.   -labs prior to next visit     #Left ankle pain   -XR left ankle with hardware stabilizing distal tib fib fractures and there is hardware removal from the calcaneus. No acute fracture, dislocation, or bone destruction seen. Multiple fixation screws demonstrate metal fatigue fracture/hardware failure.     # polyarthralgia  -Besides hand pain she likely has OA in other sites contributing to her symptoms.   -we will keep in consideration history of mild psoriasis.    #Chronic pain  -She follows pain management.  Takes oxycodone as needed and gabapentin.   -she also takes Cymbalta 60 mg from her psychiatrist.    # hyperparathyroidism  -follows with endocrinology.    Follow up in about 2 months (around 7/31/2024).    Method of contact with patient concerns: [facility]    Disclaimer:  This note is prepared using voice recognition software and as such is likely to have errors and has not been proof read. Please contact me for questions.     Time spent: 25 minutes in face to face discussion concerning diagnosis, prognosis, review of lab and test results, benefits of treatment as well as management of disease, counseling of patient and coordination of care between various health care providers.  Greater than half the time spent was used for coordination of care and counseling of patient.    Kalie William M.D.  Rheumatology  [facility]

## 2024-06-07 ENCOUNTER — TELEPHONE (OUTPATIENT)
Dept: NEUROLOGY | Facility: CLINIC | Age: 59
End: 2024-06-07
Payer: COMMERCIAL

## 2024-06-08 ENCOUNTER — PATIENT MESSAGE (OUTPATIENT)
Dept: OPTOMETRY | Facility: CLINIC | Age: 59
End: 2024-06-08
Payer: COMMERCIAL

## 2024-06-10 ENCOUNTER — PROCEDURE VISIT (OUTPATIENT)
Dept: NEUROLOGY | Facility: CLINIC | Age: 59
End: 2024-06-10
Payer: COMMERCIAL

## 2024-06-10 VITALS
HEART RATE: 60 BPM | RESPIRATION RATE: 20 BRPM | SYSTOLIC BLOOD PRESSURE: 100 MMHG | WEIGHT: 174.19 LBS | BODY MASS INDEX: 30.85 KG/M2 | DIASTOLIC BLOOD PRESSURE: 69 MMHG

## 2024-06-10 DIAGNOSIS — G43.709 CHRONIC MIGRAINE WITHOUT AURA WITHOUT STATUS MIGRAINOSUS, NOT INTRACTABLE: Primary | ICD-10-CM

## 2024-06-10 PROCEDURE — 64615 CHEMODENERV MUSC MIGRAINE: CPT | Mod: S$GLB,,, | Performed by: PHYSICIAN ASSISTANT

## 2024-06-10 NOTE — PROCEDURES
[facility]  [address] [place] [address]  Phone:[phone]  Fax: [phone]  Botox Visit, #1    Chief Complaint   Patient presents with    Botulinum Toxin Injection         A/P:        ICD-10-CM ICD-9-CM   1. Chronic migraine without aura without status migrainosus, not intractable  G43.709 346.70     Botox for migraine    Historically: Patient meets the criteria for chronic migraine. In summary, She has headaches/migraines >15 days per month  and last >4 hours if untreated. Specifics of duration, frequency and strength are listed in office visit HPI's.  This pattern has continued for >3 months.  She has failed at least three preventive medications (full list of medications is listed in office notes of Therapies tried in past)  I have therefore recommended a trial of Botox via the PREEMPT protocol for migraine prophylaxis.We schedule regular follow up intervals to check on status.     PROCEDURE NOTE:  BOTOX injection is indicated for the prophylaxis of headaches in adult patients with chronic  migraine. Patient meets indications for BOTOX therapy.  Potential risks and benefits were reviewed. Side effects including, but not limited to, potential  systemic allergic reactions of the anaphylactic type as well as local injection site reactions of  blepharoptosis, diplopia, infection, bleeding, pain, redness and bruising were reviewed. The  potential for headaches and/or neck pain post procedure were reviewed.  The patient's questions were answered. The patient signed a consent form. Patient  understands that depending on their insurance carrier, there may be a copay for this treatment.  BOTOX was reconstituted using  two 100 unit vials and diluted with 4 mL of sterile saline.  BOTOX was injected as per the PREEMPT trial injection paradigm with dose administered as 5  unit intramuscular (IM) injections per site using a sterile, 30-gauge 0.5 inch needle as  "follows:  Muscle Dose, # of Sites   10 units divided in 2 sites  Procerus 5 units in 1 site  Frontalis 20 units divided in 4 sites  Temporalis 40 units divided in 8 sites  Occipitalis 30 units divided in 6 sites  Cervical paraspinal 20 units divided in 4 sites  Trapezius 30 units divided in 6 sites  Each site was cleaned with alcohol prior to injection. A total dose of 155 units were injected. 45  units were discarded/wasted.      The patient tolerated the procedure well with no immediate complications.  MEDICATION INFO:  NDC 6706-6596-04   Lot # [id number]  Exp 04/2026      As aside:  At baseline some eyebrow asymmetry, patient states "I am aware"     Follow up in 3 months for repeat injection, we also have interspersed office visits scheduled to ensure efficacy of botox sessions/check on patient.       Edouard Zamarripa MPA, PA-C  Attending available-Dr Toby MD           Personal Protective Equipment:    Personal Protective Equipment was used during this encounter including;     non latex gloves.     06/10/2024 2:25 PM    CC: Rah Canela MD        "

## 2024-06-24 ENCOUNTER — TELEPHONE (OUTPATIENT)
Dept: SURGERY | Facility: HOSPITAL | Age: 59
End: 2024-06-24
Payer: COMMERCIAL

## 2024-06-24 ENCOUNTER — TELEPHONE (OUTPATIENT)
Dept: ORTHOPEDICS | Facility: CLINIC | Age: 59
End: 2024-06-24
Payer: COMMERCIAL

## 2024-06-24 NOTE — TELEPHONE ENCOUNTER
Returned call to pt sx date moved from 6/27 to 7/25 due to her house not being ready after moving.

## 2024-06-24 NOTE — TELEPHONE ENCOUNTER
Pt is scheduled for Right index and middle finger distal interphalangeal joints arthrodesis - Right on 6/27. Needs to cancel. Please reach out to pt to reschedule. Thank you.

## 2024-06-25 DIAGNOSIS — S82.872K CLOSED DISPLACED PILON FRACTURE OF LEFT TIBIA WITH NONUNION, SUBSEQUENT ENCOUNTER: Primary | ICD-10-CM

## 2024-06-26 ENCOUNTER — OFFICE VISIT (OUTPATIENT)
Dept: ORTHOPEDICS | Facility: CLINIC | Age: 59
End: 2024-06-26
Payer: COMMERCIAL

## 2024-06-26 ENCOUNTER — HOSPITAL ENCOUNTER (OUTPATIENT)
Dept: RADIOLOGY | Facility: HOSPITAL | Age: 59
Discharge: HOME | End: 2024-06-26
Attending: ORTHOPAEDIC SURGERY
Payer: MEDICARE

## 2024-06-26 DIAGNOSIS — G89.29 CHRONIC PAIN OF LEFT ANKLE: ICD-10-CM

## 2024-06-26 DIAGNOSIS — M25.572 CHRONIC PAIN OF LEFT ANKLE: ICD-10-CM

## 2024-06-26 DIAGNOSIS — S82.872K CLOSED DISPLACED PILON FRACTURE OF LEFT TIBIA WITH NONUNION, SUBSEQUENT ENCOUNTER: ICD-10-CM

## 2024-06-26 DIAGNOSIS — S82.872K CLOSED DISPLACED PILON FRACTURE OF LEFT TIBIA WITH NONUNION, SUBSEQUENT ENCOUNTER: Primary | ICD-10-CM

## 2024-06-26 PROCEDURE — 73610 X-RAY EXAM OF ANKLE: CPT | Mod: 26,LT,, | Performed by: RADIOLOGY

## 2024-06-26 PROCEDURE — 73610 X-RAY EXAM OF ANKLE: CPT | Mod: TC,LT
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
## 2024-06-27 DIAGNOSIS — M15.1 DEGENERATIVE ARTHRITIS OF DISTAL INTERPHALANGEAL JOINT OF MIDDLE FINGER OF RIGHT HAND: Primary | ICD-10-CM

## 2024-06-27 DIAGNOSIS — M15.1 DEGENERATIVE ARTHRITIS OF DISTAL INTERPHALANGEAL JOINT OF INDEX FINGER OF RIGHT HAND: ICD-10-CM

## 2024-07-03 DIAGNOSIS — J45.20 MILD INTERMITTENT ASTHMA WITHOUT COMPLICATION: ICD-10-CM

## 2024-07-03 DIAGNOSIS — R06.2 WHEEZES: ICD-10-CM

## 2024-07-03 DIAGNOSIS — R05.3 CHRONIC COUGH: ICD-10-CM

## 2024-07-03 RX ORDER — FLUTICASONE FUROATE, UMECLIDINIUM BROMIDE AND VILANTEROL TRIFENATATE 200; 62.5; 25 UG/1; UG/1; UG/1
1 POWDER RESPIRATORY (INHALATION) DAILY
Qty: 60 EACH | Refills: 11 | Status: SHIPPED | OUTPATIENT
Start: 2024-07-03

## 2024-07-10 ENCOUNTER — OFFICE VISIT (OUTPATIENT)
Dept: ORTHOPEDICS | Facility: CLINIC | Age: 59
End: 2024-07-10
Payer: COMMERCIAL

## 2024-07-10 DIAGNOSIS — M25.572 CHRONIC PAIN OF LEFT ANKLE: Primary | ICD-10-CM

## 2024-07-10 DIAGNOSIS — G89.29 CHRONIC PAIN OF LEFT ANKLE: Primary | ICD-10-CM

## 2024-07-10 PROCEDURE — 99999 PR PBB SHADOW E&M-EST. PATIENT-LVL II: CPT | Mod: PBBFAC,,, | Performed by: ORTHOPAEDIC SURGERY

## 2024-07-10 RX ORDER — TRIAMCINOLONE ACETONIDE 40 MG/ML
40 INJECTION, SUSPENSION INTRA-ARTICULAR; INTRAMUSCULAR
Status: DISCONTINUED | OUTPATIENT
Start: 2024-07-10 | End: 2024-07-10 | Stop reason: HOSPADM

## 2024-07-10 RX ADMIN — TRIAMCINOLONE ACETONIDE 40 MG: 40 INJECTION, SUSPENSION INTRA-ARTICULAR; INTRAMUSCULAR at 10:07

## 2024-07-10 NOTE — PROCEDURES
Left ankle injection, medium joint injection    Date/Time: 7/10/2024 10:40 AM    Performed by: Fer Mcrae MD  Authorized by: Fer Mcrae MD    Consent Done?:  Yes (Verbal)  Indications:  Pain  Timeout: prior to procedure the correct patient, procedure, and site was verified    Prep: patient was prepped and draped in usual sterile fashion      Local anesthesia used?: Yes    Local anesthetic:  Lidocaine 1% without epinephrine  Anesthetic total (ml):  5      Details:  Needle Size:  21 G  Approach:  Anteromedial  Location: left ankle.  Medications:  40 mg triamcinolone acetonide 40 mg/mL  Patient tolerance:  Patient tolerated the procedure well with no immediate complications
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
## 2024-07-12 ENCOUNTER — PATIENT MESSAGE (OUTPATIENT)
Dept: ORTHOPEDICS | Facility: CLINIC | Age: 59
End: 2024-07-12
Payer: COMMERCIAL

## 2024-07-12 ENCOUNTER — TELEPHONE (OUTPATIENT)
Dept: NEUROLOGY | Facility: CLINIC | Age: 59
End: 2024-07-12
Payer: COMMERCIAL

## 2024-07-12 DIAGNOSIS — S82.872K CLOSED DISPLACED PILON FRACTURE OF LEFT TIBIA WITH NONUNION, SUBSEQUENT ENCOUNTER: Primary | ICD-10-CM

## 2024-07-12 NOTE — TELEPHONE ENCOUNTER
Can you call her about the nerve ablation therapy she is asking.    [facility] can you please fax therapy orders.

## 2024-07-12 NOTE — TELEPHONE ENCOUNTER
The prior authorization for Faith Bernard's Aimovig prescription has been APPROVED FROM 7/12/24 TO 7/12/25 with copayment of $5.00

## 2024-07-12 NOTE — TELEPHONE ENCOUNTER
The prior authorization for Faith Bernard's [facility] prescription has been APPROVED FROM 7/12/24 TO 7/12/25 with copayment of $0.00.

## 2024-07-22 ENCOUNTER — TELEPHONE (OUTPATIENT)
Dept: ORTHOPEDICS | Facility: CLINIC | Age: 59
End: 2024-07-22
Payer: COMMERCIAL

## 2024-07-22 ENCOUNTER — OFFICE VISIT (OUTPATIENT)
Dept: NEUROLOGY | Facility: CLINIC | Age: 59
End: 2024-07-22
Payer: COMMERCIAL

## 2024-07-22 DIAGNOSIS — G43.709 CHRONIC MIGRAINE WITHOUT AURA WITHOUT STATUS MIGRAINOSUS, NOT INTRACTABLE: Primary | ICD-10-CM

## 2024-07-22 PROCEDURE — 99213 OFFICE O/P EST LOW 20 MIN: CPT | Mod: 95,,, | Performed by: PHYSICIAN ASSISTANT

## 2024-07-22 PROCEDURE — 1160F RVW MEDS BY RX/DR IN RCRD: CPT | Mod: CPTII,95,, | Performed by: PHYSICIAN ASSISTANT

## 2024-07-22 PROCEDURE — 1159F MED LIST DOCD IN RCRD: CPT | Mod: CPTII,95,, | Performed by: PHYSICIAN ASSISTANT

## 2024-07-22 NOTE — PROGRESS NOTES
[facility]  [address] [place] [address]  Phone:[phone]  Fax: [phone]   Follow up visit -telemed    Provider Location: Work         Name of Location: [facility]  City: [place]   State: [place]  Medium to connect: Video  Patient Location: home  Name of Location:   home                                   City: [place]                                                              State: [place]                                         Consent for Electronic Treatment:   This visit was conducted with the use of an interactive audio and/or video telecommunications system that permits real-time communication between the patient and this provider. The patient has submitted their consent to be treated electronically by way of this telephone and/or video technology.  The risks and limitations of the process of telemedicine have been conveyed verbally during this encounter.Each patient to whom he or she provides medical services by telemedicine is:  (1) informed of the relationship between the physician and patient and the respective role of any other health care provider with respect to management of the patient; and (2) notified that he or she may decline to receive medical services by telemedicine and may withdraw from such care at any time.    Face to Face time with patient:   15  minutes of total time spent on the encounter, which includes face to face time and non-face to face time preparing to see the patient (eg, review of tests), Obtaining and/or reviewing separately obtained history, Documenting clinical information in the electronic or other health record, Independently interpreting results (not separately reported) and communicating results to the patient/family/caregiver, or Care coordination (not separately reported).       Patient Name: Faith SIMS Jaironvicky  : 1965  MRN:  [id number]  Today: 2024   LOV: 6/10/2024 for botox 1   chief  complaint: Headache    PCP: Rah Canela MD.       Assessment:   Faith Bernard is a 59 y.o. right handed female with a PMHx of: anxiety/depression, FMG, gastric bypass, HA, neuropathy, osteoarthritis, chronic pain,  and PCOS   whom presents solo via telemed in follow up for HA.  FERRER appear to be chronic migrainous and resistant to many therapies. Current regimen keeping HA/migraines to minimum.       Review:    ICD-10-CM ICD-9-CM   1. Chronic migraine without aura without status migrainosus, not intractable  G43.709 346.70           Plan:               -if HA change in quality/nature, will get updated imaging study     For HA Prevention:  1 came to me on: aimovig 140 mg 1 shot Q28 days and topamax 400 mg BID (though admits to only taking 400 mg Qam). I will continue these medicines for now, we have discussed slowly tapering off topamax at next visit.     2 Patient meets the criteria for chronic migraine. In summary, She has  migraines >15 days per month  and last >4 hours if untreated. Specifics of duration, frequency and strength are listed in the HPI (please refer to this section).  This pattern has continued for >3 months.  She has failed at least three preventive medications (full list of medications is listed below in the HPI under Therapies tried in past, but for ease of reference aimovig, vyvanse, verpamil, effexor, valsartan, trazodone, topamax, botox, inderal, nurtec, Mg, toprol, etc )  I have therefore recommended a trial of Botox via the PREEMPT protocol for migraine prophylaxis.   We discussed what to expect on procedure day at length, wear old or loose fitting clothing (if possible, merely to keep work clothes from getting any blood or being wrinkled), no make up (if applicable), eat meals/stay well hydrated and secure a ride if necessary. Also, discussed it can take up to 2-3 sessions of botox to get results desired. Lastly, we discussed procedure at length, 31 injections done in the office,  potential complications not limited to muscle weakness, respiratory issues, or worst case scenario-death. The patient voiced understanding and wished to move forward.  Muscles to be injected:   10 units divided in 2 sites  Procerus 5 units in 1 site  Frontalis 20 units divided in 4 sites  Temporalis 40 units divided in 8 sites  Occipitalis 30 units divided in 6 sites  Cervical paraspinal 20 units divided in 4 sites  Trapezius 30 units divided in 6 sites  A total dose of 155 units of botox to be used with  45 units to be discarded/wasted (unavoidable).      ~The Botox injections have achieved well over 50%  improvement in the patient's symptoms. Migraines have been reduced at least 7 days per month and the number of cumulative hours suffering with headaches has been reduced at least 100 total hours per month. Move forward with botox #2       For HA :  1 limit pain medicines to <3 days use in week for migraines   2 nurtec (was prophylaxis, came to me on this regimen)    To break up Headaches:  May consider nerve blocks in future (not discussed)     Other:   N/a           All test results as well as any necessary instructions were reviewed and discussed with patient.    Review:           Patient to return to PCP/other specialists for all other problems  Patient to continue on all medications as Rx'd  Full office note available online.   RTO- for botox 2  The patient indicates understanding of these issues and agrees to the plan.    HPI:   Faith Bernard is a 59 y.o.right handed, female with a PMHx of: anxiety/depression, FMG, gastric bypass, HA, neuropathy, osteoarthritis, chronic pain,  and PCOS   whom presents  via telemed in follow up for HA.     At last visit:botox 1 given, previously (and came to me on this regimen) of 400 mg of topamax Qevening/ aimovig and nurtec.    Presents late to virtual appt   1 migraine a week   Nurtec is abortive  No side effects from botox  Last HA was this  "past Friday   Not take nurtec every other day as prophylaxis   Aimovig no side effects at 1 shot Q28 days   Would like to start coming down on some of the medicines after next botox   Has slight right eyebrow raise compared to left, but states "I think its always like that"          From previous visit:   Has been followed by other neurology groups, d/t insurance changes, would like to transfer her care here.   Presents late to appt, able to still see her     HA HPI:  Start:HA for many years, was on botox therapy and d/t insurance changes this care has lapsed   History of trauma (yes), History of CNS infection (no), History of Stroke (no)  Location:frontalis/neck/shoulders        Last botox: >6  months ago, and for cosmesis 3 weeks ago        Without botox: 20 migraine days a month       With botox: 8 migraine days a month                -uses nurtec, topamax (written for 400 mg BID, states she is only taking 400 mg Qam) and aimovig (states she is due this week) as preventive with the botox      Associated factors (bolded positive) WITH HA ( or migraine): Nausea, vomiting, photophobia, phonophobia, tinnitus, scalp pain, vision loss, diplopia, scintillations, eye pain, jaw pain, weakness?    Tried:botox, nurtec and aimovig   Triggers (in bold): stress/mood change, lack of sleep, too much caffeine, too little caffeine, weather change, seasonal change, strong odours, bright lights, sunlight,  hot temps, food    Last HA: 3 days ago   Positives in bold: Hx of Kidney Stones, asthma, GI bleed, osteoporosis, CAD/MI, CVA/TIA, DM    Imaging on file: none in past 5 years   Therapies tried in past: (failures to be marked, if known---why did it fail?)  Ambien  voltaren  Vyvanse  Verapamil  Effexor  Valsartan-hctz  Trazodone  Topamax  Zanaflex  Imitrex  Maxalt  Inderal  Lyrica  Deltasone  Oxycodone  Zofran  Botox  Nurtec  Toprol "   Robaxin  Mg  Lyrica  Toradol  Vistaril  Hctz  Haldol  Gabapentin  Prozac  Aimovig  Cymbalta  Depakote  Flexeril  Parafon forte  Soma  Abilify  botox                         Medication Reconciliation:   Current Outpatient Medications   Medication Sig Dispense Refill    acetaminophen (TYLENOL) 325 MG tablet Take 325 mg by mouth every 6 (six) hours as needed for Pain.      albuterol (PROVENTIL/VENTOLIN HFA) 90 mcg/actuation inhaler 2 puffs every 4 hours as needed for cough, wheeze, or shortness of breath 18 g 11    ARIPiprazole (ABILIFY) 15 MG Tab Take 7.5 mg by mouth once daily.      cetirizine (ZYRTEC) 10 MG tablet Take 10 mg by mouth.      cholecalciferol, vitamin D3, 50,000 unit capsule Take 50,000 Units by mouth every 7 days. Patient takes on Saturdays  99    clobetasol 0.05% (TEMOVATE) 0.05 % Oint Apply topically every evening. Use small, pea-sized amount at the site, every night before bed for 2 weeks. After that, use only 2-3 times per week at night before bed. 30 g 1    cyanocobalamin 1,000 mcg/mL injection INJECT 1 ML IN THE MUSCLE EVERY 28 DAYS AS DIRECTED      dextroamphetamine-amphetamine (ADDERALL XR) 30 MG 24 hr capsule Take by mouth every morning.      diclofenac sodium (VOLTAREN) 1 % Gel Apply 2 g topically 4 (four) times daily. For hands 100 g 5    DULoxetine (CYMBALTA) 30 MG capsule Take 60 mg by mouth once daily.      erenumab-aooe (AIMOVIG) 140 mg/mL autoinjector Inject 140 mg into the skin once every 28 days to reduce migraines 1 mL 11    fluconazole (DIFLUCAN) 150 MG Tab Take 1 tablet (150 mg total) by mouth once daily. 3 tablet 0    fluticasone-umeclidin-vilanter (TRELEGY ELLIPTA) 200-62.5-25 mcg inhaler Inhale 1 puff into the lungs once daily. 60 each 11    gabapentin (NEURONTIN) 600 MG tablet Take 1,200 mg by mouth 3 (three) times daily.      hydroCHLOROthiazide (HYDRODIURIL) 25 MG tablet TAKE 1 TABLET BY MOUTH DAILY IN THE MORNING      hydroxychloroquine (PLAQUENIL) 200 mg tablet Take 2  tablets a day every day except Sundays 52 tablet 3    ISOtretinoin (ACCUTANE) 40 MG capsule Take 40 mg by mouth 2 (two) times daily.      levothyroxine (SYNTHROID) 100 MCG tablet Take 1 tablet daily for 5 days and take half tablet on Saturday and Sunday. 90 tablet 3    LIDOcaine (XYLOCAINE) 5 % Oint ointment Apply topically 2 (two) times daily.      metoprolol tartrate (LOPRESSOR) 50 MG tablet Take 50 mg by mouth 2 (two) times daily.      NURTEC 75 mg odt Take 1 tablet by mouth every other day to reduce/prevent migraines 16 tablet 6    onabotulinumtoxina (BOTOX) 200 unit SolR as directed      oxyCODONE-acetaminophen (PERCOCET)  mg per tablet 5 (five) times daily. Take 1 tab 5x a day as needed      pravastatin (PRAVACHOL) 20 MG tablet Take by mouth once daily.      predniSONE (DELTASONE) 20 MG tablet Take one pill per day for three days, can repeat as needed for shortness of breath, wheezing, cough. 12 tablet 0    predniSONE (DELTASONE) 5 MG tablet Take 1 tablet (5 mg total) by mouth once daily. 90 tablet 0    tiZANidine (ZANAFLEX) 4 MG tablet Take 4 mg by mouth 2 (two) times daily.      topiramate (TOPAMAX) 200 MG Tab 2 pills every morning 60 tablet 5     No current facility-administered medications for this visit.     Facility-Administered Medications Ordered in Other Visits   Medication Dose Route Frequency Provider Last Rate Last Admin    midazolam (VERSED) 1 mg/mL injection 0.5 mg  0.5 mg Intravenous PRN Myrtle Linares MD   2 mg at 09/18/20 1155     Review of patient's allergies indicates:   Allergen Reactions    Cephalexin Anaphylaxis    Nsaids (non-steroidal anti-inflammatory drug) Other (See Comments)     Has a history of bleeding ulcers    Codeine Itching       PMHx:  Past Medical History:   Diagnosis Date    Allergy     Anxiety     Depression     Empyema of right pleural space 2021    Encounter for blood transfusion     Esophageal dysphagia     Fibromyalgia     Gastric bypass status for obesity      H/O dental abscess 04/14/2014    drained    Headache(784.0)     migraines.  Treated at [facility] (Dr. 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  Pt states took too many then realized her mistake    Polycystic ovaries     S/P JUHI-BSO     Thyroid disease     hypothyroidism    Wears partial dentures     upper     Past Surgical History:   Procedure Laterality Date    ABDOMINAL SURGERY      ANKLE HARDWARE REMOVAL Left 3/28/2022    Procedure: REMOVAL, HARDWARE, ANKLE;  Surgeon: Fer Mcrae MD;  Location: [address];  Service: Orthopedics;  Laterality: Left;    APPLICATION OF LARGE EXTERNAL FIXATION DEVICE TO TIBIA Left 9/7/2020    Procedure: APPLICATION, EXTERNAL FIXATION DEVICE, TIBIA;  Surgeon: Sujata Londono MD;  Location: [address];  Service: Orthopedics;  Laterality: Left;  need paralysis    APPLICATION OF WOUND VACUUM-ASSISTED CLOSURE DEVICE Left 9/18/2020    Procedure: APPLICATION, WOUND VAC;  Surgeon: Fer Mcrae MD;  Location: [facility] OR [address] [place] [address];  Service: Orthopedics;  Laterality: Left;    BREAST SURGERY  2004    Breast Reduction    CARDIAC CATHETERIZATION      COLONOSCOPY N/A 11/3/2015    Procedure: COLONOSCOPY;  Surgeon: Timothy Barber MD;  Location: [facility];  Service: Endoscopy;  Laterality: N/A;    CYSTOSCOPY WITH INJECTION OF PERIURETHRAL BULKING AGENT N/A 9/14/2023    Procedure: CYSTOSCOPY, WITH PERIURETHRAL BULKING AGENT INJECTION;  Surgeon: David Perez MD;  Location: [facility];  Service: Urology;  Laterality: N/A;    CYSTOSCOPY WITH URODYNAMIC  TESTING N/A 4/17/2023    Procedure: CYSTOSCOPY, WITH URODYNAMIC TESTING;  Surgeon: David Perez MD;  Location: [facility] OR [facility];  Service: Urology;  Laterality: N/A;  1 hr    DENTAL SURGERY      4 teeth removed    GASTRIC BYPASS      HERNIA REPAIR      HYSTERECTOMY      ILIAC CREST BONE GRAFT Left 3/28/2022    Procedure: BONE GRAFT, ILIAC CREST;  Surgeon: Fer Mcrae MD;  Location: [facility] OR 2ND FLR;  Service: Orthopedics;  Laterality: Left;    INJECTION OF ANESTHETIC AGENT AROUND NERVE N/A 3/29/2022    Procedure: Block, Nerve;  Surgeon: Alexus Surgeon;  Location: [facility];  Service: Anesthesiology;  Laterality: N/A;    OPEN REDUCTION AND INTERNAL FIXATION (ORIF) OF PILON FRACTURE Left 9/18/2020    Procedure: ORIF, FRACTURE, PILON - left. Diving board, supine, bone foam. Dallas anterolateral distal tibial plate, mini frag, long 3.5mm steel screw, CaPhos bone substitute;  Surgeon: Fer Mcrae MD;  Location: [facility] OR [address] [place] [address];  Service: Orthopedics;  Laterality: Left;    OPEN REDUCTION AND INTERNAL FIXATION (ORIF) OF PILON FRACTURE Left 3/28/2022    Procedure: ORIF, FRACTURE, PILON nonunion + Iliac crest bone graft - diving board, supine, bone foam. Dallas axsos3 screws, 2.7 mini plates/screws, curettes, Seda/Lomas medical Augment;  Surgeon: Fer Mcrae MD;  Location: [facility] OR [address] [place] [address];  Service: Orthopedics;  Laterality: Left;  Spinal? + postop catheter (draping out pelvis for bone graft harvest)    OVARIAN CYST REMOVAL      aprox 5 times    REMOVAL OF EXTERNAL FIXATION DEVICE Left 9/18/2020    Procedure: REMOVAL, EXTERNAL FIXATION DEVICE;  Surgeon: Fer Mcrae MD;  Location: [facility] OR [address] [place] [address];  Service: Orthopedics;  Laterality: Left;    ROTATOR CUFF REPAIR Left 01/2019    SURGICAL PROCEDURE FOR STRESS INCONTINENCE USING TENSION FREE VAGINAL TAPE N/A 12/14/2022    Procedure: SURGICAL PROCEDURE, USING TENSION FREE VAGINAL TAPE, FOR STRESS INCONTINENCE;  Surgeon:  Frandy Sherman MD;  Location[facility] OR;  Service: OB/GYN;  Laterality: N/A;  cysto    tennis elbow repair Left 01/2019    UPPER GASTROINTESTINAL ENDOSCOPY         Fhx:  Family History   Problem Relation Name Age of Onset    Anxiety disorder Mother      Depression Mother      Suicide Mother      Seizures Mother      Drug abuse Mother      Ovarian cancer Mother      Aortic aneurysm Father      Glaucoma Maternal Grandmother      Colon cancer Neg Hx      Breast cancer Neg Hx      Diabetes Neg Hx      Hypertension Neg Hx         Shx: + medical cannabis,  Social History     Socioeconomic History    Marital status:    Tobacco Use    Smoking status: Never    Smokeless tobacco: Never    Tobacco comments:     marijuana   Substance and Sexual Activity    Alcohol use: Yes     Comment: rare    Drug use: Yes     Types: Marijuana     Comment: medical    Sexual activity: Yes     Partners: Male     Birth control/protection: Surgical, None           Labs:   Reviewed in chart     Imaging:   Reviewed in chart       Other testing:  Reviewed in chart     Note: I have independently reviewed any/all imaging/labs/tests and agree with the report (s) as documented.  Any discrepancies will be as noted/demarcated by free text.  BOBBY FLOR 7/22/2024                     ROS:   Review Of Systems (questions asked, positive or additions in BOLD)  Gen: Weight change, fatigue/malaise, pyrexia   HEENT: Tinnitus, headache,  blurred vision, eye pain, diplopia, photophobia,  nose bleeds, congestion, sore throat, jaw pain, scalp pain, neck stiffness   Card: Palpitations, CP   Pulm: SOB, cough   Vas: Easy bruising, easy bleeding   GI: N/V/D/C, incontinence, hematemesis, hematochezia    : incontinence, hematuria   Endocrine: Temp intolerance, polyuria, polydipsia   M/S: Neck pain, myalgia, back pain, joint pain, falls    Neuro: PER HPI   PSY: Memory loss, confusion, depression, anxiety, trouble in sleep          EXAM:   LMP  (LMP Unknown)  <---none  taken as this was a virtual visit   GEN:  NAD  HEENT: NC/AT      NEURO:  Mental Status:  Awake, alert and appropriately oriented to time, place, and person.  Normal attention and concentration.  Speech is fluent and appropriate language function (I.e., comprehension).     Cranial Nerves:       Extraocular movements are intact and without nystagmus.      Facial movement is symmetric though right eyebrow slight higher than left. Hearing appears to be intact. Shoulder shrug symmetrical. Tongue in midline without fasiculation.     Motor: antigravity bilat UE   No resting tremor       Gait and Stance: not tested         This document has been electronically signed by Mr. Edouard CAPELLANCon Zamarripa MPA, PA-C on 7/22/2024, I have personally typed this message using the EMR.       Dr Toby MD  was available during today's visit.          CC: Rah Canela MD

## 2024-07-22 NOTE — TELEPHONE ENCOUNTER
----- Message from Geovanni Rojas sent at 7/22/2024 12:28 PM CDT -----  Patient would like a callback regarding rescheduling her surgery.     [phone]

## 2024-07-23 ENCOUNTER — TELEPHONE (OUTPATIENT)
Dept: ORTHOPEDICS | Facility: CLINIC | Age: 59
End: 2024-07-23
Payer: COMMERCIAL

## 2024-07-23 ENCOUNTER — TELEPHONE (OUTPATIENT)
Dept: SURGERY | Facility: HOSPITAL | Age: 59
End: 2024-07-23
Payer: COMMERCIAL

## 2024-07-23 NOTE — TELEPHONE ENCOUNTER
Pt is scheduled for Right index and middle finger distal interphalangeal joints arthrodesis - Right on 7/25. Pt wants to reschedule due to remodeling of home. Please reach out to pt. Thank you.

## 2024-07-23 NOTE — TELEPHONE ENCOUNTER
Spoke with patient. Patient stated she would like to cancel surgery for now and will call back when her house is complete to reschedule.   ----- Message from Remigio Ramírez sent at 7/23/2024  9:50 AM CDT -----  Regarding: return call  Contact: patient  Type:  Patient Returning Call    Who Called:patient  Who Left Message for Patient:office staff  Does the patient know what this is regarding?:please cancel upcoming procedure  Would the patient rather a call back or a response via [facility]?   Best Call Back Number:[phone]  Additional Information:

## 2024-07-25 ENCOUNTER — PATIENT MESSAGE (OUTPATIENT)
Dept: NEUROLOGY | Facility: CLINIC | Age: 59
End: 2024-07-25
Payer: COMMERCIAL

## 2024-07-25 DIAGNOSIS — G43.709 CHRONIC MIGRAINE WITHOUT AURA WITHOUT STATUS MIGRAINOSUS, NOT INTRACTABLE: Primary | ICD-10-CM

## 2024-07-25 DIAGNOSIS — M54.2 NECK PAIN: ICD-10-CM

## 2024-07-25 DIAGNOSIS — M79.18 MYOFASCIAL PAIN: ICD-10-CM

## 2024-08-14 ENCOUNTER — OFFICE VISIT (OUTPATIENT)
Dept: RHEUMATOLOGY | Facility: CLINIC | Age: 59
End: 2024-08-14
Payer: COMMERCIAL

## 2024-08-14 VITALS
SYSTOLIC BLOOD PRESSURE: 134 MMHG | HEART RATE: 60 BPM | WEIGHT: 177.69 LBS | HEIGHT: 63 IN | BODY MASS INDEX: 31.48 KG/M2 | DIASTOLIC BLOOD PRESSURE: 87 MMHG

## 2024-08-14 DIAGNOSIS — M15.4 EROSIVE OSTEOARTHRITIS OF HANDS, BILATERAL: Primary | ICD-10-CM

## 2024-08-14 DIAGNOSIS — Z79.899 LONG-TERM USE OF PLAQUENIL: ICD-10-CM

## 2024-08-14 DIAGNOSIS — M25.572 PAIN OF JOINT OF LEFT ANKLE AND FOOT: ICD-10-CM

## 2024-08-14 DIAGNOSIS — G89.4 CHRONIC PAIN SYNDROME: ICD-10-CM

## 2024-08-14 PROCEDURE — 3075F SYST BP GE 130 - 139MM HG: CPT | Mod: CPTII,S$GLB,, | Performed by: STUDENT IN AN ORGANIZED HEALTH CARE EDUCATION/TRAINING PROGRAM

## 2024-08-14 PROCEDURE — 99214 OFFICE O/P EST MOD 30 MIN: CPT | Mod: 25,S$GLB,, | Performed by: STUDENT IN AN ORGANIZED HEALTH CARE EDUCATION/TRAINING PROGRAM

## 2024-08-14 PROCEDURE — 1159F MED LIST DOCD IN RCRD: CPT | Mod: CPTII,S$GLB,, | Performed by: STUDENT IN AN ORGANIZED HEALTH CARE EDUCATION/TRAINING PROGRAM

## 2024-08-14 PROCEDURE — 3008F BODY MASS INDEX DOCD: CPT | Mod: CPTII,S$GLB,, | Performed by: STUDENT IN AN ORGANIZED HEALTH CARE EDUCATION/TRAINING PROGRAM

## 2024-08-14 PROCEDURE — 96372 THER/PROPH/DIAG INJ SC/IM: CPT | Mod: S$GLB,,, | Performed by: STUDENT IN AN ORGANIZED HEALTH CARE EDUCATION/TRAINING PROGRAM

## 2024-08-14 PROCEDURE — 99999 PR PBB SHADOW E&M-EST. PATIENT-LVL III: CPT | Mod: PBBFAC,,, | Performed by: STUDENT IN AN ORGANIZED HEALTH CARE EDUCATION/TRAINING PROGRAM

## 2024-08-14 PROCEDURE — 3079F DIAST BP 80-89 MM HG: CPT | Mod: CPTII,S$GLB,, | Performed by: STUDENT IN AN ORGANIZED HEALTH CARE EDUCATION/TRAINING PROGRAM

## 2024-08-14 RX ORDER — DULOXETIN HYDROCHLORIDE 60 MG/1
60 CAPSULE, DELAYED RELEASE ORAL
COMMUNITY
Start: 2024-06-10

## 2024-08-14 RX ORDER — KETOROLAC TROMETHAMINE 30 MG/ML
30 INJECTION, SOLUTION INTRAMUSCULAR; INTRAVENOUS
Status: COMPLETED | OUTPATIENT
Start: 2024-08-14 | End: 2024-08-14

## 2024-08-14 RX ORDER — HYDROXYCHLOROQUINE SULFATE 200 MG/1
TABLET, FILM COATED ORAL
Qty: 52 TABLET | Refills: 3 | Status: SHIPPED | OUTPATIENT
Start: 2024-08-14

## 2024-08-14 RX ORDER — PHENTERMINE HYDROCHLORIDE 15 MG/1
15 CAPSULE ORAL
COMMUNITY
Start: 2024-07-23

## 2024-08-14 RX ADMIN — KETOROLAC TROMETHAMINE 30 MG: 30 INJECTION, SOLUTION INTRAMUSCULAR; INTRAVENOUS at 11:08

## 2024-08-14 NOTE — PATIENT INSTRUCTIONS
If flare, take prednisone 5 mg tablet as follows: 3 tablets for 3 days then 2 tablets for 3 days then 1 tablet for 3 days then stop.

## 2024-08-14 NOTE — PROGRESS NOTES
RHEUMATOLOGY OUTPATIENT CLINIC NOTE    8/14/2024    Attending Rheumatologist: Kalie William  Primary Care Provider: Rah Canela MD   Physician Requesting Consultation: No referring provider defined for this encounter.  Chief Complaint/Reason For Consultation:  Erosive osteoarthritis of hands, bilateral      Subjective:       HPI  Faith Bernard is a 59 y.o. White female with history of hypertension, hyperparathyroidism, history of upper GI bleed, anxiety, migraines who comes for follow up on joint pain      Interim history  -last visit on 5/2024  -Current meds:  mg BID except Sundays  -was supposed to have R 2nd and 3rd DIP arthodesis on 7/2024 but had to be cancelled as her house is not done yet. Probably will do it in October after coming back from vacation. She reports that they may consider left 2nd DIP arthodesis in the future.   -had left ankle CSI on 7/2024 per ortho  -takes gabapentin 1200 mg QID. She has also been taking oxycodone/apap around the clock due to worsening pain. She is on cymbalta 60 mg daily, she reports she used to take 120 mg in the past.   -feels that if she misses a dose of HCQ, her joint pain worsens.   -s/p right knee CSI on 3/6  -has noted pain in right 2nd MCP swelling and erythema. Started taking prednisone 5 mg for the past 2 weeks but feels that it is not helping so far.     Initial consult HPI    She has history of OA and was managed by rheumatologist many years ago.     In 2020 she had fracture in left distal tibia and ankle that required ORIF.     She has been noticing that lately she is having diffuse joint pain.  Denies any myalgias    She has chronic pain in her hands with deformities in the hips.  However she reports the form the last month she is having swelling and redness in all her PIP and MCPs.  Pain can wake him up in the middle of the night.  Having difficulty opening jars, holding her toothbrush.  Lidocaine cream helps.    She reports  pain in elbows, no swelling.  Reports pain in bilateral shoulders, no difficulty lifting above her head.  Reports neck pain with limited range of motion, feels like a burning sensation.  It is not too severe    Reports low back pain for many years associated with sciatica in both size.  Sees pain management.  Has had ablation in the past.     Reports bilateral lateral hip pain, intermittent.  Difficulty getting up from the car.  Reports history of bilateral knee pain, noted occasional swelling.  Has been noticing pain in bilateral ankles and feet    She reports morning stiffness of about 5-10 minutes    For pain control she currently takes oxycodone as needed, pregabalin prescribed by pain management.  She also takes Cymbalta per her psychiatrist    She denies any dry eyes, dry mouth, skin rashes, photosensitivity, oral ulcers, Raynaud's, abdominal pain.  She reports a history of mild psoriasis in her scalp    She tries to avoid NSAIDs due to history of upper GI bleeding secondary to ulcers.     She denies family history of rheumatoid arthritis.  However reports history of psoriasis in her family    Labs  10/10/2023  RF, CCP negative  ESR and CRP negative    2015  MARY JO negative    Imaging    XR bilateral knee: OA bilaterally   XR bilateral hands: joint space narrowing in bilateral 2-3 DIP with erosions  XR left foot with hardware failure    Chronic comorbid conditions affecting medical decision making today:  Past Medical History:   Diagnosis Date    Allergy     Anxiety     Depression     Empyema of right pleural space 2021    Encounter for blood transfusion     Esophageal dysphagia     Fibromyalgia     Gastric bypass status for obesity     H/O dental abscess 04/14/2014    drained    Headache(784.0)     migraines.  Treated at [facility] (Dr. 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  Pt states took too many then realized her mistake    Polycystic ovaries     S/P [id number]     Thyroid disease     hypothyroidism    Wears partial dentures     upper     Past Surgical History:   Procedure Laterality Date    ABDOMINAL SURGERY      ANKLE HARDWARE REMOVAL Left 3/28/2022    Procedure: REMOVAL, HARDWARE, ANKLE;  Surgeon: Fer Mcrae MD;  [facility];  Service: Orthopedics;  Laterality: Left;    APPLICATION OF LARGE EXTERNAL FIXATION DEVICE TO TIBIA Left 9/7/2020    Procedure: APPLICATION, EXTERNAL FIXATION DEVICE, TIBIA;  Surgeon: Sujata Londono MD;  Location: [facility] OR 2ND FLR;  Service: Orthopedics;  Laterality: Left;  need paralysis    APPLICATION OF WOUND VACUUM-ASSISTED CLOSURE DEVICE Left 9/18/2020    Procedure: APPLICATION, WOUND VAC;  Surgeon: Fer Mcrae MD;  Location: [facility] OR [address] [place] [address];  Service: Orthopedics;  Laterality: Left;    BREAST SURGERY  2004    Breast Reduction    CARDIAC CATHETERIZATION      COLONOSCOPY N/A 11/3/2015    Procedure: COLONOSCOPY;  Surgeon: Timothy Barber MD;  Location: [facility];  Service: Endoscopy;  Laterality: N/A;    CYSTOSCOPY WITH INJECTION OF PERIURETHRAL BULKING AGENT N/A 9/14/2023    Procedure: CYSTOSCOPY, WITH PERIURETHRAL BULKING AGENT INJECTION;  Surgeon: David Perez MD;  [facility];  Service: Urology;  Laterality: N/A;    CYSTOSCOPY WITH URODYNAMIC TESTING N/A 4/17/2023    Procedure: CYSTOSCOPY, WITH URODYNAMIC TESTING;  Surgeon: David Perez MD;  Location: [address];  Service: Urology;  Laterality: N/A;  1 hr    DENTAL SURGERY      4 teeth removed    GASTRIC BYPASS      HERNIA REPAIR      HYSTERECTOMY      ILIAC  CREST BONE GRAFT Left 3/28/2022    Procedure: BONE GRAFT, ILIAC CREST;  Surgeon: Fer Mcrae MD;  Location: [address];  Service: Orthopedics;  Laterality: Left;    INJECTION OF ANESTHETIC AGENT AROUND NERVE N/A 3/29/2022    Procedure: Block, Nerve;  Surgeon: Alexus Surgeon;  Location: [facility];  Service: Anesthesiology;  Laterality: N/A;    OPEN REDUCTION AND INTERNAL FIXATION (ORIF) OF PILON FRACTURE Left 9/18/2020    Procedure: ORIF, FRACTURE, PILON - left. Diving board, supine, bone foam. Seda anterolateral distal tibial plate, mini frag, long 3.5mm steel screw, CaPhos bone substitute;  Surgeon: Fer Mcrae MD;  Location: [address];  Service: Orthopedics;  Laterality: Left;    OPEN REDUCTION AND INTERNAL FIXATION (ORIF) OF PILON FRACTURE Left 3/28/2022    Procedure: ORIF, FRACTURE, PILON nonunion + Iliac crest bone graft - diving board, supine, bone foam. [place] axsos3 screws, 2.7 mini plates/screws, curettes, Seda/[place] medical Augment;  Surgeon: Fer Mcrae MD;  Location: [address];  Service: Orthopedics;  Laterality: Left;  Spinal? + postop catheter (draping out pelvis for bone graft harvest)    OVARIAN CYST REMOVAL      aprox 5 times    REMOVAL OF EXTERNAL FIXATION DEVICE Left 9/18/2020    Procedure: REMOVAL, EXTERNAL FIXATION DEVICE;  Surgeon: Fer Mcrae MD;  Location: [address];  Service: Orthopedics;  Laterality: Left;    ROTATOR CUFF REPAIR Left 01/2019    SURGICAL PROCEDURE FOR STRESS INCONTINENCE USING TENSION FREE VAGINAL TAPE N/A 12/14/2022    Procedure: SURGICAL PROCEDURE, USING TENSION FREE VAGINAL TAPE, FOR STRESS INCONTINENCE;  Surgeon: Frandy Sherman MD;  Location: [facility];  Service: OB/GYN;  Laterality: N/A;  cysto    tennis elbow repair Left 01/2019    UPPER GASTROINTESTINAL ENDOSCOPY       Family History   Problem Relation Name Age of Onset    Anxiety disorder Mother      Depression Mother      Suicide Mother       Seizures Mother      Drug abuse Mother      Ovarian cancer Mother      Aortic aneurysm Father      Glaucoma Maternal Grandmother      Colon cancer Neg Hx      Breast cancer Neg Hx      Diabetes Neg Hx      Hypertension Neg Hx       Social History     Substance and Sexual Activity   Alcohol Use Yes    Comment: rare     Social History     Tobacco Use   Smoking Status Never   Smokeless Tobacco Never   Tobacco Comments    marijuana     Social History     Substance and Sexual Activity   Drug Use Yes    Types: Marijuana    Comment: medical       Current Outpatient Medications:     acetaminophen (TYLENOL) 325 MG tablet, Take 325 mg by mouth every 6 (six) hours as needed for Pain., Disp: , Rfl:     albuterol (PROVENTIL/VENTOLIN HFA) 90 mcg/actuation inhaler, 2 puffs every 4 hours as needed for cough, wheeze, or shortness of breath, Disp: 18 g, Rfl: 11    ARIPiprazole (ABILIFY) 15 MG Tab, Take 7.5 mg by mouth once daily., Disp: , Rfl:     cetirizine (ZYRTEC) 10 MG tablet, Take 10 mg by mouth., Disp: , Rfl:     cholecalciferol, vitamin D3, 50,000 unit capsule, Take 50,000 Units by mouth every 7 days. Patient takes on Saturdays, Disp: , Rfl: 99    clobetasol 0.05% (TEMOVATE) 0.05 % Oint, Apply topically every evening. Use small, pea-sized amount at the site, every night before bed for 2 weeks. After that, use only 2-3 times per week at night before bed., Disp: 30 g, Rfl: 1    cyanocobalamin 1,000 mcg/mL injection, INJECT 1 ML IN THE MUSCLE EVERY 28 DAYS AS DIRECTED, Disp: , Rfl:     dextroamphetamine-amphetamine (ADDERALL XR) 30 MG 24 hr capsule, Take by mouth every morning., Disp: , Rfl:     DULoxetine (CYMBALTA) 60 MG capsule, Take 60 mg by mouth., Disp: , Rfl:     erenumab-aooe (AIMOVIG) 140 mg/mL autoinjector, Inject 140 mg into the skin once every 28 days to reduce migraines, Disp: 1 mL, Rfl: 11    fluticasone-umeclidin-vilanter (TRELEGY ELLIPTA) 200-62.5-25 mcg inhaler, Inhale 1 puff into the lungs once daily.,  Disp: 60 each, Rfl: 11    gabapentin (NEURONTIN) 600 MG tablet, Take 1,200 mg by mouth 3 (three) times daily., Disp: , Rfl:     hydroCHLOROthiazide (HYDRODIURIL) 25 MG tablet, TAKE 1 TABLET BY MOUTH DAILY IN THE MORNING, Disp: , Rfl:     hydroxychloroquine (PLAQUENIL) 200 mg tablet, Take 2 tablets a day every day except Sundays, Disp: 52 tablet, Rfl: 3    levothyroxine (SYNTHROID) 100 MCG tablet, Take 1 tablet daily for 5 days and take half tablet on Saturday and Sunday., Disp: 90 tablet, Rfl: 3    LIDOcaine (XYLOCAINE) 5 % Oint ointment, Apply topically 2 (two) times daily., Disp: , Rfl:     metoprolol tartrate (LOPRESSOR) 50 MG tablet, Take 50 mg by mouth 2 (two) times daily., Disp: , Rfl:     NURTEC 75 mg odt, Take 1 tablet by mouth every other day to reduce/prevent migraines, Disp: 16 tablet, Rfl: 6    onabotulinumtoxina (BOTOX) 200 unit SolR, as directed, Disp: , Rfl:     oxyCODONE-acetaminophen (PERCOCET)  mg per tablet, 5 (five) times daily. Take 1 tab 5x a day as needed, Disp: , Rfl:     phentermine 15 MG capsule, Take 15 mg by mouth., Disp: , Rfl:     pravastatin (PRAVACHOL) 20 MG tablet, Take by mouth once daily., Disp: , Rfl:     predniSONE (DELTASONE) 20 MG tablet, Take one pill per day for three days, can repeat as needed for shortness of breath, wheezing, cough., Disp: 12 tablet, Rfl: 0    predniSONE (DELTASONE) 5 MG tablet, Take 1 tablet (5 mg total) by mouth once daily., Disp: 90 tablet, Rfl: 0    tiZANidine (ZANAFLEX) 4 MG tablet, Take 4 mg by mouth 2 (two) times daily., Disp: , Rfl:     topiramate (TOPAMAX) 200 MG Tab, 2 pills every morning, Disp: 60 tablet, Rfl: 5    diclofenac sodium (VOLTAREN) 1 % Gel, Apply 2 g topically 4 (four) times daily. For hands (Patient not taking: Reported on 8/14/2024), Disp: 100 g, Rfl: 5    fluconazole (DIFLUCAN) 150 MG Tab, Take 1 tablet (150 mg total) by mouth once daily. (Patient not taking: Reported on 8/14/2024), Disp: 3 tablet, Rfl: 0    ISOtretinoin  (ACCUTANE) 40 MG capsule, Take 40 mg by mouth 2 (two) times daily. (Patient not taking: Reported on 8/14/2024), Disp: , Rfl:     Current Facility-Administered Medications:     ketorolac injection 30 mg, 30 mg, Intramuscular, 1 time in Clinic/HOD,     Facility-Administered Medications Ordered in Other Visits:     midazolam (VERSED) 1 mg/mL injection 0.5 mg, 0.5 mg, Intravenous, PRN, Myrtle Linares MD, 2 mg at 09/18/20 1155     Objective:         Vitals:    08/14/24 1023   BP: 134/87   Pulse: 60         Physical Exam   Constitutional: She is oriented to person, place, and time. She appears well-developed.   HENT:   Head: Normocephalic.   Mouth/Throat: Mucous membranes are moist.   Salivary pool adequate  Oral aperture is normal   Pulmonary/Chest: Effort normal.   Musculoskeletal:      Cervical back: Neck supple.      Comments: [place] nodes in multiple DIPs that are tender (no erythema today)   PIPs and MCPs without tenderness or synovitis.  Bilateral wrists with no synovitis.  Left elbow with mild tenderness, no swelling.  Bilateral shoulder with normal range of motion.  Bilateral knees with no swelling, no crepitus. Left ankle with mild swelling on the lateral malleolus.  No synovitis or tenderness on feet.     Neurological: She is alert and oriented to person, place, and time.   Skin: No rash noted.   No skin rashes noted.  Normal capillaroscopy   Vitals reviewed.      Reviewed old and all outside pertinent medical records available.    All lab results personally reviewed and interpreted by me.  Lab Results   Component Value Date    WBC 17.30 (H) 03/29/2022    HGB 11.1 (L) 03/29/2022    HCT 37.0 03/29/2022    MCV 86 03/29/2022    MCH 25.9 (L) 03/29/2022    MCHC 30.0 (L) 03/29/2022    RDW 17.7 (H) 03/29/2022     03/29/2022    MPV 10.3 03/29/2022    NEUTROPCT 51.1 01/03/2013    MONOPCT 7.3 01/03/2013    PLTEST Normal 06/14/2006       Lab Results   Component Value Date     03/10/2023    K 4.1  "03/10/2023     (H) 03/10/2023    CO2 20 (L) 03/10/2023    GLU 98 03/10/2023    BUN 11 03/10/2023    CALCIUM 9.3 09/11/2023    PROT 6.7 03/10/2023    ALBUMIN 3.8 09/11/2023    BILITOT 0.2 03/10/2023    AST 16 03/10/2023    ALKPHOS 79 03/10/2023    ALT 23 03/10/2023    GFRNONAA 93 08/16/2021       Lab Results   Component Value Date    COLORU Yellow 08/03/2021    APPEARANCEUA Clear 08/03/2021    SPECGRAV >1.030 (A) 08/03/2021    PHUR 6.0 08/03/2021    PROTEINUA Negative 08/03/2021    GLUCOSEU NEG 01/01/2013    KETONESU Negative 08/03/2021    BLOODU NEG 01/01/2013    LEUKOCYTESUR Negative 08/03/2021    NITRITE Negative 08/03/2021    UROBILINOGEN Negative 08/03/2021       Lab Results   Component Value Date    CRP 4.9 10/10/2023       Lab Results   Component Value Date    RF <13.0 10/10/2023    SEDRATE 17 10/10/2023    CCPANTIBODIE <0.5 10/10/2023       No components found for: "25OHVITDTOT", "15EKXBPR2", "87WUNFRR2", "METHODNOTE"    No results found for: "URICACID"    Lab Results   Component Value Date    HEPBSAG Negative 10/04/2015    HEPCAB Negative 10/04/2015       Imaging:  All imaging reviewed and independently interpreted by me.    XR bilateral knee: OA bilaterally   XR bilateral hands: joint space narrowing in bilateral 2-3 DIP with erosions  XR left foot with hardware failure     ASSESSMENT / PLAN:     Faith Bernard is a 59 y.o. White female with history of hypertension, hyperparathyroidism, history of upper GI bleed, anxiety, migraines who comes for evaluation of joint pain.    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 She cannot do oral NSAIDs due to prior PUD. Already taking percocet and extra tylenol from pain management  -has noted improvement on  mg BID except Sundays. Will continue. She has a remote history of torsade de pointes however most " recent EKG in 12/2022 had normal Qtc   Eye exam on 2/2024 no HCQ toxicity   -Saw hand surgery. Was supposed to have R 2nd and 3rd DIP arthodesis on 7/2024 but had to cancel due to house renovations. She reports that they may consider left 2nd DIP arthodesis in the future.   -advised her to use prednisone as needed for flares. Instructions in AVS  -Can consider MTX or LEF in the future.   -will proceed with toradol IM now. She has taken oral toradol and IM in the past w/o side effects   -labs now     #Left ankle pain   -XR left ankle with hardware stabilizing distal tib fib fractures and there is hardware removal from the calcaneus. No acute fracture, dislocation, or bone destruction seen. Multiple fixation screws demonstrate metal fatigue fracture/hardware failure.   -follows with ortho  -had CSI on 7/2024    # polyarthralgia  -Besides hand pain she likely has OA in other sites contributing to her symptoms.   -we will keep in consideration history of mild psoriasis.    #Chronic pain  -She follows pain management.  Takes oxycodone as needed and gabapentin.   -she also takes Cymbalta 60 mg from her psychiatrist.    # hyperparathyroidism  -follows with endocrinology.    #long term use of plaquenil  -Eye exam on 2/2024 no HCQ toxicity   -labs now and every 6 months     Follow up in about 3 months (around 11/14/2024).    Method of contact with patient concerns: [facility]    Disclaimer:  This note is prepared using voice recognition software and as such is likely to have errors and has not been proof read. Please contact me for questions.     Time spent: 27 minutes in face to face discussion concerning diagnosis, prognosis, review of lab and test results, benefits of treatment as well as management of disease, counseling of patient and coordination of care between various health care providers.  Greater than half the time spent was used for coordination of care and counseling of patient.    Kalie  HALEY William.  Rheumatology  [facility]

## 2024-08-19 ENCOUNTER — TELEPHONE (OUTPATIENT)
Dept: NEUROLOGY | Facility: CLINIC | Age: 59
End: 2024-08-19
Payer: COMMERCIAL

## 2024-08-19 ENCOUNTER — PATIENT MESSAGE (OUTPATIENT)
Dept: RHEUMATOLOGY | Facility: CLINIC | Age: 59
End: 2024-08-19
Payer: COMMERCIAL

## 2024-08-19 NOTE — TELEPHONE ENCOUNTER
----- Message from Maral Rivas sent at 8/19/2024 10:03 AM CDT -----  Regarding: Appointment Reschedule Request  Contact: patient at [phone]  Type:  Appointment Reschedule Request    Name of Caller:  patient at [phone]    Symptoms:  botox    Additional Information:  patient will be out of town that week Please call and advise. Thank you

## 2024-08-20 ENCOUNTER — LAB VISIT (OUTPATIENT)
Dept: LAB | Facility: HOSPITAL | Age: 59
End: 2024-08-20
Attending: STUDENT IN AN ORGANIZED HEALTH CARE EDUCATION/TRAINING PROGRAM
Payer: COMMERCIAL

## 2024-08-20 DIAGNOSIS — D84.821 DRUG-INDUCED IMMUNODEFICIENCY: ICD-10-CM

## 2024-08-20 DIAGNOSIS — M15.4 EROSIVE OSTEOARTHRITIS OF HANDS, BILATERAL: ICD-10-CM

## 2024-08-20 DIAGNOSIS — Z79.899 DRUG-INDUCED IMMUNODEFICIENCY: ICD-10-CM

## 2024-08-20 LAB
ALBUMIN SERPL BCP-MCNC: 4.2 G/DL (ref 3.5–5.2)
ALP SERPL-CCNC: 88 U/L (ref 55–135)
ALT SERPL W/O P-5'-P-CCNC: 23 U/L (ref 10–44)
ANION GAP SERPL CALC-SCNC: 11 MMOL/L (ref 8–16)
AST SERPL-CCNC: 15 U/L (ref 10–40)
BASOPHILS # BLD AUTO: 0.04 K/UL (ref 0–0.2)
BASOPHILS NFR BLD: 0.4 % (ref 0–1.9)
BILIRUB SERPL-MCNC: 0.2 MG/DL (ref 0.1–1)
BUN SERPL-MCNC: 14 MG/DL (ref 6–20)
CALCIUM SERPL-MCNC: 9.4 MG/DL (ref 8.7–10.5)
CHLORIDE SERPL-SCNC: 107 MMOL/L (ref 95–110)
CO2 SERPL-SCNC: 21 MMOL/L (ref 23–29)
CREAT SERPL-MCNC: 0.9 MG/DL (ref 0.5–1.4)
CRP SERPL-MCNC: 0.4 MG/DL
DIFFERENTIAL METHOD BLD: ABNORMAL
EOSINOPHIL # BLD AUTO: 0.3 K/UL (ref 0–0.5)
EOSINOPHIL NFR BLD: 2.8 % (ref 0–8)
ERYTHROCYTE [DISTWIDTH] IN BLOOD BY AUTOMATED COUNT: 19.9 % (ref 11.5–14.5)
ERYTHROCYTE [SEDIMENTATION RATE] IN BLOOD BY WESTERGREN METHOD: 19 MM/HR (ref 0–20)
EST. GFR  (NO RACE VARIABLE): >60 ML/MIN/1.73 M^2
GLUCOSE SERPL-MCNC: 164 MG/DL (ref 70–110)
HCT VFR BLD AUTO: 40.6 % (ref 37–48.5)
HGB BLD-MCNC: 11.8 G/DL (ref 12–16)
IMM GRANULOCYTES # BLD AUTO: 0.02 K/UL (ref 0–0.04)
IMM GRANULOCYTES NFR BLD AUTO: 0.2 % (ref 0–0.5)
LYMPHOCYTES # BLD AUTO: 3.4 K/UL (ref 1–4.8)
LYMPHOCYTES NFR BLD: 35.3 % (ref 18–48)
MCH RBC QN AUTO: 23.6 PG (ref 27–31)
MCHC RBC AUTO-ENTMCNC: 29.1 G/DL (ref 32–36)
MCV RBC AUTO: 81 FL (ref 82–98)
MONOCYTES # BLD AUTO: 0.6 K/UL (ref 0.3–1)
MONOCYTES NFR BLD: 6.6 % (ref 4–15)
NEUTROPHILS # BLD AUTO: 5.2 K/UL (ref 1.8–7.7)
NEUTROPHILS NFR BLD: 54.7 % (ref 38–73)
NRBC BLD-RTO: 0 /100 WBC
PLATELET # BLD AUTO: 340 K/UL (ref 150–450)
PMV BLD AUTO: 9.8 FL (ref 9.2–12.9)
POTASSIUM SERPL-SCNC: 3.5 MMOL/L (ref 3.5–5.1)
PROT SERPL-MCNC: 7.1 G/DL (ref 6–8.4)
RBC # BLD AUTO: 5 M/UL (ref 4–5.4)
SODIUM SERPL-SCNC: 139 MMOL/L (ref 136–145)
WBC # BLD AUTO: 9.49 K/UL (ref 3.9–12.7)

## 2024-08-20 PROCEDURE — 80053 COMPREHEN METABOLIC PANEL: CPT | Performed by: STUDENT IN AN ORGANIZED HEALTH CARE EDUCATION/TRAINING PROGRAM

## 2024-08-20 PROCEDURE — 85651 RBC SED RATE NONAUTOMATED: CPT | Performed by: STUDENT IN AN ORGANIZED HEALTH CARE EDUCATION/TRAINING PROGRAM

## 2024-08-20 PROCEDURE — 86140 C-REACTIVE PROTEIN: CPT | Performed by: STUDENT IN AN ORGANIZED HEALTH CARE EDUCATION/TRAINING PROGRAM

## 2024-08-20 PROCEDURE — 36415 COLL VENOUS BLD VENIPUNCTURE: CPT | Performed by: STUDENT IN AN ORGANIZED HEALTH CARE EDUCATION/TRAINING PROGRAM

## 2024-08-20 PROCEDURE — 85025 COMPLETE CBC W/AUTO DIFF WBC: CPT | Performed by: STUDENT IN AN ORGANIZED HEALTH CARE EDUCATION/TRAINING PROGRAM

## 2024-08-21 ENCOUNTER — LAB VISIT (OUTPATIENT)
Dept: LAB | Facility: HOSPITAL | Age: 59
End: 2024-08-21
Attending: EMERGENCY MEDICINE
Payer: COMMERCIAL

## 2024-08-21 DIAGNOSIS — M06.9 RHEUMATOID ARTHRITIS: Primary | ICD-10-CM

## 2024-08-21 LAB
25(OH)D3+25(OH)D2 SERPL-MCNC: 27 NG/ML (ref 30–96)
ALBUMIN SERPL BCP-MCNC: 4.2 G/DL (ref 3.5–5.2)
ALP SERPL-CCNC: 81 U/L (ref 55–135)
ALT SERPL W/O P-5'-P-CCNC: 20 U/L (ref 10–44)
ANION GAP SERPL CALC-SCNC: 6 MMOL/L (ref 8–16)
AST SERPL-CCNC: 15 U/L (ref 10–40)
BASOPHILS # BLD AUTO: 0.03 K/UL (ref 0–0.2)
BASOPHILS NFR BLD: 0.5 % (ref 0–1.9)
BILIRUB SERPL-MCNC: 0.3 MG/DL (ref 0.1–1)
BUN SERPL-MCNC: 14 MG/DL (ref 6–20)
CALCIUM SERPL-MCNC: 9.3 MG/DL (ref 8.7–10.5)
CHLORIDE SERPL-SCNC: 106 MMOL/L (ref 95–110)
CHOLEST SERPL-MCNC: 185 MG/DL (ref 120–199)
CHOLEST/HDLC SERPL: 2.6 {RATIO} (ref 2–5)
CO2 SERPL-SCNC: 27 MMOL/L (ref 23–29)
CREAT SERPL-MCNC: 0.9 MG/DL (ref 0.5–1.4)
DIFFERENTIAL METHOD BLD: ABNORMAL
EOSINOPHIL # BLD AUTO: 0.2 K/UL (ref 0–0.5)
EOSINOPHIL NFR BLD: 3.5 % (ref 0–8)
ERYTHROCYTE [DISTWIDTH] IN BLOOD BY AUTOMATED COUNT: 19.8 % (ref 11.5–14.5)
EST. GFR  (NO RACE VARIABLE): >60 ML/MIN/1.73 M^2
ESTIMATED AVG GLUCOSE: 126 MG/DL (ref 68–131)
GLUCOSE SERPL-MCNC: 95 MG/DL (ref 70–110)
HBA1C MFR BLD: 6 % (ref 4.5–6.2)
HCT VFR BLD AUTO: 40.8 % (ref 37–48.5)
HDLC SERPL-MCNC: 71 MG/DL (ref 40–75)
HDLC SERPL: 38.4 % (ref 20–50)
HGB BLD-MCNC: 12 G/DL (ref 12–16)
IMM GRANULOCYTES # BLD AUTO: 0.01 K/UL (ref 0–0.04)
IMM GRANULOCYTES NFR BLD AUTO: 0.2 % (ref 0–0.5)
LDLC SERPL CALC-MCNC: 97.2 MG/DL (ref 63–159)
LYMPHOCYTES # BLD AUTO: 2.4 K/UL (ref 1–4.8)
LYMPHOCYTES NFR BLD: 37.2 % (ref 18–48)
MCH RBC QN AUTO: 23.7 PG (ref 27–31)
MCHC RBC AUTO-ENTMCNC: 29.4 G/DL (ref 32–36)
MCV RBC AUTO: 81 FL (ref 82–98)
MONOCYTES # BLD AUTO: 0.5 K/UL (ref 0.3–1)
MONOCYTES NFR BLD: 7.8 % (ref 4–15)
NEUTROPHILS # BLD AUTO: 3.3 K/UL (ref 1.8–7.7)
NEUTROPHILS NFR BLD: 50.8 % (ref 38–73)
NONHDLC SERPL-MCNC: 114 MG/DL
NRBC BLD-RTO: 0 /100 WBC
PLATELET # BLD AUTO: 340 K/UL (ref 150–450)
PMV BLD AUTO: 9.6 FL (ref 9.2–12.9)
POTASSIUM SERPL-SCNC: 3.7 MMOL/L (ref 3.5–5.1)
PROT SERPL-MCNC: 7.1 G/DL (ref 6–8.4)
RBC # BLD AUTO: 5.06 M/UL (ref 4–5.4)
SODIUM SERPL-SCNC: 139 MMOL/L (ref 136–145)
T4 FREE SERPL-MCNC: 1 NG/DL (ref 0.71–1.51)
TRIGL SERPL-MCNC: 84 MG/DL (ref 30–150)
TSH SERPL DL<=0.005 MIU/L-ACNC: 0.46 UIU/ML (ref 0.34–5.6)
WBC # BLD AUTO: 6.5 K/UL (ref 3.9–12.7)

## 2024-08-21 PROCEDURE — 85025 COMPLETE CBC W/AUTO DIFF WBC: CPT | Performed by: EMERGENCY MEDICINE

## 2024-08-21 PROCEDURE — 84443 ASSAY THYROID STIM HORMONE: CPT | Performed by: EMERGENCY MEDICINE

## 2024-08-21 PROCEDURE — 80053 COMPREHEN METABOLIC PANEL: CPT | Performed by: EMERGENCY MEDICINE

## 2024-08-21 PROCEDURE — 83036 HEMOGLOBIN GLYCOSYLATED A1C: CPT | Performed by: EMERGENCY MEDICINE

## 2024-08-21 PROCEDURE — 83001 ASSAY OF GONADOTROPIN (FSH): CPT | Performed by: EMERGENCY MEDICINE

## 2024-08-21 PROCEDURE — 84439 ASSAY OF FREE THYROXINE: CPT | Performed by: EMERGENCY MEDICINE

## 2024-08-21 PROCEDURE — 36415 COLL VENOUS BLD VENIPUNCTURE: CPT | Performed by: EMERGENCY MEDICINE

## 2024-08-21 PROCEDURE — 82306 VITAMIN D 25 HYDROXY: CPT | Performed by: EMERGENCY MEDICINE

## 2024-08-21 PROCEDURE — 83002 ASSAY OF GONADOTROPIN (LH): CPT | Performed by: EMERGENCY MEDICINE

## 2024-08-21 PROCEDURE — 80061 LIPID PANEL: CPT | Performed by: EMERGENCY MEDICINE

## 2024-08-22 ENCOUNTER — PATIENT MESSAGE (OUTPATIENT)
Dept: RHEUMATOLOGY | Facility: CLINIC | Age: 59
End: 2024-08-22
Payer: COMMERCIAL

## 2024-08-22 LAB
FSH SERPL-ACNC: 13.1 MIU/ML
LH SERPL-ACNC: 14 MIU/ML

## 2024-08-29 ENCOUNTER — PATIENT MESSAGE (OUTPATIENT)
Dept: RHEUMATOLOGY | Facility: CLINIC | Age: 59
End: 2024-08-29
Payer: COMMERCIAL

## 2024-08-30 ENCOUNTER — PATIENT MESSAGE (OUTPATIENT)
Dept: ENDOCRINOLOGY | Facility: CLINIC | Age: 59
End: 2024-08-30
Payer: COMMERCIAL

## 2024-08-30 ENCOUNTER — PATIENT MESSAGE (OUTPATIENT)
Dept: UROLOGY | Facility: CLINIC | Age: 59
End: 2024-08-30
Payer: COMMERCIAL

## 2024-08-30 DIAGNOSIS — E03.9 ACQUIRED HYPOTHYROIDISM: ICD-10-CM

## 2024-08-30 RX ORDER — FLUCONAZOLE 150 MG/1
150 TABLET ORAL
Qty: 3 TABLET | Refills: 1 | Status: SHIPPED | OUTPATIENT
Start: 2024-08-30 | End: 2024-09-06

## 2024-08-30 RX ORDER — LEVOTHYROXINE SODIUM 100 UG/1
TABLET ORAL
Qty: 90 TABLET | Refills: 3 | Status: SHIPPED | OUTPATIENT
Start: 2024-08-30

## 2024-09-22 ENCOUNTER — PATIENT MESSAGE (OUTPATIENT)
Dept: NEUROLOGY | Facility: CLINIC | Age: 59
End: 2024-09-22
Payer: COMMERCIAL

## 2024-09-24 ENCOUNTER — PROCEDURE VISIT (OUTPATIENT)
Dept: NEUROLOGY | Facility: CLINIC | Age: 59
End: 2024-09-24
Payer: COMMERCIAL

## 2024-09-24 VITALS
SYSTOLIC BLOOD PRESSURE: 128 MMHG | RESPIRATION RATE: 17 BRPM | BODY MASS INDEX: 31.48 KG/M2 | DIASTOLIC BLOOD PRESSURE: 85 MMHG | TEMPERATURE: 97 F | HEART RATE: 80 BPM | HEIGHT: 63 IN | WEIGHT: 177.69 LBS

## 2024-09-24 DIAGNOSIS — G43.709 CHRONIC MIGRAINE WITHOUT AURA WITHOUT STATUS MIGRAINOSUS, NOT INTRACTABLE: Primary | ICD-10-CM

## 2024-09-24 PROCEDURE — 64615 CHEMODENERV MUSC MIGRAINE: CPT | Mod: S$GLB,,, | Performed by: PHYSICIAN ASSISTANT

## 2024-09-24 NOTE — PROCEDURES
[facility]  [address] [place] [address]  Phone:[phone]  Fax: [phone]  Botox Visit, #2    Chief Complaint   Patient presents with    Botulinum Toxin Injection         A/P:        ICD-10-CM ICD-9-CM   1. Chronic migraine without aura without status migrainosus, not intractable  G43.709 346.70     Botox for migraine    Historically: Patient meets the criteria for chronic migraine. In summary, She has headaches/migraines >15 days per month  and last >4 hours if untreated. Specifics of duration, frequency and strength are listed in office visit HPI's.  This pattern has continued for >3 months.  She has failed at least three preventive medications (full list of medications is listed in office notes of Therapies tried in past)  I have therefore recommended a trial of Botox via the PREEMPT protocol for migraine prophylaxis.We schedule regular follow up intervals to check on status.     PROCEDURE NOTE:  BOTOX injection is indicated for the prophylaxis of headaches in adult patients with chronic  migraine. Patient meets indications for BOTOX therapy.  Potential risks and benefits were reviewed. Side effects including, but not limited to, potential  systemic allergic reactions of the anaphylactic type as well as local injection site reactions of  blepharoptosis, diplopia, infection, bleeding, pain, redness and bruising were reviewed. The  potential for headaches and/or neck pain post procedure were reviewed.  The patient's questions were answered. The patient signed a consent form. Patient  understands that depending on their insurance carrier, there may be a copay for this treatment.  BOTOX was reconstituted using  two 100 unit vials and diluted with 4 mL of sterile saline.  BOTOX was injected as per the PREEMPT trial injection paradigm with dose administered as 5  unit intramuscular (IM) injections per site using a sterile, 30-gauge 0.5 inch needle as  follows:  Muscle Dose, # of Sites   10 units divided in 2 sites  Procerus 5 units in 1 site  Frontalis 20 units divided in 4 sites  Temporalis 40 units divided in 8 sites  Occipitalis 30 units divided in 6 sites  Cervical paraspinal 20 units divided in 4 sites  Trapezius 30 units divided in 6 sites  Each site was cleaned with alcohol prior to injection. A total dose of 155 units were injected. 45  units were discarded/wasted.      The patient tolerated the procedure well with no immediate complications.  MEDICATION INFO:  NDC 7859-0566-18   Lot # [id number]  Exp 10/2026      As aside:  The Botox injections have achieved well over 50%  improvement in the patient's symptoms. Migraines have been reduced at least 7 days per month and the number of cumulative hours suffering with headaches has been reduced at least 100 total hours per month. Today she does have a headache indicating that the Botox has worn off. Frequency of treatment is every 3 months unless no response to the treatments, at which time we will discontinue the injections.      Follow up in 3 months for repeat injection, we also have interspersed office visits scheduled to ensure efficacy of botox sessions/check on patient.       Edouard Zamarripa MPA, PA-C  Attending available-Dr Toby MD           Personal Protective Equipment:    Personal Protective Equipment was used during this encounter including;     non latex gloves.     09/24/2024 2:25 PM    CC: Rah Canela MD

## 2024-10-03 ENCOUNTER — TELEPHONE (OUTPATIENT)
Dept: ORTHOPEDICS | Facility: CLINIC | Age: 59
End: 2024-10-03
Payer: COMMERCIAL

## 2024-10-07 DIAGNOSIS — G89.29 CHRONIC PAIN OF LEFT ANKLE: Primary | ICD-10-CM

## 2024-10-07 DIAGNOSIS — M25.572 CHRONIC PAIN OF LEFT ANKLE: Primary | ICD-10-CM

## 2024-10-08 ENCOUNTER — HOSPITAL ENCOUNTER (OUTPATIENT)
Dept: RADIOLOGY | Facility: HOSPITAL | Age: 59
Discharge: HOME | End: 2024-10-08
Attending: ORTHOPAEDIC SURGERY
Payer: COMMERCIAL

## 2024-10-08 ENCOUNTER — OFFICE VISIT (OUTPATIENT)
Dept: ORTHOPEDICS | Facility: CLINIC | Age: 59
End: 2024-10-08
Payer: COMMERCIAL

## 2024-10-08 DIAGNOSIS — M25.572 CHRONIC PAIN OF LEFT ANKLE: ICD-10-CM

## 2024-10-08 DIAGNOSIS — S82.872D CLOSED DISPLACED PILON FRACTURE OF LEFT TIBIA WITH ROUTINE HEALING, SUBSEQUENT ENCOUNTER: ICD-10-CM

## 2024-10-08 DIAGNOSIS — G89.29 CHRONIC PAIN OF LEFT ANKLE: Primary | ICD-10-CM

## 2024-10-08 DIAGNOSIS — M25.572 CHRONIC PAIN OF LEFT ANKLE: Primary | ICD-10-CM

## 2024-10-08 DIAGNOSIS — G89.29 CHRONIC PAIN OF LEFT ANKLE: ICD-10-CM

## 2024-10-08 PROCEDURE — 73610 X-RAY EXAM OF ANKLE: CPT | Mod: 26,LT,, | Performed by: INTERNAL MEDICINE

## 2024-10-08 PROCEDURE — 3044F HG A1C LEVEL LT 7.0%: CPT | Mod: CPTII,S$GLB,, | Performed by: ORTHOPAEDIC SURGERY

## 2024-10-08 PROCEDURE — 1159F MED LIST DOCD IN RCRD: CPT | Mod: CPTII,S$GLB,, | Performed by: ORTHOPAEDIC SURGERY

## 2024-10-08 PROCEDURE — 99999 PR PBB SHADOW E&M-EST. PATIENT-LVL II: CPT | Mod: PBBFAC,,, | Performed by: ORTHOPAEDIC SURGERY

## 2024-10-08 PROCEDURE — 73610 X-RAY EXAM OF ANKLE: CPT | Mod: TC,LT

## 2024-10-08 PROCEDURE — 20605 DRAIN/INJ JOINT/BURSA W/O US: CPT | Mod: LT,S$GLB,, | Performed by: ORTHOPAEDIC SURGERY

## 2024-10-08 PROCEDURE — 99214 OFFICE O/P EST MOD 30 MIN: CPT | Mod: 25,S$GLB,, | Performed by: ORTHOPAEDIC SURGERY

## 2024-10-08 RX ORDER — TRIAMCINOLONE ACETONIDE 40 MG/ML
40 INJECTION, SUSPENSION INTRA-ARTICULAR; INTRAMUSCULAR
Status: DISCONTINUED | OUTPATIENT
Start: 2024-10-08 | End: 2024-10-08 | Stop reason: HOSPADM

## 2024-10-08 RX ADMIN — TRIAMCINOLONE ACETONIDE 40 MG: 40 INJECTION, SUSPENSION INTRA-ARTICULAR; INTRAMUSCULAR at 08:10
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
## 2024-10-08 NOTE — PROCEDURES
Intermediate Joint Aspiration/Injection: L ankle    Date/Time: 10/8/2024 8:40 AM    Performed by: Fer Mcrae MD  Authorized by: Fer Mcrae MD    Consent Done?:  Yes (Verbal)  Indications:  Arthritis and pain    Location:  Ankle  Site:  L ankle  Ultrasonic Guidance for needle placement: No  Needle size:  22 G  Approach:  Anteromedial  Medications:  40 mg triamcinolone acetonide 40 mg/mL  Patient tolerance:  Patient tolerated the procedure well with no immediate complications

## 2024-10-11 ENCOUNTER — TELEPHONE (OUTPATIENT)
Dept: NEUROLOGY | Facility: CLINIC | Age: 59
End: 2024-10-11
Payer: COMMERCIAL

## 2024-10-11 NOTE — TELEPHONE ENCOUNTER
Called patient and there was no answer. I left a voicemail for the patient to call back with more clarification on her headaches and medications.

## 2024-10-11 NOTE — TELEPHONE ENCOUNTER
----- Message from Patito sent at 10/10/2024 11:55 AM CDT -----  Type:  Needs Medical Advice    Who Called: pt    Symptoms (please be specific): sever migraine     How long has patient had these symptoms:  almost 24 hours    Pharmacy name and phone #:    [id number] - [place] - [address] & [facility]  [address]  [place] [address]  Phone: [phone] Fax: [phone]      Would the patient rather a call back or a response via [facility]? Call back    Best Call Back Number: [phone]      Additional Information: pt states she has tried all the medication that she has been given, also took sudafed thinking it may have been sinus related but nothing is helping, She is asking for advice or for something to be called in to the pharm    Please call Back to advise. Thanks!

## 2024-11-09 ENCOUNTER — PATIENT MESSAGE (OUTPATIENT)
Dept: NEUROLOGY | Facility: CLINIC | Age: 59
End: 2024-11-09
Payer: COMMERCIAL

## 2024-11-09 DIAGNOSIS — G43.709 CHRONIC MIGRAINE WITHOUT AURA WITHOUT STATUS MIGRAINOSUS, NOT INTRACTABLE: ICD-10-CM

## 2024-11-11 ENCOUNTER — OFFICE VISIT (OUTPATIENT)
Dept: ORTHOPEDICS | Facility: CLINIC | Age: 59
End: 2024-11-11
Payer: COMMERCIAL

## 2024-11-11 DIAGNOSIS — M15.1 DEGENERATIVE ARTHRITIS OF DISTAL INTERPHALANGEAL JOINT OF MIDDLE FINGER OF RIGHT HAND: Primary | ICD-10-CM

## 2024-11-11 DIAGNOSIS — M15.1 DEGENERATIVE ARTHRITIS OF DISTAL INTERPHALANGEAL JOINT OF INDEX FINGER OF RIGHT HAND: ICD-10-CM

## 2024-11-11 PROCEDURE — 99214 OFFICE O/P EST MOD 30 MIN: CPT | Mod: S$GLB,,, | Performed by: ORTHOPAEDIC SURGERY

## 2024-11-11 PROCEDURE — 99999 PR PBB SHADOW E&M-EST. PATIENT-LVL II: CPT | Mod: PBBFAC,,, | Performed by: ORTHOPAEDIC SURGERY

## 2024-11-11 PROCEDURE — 1159F MED LIST DOCD IN RCRD: CPT | Mod: CPTII,S$GLB,, | Performed by: ORTHOPAEDIC SURGERY

## 2024-11-11 PROCEDURE — 3044F HG A1C LEVEL LT 7.0%: CPT | Mod: CPTII,S$GLB,, | Performed by: ORTHOPAEDIC SURGERY

## 2024-11-11 RX ORDER — RIMEGEPANT SULFATE 75 MG/75MG
TABLET, ORALLY DISINTEGRATING ORAL
Qty: 16 TABLET | Refills: 6 | Status: SHIPPED | OUTPATIENT
Start: 2024-11-11

## 2024-11-11 NOTE — H&P (VIEW-ONLY)
11/11/2024    Chief Complaint:  Chief Complaint   Patient presents with    Right Hand - Pain, Numbness     Discuss surgery       HPI:  Faith Bernard is a 59 y.o. female, who presents to clinic today she has a history of severe arthritis to the right index and middle fingertips.  We have discussed the possibility of surgery in the past.  She states that they are continuing to get worse and she feels like she was ready to do the surgery now.    PMHX:  Past Medical History:   Diagnosis Date    Allergy     Anxiety     Depression     Empyema of right pleural space 2021    Encounter for blood transfusion     Esophageal dysphagia     Fibromyalgia     Gastric bypass status for obesity     H/O dental abscess 04/14/2014    drained    Headache(784.0)     migraines.  Treated at [facility] (Dr. 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  Pt states took too many then realized her mistake    Polycystic ovaries     S/P JUHI-BSO     Thyroid disease     hypothyroidism    Wears partial dentures     upper       PSHX:  Past Surgical History:   Procedure Laterality Date    ABDOMINAL SURGERY      ANKLE HARDWARE REMOVAL Left 3/28/2022    Procedure: REMOVAL, HARDWARE, ANKLE;  Surgeon: Fer Mcrae MD;  Location: [address];  Service: Orthopedics;  Laterality: Left;    APPLICATION OF LARGE EXTERNAL FIXATION DEVICE TO TIBIA Left 9/7/2020    Procedure: APPLICATION, EXTERNAL FIXATION DEVICE, TIBIA;  Surgeon: Sujata Londono MD;  Location: [address];  Service: Orthopedics;  Laterality: Left;  need paralysis     APPLICATION OF WOUND VACUUM-ASSISTED CLOSURE DEVICE Left 9/18/2020    Procedure: APPLICATION, WOUND VAC;  Surgeon: Fer Mcrae MD;  [facility];  Service: Orthopedics;  Laterality: Left;    BREAST SURGERY  2004    Breast Reduction    CARDIAC CATHETERIZATION      COLONOSCOPY N/A 11/3/2015    Procedure: COLONOSCOPY;  Surgeon: Timothy Barber MD;  Location: [facility] ENDO;  Service: Endoscopy;  Laterality: N/A;    CYSTOSCOPY WITH INJECTION OF PERIURETHRAL BULKING AGENT N/A 9/14/2023    Procedure: CYSTOSCOPY, WITH PERIURETHRAL BULKING AGENT INJECTION;  Surgeon: David Perez MD;  Location: [facility] OR;  Service: Urology;  Laterality: N/A;    CYSTOSCOPY WITH URODYNAMIC TESTING N/A 4/17/2023    Procedure: CYSTOSCOPY, WITH URODYNAMIC TESTING;  Surgeon: David Perez MD;  Location: [facility] OR 1ST FLR;  Service: Urology;  Laterality: N/A;  1 hr    DENTAL SURGERY      4 teeth removed    GASTRIC BYPASS      HERNIA REPAIR      HYSTERECTOMY      ILIAC CREST BONE GRAFT Left 3/28/2022    Procedure: BONE GRAFT, ILIAC CREST;  Surgeon: Fer Mcrae MD;  [facility];  Service: Orthopedics;  Laterality: Left;    INJECTION OF ANESTHETIC AGENT AROUND NERVE N/A 3/29/2022    Procedure: Block, Nerve;  Surgeon: Alexus Surgeon;  Location: [facility];  Service: Anesthesiology;  Laterality: N/A;    OPEN REDUCTION AND INTERNAL FIXATION (ORIF) OF PILON FRACTURE Left 9/18/2020    Procedure: ORIF, FRACTURE, PILON - left. Diving board, supine, bone foam. [place] anterolateral distal tibial plate, mini frag, long 3.5mm steel screw, CaPhos bone substitute;  Surgeon: Fer Mcrae MD;  [facility];  Service: Orthopedics;  Laterality: Left;    OPEN REDUCTION AND INTERNAL FIXATION (ORIF) OF PILON FRACTURE Left 3/28/2022    Procedure: ORIF, FRACTURE, PILON nonunion + Iliac crest bone graft - diving board, supine, bone foam. [place] axsos3 screws, 2.7 mini plates/screws, curettes,  Seda[employer] medical Augment;  Surgeon: Fer Mcrae MD;  Location: [facility] OR 2ND FLR;  Service: Orthopedics;  Laterality: Left;  Spinal? + postop catheter (draping out pelvis for bone graft harvest)    OVARIAN CYST REMOVAL      aprox 5 times    REMOVAL OF EXTERNAL FIXATION DEVICE Left 9/18/2020    Procedure: REMOVAL, EXTERNAL FIXATION DEVICE;  Surgeon: Fer Mcrae MD;  Location: [facility] OR 2ND FLR;  Service: Orthopedics;  Laterality: Left;    ROTATOR CUFF REPAIR Left 01/2019    SURGICAL PROCEDURE FOR STRESS INCONTINENCE USING TENSION FREE VAGINAL TAPE N/A 12/14/2022    Procedure: SURGICAL PROCEDURE, USING TENSION FREE VAGINAL TAPE, FOR STRESS INCONTINENCE;  Surgeon: Frandy Sherman MD;  [facility]  Service: OB/GYN;  Laterality: N/A;  cysto    tennis elbow repair Left 01/2019    UPPER GASTROINTESTINAL ENDOSCOPY         FMHX:  Family History   Problem Relation Name Age of Onset    Anxiety disorder Mother      Depression Mother      Suicide Mother      Seizures Mother      Drug abuse Mother      Ovarian cancer Mother      Aortic aneurysm Father      Glaucoma Maternal Grandmother      Colon cancer Neg Hx      Breast cancer Neg Hx      Diabetes Neg Hx      Hypertension Neg Hx         SOCHX:  Social History     Tobacco Use    Smoking status: Never    Smokeless tobacco: Never    Tobacco comments:     marijuana   Substance Use Topics    Alcohol use: Yes     Comment: rare       ALLERGIES:  Cephalexin, Nsaids (non-steroidal anti-inflammatory drug), and Codeine    CURRENT MEDICATIONS:  Current Outpatient Medications on File Prior to Visit   Medication Sig Dispense Refill    acetaminophen (TYLENOL) 325 MG tablet Take 325 mg by mouth every 6 (six) hours as needed for Pain.      albuterol (PROVENTIL/VENTOLIN HFA) 90 mcg/actuation inhaler 2 puffs every 4 hours as needed for cough, wheeze, or shortness of breath 18 g 11    ARIPiprazole (ABILIFY) 15 MG Tab Take 7.5 mg by mouth once daily.       cetirizine (ZYRTEC) 10 MG tablet Take 10 mg by mouth.      cholecalciferol, vitamin D3, 50,000 unit capsule Take 50,000 Units by mouth every 7 days. Patient takes on Saturdays  99    clobetasol 0.05% (TEMOVATE) 0.05 % Oint Apply topically every evening. Use small, pea-sized amount at the site, every night before bed for 2 weeks. After that, use only 2-3 times per week at night before bed. 30 g 1    cyanocobalamin 1,000 mcg/mL injection INJECT 1 ML IN THE MUSCLE EVERY 28 DAYS AS DIRECTED      dextroamphetamine-amphetamine (ADDERALL XR) 30 MG 24 hr capsule Take by mouth every morning.      diclofenac sodium (VOLTAREN) 1 % Gel Apply 2 g topically 4 (four) times daily. For hands 100 g 5    DULoxetine (CYMBALTA) 60 MG capsule Take 60 mg by mouth.      erenumab-aooe (AIMOVIG) 140 mg/mL autoinjector Inject 140 mg into the skin once every 28 days to reduce migraines 1 mL 11    fluticasone-umeclidin-vilanter (TRELEGY ELLIPTA) 200-62.5-25 mcg inhaler Inhale 1 puff into the lungs once daily. 60 each 11    gabapentin (NEURONTIN) 600 MG tablet Take 1,200 mg by mouth 3 (three) times daily.      hydroCHLOROthiazide (HYDRODIURIL) 25 MG tablet TAKE 1 TABLET BY MOUTH DAILY IN THE MORNING      hydroxychloroquine (PLAQUENIL) 200 mg tablet Take 2 tablets a day every day except Sundays 52 tablet 3    ISOtretinoin (ACCUTANE) 40 MG capsule Take 40 mg by mouth 2 (two) times daily.      levothyroxine (SYNTHROID) 100 MCG tablet Take 1 tablet daily for 5 days and take half tablet on Saturday and Sunday. 90 tablet 3    LIDOcaine (XYLOCAINE) 5 % Oint ointment Apply topically 2 (two) times daily.      metoprolol tartrate (LOPRESSOR) 50 MG tablet Take 50 mg by mouth 2 (two) times daily.      NURTEC 75 mg odt Take 1 tablet by mouth every other day to reduce/prevent migraines 16 tablet 6    onabotulinumtoxina (BOTOX) 200 unit SolR as directed      oxyCODONE-acetaminophen (PERCOCET)  mg per tablet 5 (five) times daily. Take 1 tab 5x a day as  needed      phentermine 15 MG capsule Take 15 mg by mouth.      pravastatin (PRAVACHOL) 20 MG tablet Take by mouth once daily.      predniSONE (DELTASONE) 20 MG tablet Take one pill per day for three days, can repeat as needed for shortness of breath, wheezing, cough. 12 tablet 0    tiZANidine (ZANAFLEX) 4 MG tablet Take 4 mg by mouth 2 (two) times daily.      topiramate (TOPAMAX) 200 MG Tab 2 pills every morning 60 tablet 5    [DISCONTINUED] aspirin (ECOTRIN) 81 MG EC tablet Take 1 tablet (81 mg total) by mouth 2 (two) times daily. 84 tablet 0     Current Facility-Administered Medications on File Prior to Visit   Medication Dose Route Frequency Provider Last Rate Last Admin    midazolam (VERSED) 1 mg/mL injection 0.5 mg  0.5 mg Intravenous PRN Myrtle Linares MD   2 mg at 09/18/20 1155       REVIEW OF SYSTEMS:  ROS    GENERAL PHYSICAL EXAM:   LMP  (LMP Unknown)    GEN: well developed, well nourished, no acute distress   HENT: Normocephalic, atraumatic   EYES: No discharge, conjunctiva normal   NECK: Supple, non-tender   PULM: No wheezing, no respiratory distress   CV: RRR   ABD: Soft, non-tender    ORTHO EXAM:   Examination of the right hand reveals that there are obvious deformities about the index and the middle finger distal interphalangeal joint.  These are consistent with arthritis.  There is 30-40 degrees of angulation.  There is significant osteophytes noted.  Palpation over those joints does produce tenderness.  There was no increased edema or erythema.  He was no increased warmth.  She does have sensation intact over the tip and capillary refill less than 2 seconds.    RADIOLOGY:   X-rays of the right hand from 05/01/2024 has been reviewed.  He was noted have severe arthritis about the distal interphalangeal joints of the index and the middle fingers.  There is also moderate arthritis throughout multiple other joints.    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 I have reviewed treatment options.  I have discussed anti-inflammatories and steroid injections.  Also discussed the possibility of fusion of the tips of both the index and the middle fingers.  After discussion of the risks and benefits consent has been obtained    2.  Will proceed with right index and right middle finger distal interphalangeal joint arthrodesis under general anesthesia with a single-shot supraclavicular block     3.  She will follow up with me 2 weeks after the surgery    4. She has discuss the surgery with her pain management doctor and he will handle her pain medication needs after the surgery

## 2024-11-11 NOTE — PATIENT INSTRUCTIONS
Surgery Instructions:     Your surgery is scheduled on 12/10/2024 at the surgery center: [address], 1st floor, second entrance.    The pre-op department will be in contact with you prior to your procedure to review medications and instructions.       Nothing to eat or drink after midnight prior to day of surgery.    You should STOP taking any blood thinners 5 days prior to surgery.     The surgery center will contact you the day prior to surgery to advise you of your arrival time for surgery.     Your post op appointment is scheduled on 12/23 at 9:40.

## 2024-11-11 NOTE — PROGRESS NOTES
11/11/2024    Chief Complaint:  Chief Complaint   Patient presents with    Right Hand - Pain, Numbness     Discuss surgery       HPI:  Faith Bernard is a 59 y.o. female, who presents to clinic today she has a history of severe arthritis to the right index and middle fingertips.  We have discussed the possibility of surgery in the past.  She states that they are continuing to get worse and she feels like she was ready to do the surgery now.    PMHX:  Past Medical History:   Diagnosis Date    Allergy     Anxiety     Depression     Empyema of right pleural space 2021    Encounter for blood transfusion     Esophageal dysphagia     Fibromyalgia     Gastric bypass status for obesity     H/O dental abscess 04/14/2014    drained    Headache(784.0)     migraines.  Treated at [facility] (Dr. 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  Pt states took too many then realized her mistake    Polycystic ovaries     S/P JUHI-BSO     Thyroid disease     hypothyroidism    Wears partial dentures     upper       PSHX:  Past Surgical History:   Procedure Laterality Date    ABDOMINAL SURGERY      ANKLE HARDWARE REMOVAL Left 3/28/2022    Procedure: REMOVAL, HARDWARE, ANKLE;  Surgeon: Fer Mcrae MD;  Location: [address];  Service: Orthopedics;  Laterality: Left;    APPLICATION OF LARGE EXTERNAL FIXATION DEVICE TO TIBIA Left 9/7/2020    Procedure: APPLICATION, EXTERNAL FIXATION DEVICE, TIBIA;  Surgeon: Sujata Londono MD;  Location: [address];  Service: Orthopedics;  Laterality: Left;  need paralysis     APPLICATION OF WOUND VACUUM-ASSISTED CLOSURE DEVICE Left 9/18/2020    Procedure: APPLICATION, WOUND VAC;  Surgeon: Fer Mcrae MD;  [facility];  Service: Orthopedics;  Laterality: Left;    BREAST SURGERY  2004    Breast Reduction    CARDIAC CATHETERIZATION      COLONOSCOPY N/A 11/3/2015    Procedure: COLONOSCOPY;  Surgeon: Timothy Barber MD;  Location: [facility] ENDO;  Service: Endoscopy;  Laterality: N/A;    CYSTOSCOPY WITH INJECTION OF PERIURETHRAL BULKING AGENT N/A 9/14/2023    Procedure: CYSTOSCOPY, WITH PERIURETHRAL BULKING AGENT INJECTION;  Surgeon: David Perez MD;  Location: [facility] OR;  Service: Urology;  Laterality: N/A;    CYSTOSCOPY WITH URODYNAMIC TESTING N/A 4/17/2023    Procedure: CYSTOSCOPY, WITH URODYNAMIC TESTING;  Surgeon: David Perez MD;  Location: [facility] OR 1ST FLR;  Service: Urology;  Laterality: N/A;  1 hr    DENTAL SURGERY      4 teeth removed    GASTRIC BYPASS      HERNIA REPAIR      HYSTERECTOMY      ILIAC CREST BONE GRAFT Left 3/28/2022    Procedure: BONE GRAFT, ILIAC CREST;  Surgeon: Fer Mcrae MD;  [facility];  Service: Orthopedics;  Laterality: Left;    INJECTION OF ANESTHETIC AGENT AROUND NERVE N/A 3/29/2022    Procedure: Block, Nerve;  Surgeon: Alexus Surgeon;  Location: [facility];  Service: Anesthesiology;  Laterality: N/A;    OPEN REDUCTION AND INTERNAL FIXATION (ORIF) OF PILON FRACTURE Left 9/18/2020    Procedure: ORIF, FRACTURE, PILON - left. Diving board, supine, bone foam. [place] anterolateral distal tibial plate, mini frag, long 3.5mm steel screw, CaPhos bone substitute;  Surgeon: Fer Mcrae MD;  [facility];  Service: Orthopedics;  Laterality: Left;    OPEN REDUCTION AND INTERNAL FIXATION (ORIF) OF PILON FRACTURE Left 3/28/2022    Procedure: ORIF, FRACTURE, PILON nonunion + Iliac crest bone graft - diving board, supine, bone foam. [place] axsos3 screws, 2.7 mini plates/screws, curettes,  [employer] medical Augment;  Surgeon: Fer Mcrae MD;  Location: [facility] OR 2ND FLR;  Service: Orthopedics;  Laterality: Left;  Spinal? + postop catheter (draping out pelvis for bone graft harvest)    OVARIAN CYST REMOVAL      aprox 5 times    REMOVAL OF EXTERNAL FIXATION DEVICE Left 9/18/2020    Procedure: REMOVAL, EXTERNAL FIXATION DEVICE;  Surgeon: Fer Mcrae MD;  Location: [facility] OR 2ND FLR;  Service: Orthopedics;  Laterality: Left;    ROTATOR CUFF REPAIR Left 01/2019    SURGICAL PROCEDURE FOR STRESS INCONTINENCE USING TENSION FREE VAGINAL TAPE N/A 12/14/2022    Procedure: SURGICAL PROCEDURE, USING TENSION FREE VAGINAL TAPE, FOR STRESS INCONTINENCE;  Surgeon: Frandy Sherman MD;  Location: [facility] OR;  Service: OB/GYN;  Laterality: N/A;  cysto    tennis elbow repair Left 01/2019    UPPER GASTROINTESTINAL ENDOSCOPY         FMHX:  Family History   Problem Relation Name Age of Onset    Anxiety disorder Mother      Depression Mother      Suicide Mother      Seizures Mother      Drug abuse Mother      Ovarian cancer Mother      Aortic aneurysm Father      Glaucoma Maternal Grandmother      Colon cancer Neg Hx      Breast cancer Neg Hx      Diabetes Neg Hx      Hypertension Neg Hx         SOCHX:  Social History     Tobacco Use    Smoking status: Never    Smokeless tobacco: Never    Tobacco comments:     marijuana   Substance Use Topics    Alcohol use: Yes     Comment: rare       ALLERGIES:  Cephalexin, Nsaids (non-steroidal anti-inflammatory drug), and Codeine    CURRENT MEDICATIONS:  Current Outpatient Medications on File Prior to Visit   Medication Sig Dispense Refill    acetaminophen (TYLENOL) 325 MG tablet Take 325 mg by mouth every 6 (six) hours as needed for Pain.      albuterol (PROVENTIL/VENTOLIN HFA) 90 mcg/actuation inhaler 2 puffs every 4 hours as needed for cough, wheeze, or shortness of breath 18 g 11    ARIPiprazole (ABILIFY) 15 MG Tab Take 7.5 mg by mouth once daily.       cetirizine (ZYRTEC) 10 MG tablet Take 10 mg by mouth.      cholecalciferol, vitamin D3, 50,000 unit capsule Take 50,000 Units by mouth every 7 days. Patient takes on Saturdays  99    clobetasol 0.05% (TEMOVATE) 0.05 % Oint Apply topically every evening. Use small, pea-sized amount at the site, every night before bed for 2 weeks. After that, use only 2-3 times per week at night before bed. 30 g 1    cyanocobalamin 1,000 mcg/mL injection INJECT 1 ML IN THE MUSCLE EVERY 28 DAYS AS DIRECTED      dextroamphetamine-amphetamine (ADDERALL XR) 30 MG 24 hr capsule Take by mouth every morning.      diclofenac sodium (VOLTAREN) 1 % Gel Apply 2 g topically 4 (four) times daily. For hands 100 g 5    DULoxetine (CYMBALTA) 60 MG capsule Take 60 mg by mouth.      erenumab-aooe (AIMOVIG) 140 mg/mL autoinjector Inject 140 mg into the skin once every 28 days to reduce migraines 1 mL 11    fluticasone-umeclidin-vilanter (TRELEGY ELLIPTA) 200-62.5-25 mcg inhaler Inhale 1 puff into the lungs once daily. 60 each 11    gabapentin (NEURONTIN) 600 MG tablet Take 1,200 mg by mouth 3 (three) times daily.      hydroCHLOROthiazide (HYDRODIURIL) 25 MG tablet TAKE 1 TABLET BY MOUTH DAILY IN THE MORNING      hydroxychloroquine (PLAQUENIL) 200 mg tablet Take 2 tablets a day every day except Sundays 52 tablet 3    ISOtretinoin (ACCUTANE) 40 MG capsule Take 40 mg by mouth 2 (two) times daily.      levothyroxine (SYNTHROID) 100 MCG tablet Take 1 tablet daily for 5 days and take half tablet on Saturday and Sunday. 90 tablet 3    LIDOcaine (XYLOCAINE) 5 % Oint ointment Apply topically 2 (two) times daily.      metoprolol tartrate (LOPRESSOR) 50 MG tablet Take 50 mg by mouth 2 (two) times daily.      NURTEC 75 mg odt Take 1 tablet by mouth every other day to reduce/prevent migraines 16 tablet 6    onabotulinumtoxina (BOTOX) 200 unit SolR as directed      oxyCODONE-acetaminophen (PERCOCET)  mg per tablet 5 (five) times daily. Take 1 tab 5x a day as  needed      phentermine 15 MG capsule Take 15 mg by mouth.      pravastatin (PRAVACHOL) 20 MG tablet Take by mouth once daily.      predniSONE (DELTASONE) 20 MG tablet Take one pill per day for three days, can repeat as needed for shortness of breath, wheezing, cough. 12 tablet 0    tiZANidine (ZANAFLEX) 4 MG tablet Take 4 mg by mouth 2 (two) times daily.      topiramate (TOPAMAX) 200 MG Tab 2 pills every morning 60 tablet 5    [DISCONTINUED] aspirin (ECOTRIN) 81 MG EC tablet Take 1 tablet (81 mg total) by mouth 2 (two) times daily. 84 tablet 0     Current Facility-Administered Medications on File Prior to Visit   Medication Dose Route Frequency Provider Last Rate Last Admin    midazolam (VERSED) 1 mg/mL injection 0.5 mg  0.5 mg Intravenous PRN Myrtle Linares MD   2 mg at 09/18/20 1155       REVIEW OF SYSTEMS:  ROS    GENERAL PHYSICAL EXAM:   LMP  (LMP Unknown)    GEN: well developed, well nourished, no acute distress   HENT: Normocephalic, atraumatic   EYES: No discharge, conjunctiva normal   NECK: Supple, non-tender   PULM: No wheezing, no respiratory distress   CV: RRR   ABD: Soft, non-tender    ORTHO EXAM:   Examination of the right hand reveals that there are obvious deformities about the index and the middle finger distal interphalangeal joint.  These are consistent with arthritis.  There is 30-40 degrees of angulation.  There is significant osteophytes noted.  Palpation over those joints does produce tenderness.  There was no increased edema or erythema.  He was no increased warmth.  She does have sensation intact over the tip and capillary refill less than 2 seconds.    RADIOLOGY:   X-rays of the right hand from 05/01/2024 has been reviewed.  He was noted have severe arthritis about the distal interphalangeal joints of the index and the middle fingers.  There is also moderate arthritis throughout multiple other joints.    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 I have reviewed treatment options.  I have discussed anti-inflammatories and steroid injections.  Also discussed the possibility of fusion of the tips of both the index and the middle fingers.  After discussion of the risks and benefits consent has been obtained    2.  Will proceed with right index and right middle finger distal interphalangeal joint arthrodesis under general anesthesia with a single-shot supraclavicular block     3.  She will follow up with me 2 weeks after the surgery    4. She has discuss the surgery with her pain management doctor and he will handle her pain medication needs after the surgery

## 2024-11-14 ENCOUNTER — OFFICE VISIT (OUTPATIENT)
Dept: RHEUMATOLOGY | Facility: CLINIC | Age: 59
End: 2024-11-14
Payer: COMMERCIAL

## 2024-11-14 VITALS
HEIGHT: 63 IN | BODY MASS INDEX: 30.71 KG/M2 | DIASTOLIC BLOOD PRESSURE: 89 MMHG | SYSTOLIC BLOOD PRESSURE: 142 MMHG | HEART RATE: 62 BPM | WEIGHT: 173.31 LBS

## 2024-11-14 DIAGNOSIS — G89.4 CHRONIC PAIN SYNDROME: ICD-10-CM

## 2024-11-14 DIAGNOSIS — Z79.899 LONG-TERM USE OF PLAQUENIL: ICD-10-CM

## 2024-11-14 DIAGNOSIS — R21 SKIN RASH: Primary | ICD-10-CM

## 2024-11-14 DIAGNOSIS — M15.4 EROSIVE OSTEOARTHRITIS OF HANDS, BILATERAL: ICD-10-CM

## 2024-11-14 DIAGNOSIS — M25.572 PAIN OF JOINT OF LEFT ANKLE AND FOOT: ICD-10-CM

## 2024-11-14 PROCEDURE — 1159F MED LIST DOCD IN RCRD: CPT | Mod: CPTII,S$GLB,, | Performed by: STUDENT IN AN ORGANIZED HEALTH CARE EDUCATION/TRAINING PROGRAM

## 2024-11-14 PROCEDURE — 99214 OFFICE O/P EST MOD 30 MIN: CPT | Mod: S$GLB,,, | Performed by: STUDENT IN AN ORGANIZED HEALTH CARE EDUCATION/TRAINING PROGRAM

## 2024-11-14 PROCEDURE — 99999 PR PBB SHADOW E&M-EST. PATIENT-LVL V: CPT | Mod: PBBFAC,,, | Performed by: STUDENT IN AN ORGANIZED HEALTH CARE EDUCATION/TRAINING PROGRAM

## 2024-11-14 PROCEDURE — 3079F DIAST BP 80-89 MM HG: CPT | Mod: CPTII,S$GLB,, | Performed by: STUDENT IN AN ORGANIZED HEALTH CARE EDUCATION/TRAINING PROGRAM

## 2024-11-14 PROCEDURE — 3008F BODY MASS INDEX DOCD: CPT | Mod: CPTII,S$GLB,, | Performed by: STUDENT IN AN ORGANIZED HEALTH CARE EDUCATION/TRAINING PROGRAM

## 2024-11-14 PROCEDURE — 3077F SYST BP >= 140 MM HG: CPT | Mod: CPTII,S$GLB,, | Performed by: STUDENT IN AN ORGANIZED HEALTH CARE EDUCATION/TRAINING PROGRAM

## 2024-11-14 PROCEDURE — 3044F HG A1C LEVEL LT 7.0%: CPT | Mod: CPTII,S$GLB,, | Performed by: STUDENT IN AN ORGANIZED HEALTH CARE EDUCATION/TRAINING PROGRAM

## 2024-11-14 RX ORDER — ONDANSETRON 4 MG/1
4 TABLET, FILM COATED ORAL EVERY 8 HOURS PRN
COMMUNITY
Start: 2024-09-10

## 2024-11-14 RX ORDER — FLUCONAZOLE 200 MG/1
200 TABLET ORAL
COMMUNITY
Start: 2024-11-13

## 2024-11-14 RX ORDER — MUPIROCIN 20 MG/G
OINTMENT TOPICAL
COMMUNITY
Start: 2024-08-19

## 2024-11-14 NOTE — PROGRESS NOTES
RHEUMATOLOGY OUTPATIENT CLINIC NOTE    11/14/2024    Attending Rheumatologist: Kalie William  Primary Care Provider: Rah Canela MD   Physician Requesting Consultation: No referring provider defined for this encounter.  Chief Complaint/Reason For Consultation:  Erosive osteoarthritis of hands, bilateral      Subjective:       HPI  Faith Bernard is a 59 y.o. White female with history of hypertension, hyperparathyroidism, history of upper GI bleed, anxiety, migraines who comes for follow up on joint pain      Interim history  -Last visit on 8/2024  -Current meds:  mg BID except Sundays  -she saw hand surgery on 11/11 and she is ready for right index and right middle finger DIP arthodesis due to severe pain.   -she has been having persistent pain in left ankle. Had left ankle CSI on 10/2024 which did not help much. She has been noticing rash around left ankle with dry skin. She wonders about psoriasis. She has been using lidocaine cream to help with pain. She uses it in other areas of the body without any reashes.   -takes gabapentin 1200 mg QID. She has also been taking oxycodone/apap around the clock due to worsening pain. She is on cymbalta 60 mg daily, she reports she used to take 120 mg in the past.     Initial consult HPI    She has history of OA and was managed by rheumatologist many years ago.     In 2020 she had fracture in left distal tibia and ankle that required ORIF.     She has been noticing that lately she is having diffuse joint pain.  Denies any myalgias    She has chronic pain in her hands with deformities in the hips.  However she reports the form the last month she is having swelling and redness in all her PIP and MCPs.  Pain can wake him up in the middle of the night.  Having difficulty opening jars, holding her toothbrush.  Lidocaine cream helps.    She reports pain in elbows, no swelling.  Reports pain in bilateral shoulders, no difficulty lifting above her head.   Reports neck pain with limited range of motion, feels like a burning sensation.  It is not too severe    Reports low back pain for many years associated with sciatica in both size.  Sees pain management.  Has had ablation in the past.     Reports bilateral lateral hip pain, intermittent.  Difficulty getting up from the car.  Reports history of bilateral knee pain, noted occasional swelling.  Has been noticing pain in bilateral ankles and feet    She reports morning stiffness of about 5-10 minutes    For pain control she currently takes oxycodone as needed, pregabalin prescribed by pain management.  She also takes Cymbalta per her psychiatrist    She denies any dry eyes, dry mouth, skin rashes, photosensitivity, oral ulcers, Raynaud's, abdominal pain.  She reports a history of mild psoriasis in her scalp    She tries to avoid NSAIDs due to history of upper GI bleeding secondary to ulcers.     She denies family history of rheumatoid arthritis.  However reports history of psoriasis in her family    Labs  10/10/2023  RF, CCP negative  ESR and CRP negative    2015  MARY JO negative    Imaging    XR bilateral knee: OA bilaterally   XR bilateral hands: joint space narrowing in bilateral 2-3 DIP with erosions  XR left foot with hardware failure    Chronic comorbid conditions affecting medical decision making today:  Past Medical History:   Diagnosis Date    Allergy     Anxiety     Depression     Empyema of right pleural space 2021    Encounter for blood transfusion     Esophageal dysphagia     Fibromyalgia     Gastric bypass status for obesity     H/O dental abscess 04/14/2014    drained    Headache(784.0)     migraines.  Treated at [facility] (Dr. 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  Pt states took too many then realized her mistake    Polycystic ovaries     S/P [id number]     Thyroid disease     hypothyroidism    Wears partial dentures     upper     Past Surgical History:   Procedure Laterality Date    ABDOMINAL SURGERY      ANKLE HARDWARE REMOVAL Left 3/28/2022    Procedure: REMOVAL, HARDWARE, ANKLE;  Surgeon: Fer Mcrae MD;  Location: [facility] OR [address] [place] [address];  Service: Orthopedics;  Laterality: Left;    APPLICATION OF LARGE EXTERNAL FIXATION DEVICE TO TIBIA Left 9/7/2020    Procedure: APPLICATION, EXTERNAL FIXATION DEVICE, TIBIA;  Surgeon: Sujata Londono MD;  [facility];  Service: Orthopedics;  Laterality: Left;  need paralysis    APPLICATION OF WOUND VACUUM-ASSISTED CLOSURE DEVICE Left 9/18/2020    Procedure: APPLICATION, WOUND VAC;  Surgeon: Fer Mcrae MD;  Location: [address];  Service: Orthopedics;  Laterality: Left;    BREAST SURGERY  2004    Breast Reduction    CARDIAC CATHETERIZATION      COLONOSCOPY N/A 11/3/2015    Procedure: COLONOSCOPY;  Surgeon: Timothy Barber MD;  Location: [facility];  Service: Endoscopy;  Laterality: N/A;    CYSTOSCOPY WITH INJECTION OF PERIURETHRAL BULKING AGENT N/A 9/14/2023    Procedure: CYSTOSCOPY, WITH PERIURETHRAL BULKING AGENT INJECTION;  Surgeon: David Perez MD;  Location: [facility];  Service: Urology;  Laterality: N/A;    CYSTOSCOPY WITH URODYNAMIC TESTING N/A 4/17/2023    Procedure: CYSTOSCOPY, WITH URODYNAMIC TESTING;  Surgeon: David Perez MD;  Location: [address];  Service: Urology;  Laterality: N/A;  1 hr    DENTAL SURGERY      4 teeth removed    GASTRIC BYPASS      HERNIA REPAIR      HYSTERECTOMY      ILIAC CREST BONE GRAFT Left 3/28/2022    Procedure: BONE GRAFT, ILIAC CREST;  Surgeon: Fer Mcrae MD;   [address];  Service: Orthopedics;  Laterality: Left;    INJECTION OF ANESTHETIC AGENT AROUND NERVE N/A 3/29/2022    Procedure: Block, Nerve;  Surgeon: Alexus Surgeon;  Location: [facility];  Service: Anesthesiology;  Laterality: N/A;    OPEN REDUCTION AND INTERNAL FIXATION (ORIF) OF PILON FRACTURE Left 9/18/2020    Procedure: ORIF, FRACTURE, PILON - left. Diving board, supine, bone foam. Meade anterolateral distal tibial plate, mini frag, long 3.5mm steel screw, CaPhos bone substitute;  Surgeon: Fer Mcrae MD;  Location: [address];  Service: Orthopedics;  Laterality: Left;    OPEN REDUCTION AND INTERNAL FIXATION (ORIF) OF PILON FRACTURE Left 3/28/2022    Procedure: ORIF, FRACTURE, PILON nonunion + Iliac crest bone graft - diving board, supine, bone foam. Meade axsos3 screws, 2.7 mini plates/screws, curettes, Phagenesis/Lomas medical Augment;  Surgeon: Fer Mcrae MD;  Location: [address];  Service: Orthopedics;  Laterality: Left;  Spinal? + postop catheter (draping out pelvis for bone graft harvest)    OVARIAN CYST REMOVAL      aprox 5 times    REMOVAL OF EXTERNAL FIXATION DEVICE Left 9/18/2020    Procedure: REMOVAL, EXTERNAL FIXATION DEVICE;  Surgeon: Fer Mcrae MD;  Location: [address];  Service: Orthopedics;  Laterality: Left;    ROTATOR CUFF REPAIR Left 01/2019    SURGICAL PROCEDURE FOR STRESS INCONTINENCE USING TENSION FREE VAGINAL TAPE N/A 12/14/2022    Procedure: SURGICAL PROCEDURE, USING TENSION FREE VAGINAL TAPE, FOR STRESS INCONTINENCE;  Surgeon: Frandy Sherman MD;  Location: [facility];  Service: OB/GYN;  Laterality: N/A;  cysto    tennis elbow repair Left 01/2019    UPPER GASTROINTESTINAL ENDOSCOPY       Family History   Problem Relation Name Age of Onset    Anxiety disorder Mother      Depression Mother      Suicide Mother      Seizures Mother      Drug abuse Mother      Ovarian cancer Mother      Aortic aneurysm Father       Glaucoma Maternal Grandmother      Colon cancer Neg Hx      Breast cancer Neg Hx      Diabetes Neg Hx      Hypertension Neg Hx       Social History     Substance and Sexual Activity   Alcohol Use Yes    Comment: rare     Social History     Tobacco Use   Smoking Status Never   Smokeless Tobacco Never   Tobacco Comments    marijuana     Social History     Substance and Sexual Activity   Drug Use Yes    Types: Marijuana    Comment: medical       Current Outpatient Medications:     acetaminophen (TYLENOL) 325 MG tablet, Take 325 mg by mouth every 6 (six) hours as needed for Pain., Disp: , Rfl:     albuterol (PROVENTIL/VENTOLIN HFA) 90 mcg/actuation inhaler, 2 puffs every 4 hours as needed for cough, wheeze, or shortness of breath, Disp: 18 g, Rfl: 11    ARIPiprazole (ABILIFY) 15 MG Tab, Take 7.5 mg by mouth once daily., Disp: , Rfl:     cetirizine (ZYRTEC) 10 MG tablet, Take 10 mg by mouth., Disp: , Rfl:     cholecalciferol, vitamin D3, 50,000 unit capsule, Take 50,000 Units by mouth every 7 days. Patient takes on Saturdays, Disp: , Rfl: 99    clobetasol 0.05% (TEMOVATE) 0.05 % Oint, Apply topically every evening. Use small, pea-sized amount at the site, every night before bed for 2 weeks. After that, use only 2-3 times per week at night before bed., Disp: 30 g, Rfl: 1    cyanocobalamin 1,000 mcg/mL injection, INJECT 1 ML IN THE MUSCLE EVERY 28 DAYS AS DIRECTED, Disp: , Rfl:     dextroamphetamine-amphetamine (ADDERALL XR) 30 MG 24 hr capsule, Take by mouth every morning., Disp: , Rfl:     DULoxetine (CYMBALTA) 60 MG capsule, Take 60 mg by mouth., Disp: , Rfl:     erenumab-aooe (AIMOVIG) 140 mg/mL autoinjector, Inject 140 mg into the skin once every 28 days to reduce migraines, Disp: 1 mL, Rfl: 11    fluconazole (DIFLUCAN) 200 MG Tab, 200 mg., Disp: , Rfl:     fluticasone-umeclidin-vilanter (TRELEGY ELLIPTA) 200-62.5-25 mcg inhaler, Inhale 1 puff into the lungs once daily., Disp: 60 each, Rfl: 11    gabapentin  (NEURONTIN) 600 MG tablet, Take 1,200 mg by mouth 3 (three) times daily., Disp: , Rfl:     hydroCHLOROthiazide (HYDRODIURIL) 25 MG tablet, TAKE 1 TABLET BY MOUTH DAILY IN THE MORNING, Disp: , Rfl:     hydroxychloroquine (PLAQUENIL) 200 mg tablet, Take 2 tablets a day every day except Sundays, Disp: 52 tablet, Rfl: 3    levothyroxine (SYNTHROID) 100 MCG tablet, Take 1 tablet daily for 5 days and take half tablet on Saturday and Sunday., Disp: 90 tablet, Rfl: 3    LIDOcaine (XYLOCAINE) 5 % Oint ointment, Apply topically 2 (two) times daily., Disp: , Rfl:     metoprolol tartrate (LOPRESSOR) 50 MG tablet, Take 50 mg by mouth 2 (two) times daily., Disp: , Rfl:     mupirocin (BACTROBAN) 2 % ointment, Apply topically., Disp: , Rfl:     NURTEC 75 mg odt, Take 1 tablet by mouth every other day to reduce/prevent migraines, Disp: 16 tablet, Rfl: 6    onabotulinumtoxina (BOTOX) 200 unit SolR, as directed, Disp: , Rfl:     ondansetron (ZOFRAN) 4 MG tablet, Take 4 mg by mouth every 8 (eight) hours as needed., Disp: , Rfl:     oxyCODONE-acetaminophen (PERCOCET)  mg per tablet, 5 (five) times daily. Take 1 tab 5x a day as needed, Disp: , Rfl:     pravastatin (PRAVACHOL) 20 MG tablet, Take by mouth once daily., Disp: , Rfl:     predniSONE (DELTASONE) 20 MG tablet, Take one pill per day for three days, can repeat as needed for shortness of breath, wheezing, cough. (Patient taking differently: as needed. Take one pill per day for three days, can repeat as needed for shortness of breath, wheezing, cough.), Disp: 12 tablet, Rfl: 0    tiZANidine (ZANAFLEX) 4 MG tablet, Take 4 mg by mouth 2 (two) times daily., Disp: , Rfl:   No current facility-administered medications for this visit.    Facility-Administered Medications Ordered in Other Visits:     midazolam (VERSED) 1 mg/mL injection 0.5 mg, 0.5 mg, Intravenous, PRN, Myrtle Linares MD, 2 mg at 09/18/20 1155     Objective:         Vitals:    11/14/24 1050   BP: (!) 142/89    Pulse: 62           Physical Exam   Constitutional: She is oriented to person, place, and time. She appears well-developed.   HENT:   Head: Normocephalic.   Mouth/Throat: Mucous membranes are moist.   Salivary pool adequate  Oral aperture is normal   Pulmonary/Chest: Effort normal.   Musculoskeletal:      Cervical back: Neck supple.      Comments: Heberden nodes in multiple DIPs that are tender   PIPs and MCPs without tenderness or synovitis.  Bilateral wrists with no synovitis.  Left elbow with mild tenderness, no swelling.  Bilateral shoulder with normal range of motion.  Bilateral knees with no swelling, no crepitus. Left ankle with mild swelling on the lateral malleolus,.  No synovitis or tenderness on feet.     Neurological: She is alert and oriented to person, place, and time.   Skin: No rash noted.   Dry skin in left ankle.    Vitals reviewed.      Reviewed old and all outside pertinent medical records available.    All lab results personally reviewed and interpreted by me.  Lab Results   Component Value Date    WBC 6.50 08/21/2024    HGB 12.0 08/21/2024    HCT 40.8 08/21/2024    MCV 81 (L) 08/21/2024    MCH 23.7 (L) 08/21/2024    MCHC 29.4 (L) 08/21/2024    RDW 19.8 (H) 08/21/2024     08/21/2024    MPV 9.6 08/21/2024    NEUTROPCT 51.1 01/03/2013    MONOPCT 7.3 01/03/2013    PLTEST Normal 06/14/2006       Lab Results   Component Value Date     08/21/2024    K 3.7 08/21/2024     08/21/2024    CO2 27 08/21/2024    GLU 95 08/21/2024    BUN 14 08/21/2024    CALCIUM 9.3 08/21/2024    PROT 7.1 08/21/2024    ALBUMIN 4.2 08/21/2024    BILITOT 0.3 08/21/2024    AST 15 08/21/2024    ALKPHOS 81 08/21/2024    ALT 20 08/21/2024    GFRNONAA 93 08/16/2021       Lab Results   Component Value Date    COLORU Yellow 08/03/2021    APPEARANCEUA Clear 08/03/2021    SPECGRAV >1.030 (A) 08/03/2021    PHUR 6.0 08/03/2021    PROTEINUA Negative 08/03/2021    GLUCOSEU NEG 01/01/2013    KETONESU Negative 08/03/2021  "   BLOODU NEG 01/01/2013    LEUKOCYTESUR Negative 08/03/2021    NITRITE Negative 08/03/2021    UROBILINOGEN Negative 08/03/2021       Lab Results   Component Value Date    CRP 0.40 08/20/2024       Lab Results   Component Value Date    RF <13.0 10/10/2023    SEDRATE 19 08/20/2024    CCPANTIBODIE <0.5 10/10/2023       No components found for: "25OHVITDTOT", "63JWSAFQ9", "71PGEGTA0", "METHODNOTE"    No results found for: "URICACID"    Lab Results   Component Value Date    HEPBSAG Negative 10/04/2015    HEPCAB Negative 10/04/2015       Imaging:  All imaging reviewed and independently interpreted by me.    XR bilateral knee: OA bilaterally   XR bilateral hands: joint space narrowing in bilateral 2-3 DIP with erosions  XR left foot with hardware failure     ASSESSMENT / PLAN:     Faith Bernard is a 59 y.o. White female with history of hypertension, hyperparathyroidism, history of upper GI bleed, anxiety, migraines who comes for evaluation of joint pain.    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 She cannot do oral NSAIDs due to prior PUD. Already taking percocet and extra tylenol from pain management  -has noted improvement on  mg BID except Sundays. Will continue. She has a remote history of torsade de pointes however most recent EKG in 12/2022 had normal Qtc   Eye exam on 2/2024 no HCQ toxicity   -Saw hand surgery. Will have R 2nd and 3rd DIP arthodesis on next month  -advised her to use prednisone as needed for flares.  -Can consider MTX or LEF in the future.     #Left ankle pain   -XR left ankle with hardware stabilizing distal tib fib fractures and there is hardware removal from the calcaneus. No acute fracture, dislocation, or bone destruction seen. Multiple fixation screws demonstrate metal fatigue fracture/hardware failure.   -follows with ortho  -had " CSI on 10/2024    #Skin rash  -she has been having new onset of skin rash in left ankle with dry skin. Eczema vs psoriasis  -advised her to use cerave healing ointment.   -referral to dermatology     # polyarthralgia  -Besides hand pain she likely has OA in other sites contributing to her symptoms.   -we will keep in consideration history of mild psoriasis.     #Chronic pain  -She follows pain management.  Takes oxycodone as needed and gabapentin.   -she also takes Cymbalta 60 mg from her psychiatrist.    # hyperparathyroidism  -follows with endocrinology.    #long term use of plaquenil  -Eye exam on 2/2024 no HCQ toxicity   -labs now and every 6 months     Follow up in about 3 months (around 2/14/2025).    Method of contact with patient concerns: [facility]    Disclaimer:  This note is prepared using voice recognition software and as such is likely to have errors and has not been proof read. Please contact me for questions.     Time spent: 20 minutes in face to face discussion concerning diagnosis, prognosis, review of lab and test results, benefits of treatment as well as management of disease, counseling of patient and coordination of care between various health care providers.      Kalie William M.D.  Rheumatology  [facility]

## 2024-11-19 DIAGNOSIS — M15.1 DEGENERATIVE ARTHRITIS OF DISTAL INTERPHALANGEAL JOINT OF INDEX FINGER OF RIGHT HAND: ICD-10-CM

## 2024-11-19 DIAGNOSIS — M15.1 DEGENERATIVE ARTHRITIS OF DISTAL INTERPHALANGEAL JOINT OF MIDDLE FINGER OF RIGHT HAND: Primary | ICD-10-CM

## 2024-11-19 RX ORDER — MUPIROCIN 20 MG/G
OINTMENT TOPICAL
OUTPATIENT
Start: 2024-11-19

## 2024-11-19 RX ORDER — CLINDAMYCIN PHOSPHATE 600 MG/50ML
600 INJECTION, SOLUTION INTRAVENOUS
OUTPATIENT
Start: 2024-11-19

## 2024-12-09 ENCOUNTER — ANESTHESIA EVENT (OUTPATIENT)
Dept: SURGERY | Facility: HOSPITAL | Age: 59
End: 2024-12-09
Payer: COMMERCIAL

## 2024-12-10 ENCOUNTER — HOSPITAL ENCOUNTER (OUTPATIENT)
Facility: HOSPITAL | Age: 59
Discharge: HOME | End: 2024-12-10
Attending: ORTHOPAEDIC SURGERY | Admitting: ORTHOPAEDIC SURGERY
Payer: COMMERCIAL

## 2024-12-10 ENCOUNTER — ANESTHESIA (OUTPATIENT)
Dept: SURGERY | Facility: HOSPITAL | Age: 59
End: 2024-12-10
Payer: COMMERCIAL

## 2024-12-10 ENCOUNTER — HOSPITAL ENCOUNTER (OUTPATIENT)
Dept: RADIOLOGY | Facility: HOSPITAL | Age: 59
Discharge: HOME | End: 2024-12-10
Attending: ORTHOPAEDIC SURGERY
Payer: COMMERCIAL

## 2024-12-10 VITALS
WEIGHT: 173 LBS | DIASTOLIC BLOOD PRESSURE: 75 MMHG | RESPIRATION RATE: 20 BRPM | OXYGEN SATURATION: 97 % | HEART RATE: 71 BPM | BODY MASS INDEX: 30.65 KG/M2 | TEMPERATURE: 98 F | HEIGHT: 63 IN | SYSTOLIC BLOOD PRESSURE: 127 MMHG

## 2024-12-10 DIAGNOSIS — M79.641 RIGHT HAND PAIN: ICD-10-CM

## 2024-12-10 DIAGNOSIS — M15.1 DEGENERATIVE ARTHRITIS OF DISTAL INTERPHALANGEAL JOINT OF MIDDLE FINGER OF RIGHT HAND: ICD-10-CM

## 2024-12-10 DIAGNOSIS — M15.1 DEGENERATIVE ARTHRITIS OF DISTAL INTERPHALANGEAL JOINT OF INDEX FINGER OF RIGHT HAND: ICD-10-CM

## 2024-12-10 PROCEDURE — 36000709 HC OR TIME LEV III EA ADD 15 MIN: Mod: PO | Performed by: ORTHOPAEDIC SURGERY

## 2024-12-10 PROCEDURE — 64415 NJX AA&/STRD BRCH PLXS IMG: CPT | Mod: PO | Performed by: ANESTHESIOLOGY

## 2024-12-10 PROCEDURE — 27200651 HC AIRWAY, LMA: Mod: PO | Performed by: ANESTHESIOLOGY

## 2024-12-10 PROCEDURE — 37000009 HC ANESTHESIA EA ADD 15 MINS: Mod: PO | Performed by: ORTHOPAEDIC SURGERY

## 2024-12-10 PROCEDURE — 37000008 HC ANESTHESIA 1ST 15 MINUTES: Mod: PO | Performed by: ORTHOPAEDIC SURGERY

## 2024-12-10 PROCEDURE — 27201423 OPTIME MED/SURG SUP & DEVICES STERILE SUPPLY: Mod: PO | Performed by: ORTHOPAEDIC SURGERY

## 2024-12-10 PROCEDURE — 63600175 PHARM REV CODE 636 W HCPCS: Mod: PO | Performed by: ANESTHESIOLOGY

## 2024-12-10 PROCEDURE — 71000015 HC POSTOP RECOV 1ST HR: Mod: PO | Performed by: ORTHOPAEDIC SURGERY

## 2024-12-10 PROCEDURE — 26860 FUSION OF FINGER JOINT: CPT | Mod: F6,,, | Performed by: ORTHOPAEDIC SURGERY

## 2024-12-10 PROCEDURE — C1769 GUIDE WIRE: HCPCS | Mod: PO | Performed by: ORTHOPAEDIC SURGERY

## 2024-12-10 PROCEDURE — 63600175 PHARM REV CODE 636 W HCPCS: Mod: PO | Performed by: NURSE ANESTHETIST, CERTIFIED REGISTERED

## 2024-12-10 PROCEDURE — 63600175 PHARM REV CODE 636 W HCPCS: Mod: JZ,JG,PO | Performed by: PHYSICIAN ASSISTANT

## 2024-12-10 PROCEDURE — 71000033 HC RECOVERY, INTIAL HOUR: Mod: PO | Performed by: ORTHOPAEDIC SURGERY

## 2024-12-10 PROCEDURE — C9290 INJ, BUPIVACAINE LIPOSOME: HCPCS | Mod: PO | Performed by: ANESTHESIOLOGY

## 2024-12-10 PROCEDURE — 63600175 PHARM REV CODE 636 W HCPCS: Mod: JZ,JG,PO | Performed by: ANESTHESIOLOGY

## 2024-12-10 PROCEDURE — 25000003 PHARM REV CODE 250: Mod: PO | Performed by: NURSE ANESTHETIST, CERTIFIED REGISTERED

## 2024-12-10 PROCEDURE — 76000 FLUOROSCOPY <1 HR PHYS/QHP: CPT | Mod: TC,PO

## 2024-12-10 PROCEDURE — C1713 ANCHOR/SCREW BN/BN,TIS/BN: HCPCS | Mod: PO | Performed by: ORTHOPAEDIC SURGERY

## 2024-12-10 PROCEDURE — 26861 FUSION OF FINGER JNT ADD-ON: CPT | Mod: F7,,, | Performed by: ORTHOPAEDIC SURGERY

## 2024-12-10 PROCEDURE — 27200750 HC INSULATED NEEDLE/ STIMUPLEX: Mod: PO | Performed by: ANESTHESIOLOGY

## 2024-12-10 PROCEDURE — 36000708 HC OR TIME LEV III 1ST 15 MIN: Mod: PO | Performed by: ORTHOPAEDIC SURGERY

## 2024-12-10 DEVICE — IMPLANTABLE DEVICE: Type: IMPLANTABLE DEVICE | Site: FINGER | Status: FUNCTIONAL

## 2024-12-10 RX ORDER — OXYCODONE HYDROCHLORIDE 5 MG/1
5 TABLET ORAL
Status: DISCONTINUED | OUTPATIENT
Start: 2024-12-10 | End: 2024-12-10 | Stop reason: HOSPADM

## 2024-12-10 RX ORDER — MIDAZOLAM HYDROCHLORIDE 1 MG/ML
.5-4 INJECTION, SOLUTION INTRAMUSCULAR; INTRAVENOUS
Status: DISCONTINUED | OUTPATIENT
Start: 2024-12-10 | End: 2024-12-10 | Stop reason: HOSPADM

## 2024-12-10 RX ORDER — LIDOCAINE HYDROCHLORIDE 10 MG/ML
1 INJECTION, SOLUTION EPIDURAL; INFILTRATION; INTRACAUDAL; PERINEURAL ONCE
Status: DISCONTINUED | OUTPATIENT
Start: 2024-12-10 | End: 2024-12-10 | Stop reason: HOSPADM

## 2024-12-10 RX ORDER — DEXAMETHASONE SODIUM PHOSPHATE 4 MG/ML
8 INJECTION, SOLUTION INTRA-ARTICULAR; INTRALESIONAL; INTRAMUSCULAR; INTRAVENOUS; SOFT TISSUE
Status: DISCONTINUED | OUTPATIENT
Start: 2024-12-10 | End: 2024-12-10 | Stop reason: HOSPADM

## 2024-12-10 RX ORDER — SODIUM CHLORIDE, SODIUM LACTATE, POTASSIUM CHLORIDE, CALCIUM CHLORIDE 600; 310; 30; 20 MG/100ML; MG/100ML; MG/100ML; MG/100ML
INJECTION, SOLUTION INTRAVENOUS CONTINUOUS
Status: DISCONTINUED | OUTPATIENT
Start: 2024-12-10 | End: 2024-12-10 | Stop reason: HOSPADM

## 2024-12-10 RX ORDER — MUPIROCIN 20 MG/G
OINTMENT TOPICAL
Status: DISCONTINUED | OUTPATIENT
Start: 2024-12-10 | End: 2024-12-10 | Stop reason: HOSPADM

## 2024-12-10 RX ORDER — BUPIVACAINE HYDROCHLORIDE 5 MG/ML
INJECTION, SOLUTION EPIDURAL; INTRACAUDAL
Status: COMPLETED | OUTPATIENT
Start: 2024-12-10 | End: 2024-12-10

## 2024-12-10 RX ORDER — OXYCODONE HYDROCHLORIDE 10 MG/1
10 TABLET ORAL EVERY 4 HOURS PRN
Qty: 10 TABLET | Refills: 0 | Status: SHIPPED | OUTPATIENT
Start: 2024-12-10

## 2024-12-10 RX ORDER — FENTANYL CITRATE 50 UG/ML
25 INJECTION, SOLUTION INTRAMUSCULAR; INTRAVENOUS EVERY 5 MIN PRN
Status: DISCONTINUED | OUTPATIENT
Start: 2024-12-10 | End: 2024-12-10 | Stop reason: HOSPADM

## 2024-12-10 RX ORDER — BUPIVACAINE 13.3 MG/ML
INJECTION, SUSPENSION, LIPOSOMAL INFILTRATION
Status: DISCONTINUED | OUTPATIENT
Start: 2024-12-10 | End: 2024-12-10

## 2024-12-10 RX ORDER — EPHEDRINE SULFATE 50 MG/ML
INJECTION, SOLUTION INTRAVENOUS
Status: DISCONTINUED | OUTPATIENT
Start: 2024-12-10 | End: 2024-12-10

## 2024-12-10 RX ORDER — GLUCAGON 1 MG
1 KIT INJECTION
Status: DISCONTINUED | OUTPATIENT
Start: 2024-12-10 | End: 2024-12-10 | Stop reason: HOSPADM

## 2024-12-10 RX ORDER — CLINDAMYCIN PHOSPHATE 600 MG/50ML
600 INJECTION, SOLUTION INTRAVENOUS
Status: COMPLETED | OUTPATIENT
Start: 2024-12-10 | End: 2024-12-10

## 2024-12-10 RX ORDER — PROPOFOL 10 MG/ML
VIAL (ML) INTRAVENOUS
Status: DISCONTINUED | OUTPATIENT
Start: 2024-12-10 | End: 2024-12-10

## 2024-12-10 RX ORDER — HYDROMORPHONE HYDROCHLORIDE 2 MG/ML
0.2 INJECTION, SOLUTION INTRAMUSCULAR; INTRAVENOUS; SUBCUTANEOUS EVERY 5 MIN PRN
Status: DISCONTINUED | OUTPATIENT
Start: 2024-12-10 | End: 2024-12-10 | Stop reason: HOSPADM

## 2024-12-10 RX ORDER — FENTANYL CITRATE 50 UG/ML
25-200 INJECTION, SOLUTION INTRAMUSCULAR; INTRAVENOUS
Status: DISCONTINUED | OUTPATIENT
Start: 2024-12-10 | End: 2024-12-10 | Stop reason: HOSPADM

## 2024-12-10 RX ORDER — ONDANSETRON HYDROCHLORIDE 2 MG/ML
INJECTION, SOLUTION INTRAVENOUS
Status: DISCONTINUED | OUTPATIENT
Start: 2024-12-10 | End: 2024-12-10

## 2024-12-10 RX ORDER — LIDOCAINE HYDROCHLORIDE 20 MG/ML
INJECTION INTRAVENOUS
Status: DISCONTINUED | OUTPATIENT
Start: 2024-12-10 | End: 2024-12-10

## 2024-12-10 RX ORDER — ONDANSETRON 8 MG/1
8 TABLET, ORALLY DISINTEGRATING ORAL EVERY 8 HOURS PRN
Qty: 3 TABLET | Refills: 0 | Status: SHIPPED | OUTPATIENT
Start: 2024-12-10

## 2024-12-10 RX ADMIN — EPHEDRINE SULFATE 10 MG: 50 INJECTION INTRAVENOUS at 03:12

## 2024-12-10 RX ADMIN — BUPIVACAINE 12.5 ML: 13.3 INJECTION, SUSPENSION, LIPOSOMAL INFILTRATION at 02:12

## 2024-12-10 RX ADMIN — BUPIVACAINE HYDROCHLORIDE 12.5 ML: 5 INJECTION, SOLUTION EPIDURAL; INTRACAUDAL; PERINEURAL at 02:12

## 2024-12-10 RX ADMIN — ONDANSETRON 4 MG: 2 INJECTION, SOLUTION INTRAMUSCULAR; INTRAVENOUS at 04:12

## 2024-12-10 RX ADMIN — PROPOFOL 200 MG: 10 INJECTION, EMULSION INTRAVENOUS at 02:12

## 2024-12-10 RX ADMIN — LIDOCAINE HYDROCHLORIDE 100 MG: 20 INJECTION INTRAVENOUS at 02:12

## 2024-12-10 RX ADMIN — CLINDAMYCIN PHOSPHATE 600 MG: 600 INJECTION, SOLUTION INTRAVENOUS at 12:12

## 2024-12-10 RX ADMIN — SODIUM CHLORIDE, POTASSIUM CHLORIDE, SODIUM LACTATE AND CALCIUM CHLORIDE: 600; 310; 30; 20 INJECTION, SOLUTION INTRAVENOUS at 12:12

## 2024-12-10 RX ADMIN — MIDAZOLAM 2 MG: 1 INJECTION INTRAMUSCULAR; INTRAVENOUS at 02:12

## 2024-12-10 RX ADMIN — SODIUM CHLORIDE, POTASSIUM CHLORIDE, SODIUM LACTATE AND CALCIUM CHLORIDE: 600; 310; 30; 20 INJECTION, SOLUTION INTRAVENOUS at 03:12

## 2024-12-10 RX ADMIN — FENTANYL CITRATE 50 MCG: 50 INJECTION, SOLUTION INTRAMUSCULAR; INTRAVENOUS at 02:12

## 2024-12-10 NOTE — ANESTHESIA PROCEDURE NOTES
Intubation    Date/Time: 12/10/2024 2:57 PM    Performed by: Kristen Hernandez CRNA  Authorized by: Rodri Burton MD    Intubation:     Induction:  Intravenous    Intubated:  Postinduction    Mask Ventilation:  Easy mask    Attempts:  1    Attempted By:  CRNA    Difficult Airway Encountered?: No      Complications:  None    Airway Device:  Supraglottic airway/LMA    Airway Device Size:  4.0    Style/Cuff Inflation:  Cuffed (inflated to minimal occlusive pressure)    Secured at:  The lips    Placement Verified By:  Capnometry    Complicating Factors:  None    Findings Post-Intubation:  BS equal bilateral and atraumatic/condition of teeth unchanged

## 2024-12-10 NOTE — TRANSFER OF CARE
"Anesthesia Transfer of Care Note    Patient: Faith Bernard    Procedure(s) Performed: Procedure(s) (LRB):  Right index and right middle finger distal interphalangeal joint arthrodesis (Right)    Patient location: PACU    Anesthesia Type: general    Transport from OR: Transported from OR on room air with adequate spontaneous ventilation    Post pain: adequate analgesia    Post assessment: no apparent anesthetic complications and tolerated procedure well    Post vital signs: stable    Level of consciousness: sedated and awake    Nausea/Vomiting: no nausea/vomiting    Complications: none    Transfer of care protocol was followed      Last vitals: Visit Vitals  /63   Pulse 60   Resp 16   Ht 5' 3" (1.6 m)   Wt 78.5 kg (173 lb)   LMP  (LMP Unknown)   SpO2 100%   Breastfeeding No   BMI 30.65 kg/m²     "

## 2024-12-10 NOTE — OR NURSING
Right Supraclavicular single shot block complete per Dr. Burton with Anesthesia. Exparel band placed to pt's wrist. Pt tolerated well. See Anesthesia record for procedural notes. See flowsheet and MAR for vitals and medications. Monitors in place, alarms audible. VSS. etCO2 monitoring in place. Resp even, unlabored. Skin warm, dry. Pt in NAD. Pt awaiting transport to OR. Family at bedside. Bed in lowest, locked position. Side rails up x2. Call light within reach.

## 2024-12-10 NOTE — ANESTHESIA PROCEDURE NOTES
Peripheral Block    Patient location during procedure: pre-op   Block not for primary anesthetic.  Reason for block: at surgeon's request and post-op pain management   Post-op Pain Location: right hand   Start time: 12/10/2024 2:40 PM  Timeout: 12/10/2024 2:40 PM   End time: 12/10/2024 2:45 PM    Staffing  Authorizing Provider: Rodri Burton MD  Performing Provider: Rodri Burton MD    Staffing  Performed by: Rodri Burton MD  Authorized by: Rodri Burton MD    Preanesthetic Checklist  Completed: patient identified, IV checked, site marked, risks and benefits discussed, surgical consent, monitors and equipment checked, pre-op evaluation and timeout performed  Peripheral Block  Patient position: supine  Prep: ChloraPrep  Patient monitoring: heart rate, cardiac monitor, continuous pulse ox, continuous capnometry and frequent blood pressure checks  Block type: supraclavicular  Laterality: right  Injection technique: single shot  Needle  Needle type: Stimuplex   Needle gauge: 22 G  Needle length: 2 in  Needle localization: anatomical landmarks and ultrasound guidance   -ultrasound image captured on disc.  Assessment  Injection assessment: negative aspiration, negative parasthesia and local visualized surrounding nerve  Paresthesia pain: none  Heart rate change: no  Slow fractionated injection: yes  Pain Tolerance: comfortable throughout block and no complaints  Medications:    Medications: bupivacaine (pf) (MARCAINE) injection 0.5% - Perineural   12.5 mL - 12/10/2024 2:45:00 PM    Additional Notes  VSS.  [facility] RN monitoring vitals throughout procedure.  Patient tolerated procedure well.

## 2024-12-10 NOTE — DISCHARGE INSTRUCTIONS
Procedure:  Right index and middle finger distal interphalangeal joint fusion    1.  Please keep the splint clean, dry, and in place. Do not take it off and do not get it wet.    2.  Please keep the sling to the right arm in place until you have regained full control over the arm and hand    3.  The pain block to the right arm will last between 1 and 3 days.  As soon as the pain block begins to wear off you can begin taking the prescribed pain medication    4.  Nausea medication has been prescribed in the case that you have any postoperative stomach upset    5.  Flexion and extension of the exposed fingers is encouraged but do NOT attempt to lift or push off with the right arm or hand.    6.  If there are any questions or concerns please call Dr. Chan office at [phone]    7.  Follow up with Dr. Chan in 2 weeks

## 2024-12-10 NOTE — ANESTHESIA PREPROCEDURE EVALUATION
12/10/2024  Faith Bernard is a 59 y.o., female.      Pre-op Assessment    I have reviewed the Patient Summary Reports.     I have reviewed the Nursing Notes. I have reviewed the NPO Status.   I have reviewed the Medications.     Review of Systems  Anesthesia Hx:  No problems with previous Anesthesia                Social:  Non-Smoker       Cardiovascular:     Hypertension                                    Hypertension         Hepatic/GI:      Liver Disease, Hepatitis S/p bypass           Liver Disease, Hepatitis        Musculoskeletal:  Arthritis        Arthritis          Neurological:    Neuromuscular Disease,  Headaches      Dx of Headaches   Arthritis                         Neuromuscular Disease   Endocrine:   Hypothyroidism       Hypothyroidism          Psych:  Psychiatric History anxiety                 Physical Exam  General: Well nourished, Cooperative, Alert and Oriented    Airway:  Mallampati: II   Mouth Opening: Normal  TM Distance: Normal  Tongue: Normal  Neck ROM: Normal ROM    Dental:  Intact    Chest/Lungs:  Normal Respiratory Rate    Heart:  Rate: Normal        Anesthesia Plan  Type of Anesthesia, risks & benefits discussed:    Anesthesia Type: Gen Supraglottic Airway  Intra-op Monitoring Plan: Standard ASA Monitors  Post Op Pain Control Plan: multimodal analgesia and IV/PO Opioids PRN  Induction:  IV  Airway Plan: , Post-Induction  Informed Consent: Informed consent signed with the Patient and all parties understand the risks and agree with anesthesia plan.  All questions answered.   ASA Score: 3  Day of Surgery Review of History & Physical: H&P Update referred to the surgeon/provider.    Ready For Surgery From Anesthesia Perspective.     .

## 2024-12-10 NOTE — DISCHARGE SUMMARY
[facility] - [facility]  Discharge Note  Short Stay    Procedure(s) (LRB):  Right index and right middle finger distal interphalangeal joint arthrodesis (Right)      OUTCOME: Patient tolerated treatment/procedure well without complication and is now ready for discharge.    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    FOLLOWUP: In clinic    DISCHARGE INSTRUCTIONS:    Discharge Procedure Orders   [facility] LARGE FOR HOME USE     Diet general     Activity as tolerated     Keep surgical extremity elevated     Lifting restrictions   Order Comments: Please do not lift or push off with the right arm or hand     Leave dressing on - Keep it clean, dry, and intact until clinic visit     Call MD for:  temperature >100.4     Call MD for:  persistent nausea and vomiting     Call MD for:  severe uncontrolled pain     Call MD for:  difficulty breathing, headache or visual disturbances     Call MD for:  redness, tenderness, or signs of infection (pain, swelling, redness, odor or green/yellow discharge around incision site)     Call MD for:  hives     Call MD for:  persistent dizziness or light-headedness     Call MD for:  extreme fatigue        TIME SPENT ON DISCHARGE: 15 minutes

## 2024-12-10 NOTE — OP NOTE
Faith Bernard  1965    DATE OF SURGERY: 12/10/2024     PRE-OPERATIVE DIAGNOSIS:  Right index finger distal interphalangeal joint arthritis, right middle finger distal interphalangeal joint arthritis    POST-OPERATIVE DIAGNOSIS:  Right index finger distal interphalangeal joint arthritis, right middle finger distal interphalangeal joint arthritis     ANESTHESIA TYPE:  General with a single-shot supraclavicular block    BLOOD LOSS:  10-15 cc    TOURNIQUET TIME:  Approximately 45 minutes    SURGEON: Dr Chan    ASSISTANT:  Venita Jara    PROCEDURE:   1. Right index finger distal interphalangeal joint arthrodesis   2.  Right middle finger distal interphalangeal joint arthrodesis     IMPLANTS:  Acumed Fifi AcuTrak screw x1 for the index finger, Acumed micro AcuTrak screw x1 for the middle finger     SPECIMENS:  None    INDICATION:     Ms. Bernard has a history of severe arthritis to the distal interphalangeal joints of the right index and the middle fingers.  She continued to have persistent pain despite conservative treatments.  We discussed the possibility of arthrodesis of those joints.  Informed consent was then obtained    PROCEDURE IN DETAIL:     Ms. Bernard was transported to the operating room and was placed supine on the operating room table. All appropriate points were padded. The right arm and hand was prepped and draped in the normal sterile fashion. Time out was called. The correct patient, correct operative site, correct procedure, antibiotic administration which consisted of 600 mg of Cleocin, and allergies to medications which are to Cephalexin, Nsaids (non-steroidal anti-inflammatory drug), and Codeine  were reviewed. Time in was then called.     Attention was turned to the right index finger.  A Y-shaped incision was made directly over the dorsum of the distal interphalangeal joint.  The incision was carried through the skin.  Subcutaneous tissues were dissected sharply.  The extensor  mechanism was split.  The distal interphalangeal joint was exposed.  There were noted to be a significant amount of dorsal osteophytes from the distal and middle phalanx.  These osteophytes were removed with rongeur.  A sagittal saw was used to debride the articular surfaces of both the middle phalanx and distal phalanx.  With good resection of bone proximally and distally noted a guidewire for a Fifi AcuTrak screw was placed.  This was placed centrally in the distal phalanx.  The distal interphalangeal joint was then well reduced and the wire was advanced into the middle phalanx.  With good positioning of the wire noted the wire was over drilled.  A Fifi AcuTrak screw was selected.  It was passed over the wire and provided good compression with firm fixation of the distal interphalangeal joint.  Its position was confirmed under fluoroscopy.  At this point the wounds were copiously irrigated and the guidewire was removed.    Attention was turned to the right ring finger.  A Y-shaped incision was made directly over the dorsum of the distal interphalangeal joint.  The incision was carried through the skin.  Subcutaneous tissues were dissected sharply.  The extensor mechanism was split.  The distal interphalangeal joint was exposed.  There were noted to be a significant amount of dorsal osteophytes from the distal and middle phalanx.  These osteophytes were removed with rongeur.  A sagittal saw was used to debride the articular surfaces of both the middle phalanx and distal phalanx.  With good resection of bone proximally and distally noted a guidewire for a micro AcuTrak screw was placed.  This was placed centrally in the distal phalanx.  The distal interphalangeal joint was then well reduced and the wire was advanced into the middle phalanx.  With good positioning of the wire noted the wire was over drilled.  A micro AcuTrak screw was selected.  It was passed over the wire and provided good compression with firm  fixation of the distal interphalangeal joint.  Its position was confirmed under fluoroscopy.  At this point the wounds were copiously irrigated and the guidewire was removed.    The tourniquet was let down.  Hemostasis was obtained at the wound sites.  The wounds were closed with 4-0 nylon suture.  The wounds were dressed with Xeroform, gauze padding, cast padding and a short-arm radial gutter splint was placed.    The patient was awakened from anesthesia and was transported to the recovery room in stable condition. All lap, needle, sponge, and equipment counts were correct at the end of the case.    POST-OPERATIVE PLAN:     Patient will keep the splint in place for 2 weeks at which time she will follow up.  Sutures will be removed.  She will be placed into finger splints until the 4-6 week emily at which point we will begin range of motion of the PIP joints

## 2024-12-11 NOTE — ANESTHESIA POSTPROCEDURE EVALUATION
Anesthesia Post Evaluation    Patient: Faith Bernard    Procedure(s) Performed: Procedure(s) (LRB):  Right index and right middle finger distal interphalangeal joint arthrodesis (Right)    Final Anesthesia Type: general      Patient location during evaluation: PACU  Patient participation: Yes- Able to Participate  Level of consciousness: awake and alert  Post-procedure vital signs: reviewed and stable  Pain management: adequate  Airway patency: patent    PONV status at discharge: No PONV  Anesthetic complications: no      Cardiovascular status: hemodynamically stable  Respiratory status: unassisted and room air  Hydration status: euvolemic  Follow-up not needed.              Vitals Value Taken Time   /75 12/10/24 1658   Temp 36.6 °C (97.8 °F) 12/10/24 1628   Pulse 71 12/10/24 1658   Resp 20 12/10/24 1658   SpO2 97 % 12/10/24 1658         Event Time   Out of Recovery 16:52:00         Pain/Ricarda Score: Pain Rating Prior to Med Admin: 0 (12/10/2024  2:43 PM)  Pain Rating Post Med Admin: 0 (12/10/2024  2:46 PM)  Ricarda Score: 10 (12/10/2024  4:53 PM)

## 2024-12-13 ENCOUNTER — TELEPHONE (OUTPATIENT)
Dept: UROLOGY | Facility: CLINIC | Age: 59
End: 2024-12-13
Payer: COMMERCIAL

## 2024-12-16 ENCOUNTER — NURSE TRIAGE (OUTPATIENT)
Dept: ADMINISTRATIVE | Facility: CLINIC | Age: 59
End: 2024-12-16
Payer: COMMERCIAL

## 2024-12-17 ENCOUNTER — TELEPHONE (OUTPATIENT)
Dept: ORTHOPEDICS | Facility: CLINIC | Age: 59
End: 2024-12-17
Payer: COMMERCIAL

## 2024-12-17 NOTE — TELEPHONE ENCOUNTER
"Rt index and middle finger surgery 12/10. D/t specificity of pt complaints r/t surgery, no triage completed, and surgeon contacted for recommendation.   C/o "raw feeling" on fingers inside the cast. 5/10 with pain meds. 10/10 without pain meds. Has not increased in severity.   Not able to view any of the fingers. No discoloration on other fingers.   No fever.   "It feels kind of like it's open, or maybe the suture is caught on the gauze and it's pulling on it or something".   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 Also had C's acne in Lt elbow.   Call out to MD Chan for recommendation.  Advised to have pt call the office tomorrow and request a pt with MANSI Ferrer.    Pt further instructed to call back if any new symptoms develop, or pain becomes worse. Pt VU.     Reason for Disposition   Nursing judgment or information in reference    Protocols used: No Guideline [id number]    "

## 2024-12-17 NOTE — TELEPHONE ENCOUNTER
Called and spoke with patient regarding her concern with her finger and splint.  I stated to come in this AM and Nabil can take a peek at it.  Pt verbally understood.

## 2024-12-21 ENCOUNTER — NURSE TRIAGE (OUTPATIENT)
Dept: ADMINISTRATIVE | Facility: CLINIC | Age: 59
End: 2024-12-21
Payer: COMMERCIAL

## 2024-12-21 NOTE — TELEPHONE ENCOUNTER
Spoke with pt who reports that she had hand surgery 12/10. States that last Sunday she spoke with Dr. Chan, and he convinced caitlyn to leave wrap on until 2 week follow up appointment. States that hand pain is near 10/10 states unable to tolerate movement, and pressure. She is at [facility] now, asking if she should go to ER for evaluation or rewrap     Spoke with Dr. Melgar, who states does not cover for Dr. Chan    Spoke with Dr. Lou who states does not cover for      Spoke with Kp Orellana who advised for pt to keep wrap on until follow up appointment. Also advised to reach out to Dr. Chan    Secure chat sent to Dr. Rocio Chan responded to secure chat, and asked to be connected to pt.     Call connected between provider, and pt.           Reason for Disposition   [1] SEVERE post-op pain (e.g., excruciating, pain scale 8-10) AND [2] not controlled with pain medications    Additional Information   Negative: Sounds like a life-threatening emergency to the triager   Negative: [1] Widespread rash AND [2] bright red, sunburn-like   Negative: [1] SEVERE headache AND [2] after spinal (epidural) anesthesia   Negative: [1] Vomiting AND [2] persists > 4 hours   Negative: [1] Vomiting AND [2] abdomen looks much more swollen than usual   Negative: [1] Drinking very little AND [2] dehydration suspected (e.g., no urine > 12 hours, very dry mouth, very lightheaded)   Negative: Patient sounds very sick or weak to the triager   Negative: Sounds like a serious complication to the triager   Negative: Fever > 100.4 F (38.0 C)    Protocols used: Post-Op Symptoms and [id number]

## 2024-12-23 ENCOUNTER — OFFICE VISIT (OUTPATIENT)
Dept: ORTHOPEDICS | Facility: CLINIC | Age: 59
End: 2024-12-23
Payer: COMMERCIAL

## 2024-12-23 ENCOUNTER — TELEPHONE (OUTPATIENT)
Dept: ORTHOPEDICS | Facility: CLINIC | Age: 59
End: 2024-12-23
Payer: COMMERCIAL

## 2024-12-23 DIAGNOSIS — Z98.890 STATUS POST SURGERY: Primary | ICD-10-CM

## 2024-12-23 PROCEDURE — 1159F MED LIST DOCD IN RCRD: CPT | Mod: CPTII,S$GLB,, | Performed by: PHYSICIAN ASSISTANT

## 2024-12-23 PROCEDURE — 99999 PR PBB SHADOW E&M-EST. PATIENT-LVL IV: CPT | Mod: PBBFAC,,, | Performed by: PHYSICIAN ASSISTANT

## 2024-12-23 PROCEDURE — 3044F HG A1C LEVEL LT 7.0%: CPT | Mod: CPTII,S$GLB,, | Performed by: PHYSICIAN ASSISTANT

## 2024-12-23 PROCEDURE — 1160F RVW MEDS BY RX/DR IN RCRD: CPT | Mod: CPTII,S$GLB,, | Performed by: PHYSICIAN ASSISTANT

## 2024-12-23 PROCEDURE — 99024 POSTOP FOLLOW-UP VISIT: CPT | Mod: S$GLB,,, | Performed by: PHYSICIAN ASSISTANT

## 2024-12-23 NOTE — TELEPHONE ENCOUNTER
Called pt about her 9:40 appt as she has not shown up yet.   Per pt thought her appt was tomorrow.   I informed pt her appt was to and Nabil and Dr Chan is out the rest of the week.   Pt is coming in at 1pm today

## 2024-12-23 NOTE — PROGRESS NOTES
Faith Bernard is a 59 y.o. female who presents to clinic for follow up status post right index finger DIP joint arthrodesis and right middle finger DIP joint arthrodesis.  She is 2 weeks status post surgery.  States a few days ago she had her splint/dressing removed as it was causing significant pain to her right index finger.  States he was placed in a dressing and some splints in the meantime.  Denies any acute injuries since her surgery.  Denies any other complaints at this time.    Physical Exam:  Examination of the right middle finger reveals an appropriately healing surgical site.  Sutures remain intact.  No edema, erythema, ecchymosis, or skin breakdown.  Range of motion not tested secondary to surgery.  Normal sensation of the right middle finger.  Capillary refill is than 2 seconds.     Examination of the right index finger reveals the presence of some central wound dehiscence.  The wound dehiscence his relatively mild.  No edema, erythema, ecchymosis, drainage, purulence, or other signs of infection.  Range of motion not tested secondary to injury.  Mostly of the sutures remain intact.  Normal sensation of the right index finger.  Capillary refill less than 2 seconds.    Radiology:  None.    Assessment:  Status post right middle finger and right index finger DIP joint arthrodesis    Plan:  1. I discussed with Faith Bernard that the best course of action this time is to remove her sutures in clinic today.  We also discussed we would place a new soft dressing/splint of the right index finger.  We discussed importance of keeping the dressing/splint dry, clean, and intact until seen again next week.  We did discuss we would transition to a Velcro splint of the right middle finger.  She verbally agreed with the treatment plan.      2. Her right middle finger and right index finger surgical sites were thoroughly cleaned in clinic today with chlorhexidine.  Her right middle finger sutures were removed  and Steri-Strips were placed.  Her right index finger sutures removed, an a dressing/splint was used utilizing Telfa, Omaira wrap, and normal thumb extension splint, and Coban wrap.    3. I would like to have her follow up in clinic in 1 week for a surgical site check and to perform x-rays of the right hand.  She was instructed to contact clinic for any problems or concerns in the interim.

## 2024-12-30 ENCOUNTER — OFFICE VISIT (OUTPATIENT)
Dept: ORTHOPEDICS | Facility: CLINIC | Age: 59
End: 2024-12-30
Payer: COMMERCIAL

## 2024-12-30 ENCOUNTER — HOSPITAL ENCOUNTER (OUTPATIENT)
Dept: RADIOLOGY | Facility: HOSPITAL | Age: 59
Discharge: HOME | End: 2024-12-30
Attending: PHYSICIAN ASSISTANT
Payer: COMMERCIAL

## 2024-12-30 DIAGNOSIS — Z98.890 STATUS POST SURGERY: Primary | ICD-10-CM

## 2024-12-30 DIAGNOSIS — Z98.890 STATUS POST SURGERY: ICD-10-CM

## 2024-12-30 PROCEDURE — 73130 X-RAY EXAM OF HAND: CPT | Mod: 26,RT,, | Performed by: RADIOLOGY

## 2024-12-30 PROCEDURE — 1160F RVW MEDS BY RX/DR IN RCRD: CPT | Mod: CPTII,S$GLB,, | Performed by: PHYSICIAN ASSISTANT

## 2024-12-30 PROCEDURE — 99999 PR PBB SHADOW E&M-EST. PATIENT-LVL IV: CPT | Mod: PBBFAC,,, | Performed by: PHYSICIAN ASSISTANT

## 2024-12-30 PROCEDURE — 99024 POSTOP FOLLOW-UP VISIT: CPT | Mod: S$GLB,,, | Performed by: PHYSICIAN ASSISTANT

## 2024-12-30 PROCEDURE — 3044F HG A1C LEVEL LT 7.0%: CPT | Mod: CPTII,S$GLB,, | Performed by: PHYSICIAN ASSISTANT

## 2024-12-30 PROCEDURE — 73130 X-RAY EXAM OF HAND: CPT | Mod: TC,PO,RT

## 2024-12-30 PROCEDURE — 1159F MED LIST DOCD IN RCRD: CPT | Mod: CPTII,S$GLB,, | Performed by: PHYSICIAN ASSISTANT

## 2024-12-30 NOTE — PROGRESS NOTES
Faith Bernard is a 59 y.o. female who presents to clinic for follow up status post right index finger and middle finger DIP joint arthrodesis.  She is about 3 weeks status post surgery.  States she remove the dressing that was placed her last visit, and removed which she thought was a remaining stitch of the finger.  States since that time her pain has been minimal.  Denies any drainage, purulence, redness, or any other signs of infection.  Denies any acute injuries.  Denies any other complaints at this time.    Physical Exam:  Examination of the right hand reveals a well-healed surgical site of the right middle finger.  The central portion of the right index finger surgical site is healing appropriately by secondary intention.  Mildly reduced range of motion regards to flexion of the PIP joints of the right middle and right index fingers.  Full intact range of motion of the MCP joints of the right index and right middle fingers.  Normal sensation in the radial, ulnar, and median nerve distributions.  Capillary refill is 2 seconds.    Radiology:  X-rays of the right hand were taken today in clinic.  X-rays by myself.  Imaging showed the presence of DIP joint arthrodesis with screw fixation of the right index and right middle fingers.  No orthopedic hardware complications noted.  No other significant bony abnormalities noted.    Assessment:  Status post right index and right middle fingers DIP joint arthrodesis    Plan:  1. I discussed with Faith Bernard that he is progressing appropriately in the postoperative course.  We discussed the best course of action this time is to continue immobilization via the DIP joint Velcro extension splints.  We discussed importance of wearing the splint at all times only to be removed for bathing.  We discussed the importance of continued limited to no weight-bearing of the right hand/arm until seen again in clinic.  We discussed she can begin gentle range-of-motion  exercises of the right index and right middle fingers while wearing the splints only.  She verbalized understanding and verbally agreed with the treatment plan     2. She was placed in a removable Velcro finger splint of the right index finger in clinic today.      3. I would like to have her follow up in clinic in 2 weeks for repeat evaluation at which time we will perform repeat x-rays of the right hand.  She was instructed to contact the clinic for any problems or concerns in the interim.

## 2024-12-31 ENCOUNTER — TELEPHONE (OUTPATIENT)
Dept: ORTHOPEDICS | Facility: CLINIC | Age: 59
End: 2024-12-31
Payer: COMMERCIAL

## 2024-12-31 DIAGNOSIS — Z98.890 STATUS POST SURGERY: Primary | ICD-10-CM

## 2024-12-31 RX ORDER — DOXYCYCLINE 100 MG/1
100 CAPSULE ORAL 2 TIMES DAILY
Qty: 20 CAPSULE | Refills: 0 | Status: SHIPPED | OUTPATIENT
Start: 2024-12-31 | End: 2025-01-10

## 2025-01-07 ENCOUNTER — PATIENT MESSAGE (OUTPATIENT)
Dept: NEUROLOGY | Facility: CLINIC | Age: 60
End: 2025-01-07
Payer: COMMERCIAL

## 2025-01-09 DIAGNOSIS — M15.1 DEGENERATIVE ARTHRITIS OF DISTAL INTERPHALANGEAL JOINT OF MIDDLE FINGER OF RIGHT HAND: Primary | ICD-10-CM

## 2025-01-13 ENCOUNTER — OFFICE VISIT (OUTPATIENT)
Dept: ORTHOPEDICS | Facility: CLINIC | Age: 60
End: 2025-01-13
Payer: COMMERCIAL

## 2025-01-13 ENCOUNTER — HOSPITAL ENCOUNTER (OUTPATIENT)
Dept: RADIOLOGY | Facility: HOSPITAL | Age: 60
Discharge: HOME | End: 2025-01-13
Attending: ORTHOPAEDIC SURGERY
Payer: COMMERCIAL

## 2025-01-13 VITALS
SYSTOLIC BLOOD PRESSURE: 127 MMHG | HEART RATE: 71 BPM | BODY MASS INDEX: 30.66 KG/M2 | WEIGHT: 173.06 LBS | HEIGHT: 63 IN | DIASTOLIC BLOOD PRESSURE: 75 MMHG

## 2025-01-13 DIAGNOSIS — M15.1 DEGENERATIVE ARTHRITIS OF DISTAL INTERPHALANGEAL JOINT OF MIDDLE FINGER OF RIGHT HAND: ICD-10-CM

## 2025-01-13 DIAGNOSIS — M15.1 DEGENERATIVE ARTHRITIS OF DISTAL INTERPHALANGEAL JOINT OF INDEX FINGER OF RIGHT HAND: Primary | ICD-10-CM

## 2025-01-13 PROCEDURE — 73130 X-RAY EXAM OF HAND: CPT | Mod: 26,RT,, | Performed by: RADIOLOGY

## 2025-01-13 PROCEDURE — 3078F DIAST BP <80 MM HG: CPT | Mod: CPTII,S$GLB,, | Performed by: ORTHOPAEDIC SURGERY

## 2025-01-13 PROCEDURE — 1159F MED LIST DOCD IN RCRD: CPT | Mod: CPTII,S$GLB,, | Performed by: ORTHOPAEDIC SURGERY

## 2025-01-13 PROCEDURE — 3074F SYST BP LT 130 MM HG: CPT | Mod: CPTII,S$GLB,, | Performed by: ORTHOPAEDIC SURGERY

## 2025-01-13 PROCEDURE — 99024 POSTOP FOLLOW-UP VISIT: CPT | Mod: S$GLB,,, | Performed by: ORTHOPAEDIC SURGERY

## 2025-01-13 PROCEDURE — 99999 PR PBB SHADOW E&M-EST. PATIENT-LVL IV: CPT | Mod: PBBFAC,,, | Performed by: ORTHOPAEDIC SURGERY

## 2025-01-13 PROCEDURE — 73130 X-RAY EXAM OF HAND: CPT | Mod: TC,PO,RT

## 2025-01-13 NOTE — PROGRESS NOTES
Ms Bernard returns to clinic today.  She is approximately 4-5 weeks status post right index and middle finger distal interphalangeal joint fusion.  She is overall doing well.  Her pain is significantly improved     Physical exam: Examination of the right index and the middle finger reveals that the wounds are now well healed.  There is no significant edema or erythema.  Palpation does not produce a significant amount of tenderness.  She has capillary refill less than 2 seconds at the tips of the fingers     Radiology: X-rays of the right hand were taken in clinic today.  There is evidence of fusion of the distal interphalangeal joints of the index and the middle fingers.  The hardware remains stable and the fusions appear to be progressing appropriately.    Assessment:  Right index and middle finger distal interphalangeal joint arthrodesis     Plan:    1. She can discontinue the splinting but will continue to limit activity with her hand.  She will be limited to 2-3 lb of weight-bearing and no highly repetitive activities     2.  She will follow up in 4 weeks for repeat x-ray of the right hand.  If she has healing across the arthrodesis sites I will allow her to return to activity as tolerated.  She may discuss the timing of doing her other side at some point in the near future

## 2025-01-15 ENCOUNTER — PROCEDURE VISIT (OUTPATIENT)
Dept: NEUROLOGY | Facility: CLINIC | Age: 60
End: 2025-01-15
Payer: COMMERCIAL

## 2025-01-15 VITALS
BODY MASS INDEX: 30.66 KG/M2 | SYSTOLIC BLOOD PRESSURE: 113 MMHG | HEART RATE: 76 BPM | WEIGHT: 173.06 LBS | TEMPERATURE: 97 F | RESPIRATION RATE: 17 BRPM | DIASTOLIC BLOOD PRESSURE: 78 MMHG | HEIGHT: 63 IN

## 2025-01-15 DIAGNOSIS — G43.709 CHRONIC MIGRAINE WITHOUT AURA WITHOUT STATUS MIGRAINOSUS, NOT INTRACTABLE: Primary | ICD-10-CM

## 2025-01-15 PROCEDURE — 64615 CHEMODENERV MUSC MIGRAINE: CPT | Mod: S$GLB,,, | Performed by: PHYSICIAN ASSISTANT

## 2025-01-15 NOTE — PROCEDURES
[facility]  [address] [place] [address]  Phone:[phone]  Fax: [phone]  Botox Visit, #3    Chief Complaint   Patient presents with    Botulinum Toxin Injection         A/P:        ICD-10-CM ICD-9-CM   1. Chronic migraine without aura without status migrainosus, not intractable  G43.709 346.70     Botox for migraine    Historically: Patient meets the criteria for chronic migraine. In summary, She has headaches/migraines >15 days per month  and last >4 hours if untreated. Specifics of duration, frequency and strength are listed in office visit HPI's.  This pattern has continued for >3 months.  She has failed at least three preventive medications (full list of medications is listed in office notes of Therapies tried in past)  I have therefore recommended a trial of Botox via the PREEMPT protocol for migraine prophylaxis.We schedule regular follow up intervals to check on status.     PROCEDURE NOTE:  BOTOX injection is indicated for the prophylaxis of headaches in adult patients with chronic  migraine. Patient meets indications for BOTOX therapy.  Potential risks and benefits were reviewed. Side effects including, but not limited to, potential  systemic allergic reactions of the anaphylactic type as well as local injection site reactions of  blepharoptosis, diplopia, infection, bleeding, pain, redness and bruising were reviewed. The  potential for headaches and/or neck pain post procedure were reviewed.  The patient's questions were answered. The patient signed a consent form. Patient  understands that depending on their insurance carrier, there may be a copay for this treatment.  BOTOX was reconstituted using  two 100 unit vials and diluted with 4 mL of sterile saline.  BOTOX was injected as per the PREEMPT trial injection paradigm with dose administered as 5  unit intramuscular (IM) injections per site using a sterile, 30-gauge 0.5 inch needle as  follows:  Muscle Dose, # of Sites   10 units divided in 2 sites  Procerus 5 units in 1 site  Frontalis 20 units divided in 4 sites  Temporalis 40 units divided in 8 sites  Occipitalis 30 units divided in 6 sites  Cervical paraspinal 20 units divided in 4 sites  Trapezius 30 units divided in 6 sites  Each site was cleaned with alcohol prior to injection. A total dose of 155 units were injected. 45  units were discarded/wasted.      The patient tolerated the procedure well with no immediate complications.  MEDICATION INFO:  NDC 1772-0263-65   Lot # [id number]  Exp 12/2026      As aside:  The Botox injections have achieved well over 50%  improvement in the patient's symptoms. Migraines have been reduced at least 7 days per month and the number of cumulative hours suffering with headaches has been reduced at least 100 total hours per month. Today she does have a headache indicating that the Botox has worn off. Frequency of treatment is every 3 months unless no response to the treatments, at which time we will discontinue the injections.      Follow up in 3 months for repeat injection, we also have interspersed office visits scheduled to ensure efficacy of botox sessions/check on patient.       Edouard Zamarripa MPA, PA-C  Attending available-Dr Toby MD           Personal Protective Equipment:    Personal Protective Equipment was used during this encounter including;     non latex gloves.     01/15/2025 2:25 PM    CC: Rah Canela MD

## 2025-01-23 ENCOUNTER — TELEPHONE (OUTPATIENT)
Dept: ORTHOPEDICS | Facility: CLINIC | Age: 60
End: 2025-01-23
Payer: COMMERCIAL

## 2025-01-23 DIAGNOSIS — R13.10 DYSPHAGIA: Primary | ICD-10-CM

## 2025-01-23 NOTE — TELEPHONE ENCOUNTER
Pt states that she is in a lot of pain and feels something when she rubs her knee. Appointment scheduled.

## 2025-01-27 ENCOUNTER — PATIENT MESSAGE (OUTPATIENT)
Dept: RHEUMATOLOGY | Facility: CLINIC | Age: 60
End: 2025-01-27
Payer: COMMERCIAL

## 2025-01-27 DIAGNOSIS — S82.872D CLOSED DISPLACED PILON FRACTURE OF LEFT TIBIA WITH ROUTINE HEALING, SUBSEQUENT ENCOUNTER: Primary | ICD-10-CM

## 2025-01-28 ENCOUNTER — HOSPITAL ENCOUNTER (OUTPATIENT)
Dept: RADIOLOGY | Facility: HOSPITAL | Age: 60
Discharge: HOME | End: 2025-01-28
Attending: ORTHOPAEDIC SURGERY
Payer: COMMERCIAL

## 2025-01-28 ENCOUNTER — OFFICE VISIT (OUTPATIENT)
Dept: ORTHOPEDICS | Facility: CLINIC | Age: 60
End: 2025-01-28
Payer: COMMERCIAL

## 2025-01-28 ENCOUNTER — OFFICE VISIT (OUTPATIENT)
Dept: UROLOGY | Facility: CLINIC | Age: 60
End: 2025-01-28
Payer: COMMERCIAL

## 2025-01-28 VITALS
WEIGHT: 179.88 LBS | DIASTOLIC BLOOD PRESSURE: 91 MMHG | HEART RATE: 116 BPM | SYSTOLIC BLOOD PRESSURE: 127 MMHG | BODY MASS INDEX: 31.87 KG/M2

## 2025-01-28 DIAGNOSIS — N39.3 STRESS INCONTINENCE (FEMALE) (MALE): Primary | ICD-10-CM

## 2025-01-28 DIAGNOSIS — S82.872D CLOSED DISPLACED PILON FRACTURE OF LEFT TIBIA WITH ROUTINE HEALING, SUBSEQUENT ENCOUNTER: ICD-10-CM

## 2025-01-28 DIAGNOSIS — M25.572 CHRONIC PAIN OF LEFT ANKLE: Primary | ICD-10-CM

## 2025-01-28 DIAGNOSIS — G89.29 CHRONIC PAIN OF LEFT ANKLE: Primary | ICD-10-CM

## 2025-01-28 DIAGNOSIS — N32.81 OAB (OVERACTIVE BLADDER): ICD-10-CM

## 2025-01-28 PROCEDURE — 99214 OFFICE O/P EST MOD 30 MIN: CPT | Mod: S$GLB,,, | Performed by: UROLOGY

## 2025-01-28 PROCEDURE — 99999 PR PBB SHADOW E&M-EST. PATIENT-LVL IV: CPT | Mod: PBBFAC,,, | Performed by: UROLOGY

## 2025-01-28 PROCEDURE — 20605 DRAIN/INJ JOINT/BURSA W/O US: CPT | Mod: LT,S$GLB,, | Performed by: ORTHOPAEDIC SURGERY

## 2025-01-28 PROCEDURE — 99999 PR PBB SHADOW E&M-EST. PATIENT-LVL II: CPT | Mod: PBBFAC,,, | Performed by: ORTHOPAEDIC SURGERY

## 2025-01-28 PROCEDURE — 3074F SYST BP LT 130 MM HG: CPT | Mod: CPTII,S$GLB,, | Performed by: UROLOGY

## 2025-01-28 PROCEDURE — 3080F DIAST BP >= 90 MM HG: CPT | Mod: CPTII,S$GLB,, | Performed by: UROLOGY

## 2025-01-28 PROCEDURE — 99213 OFFICE O/P EST LOW 20 MIN: CPT | Mod: 25,S$GLB,, | Performed by: ORTHOPAEDIC SURGERY

## 2025-01-28 PROCEDURE — 1159F MED LIST DOCD IN RCRD: CPT | Mod: CPTII,S$GLB,, | Performed by: UROLOGY

## 2025-01-28 PROCEDURE — G2211 COMPLEX E/M VISIT ADD ON: HCPCS | Mod: S$GLB,,, | Performed by: UROLOGY

## 2025-01-28 PROCEDURE — 73610 X-RAY EXAM OF ANKLE: CPT | Mod: 26,LT,, | Performed by: RADIOLOGY

## 2025-01-28 PROCEDURE — 73610 X-RAY EXAM OF ANKLE: CPT | Mod: TC,LT

## 2025-01-28 PROCEDURE — 3008F BODY MASS INDEX DOCD: CPT | Mod: CPTII,S$GLB,, | Performed by: UROLOGY

## 2025-01-28 RX ORDER — TRIAMCINOLONE ACETONIDE 40 MG/ML
40 INJECTION, SUSPENSION INTRA-ARTICULAR; INTRAMUSCULAR
Status: DISCONTINUED | OUTPATIENT
Start: 2025-01-28 | End: 2025-01-28 | Stop reason: HOSPADM

## 2025-01-28 RX ORDER — MIRABEGRON 50 MG/1
50 TABLET, FILM COATED, EXTENDED RELEASE ORAL DAILY
Qty: 30 TABLET | Refills: 11 | Status: SHIPPED | OUTPATIENT
Start: 2025-01-28 | End: 2026-01-28

## 2025-01-28 RX ADMIN — TRIAMCINOLONE ACETONIDE 40 MG: 40 INJECTION, SUSPENSION INTRA-ARTICULAR; INTRAMUSCULAR at 09:01

## 2025-01-28 NOTE — PROGRESS NOTES
[facility]      Return Mixed Incontinence Note    1/28/2025    Referred by:  No ref. provider found    CHIEF COMPLAINT:  Patient presents with recurrence of urinary incontinence, primarily characterized by urgency and occasional stress incontinence.    HPI:  Patient is a 59-year-old woman last seen 1.5 years ago in November 2023. She has a history of retropubic synthetic mid-urethral sling for stress incontinence, followed by transurethral bulking in September 2023 due to continued leakage. After the bulking procedure, she had good resolution of her stress incontinence.    Recently, she has noticed a change in her urinary symptoms. She reports urgency incontinence, needing to urinate immediately and sometimes not reaching the bathroom in time. This occurs 2-5 times per night and 8-10 times during the day. She also has occasional stress incontinence, with large volume urine loss that she cannot control when coughing or sneezing, approximately every 2-3 days.    Patient mentions having 2-3 episodes of symptoms similar to kidney stones or bladder infections in the past year, which resolved spontaneously. She reports consuming large amounts of caffeinated beverages, primarily coffee and iced tea.    She denies difficulty urinating, hesitancy, straining, or intermittency.    MEDICAL HISTORY:  Patient has a history of stress incontinence and overactive bladder.    SURGICAL HISTORY:  Patient underwent a retropubic synthetic mid-urethral sling procedure for the treatment of stress incontinence, though the date is not specified. Despite the intervention, she continued to experience urinary leakage. In September 2023, she underwent a transurethral bulking procedure, also for stress incontinence. Initially, this procedure provided good resolution of her stress incontinence symptoms, but its effectiveness decreased over time.    TEST RESULTS:  Prior to September 2023, the patient underwent a urodynamic  evaluation. The results showed a fairly normal bladder capacity with no detrusor overactivity. Her leak point pressures were measured at 62 cm of water, and she voided with Valsalva. A cystourethroscopy was also performed before September 2023, which showed no evidence of perforation.    SOCIAL HISTORY:  Patient frequently drinks coffee or tea. She is considering switching to decaffeinated tea.          A review of 10+ systems was conducted with pertinent positive and negative findings documented in HPI with all other systems reviewed and negative.    Past medical, family, surgical and social history reviewed as documented in chart with pertinent positive medical, family, surgical and social history detailed in HPI.    Exam Findings:    Physical Exam    General: No acute distress. Nontoxic appearing.  HENT: Normocephalic. Atraumatic.  Respiratory: Normal respiratory effort. No conversational dyspnea. No audible wheezing.  Abdomen: No obvious distension.  Skin: No visible abnormalities.  Extremities: No edema upper extremities. No edema lower extremities.  Neurological: Alert and oriented x3. Normal speech.  Psychiatric: Normal mood. Normal affect. No evidence of SI.          Assessment/Plan:    Assessment & Plan    IMPRESSION:  Patient's stress incontinence improved after transurethral bulking in September 2023  Recent symptoms mostly consistent with overactive bladder rather than stress incontinence  Will start with overactive bladder medication before considering repeat bulking  If medication ineffective and stress incontinence symptoms persist, may consider repeat bulking in the future    PLAN SUMMARY:  - Start overactive bladder medication  - Reduce caffeine intake to less than 100 mg per day  - Consider switching to decaffeinated teas  - Follow up in 1 month to assess medication effectiveness    OVERACTIVE BLADDER AND URGE INCONTINENCE:  - Explained difference between stress incontinence and overactive bladder  symptoms.  - Discussed caffeine's role in exacerbating urinary symptoms.  - Reduce caffeine intake to less than 100 mg per day.  - Consider switching to decaffeinated teas.  - Started overactive bladder medication.    STRESS INCONTINENCE (FEMALE):  - Explained difference between stress incontinence and overactive bladder symptoms.    FOLLOW-UP:  - Follow up in 1 month to assess effectiveness of overactive bladder medication.              Visit complexity today is associated with medical care services that are part of the ongoing care related to the single serious and/or complex condition of Overactive bladder (OAB). A longitudinal relationship exists or is being developed between the patient and this practitioner for the care of this condition.

## 2025-01-28 NOTE — PROCEDURES
Intermediate Joint Aspiration/Injection: L ankle    Date/Time: 1/28/2025 9:00 AM    Performed by: Fer Mcrae MD  Authorized by: Fer Mcrae MD    Consent Done?:  Yes (Verbal)  Indications:  Arthritis and pain    Location:  Ankle  Site:  L ankle  Ultrasonic Guidance for needle placement: No  Needle size:  22 G  Approach:  Anteromedial  Medications:  40 mg triamcinolone acetonide 40 mg/mL  Patient tolerance:  Patient tolerated the procedure well with no immediate complications
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
## 2025-02-12 DIAGNOSIS — R13.10 DYSPHAGIA: Primary | ICD-10-CM

## 2025-02-13 ENCOUNTER — PATIENT MESSAGE (OUTPATIENT)
Dept: ORTHOPEDICS | Facility: CLINIC | Age: 60
End: 2025-02-13
Payer: COMMERCIAL

## 2025-02-13 DIAGNOSIS — M15.1 DEGENERATIVE ARTHRITIS OF DISTAL INTERPHALANGEAL JOINT OF INDEX FINGER OF RIGHT HAND: Primary | ICD-10-CM

## 2025-02-17 ENCOUNTER — TELEPHONE (OUTPATIENT)
Dept: ORTHOPEDICS | Facility: CLINIC | Age: 60
End: 2025-02-17
Payer: COMMERCIAL

## 2025-02-17 ENCOUNTER — OFFICE VISIT (OUTPATIENT)
Dept: ORTHOPEDICS | Facility: CLINIC | Age: 60
End: 2025-02-17
Payer: COMMERCIAL

## 2025-02-17 ENCOUNTER — HOSPITAL ENCOUNTER (OUTPATIENT)
Dept: RADIOLOGY | Facility: HOSPITAL | Age: 60
Discharge: HOME | End: 2025-02-17
Attending: ORTHOPAEDIC SURGERY
Payer: COMMERCIAL

## 2025-02-17 DIAGNOSIS — M15.1 DEGENERATIVE ARTHRITIS OF DISTAL INTERPHALANGEAL JOINT OF INDEX FINGER OF RIGHT HAND: Primary | ICD-10-CM

## 2025-02-17 DIAGNOSIS — M15.1 DEGENERATIVE ARTHRITIS OF DISTAL INTERPHALANGEAL JOINT OF INDEX FINGER OF RIGHT HAND: ICD-10-CM

## 2025-02-17 DIAGNOSIS — M15.1 DEGENERATIVE ARTHRITIS OF DISTAL INTERPHALANGEAL JOINT OF MIDDLE FINGER OF RIGHT HAND: ICD-10-CM

## 2025-02-17 PROCEDURE — 73130 X-RAY EXAM OF HAND: CPT | Mod: TC,PO,RT

## 2025-02-17 NOTE — TELEPHONE ENCOUNTER
Spoke with caller and caller stated she was running 15 minutes late. I let her know that was okay and we would still see her    ----- Message from Geovanni sent at 2/17/2025 10:36 AM CST -----  Patient is in traffic and is late for her 10:40 appointment and can't come on 02/19/25-- Soonest after the 19th is 03/12/25Please call [phone]

## 2025-02-20 ENCOUNTER — HOSPITAL ENCOUNTER (OUTPATIENT)
Dept: RADIOLOGY | Facility: HOSPITAL | Age: 60
Discharge: HOME | End: 2025-02-20
Attending: SURGERY
Payer: COMMERCIAL

## 2025-02-20 DIAGNOSIS — R13.10 DYSPHAGIA: ICD-10-CM

## 2025-02-20 PROCEDURE — 74240 X-RAY XM UPR GI TRC 1CNTRST: CPT | Mod: TC

## 2025-02-20 PROCEDURE — A9698 NON-RAD CONTRAST MATERIALNOC: HCPCS

## 2025-02-20 PROCEDURE — 25500020 PHARM REV CODE 255

## 2025-02-20 RX ADMIN — BARIUM SULFATE 250 ML: 0.6 SUSPENSION ORAL at 09:02

## 2025-02-26 ENCOUNTER — TELEPHONE (OUTPATIENT)
Dept: UROLOGY | Facility: CLINIC | Age: 60
End: 2025-02-26
Payer: COMMERCIAL

## 2025-02-26 NOTE — TELEPHONE ENCOUNTER
Called pt and informed her to check in at the . Pt states she thought appt was virtual. Informed pt that appt was office visit. Scheduled virtual appt for 3/18/25 @ 4:00 pm. Pt states medication is improving, but she wants to proceed with the bulking procedure.     ----- Message from Summer sent at 2/26/2025  1:59 PM CST -----  Regarding: appt  Type:  Patient Returning Call  Name of who is calling:pt  What is request in detail:pt is requesting a call back in regards to appt, pt states her appt was supposed to be an virtual not on person ans states she needs to be worked into scheduled please assist.   Can clinic reply by [facility]:no  What number to call back if not in [facility]:[phone]

## 2025-03-02 DIAGNOSIS — Z79.899 LONG-TERM USE OF PLAQUENIL: ICD-10-CM

## 2025-03-02 DIAGNOSIS — M15.4 EROSIVE OSTEOARTHRITIS OF HANDS, BILATERAL: ICD-10-CM

## 2025-03-05 DIAGNOSIS — M15.4 EROSIVE OSTEOARTHRITIS OF HANDS, BILATERAL: Primary | ICD-10-CM

## 2025-03-05 RX ORDER — HYDROXYCHLOROQUINE SULFATE 200 MG/1
TABLET, FILM COATED ORAL
Qty: 52 TABLET | Refills: 5 | Status: SHIPPED | OUTPATIENT
Start: 2025-03-05

## 2025-03-05 RX ORDER — PREDNISONE 5 MG/1
TABLET ORAL
Qty: 30 TABLET | Refills: 0 | Status: SHIPPED | OUTPATIENT
Start: 2025-03-05 | End: 2025-03-20

## 2025-03-18 ENCOUNTER — OFFICE VISIT (OUTPATIENT)
Dept: UROLOGY | Facility: CLINIC | Age: 60
End: 2025-03-18
Payer: COMMERCIAL

## 2025-03-18 ENCOUNTER — PATIENT MESSAGE (OUTPATIENT)
Dept: UROLOGY | Facility: CLINIC | Age: 60
End: 2025-03-18

## 2025-03-18 ENCOUNTER — TELEPHONE (OUTPATIENT)
Dept: UROLOGY | Facility: CLINIC | Age: 60
End: 2025-03-18

## 2025-03-18 VITALS
DIASTOLIC BLOOD PRESSURE: 91 MMHG | BODY MASS INDEX: 32.02 KG/M2 | HEART RATE: 116 BPM | SYSTOLIC BLOOD PRESSURE: 127 MMHG | WEIGHT: 180.75 LBS

## 2025-03-18 DIAGNOSIS — N39.3 STRESS INCONTINENCE (FEMALE) (MALE): Primary | ICD-10-CM

## 2025-03-18 DIAGNOSIS — N39.3 STRESS INCONTINENCE (FEMALE) (MALE): ICD-10-CM

## 2025-03-18 DIAGNOSIS — N39.41 URGENCY INCONTINENCE: Primary | ICD-10-CM

## 2025-03-18 PROCEDURE — 99214 OFFICE O/P EST MOD 30 MIN: CPT | Mod: S$GLB,,, | Performed by: UROLOGY

## 2025-03-18 PROCEDURE — 3008F BODY MASS INDEX DOCD: CPT | Mod: CPTII,S$GLB,, | Performed by: UROLOGY

## 2025-03-18 PROCEDURE — 99999 PR PBB SHADOW E&M-EST. PATIENT-LVL II: CPT | Mod: PBBFAC,,, | Performed by: UROLOGY

## 2025-03-18 PROCEDURE — 1159F MED LIST DOCD IN RCRD: CPT | Mod: CPTII,S$GLB,, | Performed by: UROLOGY

## 2025-03-18 PROCEDURE — 3080F DIAST BP >= 90 MM HG: CPT | Mod: CPTII,S$GLB,, | Performed by: UROLOGY

## 2025-03-18 PROCEDURE — 3074F SYST BP LT 130 MM HG: CPT | Mod: CPTII,S$GLB,, | Performed by: UROLOGY

## 2025-03-18 PROCEDURE — 87086 URINE CULTURE/COLONY COUNT: CPT | Performed by: UROLOGY

## 2025-03-18 PROCEDURE — G2211 COMPLEX E/M VISIT ADD ON: HCPCS | Mod: S$GLB,,, | Performed by: UROLOGY

## 2025-03-18 NOTE — H&P (VIEW-ONLY)
[facility]      Return Stress Incontinence Note    3/18/2025    Referred by:  David Perez MD    Chief Complaint  Patient presents for follow-up of mixed incontinence and to assess the effectiveness of recently prescribed Mirabegron (Myrbetriq) for urgency symptoms.    HPI:  Patient is a 60-year-old woman with a history of mixed incontinence, including both stress and urgency incontinence. She underwent a retropubic mid-urethral sling procedure for stress incontinence, performed by another provider. Due to continued leakage after the sling procedure, she had a bulking procedure which resulted in significant improvement of her stress incontinence symptoms.    Recently, she noticed a change in her symptoms, with urgency becoming more predominant. She reports more leakage at night than during the day. She continues to have stress incontinence, including large volumes of urine loss with coughing or sneezing.    A prior urodynamic evaluation showed no detrusor overactivity, with leak point pressures of 62 cm of water before her sling procedure. She was noted to void with Valsalva maneuver.    At her last visit in January 2025, due to worsening urgency symptoms, she was started on Mirabegron 50 mg. Today, she reports noticing a slight improvement with Mirabegron. She still has significant leakage with coughing, sneezing, and vomiting, affecting her clothing and car seat. This suggests that while the medication has helped somewhat with urgency symptoms, stress incontinence remains a significant issue.    She had a previous positive experience with the bulking procedure, reporting significant effectiveness. It has been at least a year since her last bulking treatment.    MEDICATIONS:  Patient is on Mirabegron (Myrbetriq) 50 mg daily for urgency incontinence. This medication was started in the previous visit, and a slight improvement has been noted.    MEDICAL HISTORY:  Patient has a history of  mixed incontinence, experiencing both stress and urgency incontinence. She has no detrusor overactivity, which was confirmed by a prior urodynamic evaluation.    SURGICAL HISTORY:  Patient has undergone a retropubic mid-urethral sling procedure for stress incontinence, although the date is not specified. She continued to experience leakage following this procedure. At least a year ago, she had a bulking procedure for stress incontinence, which resulted in significant improvement in her stress incontinence symptoms.    TEST RESULTS:  Prior to the current visit, the patient underwent a urodynamic evaluation. The results showed no detrusor overactivity, leak point pressures of 62 cm of water, and Valsalva voiding.      OBJECTIVE  General: No acute distress. Nontoxic appearing.    HENT: Normocephalic. Atraumatic.    Respiratory: Normal respiratory effort. No conversational dyspnea. No audible wheezing.    Abdomen: No obvious distension.    Skin: No visible abnormalities.    Extremities: No edema upper extremities. No edema lower extremities.    Neurological: Alert and oriented x3. Normal speech.    Psychiatric: Normal mood. Normal affect. No evidence of SI.      ASSESSMENT  IMPRESSION:  Mixed incontinence with stress and urgency components. Previously underwent retropubic mid-urethral sling and bulking procedure for stress incontinence.  Recent symptoms suggest more urgency predominance, but stress incontinence persists.  Started Mirabegron 50 mg at last visit to assess impact on urgency symptoms. Minimal improvement with Mirabegron, suggesting stress incontinence remains predominant.  Will repeat bulking procedure as it previously provided good improvement and is less invasive than pubovaginal sling.      PLAN  PLAN SUMMARY:  - Order urine culture  - Schedule patient for bulking procedure  - Continue Mirabegron (Myrbetriq) 50 mg  - Paty to schedule patient for bulking procedure  - Informed patient about need for  multiple bulking injections for optimal results    MIXED INCONTINENCE:  - Explained the difference between stress and urgency incontinence.  - Discussed that Mirabegron targets urgency incontinence but not stress incontinence.  - Informed about the need for multiple bulking injections for optimal results.  - Continued Mirabegron (Myrbetriq) 50 mg.    DIAGNOSTIC TESTS:  - Ordered urine culture.    FOLLOW-UP:  - Paty to schedule patient for bulking procedure.    Visit complexity today is associated with medical care services that are part of the ongoing care related to the single serious and/or complex condition of Stress incontinence (FILIBERTO). A longitudinal relationship exists or is being developed between the patient and this practitioner for the care of this condition.

## 2025-03-19 ENCOUNTER — OFFICE VISIT (OUTPATIENT)
Dept: RHEUMATOLOGY | Facility: CLINIC | Age: 60
End: 2025-03-19
Payer: COMMERCIAL

## 2025-03-19 DIAGNOSIS — Z79.899 LONG-TERM USE OF PLAQUENIL: ICD-10-CM

## 2025-03-19 DIAGNOSIS — D84.821 DRUG-INDUCED IMMUNODEFICIENCY: ICD-10-CM

## 2025-03-19 DIAGNOSIS — L40.9 PSORIASIS: ICD-10-CM

## 2025-03-19 DIAGNOSIS — G89.4 CHRONIC PAIN SYNDROME: ICD-10-CM

## 2025-03-19 DIAGNOSIS — M25.572 PAIN OF JOINT OF LEFT ANKLE AND FOOT: ICD-10-CM

## 2025-03-19 DIAGNOSIS — Z79.899 DRUG-INDUCED IMMUNODEFICIENCY: ICD-10-CM

## 2025-03-19 DIAGNOSIS — M15.4 EROSIVE OSTEOARTHRITIS OF HANDS, BILATERAL: Primary | ICD-10-CM

## 2025-03-19 PROCEDURE — 98006 SYNCH AUDIO-VIDEO EST MOD 30: CPT | Mod: 95,,, | Performed by: STUDENT IN AN ORGANIZED HEALTH CARE EDUCATION/TRAINING PROGRAM

## 2025-03-19 PROCEDURE — G2211 COMPLEX E/M VISIT ADD ON: HCPCS | Mod: 95,,, | Performed by: STUDENT IN AN ORGANIZED HEALTH CARE EDUCATION/TRAINING PROGRAM

## 2025-03-19 RX ORDER — LEFLUNOMIDE 10 MG/1
20 TABLET ORAL DAILY
Qty: 60 TABLET | Refills: 5 | Status: SHIPPED | OUTPATIENT
Start: 2025-03-19 | End: 2025-09-15

## 2025-03-19 NOTE — PROGRESS NOTES
RHEUMATOLOGY OUTPATIENT CLINIC NOTE    3/19/2025    Attending Rheumatologist: Kalie Willima  Primary Care Provider: Rah Canela MD   Physician Requesting Consultation: No referring provider defined for this encounter.  Chief Complaint/Reason For Consultation:  No chief complaint on file.    The patient location is: home  The chief complaint leading to consultation is: 30 min     Visit type: audiovisual    30 minutes of total time spent on the encounter, which includes face to face time and non-face to face time preparing to see the patient (eg, review of tests), Obtaining and/or reviewing separately obtained history, Documenting clinical information in the electronic or other health record, Independently interpreting results (not separately reported) and communicating results to the patient/family/caregiver, or Care coordination (not separately reported).     Each patient to whom he or she provides medical services by telemedicine is:  (1) informed of the relationship between the physician and patient and the respective role of any other health care provider with respect to management of the patient; and (2) notified that he or she may decline to receive medical services by telemedicine and may withdraw from such care at any time.    Notes:        Subjective:       XU Bernard is a 60 y.o. White female with history of hypertension, hyperparathyroidism, history of upper GI bleed, anxiety, migraines who comes for follow up on joint pain      Interim history  -last visit on 11/2024  -she had right index and right middle finger DIP arthodesis on 12/2024 and did well. plan to have another arthodesis in left 2-3 DIPs in the future.  She is dealing with pain and swelling in multiple other DIPs very painful.   -she is tolerating  mg BID except Sundays. Has not taken prednisone in the past month but has refill   -she reports that she saw dermatology and was told that she may have  psoriasis. Was prescribed topical creams   -takes gabapentin 1200 mg QID and cymbalta 60 mg daily, she reports she used to take 120 mg in the past.     Answers submitted by the patient for this visit:  Rheumatology Questionnaire (Submitted on 3/19/2025)  fever: No  eye redness: No  mouth sores: No  headaches: Yes  shortness of breath: Yes  chest pain: No  trouble swallowing: Yes  diarrhea: No  constipation: No  unexpected weight change: No  genital sore: No  dysuria: No  During the last 3 days, have you had a skin rash?: No  Bruises or bleeds easily: No  cough: No      Initial consult HPI    She has history of OA and was managed by rheumatologist many years ago.     In 2020 she had fracture in left distal tibia and ankle that required ORIF.     She has been noticing that lately she is having diffuse joint pain.  Denies any myalgias    She has chronic pain in her hands with deformities in the hips.  However she reports the form the last month she is having swelling and redness in all her PIP and MCPs.  Pain can wake him up in the middle of the night.  Having difficulty opening jars, holding her toothbrush.  Lidocaine cream helps.    She reports pain in elbows, no swelling.  Reports pain in bilateral shoulders, no difficulty lifting above her head.  Reports neck pain with limited range of motion, feels like a burning sensation.  It is not too severe    Reports low back pain for many years associated with sciatica in both size.  Sees pain management.  Has had ablation in the past.     Reports bilateral lateral hip pain, intermittent.  Difficulty getting up from the car.  Reports history of bilateral knee pain, noted occasional swelling.  Has been noticing pain in bilateral ankles and feet    She reports morning stiffness of about 5-10 minutes    For pain control she currently takes oxycodone as needed, pregabalin prescribed by pain management.  She also takes Cymbalta per her psychiatrist    She denies any dry eyes,  dry mouth, skin rashes, photosensitivity, oral ulcers, Raynaud's, abdominal pain.  She reports a history of mild psoriasis in her scalp    She tries to avoid NSAIDs due to history of upper GI bleeding secondary to ulcers.     She denies family history of rheumatoid arthritis.  However reports history of psoriasis in her family    Labs  10/10/2023  RF, CCP negative  ESR and CRP negative    2015  MARY JO negative    Imaging    XR bilateral knee: OA bilaterally   XR bilateral hands: joint space narrowing in bilateral 2-3 DIP with erosions  XR left foot with hardware failure    Chronic comorbid conditions affecting medical decision making today:  Past Medical History:   Diagnosis Date    Allergy     Anxiety     Depression     Empyema of right pleural space 2021    Encounter for blood transfusion     Esophageal dysphagia     Fibromyalgia     Gastric bypass status for obesity     H/O dental abscess 04/14/2014    drained    Headache(784.0)     migraines.  Treated at [facility] (Dr. 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  Pt states took too many then realized her mistake    Polycystic ovaries     S/P [id number]     Thyroid disease     hypothyroidism    Wears partial dentures     upper     Past Surgical History:   Procedure Laterality Date    ABDOMINAL SURGERY      ANKLE HARDWARE REMOVAL Left 3/28/2022    Procedure: REMOVAL, HARDWARE, ANKLE;  Surgeon: Fer Mcrae MD;  Location: [facility] OR [address] [place] [address];  Service: Orthopedics;  Laterality: Left;    APPLICATION OF LARGE EXTERNAL FIXATION DEVICE TO TIBIA Left 9/7/2020     Procedure: APPLICATION, EXTERNAL FIXATION DEVICE, TIBIA;  Surgeon: Sujata Londono MD;  Location: [facility] OR 2ND FLR;  Service: Orthopedics;  Laterality: Left;  need paralysis    APPLICATION OF WOUND VACUUM-ASSISTED CLOSURE DEVICE Left 9/18/2020    Procedure: APPLICATION, WOUND VAC;  Surgeon: Fer Mcrae MD;  Location: [facility] OR 2ND FLR;  Service: Orthopedics;  Laterality: Left;    BREAST SURGERY  2004    Breast Reduction    CARDIAC CATHETERIZATION      COLONOSCOPY N/A 11/3/2015    Procedure: COLONOSCOPY;  Surgeon: Timothy Barber MD;  Location: [facility] ENDO;  Service: Endoscopy;  Laterality: N/A;    CYSTOSCOPY WITH INJECTION OF PERIURETHRAL BULKING AGENT N/A 9/14/2023    Procedure: CYSTOSCOPY, WITH PERIURETHRAL BULKING AGENT INJECTION;  Surgeon: David Perez MD;  Location: [facility] OR;  Service: Urology;  Laterality: N/A;    CYSTOSCOPY WITH URODYNAMIC TESTING N/A 4/17/2023    Procedure: CYSTOSCOPY, WITH URODYNAMIC TESTING;  Surgeon: David Perez MD;  Location: [facility] 1ST FLR;  Service: Urology;  Laterality: N/A;  1 hr    DENTAL SURGERY      4 teeth removed    FUSION, JOINT, FINGER Right 12/10/2024    Procedure: Right index and right middle finger distal interphalangeal joint arthrodesis;  Surgeon: Ervin Chan MD;  Location: [facility] OR;  Service: Orthopedics;  Laterality: Right;    GASTRIC BYPASS      HERNIA REPAIR      HYSTERECTOMY      ILIAC CREST BONE GRAFT Left 3/28/2022    Procedure: BONE GRAFT, ILIAC CREST;  Surgeon: Fer Mcrae MD;  Location: [facility] OR 2ND FLR;  Service: Orthopedics;  Laterality: Left;    INJECTION OF ANESTHETIC AGENT AROUND NERVE N/A 3/29/2022    Procedure: Block, Nerve;  Surgeon: Alexus Surgeon;  Location: [facility] ALEXUS;  Service: Anesthesiology;  Laterality: N/A;    OPEN REDUCTION AND INTERNAL FIXATION (ORIF) OF PILON FRACTURE Left 9/18/2020    Procedure: ORIF, FRACTURE, PILON - left. Diving board, supine, bone foam. ThetaRay anterolateral distal tibial plate,  mini frag, long 3.5mm steel screw, CaPhos bone substitute;  Surgeon: Fer Mcrae MD;  [facility];  Service: Orthopedics;  Laterality: Left;    OPEN REDUCTION AND INTERNAL FIXATION (ORIF) OF PILON FRACTURE Left 3/28/2022    Procedure: ORIF, FRACTURE, PILON nonunion + Iliac crest bone graft - diving board, supine, bone foam. [place] axsos3 screws, 2.7 mini plates/screws, curettes, Seda/Lomas medical Augment;  Surgeon: Fre Mcrae MD;  [facility];  Service: Orthopedics;  Laterality: Left;  Spinal? + postop catheter (draping out pelvis for bone graft harvest)    OVARIAN CYST REMOVAL      aprox 5 times    REMOVAL OF EXTERNAL FIXATION DEVICE Left 9/18/2020    Procedure: REMOVAL, EXTERNAL FIXATION DEVICE;  Surgeon: Fer Mcrae MD;  [facility];  Service: Orthopedics;  Laterality: Left;    ROTATOR CUFF REPAIR Left 01/2019    SURGICAL PROCEDURE FOR STRESS INCONTINENCE USING TENSION FREE VAGINAL TAPE N/A 12/14/2022    Procedure: SURGICAL PROCEDURE, USING TENSION FREE VAGINAL TAPE, FOR STRESS INCONTINENCE;  Surgeon: Frandy Sherman MD;  Location: [facility] OR;  Service: OB/GYN;  Laterality: N/A;  cysto    tennis elbow repair Left 01/2019    UPPER GASTROINTESTINAL ENDOSCOPY       Family History   Problem Relation Name Age of Onset    Anxiety disorder Mother      Depression Mother      Suicide Mother      Seizures Mother      Drug abuse Mother      Ovarian cancer Mother      Aortic aneurysm Father      Glaucoma Maternal Grandmother      Colon cancer Neg Hx      Breast cancer Neg Hx      Diabetes Neg Hx      Hypertension Neg Hx       Social History     Substance and Sexual Activity   Alcohol Use Yes    Comment: rare     Social History     Tobacco Use   Smoking Status Never   Smokeless Tobacco Never   Tobacco Comments    marijuana     Social History     Substance and Sexual Activity   Drug Use Yes    Types: Marijuana    Comment: medical        Current Outpatient Medications:     acetaminophen (TYLENOL) 325 MG tablet, Take 325 mg by mouth every 6 (six) hours as needed for Pain., Disp: , Rfl:     albuterol (PROVENTIL/VENTOLIN HFA) 90 mcg/actuation inhaler, 2 puffs every 4 hours as needed for cough, wheeze, or shortness of breath, Disp: 18 g, Rfl: 11    ARIPiprazole (ABILIFY) 15 MG Tab, Take 7.5 mg by mouth once daily., Disp: , Rfl:     cetirizine (ZYRTEC) 10 MG tablet, Take 10 mg by mouth., Disp: , Rfl:     cholecalciferol, vitamin D3, 50,000 unit capsule, Take 50,000 Units by mouth every 7 days. Patient takes on Saturdays, Disp: , Rfl: 99    clobetasol 0.05% (TEMOVATE) 0.05 % Oint, Apply topically every evening. Use small, pea-sized amount at the site, every night before bed for 2 weeks. After that, use only 2-3 times per week at night before bed., Disp: 30 g, Rfl: 1    cyanocobalamin 1,000 mcg/mL injection, INJECT 1 ML IN THE MUSCLE EVERY 28 DAYS AS DIRECTED, Disp: , Rfl:     dextroamphetamine-amphetamine (ADDERALL XR) 30 MG 24 hr capsule, Take by mouth every morning., Disp: , Rfl:     DULoxetine (CYMBALTA) 60 MG capsule, Take 60 mg by mouth., Disp: , Rfl:     erenumab-aooe (AIMOVIG) 140 mg/mL autoinjector, Inject 140 mg into the skin once every 28 days to reduce migraines, Disp: 1 mL, Rfl: 11    fluticasone-umeclidin-vilanter (TRELEGY ELLIPTA) 200-62.5-25 mcg inhaler, Inhale 1 puff into the lungs once daily., Disp: 60 each, Rfl: 11    gabapentin (NEURONTIN) 600 MG tablet, Take 1,200 mg by mouth 3 (three) times daily., Disp: , Rfl:     hydroCHLOROthiazide (HYDRODIURIL) 25 MG tablet, TAKE 1 TABLET BY MOUTH DAILY IN THE MORNING, Disp: , Rfl:     hydroxychloroquine (PLAQUENIL) 200 mg tablet, Take 2 tablets a day every day except Sundays, Disp: 52 tablet, Rfl: 5    leflunomide (ARAVA) 10 MG Tab, Take 2 tablets (20 mg total) by mouth once daily., Disp: 60 tablet, Rfl: 5    levothyroxine (SYNTHROID) 100 MCG tablet, Take 1 tablet daily for 5 days and take  half tablet on Saturday and Sunday., Disp: 90 tablet, Rfl: 3    LIDOcaine (XYLOCAINE) 5 % Oint ointment, Apply topically 2 (two) times daily., Disp: , Rfl:     metoprolol tartrate (LOPRESSOR) 50 MG tablet, Take 50 mg by mouth 2 (two) times daily., Disp: , Rfl:     mirabegron (MYRBETRIQ) 50 mg Tb24, Take 1 tablet (50 mg total) by mouth once daily., Disp: 30 tablet, Rfl: 11    mupirocin (BACTROBAN) 2 % ointment, Apply topically., Disp: , Rfl:     NURTEC 75 mg odt, Take 1 tablet by mouth every other day to reduce/prevent migraines, Disp: 16 tablet, Rfl: 6    onabotulinumtoxina (BOTOX) 200 unit SolR, as directed, Disp: , Rfl:     ondansetron (ZOFRAN) 4 MG tablet, Take 4 mg by mouth every 8 (eight) hours as needed., Disp: , Rfl:     ondansetron (ZOFRAN-ODT) 8 MG TbDL, Take 1 tablet (8 mg total) by mouth every 8 (eight) hours as needed (Nausea)., Disp: 3 tablet, Rfl: 0    oxyCODONE (ROXICODONE) 10 mg Tab immediate release tablet, Take 1 tablet (10 mg total) by mouth every 4 (four) hours as needed for Pain., Disp: 10 tablet, Rfl: 0    pravastatin (PRAVACHOL) 20 MG tablet, Take by mouth once daily., Disp: , Rfl:     predniSONE (DELTASONE) 5 MG tablet, Take 3 tablets (15 mg total) by mouth once daily for 5 days, THEN 2 tablets (10 mg total) once daily for 5 days, THEN 1 tablet (5 mg total) once daily for 5 days., Disp: 30 tablet, Rfl: 0    tiZANidine (ZANAFLEX) 4 MG tablet, Take 4 mg by mouth 2 (two) times daily., Disp: , Rfl:   No current facility-administered medications for this visit.    Facility-Administered Medications Ordered in Other Visits:     midazolam (VERSED) 1 mg/mL injection 0.5 mg, 0.5 mg, Intravenous, PRN, Myrtle Linares MD, 2 mg at 09/18/20 1155     Objective:         There were no vitals filed for this visit.    Physical Exam   Constitutional: She is oriented to person, place, and time. She appears well-developed.   HENT:   Head: Normocephalic.   Mouth/Throat: Mucous membranes are moist.   Salivary  pool adequate  Oral aperture is normal   Pulmonary/Chest: Effort normal.   Musculoskeletal:      Cervical back: Neck supple.      Comments: [place] nodes in multiple DIPs that are tender   PIPs and MCPs without tenderness or synovitis.  Bilateral wrists with no synovitis.  Left elbow with mild tenderness, no swelling.  Bilateral shoulder with normal range of motion.  Bilateral knees with no swelling, no crepitus. Left ankle with mild swelling on the lateral malleolus,.  No synovitis or tenderness on feet.     Neurological: She is alert and oriented to person, place, and time.   Skin: No rash noted.   Dry skin in left ankle.    Vitals reviewed.    Virtual visit 3/19: no rashes in face     Reviewed old and all outside pertinent medical records available.    All lab results personally reviewed and interpreted by me.  Lab Results   Component Value Date    WBC 6.50 08/21/2024    HGB 12.0 08/21/2024    HCT 40.8 08/21/2024    MCV 81 (L) 08/21/2024    MCH 23.7 (L) 08/21/2024    MCHC 29.4 (L) 08/21/2024    RDW 19.8 (H) 08/21/2024     08/21/2024    MPV 9.6 08/21/2024    NEUTROPCT 51.1 01/03/2013    MONOPCT 7.3 01/03/2013    PLTEST Normal 06/14/2006       Lab Results   Component Value Date     08/21/2024    K 3.7 08/21/2024     08/21/2024    CO2 27 08/21/2024    GLU 95 08/21/2024    BUN 14 08/21/2024    CALCIUM 9.3 08/21/2024    PROT 7.1 08/21/2024    ALBUMIN 4.2 08/21/2024    BILITOT 0.3 08/21/2024    AST 15 08/21/2024    ALKPHOS 81 08/21/2024    ALT 20 08/21/2024    GFRNONAA 93 08/16/2021       Lab Results   Component Value Date    COLORU Yellow 08/03/2021    APPEARANCEUA Clear 08/03/2021    SPECGRAV >1.030 (A) 08/03/2021    PHUR 6.0 08/03/2021    PROTEINUA Negative 08/03/2021    GLUCOSEU NEG 01/01/2013    KETONESU Negative 08/03/2021    BLOODU NEG 01/01/2013    LEUKOCYTESUR Negative 08/03/2021    NITRITE Negative 08/03/2021    UROBILINOGEN Negative 08/03/2021       Lab Results   Component Value Date     "CRP 0.40 08/20/2024       Lab Results   Component Value Date    RF <13.0 10/10/2023    SEDRATE 19 08/20/2024    CCPANTIBODIE <0.5 10/10/2023       No components found for: "25OHVITDTOT", "07QBRWXW8", "13XRSKWC6", "METHODNOTE"    No results found for: "URICACID"    Lab Results   Component Value Date    HEPBSAG Negative 10/04/2015    HEPCAB Negative 10/04/2015       Imaging:  All imaging reviewed and independently interpreted by me.    XR bilateral knee: OA bilaterally   XR bilateral hands: joint space narrowing in bilateral 2-3 DIP with erosions  XR left foot with hardware failure     ASSESSMENT / PLAN:     Faith Bernard is a 60 y.o. White female with history of hypertension, hyperparathyroidism, history of upper GI bleed, anxiety, migraines who comes for evaluation of joint pain.    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 She cannot do oral NSAIDs due to prior PUD. Already taking percocet and extra tylenol from pain management  -has noted improvement on  mg BID except Sundays. Will continue. She has a remote history of torsade de pointes however most recent EKG in 12/2022 had normal Qtc   Eye exam on 2/2024 no HCQ toxicity   -had R 2nd and 3rd DIP arthodesis on next month  -advised her to use prednisone as needed for flares.  -given worsening swelling and erythema in other DIPs, will proceed with Leflunomide 10 mg daily. Discussed SE. Pt agreeable to start medication.   -will have to keep in mind new diagnosis of psoriasis.     #Left ankle pain   -XR left ankle with hardware stabilizing distal tib fib fractures and there is hardware removal from the calcaneus. No acute fracture, dislocation, or bone destruction seen. Multiple fixation screws demonstrate metal fatigue fracture/hardware failure.   -follows with ortho  -had CSI on 10/2024    #psoriasis  -she " has been having new onset of skin rash in left ankle with dry skin. She saw dermatology and was told she had psoriasis and to use topical creams     # polyarthralgia  -Besides hand pain she likely has OA in other sites contributing to her symptoms.   -we will keep in consideration history of mild psoriasis.     #Chronic pain  -She follows pain management.  Takes oxycodone as needed and gabapentin.   -she also takes Cymbalta 60 mg from her psychiatrist.    # hyperparathyroidism  -follows with endocrinology.    #long term use of plaquenil  -Eye exam on 2/2024 no HCQ toxicity     #drug induced immunodeficiency  - need labs in 4 weeks and then every 3 months  - no live vaccines  - vaccines per guidelines   - immunosuppression/infectious precautions reinforced     Follow up in about 3 months (around 6/19/2025).    Method of contact with patient concerns: [facility]    Disclaimer:  This note is prepared using voice recognition software and as such is likely to have errors and has not been proof read. Please contact me for questions.     Kalie William M.D.  Rheumatology  [facility]

## 2025-03-20 ENCOUNTER — PATIENT MESSAGE (OUTPATIENT)
Dept: RHEUMATOLOGY | Facility: CLINIC | Age: 60
End: 2025-03-20
Payer: COMMERCIAL

## 2025-03-20 ENCOUNTER — PATIENT MESSAGE (OUTPATIENT)
Dept: UROLOGY | Facility: CLINIC | Age: 60
End: 2025-03-20
Payer: COMMERCIAL

## 2025-03-20 LAB
BACTERIA UR CULT: NORMAL
BACTERIA UR CULT: NORMAL

## 2025-03-20 RX ORDER — NITROFURANTOIN 25; 75 MG/1; MG/1
100 CAPSULE ORAL 2 TIMES DAILY
Qty: 10 CAPSULE | Refills: 0 | Status: SHIPPED | OUTPATIENT
Start: 2025-03-20 | End: 2025-03-25

## 2025-03-21 DIAGNOSIS — Z79.899 DRUG-INDUCED IMMUNODEFICIENCY: ICD-10-CM

## 2025-03-21 DIAGNOSIS — M15.4 EROSIVE OSTEOARTHRITIS OF HANDS, BILATERAL: ICD-10-CM

## 2025-03-21 DIAGNOSIS — D84.821 DRUG-INDUCED IMMUNODEFICIENCY: ICD-10-CM

## 2025-03-21 DIAGNOSIS — L40.9 PSORIASIS: Primary | ICD-10-CM

## 2025-03-26 PROBLEM — L40.9 PSORIASIS: Status: ACTIVE | Noted: 2025-03-26

## 2025-03-26 RX ORDER — LEFLUNOMIDE 10 MG/1
20 TABLET ORAL DAILY
Qty: 60 TABLET | Refills: 5 | Status: ACTIVE | OUTPATIENT
Start: 2025-03-26 | End: 2025-09-22

## 2025-03-31 ENCOUNTER — TELEPHONE (OUTPATIENT)
Dept: RHEUMATOLOGY | Facility: CLINIC | Age: 60
End: 2025-03-31
Payer: COMMERCIAL

## 2025-03-31 DIAGNOSIS — M15.4 EROSIVE OSTEOARTHRITIS OF HANDS, BILATERAL: Primary | ICD-10-CM

## 2025-03-31 RX ORDER — LEFLUNOMIDE 20 MG/1
20 TABLET ORAL DAILY
Qty: 30 TABLET | Refills: 11 | Status: ACTIVE | OUTPATIENT
Start: 2025-03-31 | End: 2026-03-31

## 2025-03-31 NOTE — TELEPHONE ENCOUNTER
----- Message from Baljinder Walker sent at 3/31/2025  3:17 PM CDT -----  Regarding: Leflunomide  Good afternoon Dr. William,Mrs. Bernard's insurance will not cover two leflunomide 10 mg tablets daily. They will, however, cover one leflunomide 20 mg tablet daily. If appropriate, could you please send a prescription for the 20 mg tablets?Thank you,Denise Montilla, PharmDClinical PharmacistOchsner Specialty [phone]

## 2025-04-01 DIAGNOSIS — G43.709 CHRONIC MIGRAINE WITHOUT AURA WITHOUT STATUS MIGRAINOSUS, NOT INTRACTABLE: ICD-10-CM

## 2025-04-02 ENCOUNTER — PATIENT MESSAGE (OUTPATIENT)
Dept: NEUROLOGY | Facility: CLINIC | Age: 60
End: 2025-04-02
Payer: COMMERCIAL

## 2025-04-02 DIAGNOSIS — G43.709 CHRONIC MIGRAINE WITHOUT AURA WITHOUT STATUS MIGRAINOSUS, NOT INTRACTABLE: ICD-10-CM

## 2025-04-02 RX ORDER — ERENUMAB-AOOE 140 MG/ML
INJECTION, SOLUTION SUBCUTANEOUS
Qty: 1 ML | Refills: 11 | Status: SHIPPED | OUTPATIENT
Start: 2025-04-02 | End: 2025-04-04 | Stop reason: SDUPTHER

## 2025-04-04 RX ORDER — ERENUMAB-AOOE 140 MG/ML
INJECTION, SOLUTION SUBCUTANEOUS
Qty: 1 ML | Refills: 11 | Status: SHIPPED | OUTPATIENT
Start: 2025-04-04

## 2025-04-04 RX ORDER — RIMEGEPANT SULFATE 75 MG/75MG
TABLET, ORALLY DISINTEGRATING ORAL
Qty: 16 TABLET | Refills: 6 | Status: SHIPPED | OUTPATIENT
Start: 2025-04-04

## 2025-04-06 ENCOUNTER — PATIENT MESSAGE (OUTPATIENT)
Dept: UROLOGY | Facility: CLINIC | Age: 60
End: 2025-04-06
Payer: COMMERCIAL

## 2025-04-06 ENCOUNTER — PATIENT MESSAGE (OUTPATIENT)
Dept: ORTHOPEDICS | Facility: CLINIC | Age: 60
End: 2025-04-06
Payer: COMMERCIAL

## 2025-04-09 ENCOUNTER — TELEPHONE (OUTPATIENT)
Dept: UROLOGY | Facility: CLINIC | Age: 60
End: 2025-04-09
Payer: COMMERCIAL

## 2025-04-09 ENCOUNTER — PROCEDURE VISIT (OUTPATIENT)
Dept: NEUROLOGY | Facility: CLINIC | Age: 60
End: 2025-04-09
Payer: COMMERCIAL

## 2025-04-09 VITALS — DIASTOLIC BLOOD PRESSURE: 89 MMHG | RESPIRATION RATE: 17 BRPM | SYSTOLIC BLOOD PRESSURE: 138 MMHG | HEART RATE: 70 BPM

## 2025-04-09 DIAGNOSIS — G43.709 CHRONIC MIGRAINE WITHOUT AURA WITHOUT STATUS MIGRAINOSUS, NOT INTRACTABLE: Primary | ICD-10-CM

## 2025-04-09 NOTE — PROCEDURES
[facility]  [address] [place] [address]  Phone:[phone]  Fax: [phone]  Botox Visit, #4    Chief Complaint   Patient presents with    Botulinum Toxin Injection         A/P:        ICD-10-CM ICD-9-CM   1. Chronic migraine without aura without status migrainosus, not intractable  G43.709 346.70     Botox for migraine    Historically: Patient meets the criteria for chronic migraine. In summary, She has headaches/migraines >15 days per month  and last >4 hours if untreated. Specifics of duration, frequency and strength are listed in office visit HPI's.  This pattern has continued for >3 months.  She has failed at least three preventive medications (full list of medications is listed in office notes of Therapies tried in past)  I have therefore recommended a trial of Botox via the PREEMPT protocol for migraine prophylaxis.We schedule regular follow up intervals to check on status.     PROCEDURE NOTE:  BOTOX injection is indicated for the prophylaxis of headaches in adult patients with chronic  migraine. Patient meets indications for BOTOX therapy.  Potential risks and benefits were reviewed. Side effects including, but not limited to, potential  systemic allergic reactions of the anaphylactic type as well as local injection site reactions of  blepharoptosis, diplopia, infection, bleeding, pain, redness and bruising were reviewed. The  potential for headaches and/or neck pain post procedure were reviewed.  The patient's questions were answered. The patient signed a consent form. Patient  understands that depending on their insurance carrier, there may be a copay for this treatment.  BOTOX was reconstituted using  two 100 unit vials and diluted with 4 mL of sterile saline.  BOTOX was injected as per the PREEMPT trial injection paradigm with dose administered as 5  unit intramuscular (IM) injections per site using a sterile, 30-gauge 0.5 inch needle as  follows:  Muscle Dose, # of Sites   10 units divided in 2 sites  Procerus 5 units in 1 site  Frontalis 20 units divided in 4 sites  Temporalis 40 units divided in 8 sites  Occipitalis 30 units divided in 6 sites  Cervical paraspinal 20 units divided in 4 sites  Trapezius 30 units divided in 6 sites  Each site was cleaned with alcohol prior to injection. A total dose of 155 units were injected. 45  units were discarded/wasted.      The patient tolerated the procedure well with no immediate complications.  MEDICATION INFO:  NDC 1898-7437-84   Lot # [id number]  Exp 5/2027      As aside:  The Botox injections have achieved well over 50%  improvement in the patient's symptoms. Migraines have been reduced at least 7 days per month and the number of cumulative hours suffering with headaches has been reduced at least 100 total hours per month. Today she does have a headache indicating that the Botox has worn off. Frequency of treatment is every 3 months unless no response to the treatments, at which time we will discontinue the injections.    As aside mentions she will be getting botox for GI procedure in near future, she states her GI is aware she received botox today.       Follow up in 3 months for repeat injection, we also have interspersed office visits scheduled to ensure efficacy of botox sessions/check on patient.       Edouard Zamarripa MPA, PA-C  Attending available-Dr Tboy MD           Personal Protective Equipment:    Personal Protective Equipment was used during this encounter including;     non latex gloves.     04/09/2025     CC: Rah Canela MD

## 2025-04-09 NOTE — TELEPHONE ENCOUNTER
Called pt  full  ----- Message from Summer sent at 4/9/2025 10:58 AM CDT -----  Regarding: call back  Type:  Patient Returning Call  Name of who is calling:pt  What is request in detail:pt is requesting a call back in regards to meds, pt was told not to take meds before upcoming surgery she is having. Pt states that does not work for her, and needs someone to give her a call back. Please assist.   Can clinic reply by [facility]:no  What number to call back [facility] not [facility][phone]

## 2025-04-10 ENCOUNTER — PATIENT MESSAGE (OUTPATIENT)
Dept: NEUROLOGY | Facility: CLINIC | Age: 60
End: 2025-04-10
Payer: COMMERCIAL

## 2025-04-10 ENCOUNTER — TELEPHONE (OUTPATIENT)
Dept: UROLOGY | Facility: CLINIC | Age: 60
End: 2025-04-10
Payer: COMMERCIAL

## 2025-04-10 RX ORDER — HYDROXYZINE PAMOATE 25 MG/1
CAPSULE ORAL
Qty: 20 CAPSULE | Refills: 0 | Status: SHIPPED | OUTPATIENT
Start: 2025-04-10

## 2025-04-10 NOTE — TELEPHONE ENCOUNTER
Called pt to inform her of no med change   ----- Message from Med Assistant Ramos sent at 4/10/2025  2:20 PM CDT -----    ----- Message -----  From: Ashley Pinto  Sent: 4/10/2025   1:26 PM CDT  To: Chris Hernandez Staff      Patient Returning CallWho Called:ptWho Left Message for Patient:Oralia Granado the patient know what this is regarding?:pt is returning call Would the patient rather a call back or a response via [facility]? [facility] Back Number:[phone] Information: call back

## 2025-04-13 ENCOUNTER — PATIENT MESSAGE (OUTPATIENT)
Dept: OPTOMETRY | Facility: CLINIC | Age: 60
End: 2025-04-13
Payer: COMMERCIAL

## 2025-04-16 NOTE — PRE-PROCEDURE INSTRUCTIONS
Patient reviewed on 4/16/2025.  Okay to proceed at [facility]. The following pre-procedure instructions and arrival time have been reviewed with patient via phone and sent to patient portal for review.  Patient verbalized an understanding.  Pt to be accompanied by her best friend Myrtle Krishnamurthy day of procedure as responsible .    Dear Faith,     You are scheduled for a procedure with Dr. Perez on 4/17/2025.      Registration check in time: 10:25 AM  Scheduled procedure time: 12:25 AM      Please wear comfortable clothing  This procedure will take place at the [facility] at the corner of [facility] and [facility]. It is in the [facility] [place] [place] next to [employer]. The address is:     [address] [place] [address].  [place] [id number]     After entering the building, proceed to the second floor and check in with registration.      Your fasting instructions are as follows:     Nothing to eat after 11:00 pm the evening before your surgery. Anesthesia encourages you to stay adequately hydrated. Please STOP drinking clear liquids 2 hours before arrival time.     Examples of clear liquids include:  Water  Coffee (without milk, creamer)  Fruit juices without pulp (apple juice)  Sports drinks (Gatorade)  Sprite or Clear Soda     You MUST have a responsible adult to bring you home.         Please HOLD the following medications the night before and morning of your procedure:  Aspirin and Aspirin-containing products (Goody's powder, Excedrin)  Stimulants (Adderall, Vyvanse, Adipex)  Diabetic medication   Prescription creams/gels/lotions     *You may take tylenol if needed         The evening before and morning of your procedure, take a shower using antibacterial soap (ex: hibiclens or dial antibacterial soap). DO NOT apply deodorant, lotion, cologne, or anything else to the skin. Please do not wear jewelry or bring any valuables with you.  .     Please do not wear contact lenses the day of  your procedure.      Bring any inhalers that you may need.     If you have any procedure-specific questions, please contact your surgeon's office. For pre-admit or anesthesia questions prior to surgery, feel free to give me a call @ [phone].     IF YOU ARE RUNNING LATE OR HAVE QUESTIONS ON THE DAY OF YOUR SURGERY PLEASE CALL [phone].     Thanks,  JOSE Fontenot  Pre-Admit Testing  Anesthesia Dept [facility]

## 2025-04-17 ENCOUNTER — ANESTHESIA (OUTPATIENT)
Dept: SURGERY | Facility: HOSPITAL | Age: 60
End: 2025-04-17
Payer: COMMERCIAL

## 2025-04-17 ENCOUNTER — HOSPITAL ENCOUNTER (OUTPATIENT)
Facility: HOSPITAL | Age: 60
Discharge: HOME | End: 2025-04-17
Attending: UROLOGY | Admitting: UROLOGY
Payer: COMMERCIAL

## 2025-04-17 ENCOUNTER — ANESTHESIA EVENT (OUTPATIENT)
Dept: SURGERY | Facility: HOSPITAL | Age: 60
End: 2025-04-17
Payer: COMMERCIAL

## 2025-04-17 VITALS
DIASTOLIC BLOOD PRESSURE: 74 MMHG | BODY MASS INDEX: 32.02 KG/M2 | TEMPERATURE: 98 F | OXYGEN SATURATION: 98 % | WEIGHT: 180.75 LBS | SYSTOLIC BLOOD PRESSURE: 148 MMHG | RESPIRATION RATE: 24 BRPM | HEART RATE: 77 BPM

## 2025-04-17 DIAGNOSIS — N39.3 STRESS INCONTINENCE: Primary | ICD-10-CM

## 2025-04-17 PROCEDURE — 63600175 PHARM REV CODE 636 W HCPCS: Performed by: UROLOGY

## 2025-04-17 PROCEDURE — 25000003 PHARM REV CODE 250: Performed by: UROLOGY

## 2025-04-17 PROCEDURE — 36000707: Performed by: UROLOGY

## 2025-04-17 PROCEDURE — L8606 SYNTHETIC IMPLNT URINARY 1ML: HCPCS | Performed by: UROLOGY

## 2025-04-17 PROCEDURE — 36000706: Performed by: UROLOGY

## 2025-04-17 PROCEDURE — 63600175 PHARM REV CODE 636 W HCPCS: Mod: JZ,TB | Performed by: ANESTHESIOLOGY

## 2025-04-17 PROCEDURE — 94761 N-INVAS EAR/PLS OXIMETRY MLT: CPT

## 2025-04-17 PROCEDURE — 37000009 HC ANESTHESIA EA ADD 15 MINS: Performed by: UROLOGY

## 2025-04-17 PROCEDURE — 99900035 HC TECH TIME PER 15 MIN (STAT)

## 2025-04-17 PROCEDURE — 63600175 PHARM REV CODE 636 W HCPCS: Performed by: NURSE ANESTHETIST, CERTIFIED REGISTERED

## 2025-04-17 PROCEDURE — 71000015 HC POSTOP RECOV 1ST HR: Performed by: UROLOGY

## 2025-04-17 PROCEDURE — 25000003 PHARM REV CODE 250: Performed by: ANESTHESIOLOGY

## 2025-04-17 PROCEDURE — 71000016 HC POSTOP RECOV ADDL HR: Performed by: UROLOGY

## 2025-04-17 PROCEDURE — D9220A PRA ANESTHESIA: Mod: ,,, | Performed by: NURSE ANESTHETIST, CERTIFIED REGISTERED

## 2025-04-17 PROCEDURE — 71000033 HC RECOVERY, INTIAL HOUR: Performed by: UROLOGY

## 2025-04-17 PROCEDURE — 51715 ENDOSCOPIC INJECTION/IMPLANT: CPT | Mod: ,,, | Performed by: UROLOGY

## 2025-04-17 PROCEDURE — 37000008 HC ANESTHESIA 1ST 15 MINUTES: Performed by: UROLOGY

## 2025-04-17 DEVICE — SOFT TISSUE BULKING AGENT INDICATED FOR TRANSURETHRAL INJECTION IN THE TREATMENT OF ADULT WOMEN DIAGNOSED WITH STRESS URINARY INCONTINENCE PRIMARILY DUE TO INTRINSIC SPHINCTER DEFICIENCY.
Type: IMPLANTABLE DEVICE | Site: BLADDER | Status: FUNCTIONAL
Brand: MACROPLASTIQUE IMPLANTS

## 2025-04-17 RX ORDER — KETOROLAC TROMETHAMINE 30 MG/ML
15 INJECTION, SOLUTION INTRAMUSCULAR; INTRAVENOUS ONCE
Status: COMPLETED | OUTPATIENT
Start: 2025-04-17 | End: 2025-04-17

## 2025-04-17 RX ORDER — PROPOFOL 10 MG/ML
VIAL (ML) INTRAVENOUS CONTINUOUS PRN
Status: DISCONTINUED | OUTPATIENT
Start: 2025-04-17 | End: 2025-04-17

## 2025-04-17 RX ORDER — MIDAZOLAM HYDROCHLORIDE 1 MG/ML
INJECTION INTRAMUSCULAR; INTRAVENOUS
Status: DISCONTINUED | OUTPATIENT
Start: 2025-04-17 | End: 2025-04-17

## 2025-04-17 RX ORDER — FENTANYL CITRATE 50 UG/ML
25 INJECTION, SOLUTION INTRAMUSCULAR; INTRAVENOUS EVERY 5 MIN PRN
Status: DISCONTINUED | OUTPATIENT
Start: 2025-04-17 | End: 2025-04-17 | Stop reason: HOSPADM

## 2025-04-17 RX ORDER — LIDOCAINE HYDROCHLORIDE 20 MG/ML
INJECTION INTRAVENOUS
Status: DISCONTINUED | OUTPATIENT
Start: 2025-04-17 | End: 2025-04-17

## 2025-04-17 RX ORDER — OXYCODONE AND ACETAMINOPHEN 5; 325 MG/1; MG/1
1 TABLET ORAL
Status: DISCONTINUED | OUTPATIENT
Start: 2025-04-17 | End: 2025-04-17 | Stop reason: HOSPADM

## 2025-04-17 RX ORDER — SODIUM CHLORIDE 9 MG/ML
INJECTION, SOLUTION INTRAVENOUS CONTINUOUS
Status: DISCONTINUED | OUTPATIENT
Start: 2025-04-17 | End: 2025-04-17 | Stop reason: HOSPADM

## 2025-04-17 RX ORDER — FENTANYL CITRATE 50 UG/ML
INJECTION, SOLUTION INTRAMUSCULAR; INTRAVENOUS
Status: DISCONTINUED | OUTPATIENT
Start: 2025-04-17 | End: 2025-04-17

## 2025-04-17 RX ORDER — PROCHLORPERAZINE EDISYLATE 5 MG/ML
5 INJECTION INTRAMUSCULAR; INTRAVENOUS EVERY 30 MIN PRN
Status: DISCONTINUED | OUTPATIENT
Start: 2025-04-17 | End: 2025-04-17 | Stop reason: HOSPADM

## 2025-04-17 RX ORDER — ONDANSETRON HYDROCHLORIDE 2 MG/ML
INJECTION, SOLUTION INTRAVENOUS
Status: DISCONTINUED | OUTPATIENT
Start: 2025-04-17 | End: 2025-04-17

## 2025-04-17 RX ORDER — ACETAMINOPHEN 10 MG/ML
INJECTION, SOLUTION INTRAVENOUS
Status: DISCONTINUED | OUTPATIENT
Start: 2025-04-17 | End: 2025-04-17

## 2025-04-17 RX ORDER — GLUCAGON 1 MG
1 KIT INJECTION
Status: DISCONTINUED | OUTPATIENT
Start: 2025-04-17 | End: 2025-04-17 | Stop reason: HOSPADM

## 2025-04-17 RX ORDER — ONDANSETRON HYDROCHLORIDE 2 MG/ML
4 INJECTION, SOLUTION INTRAVENOUS DAILY PRN
Status: DISCONTINUED | OUTPATIENT
Start: 2025-04-17 | End: 2025-04-17 | Stop reason: HOSPADM

## 2025-04-17 RX ADMIN — LIDOCAINE HYDROCHLORIDE 100 MG: 20 INJECTION INTRAVENOUS at 11:04

## 2025-04-17 RX ADMIN — KETOROLAC TROMETHAMINE 15 MG: 30 INJECTION, SOLUTION INTRAMUSCULAR; INTRAVENOUS at 01:04

## 2025-04-17 RX ADMIN — PROPOFOL 150 MCG/KG/MIN: 10 INJECTION, EMULSION INTRAVENOUS at 11:04

## 2025-04-17 RX ADMIN — OXYCODONE HYDROCHLORIDE AND ACETAMINOPHEN 1 TABLET: 5; 325 TABLET ORAL at 12:04

## 2025-04-17 RX ADMIN — GLYCOPYRROLATE 0.1 MG: 0.2 INJECTION, SOLUTION INTRAMUSCULAR; INTRAVENOUS at 11:04

## 2025-04-17 RX ADMIN — MIDAZOLAM HYDROCHLORIDE 2 MG: 1 INJECTION, SOLUTION INTRAMUSCULAR; INTRAVENOUS at 11:04

## 2025-04-17 RX ADMIN — FENTANYL CITRATE 25 MCG: 50 INJECTION, SOLUTION INTRAMUSCULAR; INTRAVENOUS at 11:04

## 2025-04-17 RX ADMIN — FENTANYL CITRATE 25 MCG: 50 INJECTION INTRAMUSCULAR; INTRAVENOUS at 01:04

## 2025-04-17 RX ADMIN — GENTAMICIN SULFATE 321 MG: 40 INJECTION, SOLUTION INTRAMUSCULAR; INTRAVENOUS at 11:04

## 2025-04-17 RX ADMIN — ACETAMINOPHEN 1000 MG: 10 INJECTION INTRAVENOUS at 11:04

## 2025-04-17 RX ADMIN — SODIUM CHLORIDE: 0.9 INJECTION, SOLUTION INTRAVENOUS at 11:04

## 2025-04-17 RX ADMIN — ONDANSETRON 4 MG: 2 INJECTION INTRAMUSCULAR; INTRAVENOUS at 11:04

## 2025-04-17 NOTE — PLAN OF CARE
Pt in preop bay 32, VSS, and IV inserted. Pt denies any open wounds on body or the use of any weight loss injections. Pt needs anesthesia consents signed, procedural consents signed, admit order, updated H&P, otherwise ready to roll.

## 2025-04-17 NOTE — OP NOTE
Urology Cystoscopy Note     Date: 4/17/2025     Pre-Op Diagnosis: Stress Incontinence; Intrinsic Sphincter Deficiency     Post-Op Diagnosis: Stress Incontinence; Intrinsic Sphincter Deficiency     Procedure(s) Performed:   1.  Cystoscopy with transurethral injection of urethral bulking agent ([id number])     Specimen(s): none     Staff Surgeon: David Perez     Anesthesia: General LMA anesthesia     Indications: Faith Bernard is a 58 y.o. female with stress urinary incontinence, despite prior MUS.       Findings: good coaptation of the urethra     Estimated Blood Loss: min     Drains: none     Procedure in Detail: After informed consent was obtained, the patient was taken to the cystoscopy suite and placed in the supine position. SCDs were applied and working. Anesthesia was administered. Once the patient was adequately sedated she was placed in dorsal lithotomy position and prepped and draped in the usual sterile fashion. Preoperative timeout was performed, and preoperative antibiotics were confirmed.     A 0 degree hysteroscope was inserted into the urethra and advanced into the urinary bladder. Formal cystoscopy revealed normal bladder mucosa. The ureteral orifices were in the normal anatomic position bilaterally, effluxing clear urine. The rigid needle was inserted into the scope and the scope was backed out into the urethra. Keeping the needle parallel to the urethra, the needle was inserted into the submucosa. Macroplastique was injected at the 5 and 7 o'clock regions transurethrally. This was repeated at the 12 o'clock position. Good coaptation of the urethra was seen after injection. The scope was removed from the bladder.     The patient tolerated the procedure well and was transferred to recovery in stable condition.         Plan:  The patient will void prior to discharge.

## 2025-04-17 NOTE — ANESTHESIA PREPROCEDURE EVALUATION
04/17/2025  Faith Bernard is a 60 y.o., female.      Pre-op Assessment    I have reviewed the Patient Summary Reports.     I have reviewed the Nursing Notes. I have reviewed the NPO Status.   I have reviewed the Medications.     Review of Systems  Anesthesia Hx:             Denies Family Hx of Anesthesia complications.    Denies Personal Hx of Anesthesia complications.                    Cardiovascular:     Hypertension                                            Physical Exam  General: Well nourished    Airway:  Mallampati: II   Mouth Opening: Normal  TM Distance: Normal    Chest/Lungs:  Normal Respiratory Rate    Heart:  Rate: Normal    Anesthesia Plan  Type of Anesthesia, risks & benefits discussed:    Anesthesia Type: Gen Natural Airway  Intra-op Monitoring Plan: Standard ASA Monitors  Post Op Pain Control Plan: multimodal analgesia  Induction:  IV  Informed Consent: Informed consent signed with the Patient and all parties understand the risks and agree with anesthesia plan.  All questions answered.   ASA Score: 2  Day of Surgery Review of History & Physical: H&P Update referred to the surgeon/provider.    Ready For Surgery From Anesthesia Perspective.   .

## 2025-04-17 NOTE — TRANSFER OF CARE
Anesthesia Transfer of Care Note    Patient: Faith Bernard    Procedure(s) Performed: Procedure(s) (LRB):  CYSTOSCOPY, WITH PERIURETHRAL BULKING AGENT INJECTION (N/A)    Patient location: PACU    Anesthesia Type: MAC    Transport from OR: Transported from OR on 6-10 L/min O2 by face mask with adequate spontaneous ventilation    Post pain: adequate analgesia    Post assessment: no apparent anesthetic complications and tolerated procedure well    Post vital signs: stable    Level of consciousness: awake    Nausea/Vomiting: no nausea/vomiting    Complications: none    Transfer of care protocol was followed      Last vitals: Visit Vitals  /82 (BP Location: Right arm, Patient Position: Lying)   Pulse 84   Temp 36.4 °C (97.6 °F) (Temporal)   Resp 17   Wt 82 kg (180 lb 12.4 oz)   LMP  (LMP Unknown)   SpO2 97%   Breastfeeding No   BMI 32.02 kg/m²

## 2025-04-17 NOTE — DISCHARGE SUMMARY
[facility]  Discharge Note  Short Stay    Procedure(s) (LRB):  CYSTOSCOPY, WITH PERIURETHRAL BULKING AGENT INJECTION (N/A)      OUTCOME: Patient tolerated treatment/procedure well without complication and is now ready for discharge.    DISPOSITION: Home or Self Care    FINAL DIAGNOSIS:  <principal problem not specified>    FOLLOWUP: In clinic    DISCHARGE INSTRUCTIONS:  No discharge procedures on file.     TIME SPENT ON DISCHARGE: 15 minutes

## 2025-04-20 ENCOUNTER — PATIENT MESSAGE (OUTPATIENT)
Dept: UROLOGY | Facility: CLINIC | Age: 60
End: 2025-04-20
Payer: COMMERCIAL

## 2025-04-20 NOTE — ANESTHESIA POSTPROCEDURE EVALUATION
Anesthesia Post Evaluation    Patient: Faith Bernard    Procedure(s) Performed: Procedure(s) (LRB):  CYSTOSCOPY, WITH PERIURETHRAL BULKING AGENT INJECTION (N/A)    Final Anesthesia Type: general      Patient location during evaluation: PACU  Patient participation: Yes- Able to Participate  Level of consciousness: awake and alert  Post-procedure vital signs: reviewed and stable  Pain management: adequate  Airway patency: patent    PONV status at discharge: No PONV  Anesthetic complications: no      Cardiovascular status: stable  Respiratory status: room air  Hydration status: euvolemic  Follow-up not needed.              Vitals Value Taken Time   /63 04/17/25 13:47   Temp 36.5 04/19/25 21:04   Pulse 80 04/17/25 13:57   Resp 31 04/17/25 13:57   SpO2 97 % 04/17/25 13:57   Vitals shown include unfiled device data.      Event Time   Out of Recovery 13:00:00         Pain/Ricarda Score: No data recorded

## 2025-04-23 ENCOUNTER — PATIENT MESSAGE (OUTPATIENT)
Dept: NEUROLOGY | Facility: CLINIC | Age: 60
End: 2025-04-23
Payer: COMMERCIAL

## 2025-04-23 ENCOUNTER — TELEPHONE (OUTPATIENT)
Dept: NEUROLOGY | Facility: CLINIC | Age: 60
End: 2025-04-23
Payer: COMMERCIAL

## 2025-04-28 DIAGNOSIS — R52 PAIN: Primary | ICD-10-CM

## 2025-04-29 ENCOUNTER — TELEPHONE (OUTPATIENT)
Dept: ORTHOPEDICS | Facility: CLINIC | Age: 60
End: 2025-04-29
Payer: COMMERCIAL

## 2025-04-29 ENCOUNTER — HOSPITAL ENCOUNTER (OUTPATIENT)
Dept: RADIOLOGY | Facility: CLINIC | Age: 60
Discharge: HOME | End: 2025-04-29
Attending: ORTHOPAEDIC SURGERY
Payer: COMMERCIAL

## 2025-04-29 ENCOUNTER — OFFICE VISIT (OUTPATIENT)
Dept: ORTHOPEDICS | Facility: CLINIC | Age: 60
End: 2025-04-29
Payer: COMMERCIAL

## 2025-04-29 DIAGNOSIS — M25.572 CHRONIC PAIN OF LEFT ANKLE: Primary | ICD-10-CM

## 2025-04-29 DIAGNOSIS — S82.872S CLOSED DISPLACED PILON FRACTURE OF LEFT TIBIA, SEQUELA: ICD-10-CM

## 2025-04-29 DIAGNOSIS — R52 PAIN: ICD-10-CM

## 2025-04-29 DIAGNOSIS — G89.29 CHRONIC PAIN OF LEFT ANKLE: Primary | ICD-10-CM

## 2025-04-29 DIAGNOSIS — M19.072 ARTHRITIS OF LEFT ANKLE: ICD-10-CM

## 2025-04-29 DIAGNOSIS — T84.84XA PAINFUL ORTHOPAEDIC HARDWARE: ICD-10-CM

## 2025-04-29 PROCEDURE — 73610 X-RAY EXAM OF ANKLE: CPT | Mod: 26,LT,, | Performed by: RADIOLOGY

## 2025-04-29 PROCEDURE — 73610 X-RAY EXAM OF ANKLE: CPT | Mod: TC,FY,PO,LT

## 2025-04-29 NOTE — TELEPHONE ENCOUNTER
----- Message from Les Ramirez MD sent at 4/29/2025  1:55 PM CDT -----  Regarding: FW: prior L pilon nonunion, chronic L ankle pain  Hey guys - please schedule as soon as patient available. Okay to double book.People are telling me we have no availability until the end of may but that shouldn't be right.Thanks,Les  ----- Message -----  From: Fer Mcrae MD  Sent: 4/29/2025   1:19 PM CDT  To: Pamela Rios MA; Les Ramirez MD  Subject: prior L pilon nonunion, chronic L ankle pain     60-year-old female,fall from height 09/06/2020 sustaining a closed left pilon fracture.   09/07/2020 - ex fix by 1 of my partners.09/18/2020 - ORIF left pilon fracture, removal of ex fixNonunion, broken hardware, ongoing pain.03/28/2022 - repair nonunion left distal tibia with iliac crest bone graftFx looks healedHardware stable on serial xr, but w/ mult broken screwsHas some apex posterior angulationProb some post traumatic OA Ant ankle She has chronic pain about this ankle, mostly medial ankle, but also anteriorly.Initial steroid injection in the ankle help, the last 1 did not provide any reliefCan you see her to discuss potential treatment options going forward She would like to consider another steroid injection in the immediate term - maybe you would be more successful than my last boneShe would also like to consider potential ankle fusion or replacement, and I would be happy to help with a staged removal of hardware if you think that would be of benefit.At minimum I think it would be helpful to take out the medial plate 1st and a separate procedure in order to avoid a big anterior incision and then the medial incision that would be required to remove that plate and broken screws.  If you think I should just take out all the hardware see how she does, and then do other things like a CT scan, MRI, ankle scope that would be fine with me as Ludwig

## 2025-04-29 NOTE — PROGRESS NOTES
Audio Only Telehealth Visit     The patient location is:  [place], [place]  The chief complaint leading to consultation is:  Left ankle pain  Visit type: Virtual visit with audio only (telephone)  Total time spent in medical discussion with patient:  11 minutes  Total time spent on date of the encounter:  11 minutes       The reason for the audio only service rather than synchronous audio and video virtual visit was related to technical difficulties or patient preference/necessity.       Each patient to whom I provide medical services by telemedicine is:  (1) informed of the relationship between the physician and patient and the respective role of any other health care provider with respect to management of the patient; and (2) notified that they may decline to receive medical services by telemedicine and may withdraw from such care at any time. Patient verbally consented to receive this service via voice-only telephone call.       HPI:   60-year-old female,  fall from height 09/06/2020 sustaining a closed left pilon fracture.       09/07/2020 - ex fix by 1 of my partners.  09/18/2020 - ORIF left pilon fracture, removal of ex fix  Nonunion, broken hardware, ongoing pain.  03/28/2022 - repair nonunion left distal tibia with iliac crest bone graft     She has chronic pain about this ankle, mostly medial ankle, but also anteriorly.  Initial steroid injection in the ankle help, the last 1 did not provide any relief    X-ray of the ankle today demonstrate stable appearance, fracture appears to be well healed, the hardware does not appear to have any new broken screws, that has stability of the prior broken hardware.  The fracture does appear to be healed, there is potentially some posttraumatic arthritis, especially along the anterior ankle    In regards to treatment of her pain,  She has follow up with chronic pain management she was on chronic opioids and has plans for a nerve stimulator to be implanted.          Assessment and plan:      It would be a good idea to have her see 1 of my foot and ankle colleagues Dr. Les Ramirez.    We will discuss potential for removal of hardware followed by other treatment options - such as repeat steroid injections - potentially w/ image guidance.      She may elect to proceed with an ankle fusion or ankle replacement at some point in the future.    This may require multiple staged procedures to remove the hardware prior to any planned reconstructive procedure, but further discussion will be had between me, Dr. Ramirez, and the patient regarding the best treatment option going forward                        This service was not originating from a related E/M service provided within the previous 7 days nor will  to an E/M service or procedure within the next 24 hours or my soonest available appointment.  Prevailing standard of care was able to be met in this audio-only visit.

## 2025-04-30 ENCOUNTER — TELEPHONE (OUTPATIENT)
Dept: ORTHOPEDICS | Facility: CLINIC | Age: 60
End: 2025-04-30
Payer: COMMERCIAL

## 2025-04-30 NOTE — TELEPHONE ENCOUNTER
----- Message from Mara sent at 4/30/2025 11:28 AM CDT -----  Regarding: APPT  Type:Patient Returning Call:Pt  Who Called: MA Who Left Message for Patient: Does the patient know what this is regarding? Missed call Best Call Back Number: Telephone Information:Mobile          [phone] Information:

## 2025-05-14 ENCOUNTER — OFFICE VISIT (OUTPATIENT)
Dept: SPORTS MEDICINE | Facility: CLINIC | Age: 60
End: 2025-05-14
Payer: COMMERCIAL

## 2025-05-14 VITALS — WEIGHT: 181.5 LBS | BODY MASS INDEX: 32.16 KG/M2 | HEIGHT: 63 IN

## 2025-05-14 DIAGNOSIS — M19.072 ARTHRITIS OF ANKLE, LEFT: ICD-10-CM

## 2025-05-14 DIAGNOSIS — S82.872D CLOSED DISPLACED PILON FRACTURE OF LEFT TIBIA WITH ROUTINE HEALING, SUBSEQUENT ENCOUNTER: Primary | ICD-10-CM

## 2025-05-14 PROCEDURE — 99999 PR PBB SHADOW E&M-EST. PATIENT-LVL IV: CPT | Mod: PBBFAC,,, | Performed by: SURGERY

## 2025-05-14 RX ORDER — TRIAMCINOLONE ACETONIDE 40 MG/ML
40 INJECTION, SUSPENSION INTRA-ARTICULAR; INTRAMUSCULAR
Status: DISCONTINUED | OUTPATIENT
Start: 2025-05-14 | End: 2025-05-14 | Stop reason: HOSPADM

## 2025-05-14 RX ADMIN — TRIAMCINOLONE ACETONIDE 40 MG: 40 INJECTION, SUSPENSION INTRA-ARTICULAR; INTRAMUSCULAR at 01:05

## 2025-05-14 NOTE — PROGRESS NOTES
" Patient ID: Faith Bernard is a 60 y.o. female.    Chief Complaint: Pain of the Left Foot, Pain of the Left Ankle, and Pain    HPI     Faith Bernard has experienced problems with chronic ankle pain of the left lower extremity. Making ambulation difficult. Presents here for further consultation.  History of a left pilon fracture.  Status post ORIF.  Has had a previous injection.  Interested in ankle replacement, not fusion surgery per patient report.    ROS      Objective:      Ortho/SPM Exam        There were no vitals filed for this visit.    The patient is not in acute distress.   The skin over the foot and ankle is intact with well healed surgical incisions and excoriations  No Effusion   Palpation- tender to palpation  Range of motion- minimal range of motion about the ankle  Ligament laxity exam:  Stable  Flatfoot negative   Grossly neurovascularly intact    IMAGING-   These images were independently reviewed and discussed with the patient.  Imaging shows ankle arthritis, subtalar joint with minimal arthritis.  Well healed appearing hardware status post ORIF pilon    Assessment:       Encounter Diagnosis   Name Primary?    Closed displaced pilon fracture of left tibia with routine healing, subsequent encounter Yes            Ankle arthritis      Plan:     We discussed options for treatment today include medications, bracing, and surgery.  Surgery options are ankle replacement vs ankle fusion.  We discussed both options and I encourage you to continue research at home.      Benefits of fusion include pain relief and durability.  A fusion cannot "wear out" and you have no permanent restrictions after a fusion heals.  Risks of fusion relates to the bone healing and the long term risk involves stress referred to other joints in the foot which can start to wear out and hurt.  There is also the obvious loss of ankle motion.  However, motion in our "ankle" comes from more places than just the ankle joint and " people walk more normal than you would think (https://www.youtube.com/watch?v=zsXPtptmPNM).      Benefits of replacement include pain relief and preserved motion.  Replacement is gaining in popularity and use as great strides have been made in the techniques and the replacement parts available.  Risks of replacement relates to wound healing and infection and the components healing to the bone.  Unique to replacement is the risk of it wearing out - the metal and plastic parts that replace the joint can may not last your lifetime.  For that reason, after a replacement there are certain guidelines I encourage patients to follow to protect/preserve their ankle replacement (avoiding heavy laboring, avoiding running and other higher impact sports).  The younger you are when you have a replacement, the more there is the risk that you outlive the components that were placed.  In that case, you might need another surgery to tweak things or even replace the replacement or convert to a fusion.   This is still an area where our knowledge and experience is growing which is why ankle replacement should be delayed as long as possible.    You can learn more about these options at these websites:  --https://blog.[facility].org/articles/ankle-replacement-or-fusion-which-procedure-is-best-for-you  --www."[employer]"  --https://www.footcaremd.org/conditions-treatments/ankle/ankle-arthrodesis  --https://www.footcaremd.org/conditions-treatments/ankle/total-ankle-replacement    We discussed options for treatment of ankle arthritis is/are:  RICE guidelines  Anti-inflammatory medications: no new prescriptions, can continue over the counter medications  Therapy: I do not routinely recommend therapy as part of the treatment of arthritis.  I rather recommend you stay active and participate in low impact activities (biking, walking, swimming).  Activity: Use pain as your guide, advance your activities as tolerated   Bracing:  Discussed lace-up  ankle brace  Injections:  performed today  CT scan will be completed to evaluate healing of the pilon fracture and distal tibia prior to any potential hardware removal    We will see the patient for follow up after the CT  She would require a staged procedure with hardware removal occurring 1st, then an evaluation for candidacy for ankle replacement versus fusion.  We will see if she gets any relief from the injection.  Patient expressed understanding of this plan and all questions were answered.    Lse Ramirez MD  [facility]  Department of Orthopedic Surgery    This note is dictated using the M*Modal Fluency Direct word recognition program. There are word recognition mistakes that are occasionally missed on review.

## 2025-05-14 NOTE — PROCEDURES
Intermediate Joint Aspiration/Injection: L ankle    Date/Time: 5/14/2025 1:00 PM    Performed by: Les Ramirez MD  Authorized by: Les Ramirez MD    Consent Done?:  Yes (Verbal)  Indications:  Diagnostic evaluation and arthritis    Location:  Ankle    Local anesthesia used?: Yes    Anesthesia:  Local infiltration  Local anesthetic:  Lidocaine 1% without epinephrine  Site:  L ankle  Ultrasonic Guidance for needle placement: No  Needle size:  22 G  Approach:  Anteromedial  Medications:  40 mg triamcinolone acetonide 40 mg/mL  Patient tolerance:  Patient tolerated the procedure well with no immediate complications

## 2025-05-21 ENCOUNTER — HOSPITAL ENCOUNTER (OUTPATIENT)
Dept: RADIOLOGY | Facility: HOSPITAL | Age: 60
Discharge: HOME | End: 2025-05-21
Attending: SURGERY
Payer: COMMERCIAL

## 2025-05-21 DIAGNOSIS — S82.872D CLOSED DISPLACED PILON FRACTURE OF LEFT TIBIA WITH ROUTINE HEALING, SUBSEQUENT ENCOUNTER: ICD-10-CM

## 2025-05-21 PROCEDURE — 73700 CT LOWER EXTREMITY W/O DYE: CPT | Mod: 26,LT,, | Performed by: RADIOLOGY

## 2025-05-21 PROCEDURE — 73700 CT LOWER EXTREMITY W/O DYE: CPT | Mod: TC,LT

## 2025-05-22 ENCOUNTER — OFFICE VISIT (OUTPATIENT)
Dept: NEUROLOGY | Facility: CLINIC | Age: 60
End: 2025-05-22
Payer: COMMERCIAL

## 2025-05-22 VITALS
RESPIRATION RATE: 18 BRPM | HEIGHT: 63 IN | WEIGHT: 174.19 LBS | BODY MASS INDEX: 30.86 KG/M2 | DIASTOLIC BLOOD PRESSURE: 88 MMHG | SYSTOLIC BLOOD PRESSURE: 117 MMHG | HEART RATE: 87 BPM

## 2025-05-22 DIAGNOSIS — G43.709 CHRONIC MIGRAINE WITHOUT AURA WITHOUT STATUS MIGRAINOSUS, NOT INTRACTABLE: Primary | ICD-10-CM

## 2025-05-22 PROCEDURE — 3074F SYST BP LT 130 MM HG: CPT | Mod: CPTII,S$GLB,, | Performed by: PHYSICIAN ASSISTANT

## 2025-05-22 PROCEDURE — 99999 PR PBB SHADOW E&M-EST. PATIENT-LVL III: CPT | Mod: PBBFAC,,, | Performed by: PHYSICIAN ASSISTANT

## 2025-05-22 PROCEDURE — 1159F MED LIST DOCD IN RCRD: CPT | Mod: CPTII,S$GLB,, | Performed by: PHYSICIAN ASSISTANT

## 2025-05-22 PROCEDURE — 99213 OFFICE O/P EST LOW 20 MIN: CPT | Mod: S$GLB,,, | Performed by: PHYSICIAN ASSISTANT

## 2025-05-22 PROCEDURE — 3008F BODY MASS INDEX DOCD: CPT | Mod: CPTII,S$GLB,, | Performed by: PHYSICIAN ASSISTANT

## 2025-05-22 PROCEDURE — 3079F DIAST BP 80-89 MM HG: CPT | Mod: CPTII,S$GLB,, | Performed by: PHYSICIAN ASSISTANT

## 2025-05-22 RX ORDER — HYDROXYZINE PAMOATE 25 MG/1
CAPSULE ORAL
Qty: 20 CAPSULE | Refills: 1 | Status: SHIPPED | OUTPATIENT
Start: 2025-05-22

## 2025-05-22 NOTE — PROGRESS NOTES
[facility]  [address] [facility] [address]  [place] [address]  Phone:[phone]  Fax: [phone]   Follow up visit      Patient Name: Faith Bernard  : 1965  MRN:  [id number]  Today: 2025   LOV:  2025 for botox #4  chief complaint: Headache    PCP: Rah Canela MD.       Assessment:   Faith Bernard is a 60 y.o. right handed female  with a PMHx of: anxiety/depression, FMG, gastric bypass, HA, neuropathy, osteoarthritis, chronic pain,  and PCOS   whom presents solo in follow up for HA.HA appear to be chronic migrainous and resistant to many therapies. Current regimen keeping HA/migraines to minimum.       Review:    ICD-10-CM ICD-9-CM   1. Chronic migraine without aura without status migrainosus, not intractable  G43.709 346.70             Plan:               -if HA change in quality/nature, will get updated imaging study     For HA Prevention:  1  stop aimovig, pivot to emgality, discussed adv effects/dosing, she agreed. Gave instructional video in AVS from emgality website            -if above not working, will consider vypeti     2 Patient meets the criteria for chronic migraine. In summary, She has  migraines >15 days per month  and last >4 hours if untreated. Specifics of duration, frequency and strength are listed in the HPI (please refer to this section).  This pattern has continued for >3 months.  She has failed at least three preventive medications (full list of medications is listed below in the HPI under Therapies tried in past, but for ease of reference aimovig, vyvanse, verpamil, effexor, valsartan, trazodone, topamax, botox, inderal, nurtec, Mg, toprol, etc )  I have therefore recommended a trial of Botox via the PREEMPT protocol for migraine prophylaxis.   We discussed what to expect on procedure day at length, wear old or loose fitting clothing (if possible, merely to keep work clothes from getting any blood or being wrinkled),  no make up (if applicable), eat meals/stay well hydrated and secure a ride if necessary. Also, discussed it can take up to 2-3 sessions of botox to get results desired. Lastly, we discussed procedure at length, 31 injections done in the office, potential complications not limited to muscle weakness, respiratory issues, or worst case scenario-death. The patient voiced understanding and wished to move forward.  Muscles to be injected:   10 units divided in 2 sites  Procerus 5 units in 1 site  Frontalis 20 units divided in 4 sites  Temporalis 40 units divided in 8 sites  Occipitalis 30 units divided in 6 sites  Cervical paraspinal 20 units divided in 4 sites  Trapezius 30 units divided in 6 sites  A total dose of 155 units of botox to be used with  45 units to be discarded/wasted (unavoidable).      ~The Botox injections have achieved well over 50%  improvement in the patient's symptoms. Migraines have been reduced at least 7 days per month and the number of cumulative hours suffering with headaches has been reduced at least 100 total hours per month. Move forward with botox #5       For HA :  1 limit pain medicines to <3 days use in week for migraines   2 nurtec      To break up Headaches:  May consider nerve blocks in future (not discussed)   Refilled vistaril     Other:   N/a           All test results as well as any necessary instructions were reviewed and discussed with patient.    Review:  Orders Placed This Encounter    galcanezumab-gnlm 120 mg/mL PnIj    galcanezumab-gnlm 120 mg/mL PnIj    hydrOXYzine pamoate (VISTARIL) 25 MG Cap           Patient to return to PCP/other specialists for all other problems  Patient to continue on all medications as Rx'd  Full office note available online.   RTO- for botox 5  The patient indicates understanding of these issues and agrees to the plan.    HPI:   Faith Bernard is a 60 y.o.right handed, female with a PMHx of: anxiety/depression, FMG,  "gastric bypass, HA, neuropathy, osteoarthritis, chronic pain,  and PCOS   whom presents solo in follow up for HA.    At LOV (4/9/2025): received botox #4, has aimovig, vistaril, and nurtec   3 HD a week for past 1-1.5 months, stress could be triggering, some of which have been "bad"  Pain temporal/occipital region   Will be seeing eye provider in 2 months to check eyes notes some blurred vision   No side effects from botox  Vistaril given did help but made her tired, would like more   Nurtec helps, took one this am   Due for next aimovig within the week, no side effects       At last visit:botox 1 given, previously (and came to me on this regimen) of 400 mg of topamax Qevening/ aimovig and nurtec.    Presents late to virtual appt   1 migraine a week   Nurtec is abortive  No side effects from botox  Last HA was this past Friday   Not take nurtec every other day as prophylaxis   Aimovig no side effects at 1 shot Q28 days   Would like to start coming down on some of the medicines after next botox   Has slight right eyebrow raise compared to left, but states "I think its always like that"          From previous visit:   Has been followed by other neurology groups, d/t insurance changes, would like to transfer her care here.   Presents late to appt, able to still see her     HA HPI:  Start:HA for many years, was on botox therapy and d/t insurance changes this care has lapsed   History of trauma (yes), History of CNS infection (no), History of Stroke (no)  Location:frontalis/neck/shoulders        Last botox: >6  months ago, and for cosmesis 3 weeks ago        Without botox: 20 migraine days a month       With botox: 8 migraine days a month                -uses nurtec, topamax (written for 400 mg BID, states she is only taking 400 mg Qam) and aimovig (states she is due this week) as preventive with the botox      Associated factors (bolded positive) WITH HA ( or migraine): Nausea, vomiting, photophobia, phonophobia, " tinnitus, scalp pain, vision loss, diplopia, scintillations, eye pain, jaw pain, weakness?    Tried:botox, nurtec and aimovig   Triggers (in bold): stress/mood change, lack of sleep, too much caffeine, too little caffeine, weather change, seasonal change, strong odours, bright lights, sunlight,  hot temps, food    Last HA: 3 days ago   Positives in bold: Hx of Kidney Stones, asthma, GI bleed, osteoporosis, CAD/MI, CVA/TIA, DM    Imaging on file: none in past 5 years   Therapies tried in past: (failures to be marked, if known---why did it fail?)  Ambien  voltaren  Vyvanse  Verapamil  Effexor  Valsartan-hctz  Trazodone  Topamax  Zanaflex  Imitrex  Maxalt  Inderal  Lyrica  Deltasone  Oxycodone  Zofran  Botox  Nurtec  Toprol   Robaxin  Mg  Lyrica  Toradol  Vistaril  Hctz  Haldol  Gabapentin  Prozac  Aimovig  Cymbalta  Depakote  Flexeril  Parafon forte  Soma  Abilify  botox                         Medication Reconciliation:   Current Outpatient Medications   Medication Sig Dispense Refill    acetaminophen (TYLENOL) 325 MG tablet Take 325 mg by mouth every 6 (six) hours as needed for Pain.      albuterol (PROVENTIL/VENTOLIN HFA) 90 mcg/actuation inhaler 2 puffs every 4 hours as needed for cough, wheeze, or shortness of breath 18 g 11    ARIPiprazole (ABILIFY) 15 MG Tab Take 7.5 mg by mouth once daily.      ascorbic acid, vitamin C, (VITAMIN C) 250 mg Chew Take 1 tablet by mouth as needed.      ascorbic acid/collagen hydr (COLLAGEN SKIN RENEWAL ORAL) Take 1 tablet by mouth once daily.      cholecalciferol, vitamin D3, 50,000 unit capsule Take 50,000 Units by mouth every 7 days. Patient takes on Saturdays  99    clobetasol 0.05% (TEMOVATE) 0.05 % Oint Apply topically every evening. Use small, pea-sized amount at the site, every night before bed for 2 weeks. After that, use only 2-3 times per week at night before bed. 30 g 1    cyanocobalamin 1,000 mcg/mL injection INJECT 1 ML IN THE MUSCLE EVERY 28 DAYS AS DIRECTED       dextroamphetamine-amphetamine (ADDERALL XR) 30 MG 24 hr capsule Take by mouth every morning.      DULoxetine (CYMBALTA) 60 MG capsule Take 60 mg by mouth.      erenumab-aooe (AIMOVIG AUTOINJECTOR) 140 mg/mL autoinjector Inject 140 mg into the skin once every 28 days to reduce migraines. 1 mL 11    gabapentin (NEURONTIN) 600 MG tablet Take 1,200 mg by mouth 3 (three) times daily.      hydroCHLOROthiazide (HYDRODIURIL) 25 MG tablet TAKE 1 TABLET BY MOUTH DAILY IN THE MORNING      hydroxychloroquine (PLAQUENIL) 200 mg tablet Take 2 tablets a day every day except Sundays 52 tablet 5    hydrOXYzine pamoate (VISTARIL) 25 MG Cap 1 pill TID for 2 days, any left over medicine 1 pill Q8H PRN headache. No driving/causes sedation 20 capsule 0    leflunomide (ARAVA) 20 MG Tab Take 1 tablet (20 mg total) by mouth once daily. 30 tablet 11    levothyroxine (SYNTHROID) 100 MCG tablet Take 1 tablet daily for 5 days and take half tablet on Saturday and Sunday. 90 tablet 3    LIDOcaine (XYLOCAINE) 5 % Oint ointment Apply topically 2 (two) times daily.      magnesium 200 mg Tab Take 1 tablet by mouth once.      metoprolol tartrate (LOPRESSOR) 50 MG tablet Take 50 mg by mouth 2 (two) times daily.      mirabegron (MYRBETRIQ) 50 mg Tb24 Take 1 tablet (50 mg total) by mouth once daily. 30 tablet 11    mupirocin (BACTROBAN) 2 % ointment Apply topically.      NURTEC 75 mg odt Take 1 tablet by mouth every other day to reduce/prevent migraines 16 tablet 6    onabotulinumtoxina (BOTOX) 200 unit SolR as directed      ondansetron (ZOFRAN) 4 MG tablet Take 4 mg by mouth every 8 (eight) hours as needed.      ondansetron (ZOFRAN-ODT) 8 MG TbDL Take 1 tablet (8 mg total) by mouth every 8 (eight) hours as needed (Nausea). 3 tablet 0    oxyCODONE (ROXICODONE) 10 mg Tab immediate release tablet Take 1 tablet (10 mg total) by mouth every 4 (four) hours as needed for Pain. 10 tablet 0    pravastatin (PRAVACHOL) 20 MG tablet Take by mouth once daily.       tiZANidine (ZANAFLEX) 4 MG tablet Take 4 mg by mouth 2 (two) times daily.       No current facility-administered medications for this visit.     Facility-Administered Medications Ordered in Other Visits   Medication Dose Route Frequency Provider Last Rate Last Admin    midazolam (VERSED) 1 mg/mL injection 0.5 mg  0.5 mg Intravenous PRN Myrtle Linares MD   2 mg at 09/18/20 1155     Review of patient's allergies indicates:   Allergen Reactions    Cephalexin Anaphylaxis    Lyrica [pregabalin] Edema     Lips swelling    Nsaids (non-steroidal anti-inflammatory drug) Other (See Comments)     Has a history of bleeding ulcers    Codeine Itching       PMHx:  Past Medical History:   Diagnosis Date    Allergy     Anxiety     Depression     Empyema of right pleural space 2021    Encounter for blood transfusion     Esophageal dysphagia     Fibromyalgia     Gastric bypass status for obesity     H/O dental abscess 04/14/2014    drained    Headache(784.0)     migraines.  Treated at [facility] (Dr. 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  Pt states took too many then realized her mistake    Polycystic ovaries     S/P JUHI-BSO     Thyroid disease     hypothyroidism    Wears partial dentures     upper     Past Surgical History:   Procedure Laterality Date    ABDOMINAL SURGERY      ANKLE HARDWARE REMOVAL Left 3/28/2022    Procedure: REMOVAL, HARDWARE, ANKLE;  Surgeon: Fer Mcrae MD;  Location: [facility] OR [address] [place] [address];  Service: Orthopedics;  Laterality: Left;    APPLICATION OF LARGE EXTERNAL FIXATION DEVICE TO  TIBIA Left 9/7/2020    Procedure: APPLICATION, EXTERNAL FIXATION DEVICE, TIBIA;  Surgeon: Sujata Londono MD;  Location: [facility] OR 2ND FLR;  Service: Orthopedics;  Laterality: Left;  need paralysis    APPLICATION OF WOUND VACUUM-ASSISTED CLOSURE DEVICE Left 9/18/2020    Procedure: APPLICATION, WOUND VAC;  Surgeon: Fer Mcrae MD;  Location: [facility] OR 2ND FLR;  Service: Orthopedics;  Laterality: Left;    BREAST SURGERY  2004    Breast Reduction    CARDIAC CATHETERIZATION      COLONOSCOPY N/A 11/3/2015    Procedure: COLONOSCOPY;  Surgeon: Timothy Barber MD;  Location: [facility] ENDO;  Service: Endoscopy;  Laterality: N/A;    CYSTOSCOPY WITH INJECTION OF PERIURETHRAL BULKING AGENT N/A 9/14/2023    Procedure: CYSTOSCOPY, WITH PERIURETHRAL BULKING AGENT INJECTION;  Surgeon: David Perez MD;  Location: [facility];  Service: Urology;  Laterality: N/A;    CYSTOSCOPY WITH INJECTION OF PERIURETHRAL BULKING AGENT N/A 4/17/2025    Procedure: CYSTOSCOPY, WITH PERIURETHRAL BULKING AGENT INJECTION;  Surgeon: David Perez MD;  Location: [facility];  Service: Urology;  Laterality: N/A;  1 hour    CYSTOSCOPY WITH URODYNAMIC TESTING N/A 4/17/2023    Procedure: CYSTOSCOPY, WITH URODYNAMIC TESTING;  Surgeon: David Perez MD;  Location: [facility] OR 1ST FLR;  Service: Urology;  Laterality: N/A;  1 hr    DENTAL SURGERY      4 teeth removed    FUSION, JOINT, FINGER Right 12/10/2024    Procedure: Right index and right middle finger distal interphalangeal joint arthrodesis;  Surgeon: Ervin Chan MD;  Location: [facility] OR;  Service: Orthopedics;  Laterality: Right;    GASTRIC BYPASS      HERNIA REPAIR      HYSTERECTOMY      ILIAC CREST BONE GRAFT Left 3/28/2022    Procedure: BONE GRAFT, ILIAC CREST;  Surgeon: Fer Mcrae MD;  Location: [facility] OR 2ND FLR;  Service: Orthopedics;  Laterality: Left;    INJECTION OF ANESTHETIC AGENT AROUND NERVE N/A 3/29/2022    Procedure: Block, Nerve;  Surgeon: Alexus Pink;   Location: [facility];  Service: Anesthesiology;  Laterality: N/A;    OPEN REDUCTION AND INTERNAL FIXATION (ORIF) OF PILON FRACTURE Left 9/18/2020    Procedure: ORIF, FRACTURE, PILON - left. Diving board, supine, bone foam. Seda anterolateral distal tibial plate, mini frag, long 3.5mm steel screw, CaPhos bone substitute;  Surgeon: Fer Mcrae MD;  Location: [address];  Service: Orthopedics;  Laterality: Left;    OPEN REDUCTION AND INTERNAL FIXATION (ORIF) OF PILON FRACTURE Left 3/28/2022    Procedure: ORIF, FRACTURE, PILON nonunion + Iliac crest bone graft - diving board, supine, bone foam. [place] axsos3 screws, 2.7 mini plates/screws, curettes, Seda/[employer] medical Augment;  Surgeon: Fer Mcrae MD;  Location: [address];  Service: Orthopedics;  Laterality: Left;  Spinal? + postop catheter (draping out pelvis for bone graft harvest)    OVARIAN CYST REMOVAL      aprox 5 times    REMOVAL OF EXTERNAL FIXATION DEVICE Left 9/18/2020    Procedure: REMOVAL, EXTERNAL FIXATION DEVICE;  Surgeon: Fer Mcrae MD;  Location: [facility] OR [address] [place] [address];  Service: Orthopedics;  Laterality: Left;    ROTATOR CUFF REPAIR Left 01/2019    SURGICAL PROCEDURE FOR STRESS INCONTINENCE USING TENSION FREE VAGINAL TAPE N/A 12/14/2022    Procedure: SURGICAL PROCEDURE, USING TENSION FREE VAGINAL TAPE, FOR STRESS INCONTINENCE;  Surgeon: Frandy Sherman MD;  Location: [facility];  Service: OB/GYN;  Laterality: N/A;  cysto    tennis elbow repair Left 01/2019    UPPER GASTROINTESTINAL ENDOSCOPY         Fhx:  Family History   Problem Relation Name Age of Onset    Anxiety disorder Mother      Depression Mother      Suicide Mother      Seizures Mother      Drug abuse Mother      Ovarian cancer Mother      Aortic aneurysm Father      Glaucoma Maternal Grandmother      Colon cancer Neg Hx      Breast cancer Neg Hx      Diabetes Neg Hx      Hypertension Neg Hx         Shx: + medical cannabis,  Social History      Socioeconomic History    Marital status:    Tobacco Use    Smoking status: Never    Smokeless tobacco: Never    Tobacco comments:     marijuana   Substance and Sexual Activity    Alcohol use: Yes     Comment: rare    Drug use: Yes     Types: Marijuana     Comment: medical    Sexual activity: Yes     Partners: Male     Birth control/protection: Surgical, None     Social Drivers of Health     Financial Resource Strain: Medium Risk (7/22/2024)    Overall Financial Resource Strain (CARDIA)     Difficulty of Paying Living Expenses: Somewhat hard   Food Insecurity: Patient Declined (4/10/2025)    [facility]    Hunger Vital Sign     Worried About Running Out of Food in the Last Year: Patient declined     Ran Out of Food in the Last Year: Patient declined   Transportation Needs: Patient Declined (4/10/2025)    [facility]    PRAPARE - Transportation     Lack of Transportation (Medical): Patient declined     Lack of Transportation (Non-Medical): Patient declined   Physical Activity: Unknown (7/22/2024)    Exercise Vital Sign     Days of Exercise per Week: 0 days   Stress: Stress Concern Present (3/21/2025)    Received from [facility]    [employer] [place] [employer] - Occupational Stress Questionnaire     Feeling of Stress : To some extent   Housing Stability: Patient Declined (4/10/2025)    [facility]    Housing Stability Vital Sign     Unable to Pay for Housing in the Last Year: Patient declined     Number of Times Moved in the Last Year: 0     Homeless in the Last Year: Patient declined           Labs:   Reviewed in chart     Imaging:   Reviewed in chart       Other  "testing:  Reviewed in chart     Note: I have independently reviewed any/all imaging/labs/tests and agree with the report (s) as documented.  Any discrepancies will be as noted/demarcated by free text.  BOBBY FLOR 5/22/2025                     ROS:   Review Of Systems (questions asked, positive or additions in BOLD)  Gen: Weight change, fatigue/malaise, pyrexia   HEENT: Tinnitus, headache,  blurred vision, eye pain, diplopia, photophobia,  nose bleeds, congestion, sore throat, jaw pain, scalp pain, neck stiffness   Card: Palpitations, CP   Pulm: SOB, cough   Vas: Easy bruising, easy bleeding   GI: N/V/D/C, incontinence, hematemesis, hematochezia    : incontinence, hematuria   Endocrine: Temp intolerance, polyuria, polydipsia   M/S: Neck pain, myalgia, back pain, joint pain, falls    Neuro: PER HPI   PSY: Memory loss, confusion, depression, anxiety, trouble in sleep          EXAM:   /88   Pulse 87   Resp 18   Ht 5' 3" (1.6 m)   Wt 79 kg (174 lb 2.6 oz)   LMP  (LMP Unknown)   BMI 30.85 kg/m²    GEN:  NAD  HEENT: NC/AT      NEURO:  Mental Status:  Awake, alert and appropriately oriented to time, place, and person.  Normal attention and concentration.  Speech is fluent and appropriate language function (I.e., comprehension).     Cranial Nerves:    VFF,  Extraocular movements are intact and without nystagmus though slight strabismus (temporal) OS.    Facial movement is symmetric. Hearing appears to be intact. Shoulder shrug symmetrical. Tongue in midline without fasiculation.     Motor: antigravity bilat UE   No resting tremor    DTR: 1+bilat patellar     Gait and Stance: WNL, walks unassisted, no overt postural instability         This document has been electronically signed by Mr. Edouard Zamarripa MPA, PA-C on 5/22/2025, I have personally typed this message using the EMR.       Dr Toby MD  was available during today's visit.         CC: Rah Canela MD            "

## 2025-05-27 ENCOUNTER — OFFICE VISIT (OUTPATIENT)
Dept: UROLOGY | Facility: CLINIC | Age: 60
End: 2025-05-27
Payer: COMMERCIAL

## 2025-05-27 VITALS
BODY MASS INDEX: 31.67 KG/M2 | WEIGHT: 178.81 LBS | DIASTOLIC BLOOD PRESSURE: 87 MMHG | SYSTOLIC BLOOD PRESSURE: 122 MMHG | HEART RATE: 93 BPM

## 2025-05-27 DIAGNOSIS — N39.41 URGENCY INCONTINENCE: ICD-10-CM

## 2025-05-27 DIAGNOSIS — N39.3 STRESS INCONTINENCE (FEMALE) (MALE): Primary | ICD-10-CM

## 2025-05-27 PROCEDURE — 3079F DIAST BP 80-89 MM HG: CPT | Mod: CPTII,S$GLB,, | Performed by: UROLOGY

## 2025-05-27 PROCEDURE — 3074F SYST BP LT 130 MM HG: CPT | Mod: CPTII,S$GLB,, | Performed by: UROLOGY

## 2025-05-27 PROCEDURE — 3008F BODY MASS INDEX DOCD: CPT | Mod: CPTII,S$GLB,, | Performed by: UROLOGY

## 2025-05-27 PROCEDURE — 99999 PR PBB SHADOW E&M-EST. PATIENT-LVL IV: CPT | Mod: PBBFAC,,, | Performed by: UROLOGY

## 2025-05-27 PROCEDURE — G2211 COMPLEX E/M VISIT ADD ON: HCPCS | Mod: S$GLB,,, | Performed by: UROLOGY

## 2025-05-27 PROCEDURE — 99214 OFFICE O/P EST MOD 30 MIN: CPT | Mod: S$GLB,,, | Performed by: UROLOGY

## 2025-05-27 PROCEDURE — 1159F MED LIST DOCD IN RCRD: CPT | Mod: CPTII,S$GLB,, | Performed by: UROLOGY

## 2025-05-27 NOTE — PROGRESS NOTES
[facility]      Return Stress Incontinence Note    5/29/2025    Referred by:  No ref. provider found    History of Present Illness    CHIEF COMPLAINT:  Patient presents for follow-up evaluation of stress incontinence treated with urethral bulking in April.    HPI:  Patient is a 60-year-old female returning for evaluation of stress incontinence. She previously underwent a midurethral sling procedure, which provided some improvement but did not fully resolve her condition. She attempted pelvic floor exercises with unclear benefit. In April, she underwent urethral bulking with Macroplastique for persistent stress incontinence.    She reports significant improvement since the bulking procedure, estimating about 95% reduction in her stress incontinence symptoms. She initially had some difficulty urinating after the procedure, describing it as feeling constricted, but notes that it has already improved. She is able to empty her bladder completely when relaxed.    She had a minor episode of leakage when she became ill and vomited recently. She considers this a significant improvement, accepting this minimal leakage as a favorable outcome compared to her previous condition.    MEDICAL HISTORY:  Patient has a history of stress incontinence.    SURGICAL HISTORY:  She underwent a midurethral sling procedure for stress incontinence, which resulted in improvement but not complete resolution of the condition. The date of this procedure was not specified. In April (year not specified), the patient had urethral bulking with Macroplastique for persistent stress incontinence following the midurethral sling procedure.    TEST RESULTS:  Patient underwent a post-void residual test today, which resulted in 0 mL. She underwent urethral bulking with Macroplastique in April, which improved her stress incontinence but did not resolve it completely.          A review of 10+ systems was conducted with pertinent positive and  negative findings documented in HPI with all other systems reviewed and negative.    Past medical, family, surgical and social history reviewed as documented in chart with pertinent positive medical, family, surgical and social history detailed in HPI.      Exam Findings:    Physical Exam    General: No acute distress. Nontoxic appearing.  HENT: Normocephalic. Atraumatic.  Respiratory: Normal respiratory effort. No conversational dyspnea. No audible wheezing.  Abdomen: No obvious distension.  Skin: No visible abnormalities.  Extremities: No edema upper extremities. No edema lower extremities.  Neurological: Alert and oriented x3. Normal speech.  Psychiatric: Normal mood. Normal affect. No evidence of SI.          Assessment/Plan:    Assessment & Plan    IMPRESSION:  Previously underwent midurethral sling for stress incontinence with partial improvement.  Recently underwent urethral bulking with Macroplastique for persistent symptoms.  Bulking procedure appears highly effective, with ~95% improvement in stress incontinence.  Post-void residual today is 0 mL, indicating complete bladder emptying.  Slight urinary hesitancy noted, expected to improve as bulking material settles.    PLAN SUMMARY:  - Educated patient on normal occurrence of occasional stress incontinence in extreme situations  - Informed patient about gradual loosening of urethral tightness from bulking over time    URINARY INCONTINENCE:  - Educated patient that urethral tightness from bulking will gradually loosen over time.  - Explained that occasional stress incontinence during extreme situations (e.g., vomiting, significant coughing) can still occur and is considered normal.       URGENCY INCONTINENCE/OAB  -Stress incontinence has typically been the main issue for Ms. Bernard. No bothersome symptoms today that require further therapy.          Visit complexity today is associated with medical care services that are part of the ongoing care related to  the single serious and/or complex condition of Stress incontinence (FILIBERTO). A longitudinal relationship exists or is being developed between the patient and this practitioner for the care of this condition.

## 2025-06-25 ENCOUNTER — OFFICE VISIT (OUTPATIENT)
Dept: SPORTS MEDICINE | Facility: CLINIC | Age: 60
End: 2025-06-25
Payer: COMMERCIAL

## 2025-06-25 VITALS
WEIGHT: 180.56 LBS | HEIGHT: 63 IN | HEART RATE: 66 BPM | BODY MASS INDEX: 31.99 KG/M2 | SYSTOLIC BLOOD PRESSURE: 114 MMHG | DIASTOLIC BLOOD PRESSURE: 84 MMHG

## 2025-06-25 DIAGNOSIS — M19.072 ARTHRITIS OF ANKLE, LEFT: ICD-10-CM

## 2025-06-25 DIAGNOSIS — S82.872D CLOSED DISPLACED PILON FRACTURE OF LEFT TIBIA WITH ROUTINE HEALING, SUBSEQUENT ENCOUNTER: Primary | ICD-10-CM

## 2025-06-25 PROCEDURE — 3079F DIAST BP 80-89 MM HG: CPT | Mod: CPTII,S$GLB,, | Performed by: SURGERY

## 2025-06-25 PROCEDURE — 3008F BODY MASS INDEX DOCD: CPT | Mod: CPTII,S$GLB,, | Performed by: SURGERY

## 2025-06-25 PROCEDURE — 1159F MED LIST DOCD IN RCRD: CPT | Mod: CPTII,S$GLB,, | Performed by: SURGERY

## 2025-06-25 PROCEDURE — 99999 PR PBB SHADOW E&M-EST. PATIENT-LVL V: CPT | Mod: PBBFAC,,, | Performed by: SURGERY

## 2025-06-25 PROCEDURE — 3074F SYST BP LT 130 MM HG: CPT | Mod: CPTII,S$GLB,, | Performed by: SURGERY

## 2025-06-25 PROCEDURE — 99214 OFFICE O/P EST MOD 30 MIN: CPT | Mod: S$GLB,,, | Performed by: SURGERY

## 2025-06-27 NOTE — PROGRESS NOTES
" Patient ID: Faith Bernard is a 60 y.o. female.    Chief Complaint: Pain of the Left Ankle, Pain, and Results    Pain         Faith Bernard has experienced problems with chronic ankle pain of the left lower extremity. Making ambulation difficult. Presents here for further consultation.  History of a left pilon fracture.  Status post ORIF.  Has had a previous injection.  Interested in ankle replacement, not fusion surgery per patient report.    6/25/25 - she experienced very little relief from the injection.  She believes she is having nerve pain.  CT demonstrates healed fractures. Minimal ankle deformity, mild joint step off / narrowing.  We will get her an EMG, however if that is negative then I do not think there is anything for us to do from a posttraumatic ankle standpoint.    ROS      Objective:      Ortho/SPM Exam        Vitals:    06/25/25 1255   BP: 114/84   Pulse: 66       The patient is not in acute distress.   The skin over the foot and ankle is intact with well healed surgical incisions and excoriations  No Effusion   Palpation- tender to palpation  Range of motion- minimal range of motion about the ankle  Ligament laxity exam:  Stable  Flatfoot negative   Grossly neurovascularly intact    IMAGING-   These images were independently reviewed and discussed with the patient.  Imaging shows ankle arthritis, subtalar joint with minimal arthritis.  Well healed appearing hardware status post ORIF pilon    Assessment:       No diagnosis found.           Ankle arthritis      Plan:     We discussed options for treatment today include medications, bracing, and surgery.  Surgery options are ankle replacement vs ankle fusion.  We discussed both options and I encourage you to continue research at home.      Benefits of fusion include pain relief and durability.  A fusion cannot "wear out" and you have no permanent restrictions after a fusion heals.  Risks of fusion relates to the bone healing and the long " "term risk involves stress referred to other joints in the foot which can start to wear out and hurt.  There is also the obvious loss of ankle motion.  However, motion in our "ankle" comes from more places than just the ankle joint and people walk more normal than you would think (https://www.[employer]aceutical[employer]e.com/watch?v=zsXPtptmPNM).      Benefits of replacement include pain relief and preserved motion.  Replacement is gaining in popularity and use as great strides have been made in the techniques and the replacement parts available.  Risks of replacement relates to wound healing and infection and the components healing to the bone.  Unique to replacement is the risk of it wearing out - the metal and plastic parts that replace the joint can may not last your lifetime.  For that reason, after a replacement there are certain guidelines I encourage patients to follow to protect/preserve their ankle replacement (avoiding heavy laboring, avoiding running and other higher impact sports).  The younger you are when you have a replacement, the more there is the risk that you outlive the components that were placed.  In that case, you might need another surgery to tweak things or even replace the replacement or convert to a fusion.   This is still an area where our knowledge and experience is growing which is why ankle replacement should be delayed as long as possible.    You can learn more about these options at these websites:  --https://blog.[facility].org/articles/ankle-replacement-or-fusion-which-procedure-is-[email or web address]-you  --www.[employer]  --https://www.footcaremd.org/conditions-treatments/ankle/ankle-arthrodesis  --https://www.footcaremd.org/conditions-treatments/ankle/total-ankle-replacement    We discussed options for treatment of ankle arthritis is/are:  RICE guidelines  Anti-inflammatory medications: no new prescriptions, can continue over the counter medications  Therapy: I do not routinely recommend therapy as part of the " treatment of arthritis.  I rather recommend you stay active and participate in low impact activities (biking, walking, swimming).  Activity: Use pain as your guide, advance your activities as tolerated   Bracing:  Discussed lace-up ankle brace  Injections:  performed previously    We will order an EMG. Follow up after EMG.    Les Ramirez MD  [facility]  Department of Orthopedic Surgery    This note is dictated using the M*Modal Fluency Direct word recognition program. There are word recognition mistakes that are occasionally missed on review.

## 2025-07-02 ENCOUNTER — PROCEDURE VISIT (OUTPATIENT)
Dept: NEUROLOGY | Facility: CLINIC | Age: 60
End: 2025-07-02
Payer: COMMERCIAL

## 2025-07-02 VITALS — SYSTOLIC BLOOD PRESSURE: 125 MMHG | DIASTOLIC BLOOD PRESSURE: 88 MMHG | HEART RATE: 86 BPM | RESPIRATION RATE: 17 BRPM

## 2025-07-02 DIAGNOSIS — G43.709 CHRONIC MIGRAINE WITHOUT AURA WITHOUT STATUS MIGRAINOSUS, NOT INTRACTABLE: Primary | ICD-10-CM

## 2025-07-02 NOTE — PROCEDURES
[facility]  [address] [place] [address]  Phone:[phone]  Fax: [phone]  Botox Visit, #5    Chief Complaint   Patient presents with    Botulinum Toxin Injection         A/P:        ICD-10-CM ICD-9-CM   1. Chronic migraine without aura without status migrainosus, not intractable  G43.709 346.70     Botox for migraine    Historically: Patient meets the criteria for chronic migraine. In summary, She has headaches/migraines >15 days per month  and last >4 hours if untreated. Specifics of duration, frequency and strength are listed in office visit HPI's.  This pattern has continued for >3 months.  She has failed at least three preventive medications (full list of medications is listed in office notes of Therapies tried in past)  I have therefore recommended a trial of Botox via the PREEMPT protocol for migraine prophylaxis.We schedule regular follow up intervals to check on status.     PROCEDURE NOTE:  BOTOX injection is indicated for the prophylaxis of headaches in adult patients with chronic  migraine. Patient meets indications for BOTOX therapy.  Potential risks and benefits were reviewed. Side effects including, but not limited to, potential  systemic allergic reactions of the anaphylactic type as well as local injection site reactions of  blepharoptosis, diplopia, infection, bleeding, pain, redness and bruising were reviewed. The  potential for headaches and/or neck pain post procedure were reviewed.  The patient's questions were answered. The patient signed a consent form. Patient  understands that depending on their insurance carrier, there may be a copay for this treatment.  BOTOX was reconstituted using  two 100 unit vials and diluted with 4 mL of sterile saline.  BOTOX was injected as per the PREEMPT trial injection paradigm with dose administered as 5  unit intramuscular (IM) injections per site using a sterile, 30-gauge 0.5 inch needle as  follows:  Muscle Dose, # of Sites   10 units divided in 2 sites  Procerus 5 units in 1 site  Frontalis 25 units divided in 5 sites (extra on the right)   Temporalis 40 units divided in 8 sites  Occipitalis 30 units divided in 6 sites  Cervical paraspinal 20 units divided in 4 sites  Trapezius 30 units divided in 6 sites  Each site was cleaned with alcohol prior to injection. A total dose of 160 units were injected. 40  units were discarded/wasted.      The patient tolerated the procedure well with no immediate complications.  MEDICATION INFO:  NDC 7211-9627-53   Lot # [id number]  Exp 09/2027      As aside:  The Botox injections have achieved well over 50%  improvement in the patient's symptoms. Migraines have been reduced at least 7 days per month and the number of cumulative hours suffering with headaches has been reduced at least 100 total hours per month. Today she does have a headache indicating that the Botox has worn off. Frequency of treatment is every 3 months unless no response to the treatments, at which time we will discontinue the injections.    As aside mentions she will be getting botox for GI procedure in near future, she states her GI is aware she received botox today.       Follow up in 3 months for repeat injection, we also have interspersed office visits scheduled to ensure efficacy of botox sessions/check on patient.       Edouard Zamarripa MPA, PA-C  Attending available-Dr Toby MD           Personal Protective Equipment:    Personal Protective Equipment was used during this encounter including;     non latex gloves.     07/02/2025     CC: Rah Canela MD

## 2025-07-18 ENCOUNTER — OFFICE VISIT (OUTPATIENT)
Dept: OPTOMETRY | Facility: CLINIC | Age: 60
End: 2025-07-18
Payer: COMMERCIAL

## 2025-07-18 ENCOUNTER — CLINICAL SUPPORT (OUTPATIENT)
Dept: OPHTHALMOLOGY | Facility: CLINIC | Age: 60
End: 2025-07-18
Payer: COMMERCIAL

## 2025-07-18 DIAGNOSIS — M15.4 EROSIVE OSTEOARTHRITIS OF HANDS, BILATERAL: Primary | ICD-10-CM

## 2025-07-18 DIAGNOSIS — H52.7 REFRACTIVE ERROR: ICD-10-CM

## 2025-07-18 DIAGNOSIS — Z46.0 CONTACT LENS/GLASSES FITTING: Primary | ICD-10-CM

## 2025-07-18 DIAGNOSIS — Z79.899 HIGH RISK MEDICATION USE: ICD-10-CM

## 2025-07-18 DIAGNOSIS — H25.13 NUCLEAR SCLEROSIS, BILATERAL: ICD-10-CM

## 2025-07-18 DIAGNOSIS — H26.9 CORTICAL CATARACT: ICD-10-CM

## 2025-07-18 PROCEDURE — 99999 PR PBB SHADOW E&M-EST. PATIENT-LVL IV: CPT | Mod: PBBFAC,,, | Performed by: OPTOMETRIST

## 2025-07-18 PROCEDURE — 99999 PR PBB SHADOW E&M-EST. PATIENT-LVL II: CPT | Mod: PBBFAC,,, | Performed by: OPTOMETRIST

## 2025-07-18 NOTE — PROGRESS NOTES
Assessment /Plan     For exam results, see Encounter Report.    Contact lens/glasses fitting      Patient is here for a comprehensive eye exam and contact lens fit. See other exam visit with same encounter date 07/18/25 for detailed exam information.

## 2025-07-18 NOTE — PROGRESS NOTES
HPI    Pt here for annual. Pt c/o decreased vision.     Pt underwent a lot of medical issues and did not get contacts, wore   trials.   Wearing +2.50 readers. Struggling to drive at night. C/o eye   fatigue/strain.    Wanting to get contacts rx today.     +systane PRN  +migraines  +floaters (stable)  +seldom flashes  Last edited by Pavan Burkett, OD on 7/18/2025  9:55 AM.            Assessment /Plan     For exam results, see Encounter Report.    Erosive osteoarthritis of hands, bilateral  -     OCT, Retina - OU - Both Eyes  -     Torres Visual Field - OU - Extended - Both Eyes    High risk medication use  -     OCT, Retina - OU - Both Eyes  -     Torres Visual Field - OU - Extended - Both Eyes    Nuclear sclerosis, bilateral    Cortical cataract    Refractive error      1. Erosive osteoarthritis of hands, bilateral (Primary)  2. High risk medication use  Plaquenil x 1+ year  Current dosage 4.9 mg/kg  Ishihara 11/11 OD, OS    Reviewed mac OCT / HVF -- reviewed with patient  OCT -- normal foveal contour, no PIL  HVF -- poor reliability, non-specific defects    Monitor yearly, sooner if any vision changes    - OCT, Retina - OU - Both Eyes  - Torres Visual Field - OU - Extended - Both Eyes    3. Nuclear sclerosis, bilateral  4. Cortical cataract  Mild OU, not VS  Discussed possible ocular affects of cataracts. Acceptable BCVA OU.   Discussed treatment options. Surgery not recommended at this time.   Monitor yearly.     5. Refractive error  Dispensed updated spectacle Rx. Discussed various spectacle lens options. Discussed adaptation period to new specs.   Demonstrated new spec Rx vs current specs in phoropter with patient satisfaction    Discussed cls options -- happy with previous   Ordered new trials, return for dispense

## 2025-07-24 ENCOUNTER — TELEPHONE (OUTPATIENT)
Dept: OPTOMETRY | Facility: CLINIC | Age: 60
End: 2025-07-24
Payer: COMMERCIAL

## 2025-07-30 ENCOUNTER — OFFICE VISIT (OUTPATIENT)
Dept: SPORTS MEDICINE | Facility: CLINIC | Age: 60
End: 2025-07-30
Payer: COMMERCIAL

## 2025-07-30 DIAGNOSIS — S82.872D CLOSED DISPLACED PILON FRACTURE OF LEFT TIBIA WITH ROUTINE HEALING, SUBSEQUENT ENCOUNTER: Primary | ICD-10-CM

## 2025-07-30 DIAGNOSIS — M19.072 ARTHRITIS OF ANKLE, LEFT: ICD-10-CM

## 2025-07-31 ENCOUNTER — OFFICE VISIT (OUTPATIENT)
Dept: OPTOMETRY | Facility: CLINIC | Age: 60
End: 2025-07-31
Payer: COMMERCIAL

## 2025-07-31 DIAGNOSIS — Z97.3 WEARS CONTACT LENSES: Primary | ICD-10-CM

## 2025-07-31 PROCEDURE — 99499 UNLISTED E&M SERVICE: CPT | Mod: S$GLB,,, | Performed by: OPTOMETRIST

## 2025-07-31 NOTE — PROGRESS NOTES
HPI    Pt here today for cls follow up.   Had pt insert MF trials OU.  Last edited by Gabbi Burns on 7/31/2025  1:06 PM.            Assessment /Plan     For exam results, see Encounter Report.    Wears contact lenses      Good clfu -- happy with comfort and vision  Dispensed CLs Rx: Biofinity MF. Daily wear only, dispose of monthly.   Discussed proper hand hygiene and wear/care of lenses. Do not sleep/swim/shower in lenses.   Discontinue CL wear ASAP and RTC if any redness or discomfort occurs.   Try x few days, report back if any trouble

## 2025-08-01 ENCOUNTER — PATIENT MESSAGE (OUTPATIENT)
Dept: ADMINISTRATIVE | Facility: OTHER | Age: 60
End: 2025-08-01
Payer: COMMERCIAL

## 2025-08-01 ENCOUNTER — PATIENT MESSAGE (OUTPATIENT)
Dept: RHEUMATOLOGY | Facility: CLINIC | Age: 60
End: 2025-08-01
Payer: COMMERCIAL

## 2025-08-05 ENCOUNTER — TELEPHONE (OUTPATIENT)
Dept: RHEUMATOLOGY | Facility: CLINIC | Age: 60
End: 2025-08-05
Payer: COMMERCIAL

## 2025-08-05 DIAGNOSIS — M15.4 EROSIVE OSTEOARTHRITIS OF HANDS, BILATERAL: ICD-10-CM

## 2025-08-05 DIAGNOSIS — Z79.899 LONG-TERM USE OF PLAQUENIL: ICD-10-CM

## 2025-08-05 RX ORDER — HYDROXYCHLOROQUINE SULFATE 200 MG/1
TABLET, FILM COATED ORAL
Qty: 52 TABLET | Refills: 5 | Status: SHIPPED | OUTPATIENT
Start: 2025-08-05

## 2025-08-11 ENCOUNTER — PATIENT MESSAGE (OUTPATIENT)
Dept: OPTOMETRY | Facility: CLINIC | Age: 60
End: 2025-08-11
Payer: MEDICARE

## 2025-08-18 ENCOUNTER — PATIENT MESSAGE (OUTPATIENT)
Dept: RHEUMATOLOGY | Facility: CLINIC | Age: 60
End: 2025-08-18
Payer: MEDICARE

## 2025-08-18 ENCOUNTER — PATIENT MESSAGE (OUTPATIENT)
Dept: OPTOMETRY | Facility: CLINIC | Age: 60
End: 2025-08-18
Payer: MEDICARE

## 2025-08-18 ENCOUNTER — OFFICE VISIT (OUTPATIENT)
Facility: CLINIC | Age: 60
End: 2025-08-18
Payer: COMMERCIAL

## 2025-08-18 DIAGNOSIS — M15.4 EROSIVE OSTEOARTHRITIS OF HANDS, BILATERAL: Primary | ICD-10-CM

## 2025-08-18 DIAGNOSIS — Z79.899 LONG-TERM USE OF PLAQUENIL: ICD-10-CM

## 2025-08-18 DIAGNOSIS — D84.821 DRUG-INDUCED IMMUNODEFICIENCY: ICD-10-CM

## 2025-08-18 DIAGNOSIS — Z79.899 DRUG-INDUCED IMMUNODEFICIENCY: ICD-10-CM

## 2025-08-18 DIAGNOSIS — M25.572 PAIN OF JOINT OF LEFT ANKLE AND FOOT: ICD-10-CM

## 2025-08-18 DIAGNOSIS — L40.9 PSORIASIS: ICD-10-CM

## 2025-08-18 PROCEDURE — G2211 COMPLEX E/M VISIT ADD ON: HCPCS | Mod: 95,,, | Performed by: STUDENT IN AN ORGANIZED HEALTH CARE EDUCATION/TRAINING PROGRAM

## 2025-08-18 PROCEDURE — 98006 SYNCH AUDIO-VIDEO EST MOD 30: CPT | Mod: 95,,, | Performed by: STUDENT IN AN ORGANIZED HEALTH CARE EDUCATION/TRAINING PROGRAM

## 2025-08-19 ENCOUNTER — PATIENT MESSAGE (OUTPATIENT)
Dept: OPTOMETRY | Facility: CLINIC | Age: 60
End: 2025-08-19
Payer: MEDICARE

## 2025-08-21 ENCOUNTER — PATIENT MESSAGE (OUTPATIENT)
Dept: UROLOGY | Facility: CLINIC | Age: 60
End: 2025-08-21
Payer: MEDICARE

## 2025-08-22 ENCOUNTER — OFFICE VISIT (OUTPATIENT)
Dept: OPTOMETRY | Facility: CLINIC | Age: 60
End: 2025-08-22
Payer: COMMERCIAL

## 2025-08-22 DIAGNOSIS — Z97.3 WEARS CONTACT LENSES: Primary | ICD-10-CM

## 2025-08-26 ENCOUNTER — PATIENT MESSAGE (OUTPATIENT)
Dept: OPTOMETRY | Facility: CLINIC | Age: 60
End: 2025-08-26
Payer: MEDICARE

## (undated) DEVICE — TOWEL OR DISP STRL BLUE 4/PK

## (undated) DEVICE — DRAPE U SPLIT SHEET 54X76IN

## (undated) DEVICE — PIN EXT FIX APEX 5X150MM SS
Type: IMPLANTABLE DEVICE | Site: LEG | Status: NON-FUNCTIONAL
Removed: 2020-09-18

## (undated) DEVICE — SEE MEDLINE ITEM 152529

## (undated) DEVICE — BAG URO DRAIN

## (undated) DEVICE — Device

## (undated) DEVICE — TRAY MINOR ORTHO

## (undated) DEVICE — DRAPE STERI INSTRUMENT 1018

## (undated) DEVICE — DRAPE STERI U-SHAPED 47X51IN

## (undated) DEVICE — TAPE SURG DURAPORE 2 X10YD

## (undated) DEVICE — SEE MEDLINE ITEM 157150

## (undated) DEVICE — SPONGE LAP 18X18 PREWASHED

## (undated) DEVICE — BLADE SURG CARBON STEEL #10

## (undated) DEVICE — SUT VICRYL PLUS 3-0 SH 18IN

## (undated) DEVICE — TUBING SUCTION STR .25INX6FT

## (undated) DEVICE — NDL HYPO 27G X 1 1/2

## (undated) DEVICE — TOURNIQUET SB QC DP 18X4IN

## (undated) DEVICE — GAUZE SPONGE 4'X4 12 PLY

## (undated) DEVICE — PADDING WYTEX UNDRCST 6INX4YD

## (undated) DEVICE — SEE MEDLINE ITEM 146271

## (undated) DEVICE — GLOVE SURG ULTRA TOUCH 7.5

## (undated) DEVICE — BNDG COFLEX FOAM LF2 ST 4X5YD

## (undated) DEVICE — SEE MEDLINE ITEM 146345

## (undated) DEVICE — BANDAGE ACE ELASTIC 6"

## (undated) DEVICE — BOWL STERILE LARGE 32OZ

## (undated) DEVICE — SPLINT CAST ROLL 5IN X 15

## (undated) DEVICE — SOL IRR WATER STRL 3000 ML

## (undated) DEVICE — GUIDE DRILL AO 2.6X70MM

## (undated) DEVICE — SPONGE COTTON TRAY 4X4IN

## (undated) DEVICE — BANDAGE ESMARK 6X12

## (undated) DEVICE — HANDLE SURG LIGHT NONRIGID

## (undated) DEVICE — GOWN POLY REINF BRTH SLV LG

## (undated) DEVICE — DRAPE C ARM 42 X 120 10/BX

## (undated) DEVICE — PAD CAST 2 IN X 4YDS STERILE

## (undated) DEVICE — GUIDEWIRE ORTHO 1.6X150MM
Type: IMPLANTABLE DEVICE | Site: ANKLE | Status: NON-FUNCTIONAL
Removed: 2022-03-28

## (undated) DEVICE — DRAPE PLASTIC U 60X72

## (undated) DEVICE — APPLICATOR CHLORAPREP ORN 26ML

## (undated) DEVICE — 2.0 SPEED DRILL

## (undated) DEVICE — DRESSING XEROFORM NONADH 1X8IN

## (undated) DEVICE — GAUZE SPONGE 4X4 12PLY

## (undated) DEVICE — SUT 3-0 VICRYL / SH (J416)

## (undated) DEVICE — CANISTER INFOV.A.C WOUND 500ML

## (undated) DEVICE — STOCKINET TUBULAR 1 PLY 6X60IN

## (undated) DEVICE — GLOVE SENSICARE PI SURG 8

## (undated) DEVICE — STRAP OR TABLE 5IN X 72IN

## (undated) DEVICE — SUT CTD VICRYL 0 UND BR CPX

## (undated) DEVICE — SOL PVP-I SCRUB 7.5% 4OZ

## (undated) DEVICE — NDL MACROPLASTIQUE

## (undated) DEVICE — BLADE SURG #15 CARBON STEEL

## (undated) DEVICE — 2.0 DRILL

## (undated) DEVICE — ELECTRODE REM PLYHSV RETURN 9

## (undated) DEVICE — SOL IRRI STRL WATER 1000ML

## (undated) DEVICE — GLOVE SENSICARE PI ALOE 8

## (undated) DEVICE — DRAPE C-ARMOR EQUIPMENT COVER

## (undated) DEVICE — GLOVE PROTEXIS LTX MICRO  7.5

## (undated) DEVICE — SUT CTD VICRYL UND BR CP-1

## (undated) DEVICE — COVER SURG LIGHT HANDLE

## (undated) DEVICE — COUPLING HOFF3 RR 5X8X11MM

## (undated) DEVICE — GLOVE BIOGEL SKINSENSE PI 8.0

## (undated) DEVICE — SPLINT PLASTER FAST SET 5X30IN

## (undated) DEVICE — SYS LABEL CORRECT MED

## (undated) DEVICE — OVERDRILL AO 2.7MMX122MM

## (undated) DEVICE — SEE MEDLINE ITEM 146298

## (undated) DEVICE — BIT DRILL LOCK SHORT 3.1X216 S

## (undated) DEVICE — SUT ETHILON 3/0 18IN PS-1

## (undated) DEVICE — NDL BLUNT FILL 18G 1.5IN

## (undated) DEVICE — SEE MEDLINE ITEM 157131

## (undated) DEVICE — KIT DRESSING PREVENA PLUS

## (undated) DEVICE — BLADE SAGITTAL FINE 5.5 X 18.5

## (undated) DEVICE — CATH SUCTION 10FR

## (undated) DEVICE — NDL SAFETY 22G X 1.5 ECLIPSE

## (undated) DEVICE — SHEET DRAPE FAN-FOLDED 3/4

## (undated) DEVICE — SOL IRR 0.9% NACL 500ML PB

## (undated) DEVICE — SYR B-D DISP CONTROL 10CC100/C

## (undated) DEVICE — MASK FLYTE HOOD PEEL AWAY

## (undated) DEVICE — DRESSING N ADH OIL EMUL 3X8

## (undated) DEVICE — DRAPE UINDERBUT GRAD PCH

## (undated) DEVICE — BIT DRILL GRAY PT APEX 4X200MM

## (undated) DEVICE — SYR 0.9% NACL 10ML STERILE

## (undated) DEVICE — SCRUB 10% POVIDONE IODINE 4OZ

## (undated) DEVICE — TOURNIQUET SB QC DP 34X4IN

## (undated) DEVICE — SUT VICRYL PLUS 0 CT1 18IN

## (undated) DEVICE — SPONGE DERMACEA GAUZE 4X4

## (undated) DEVICE — TIP YANKAUERS BULB NO VENT

## (undated) DEVICE — KIT SAHARA DRAPE DRAW/LIFT

## (undated) DEVICE — GUIDEWIRE ORTHO 1.6X150MM
Type: IMPLANTABLE DEVICE | Site: LEG | Status: NON-FUNCTIONAL
Removed: 2020-09-18

## (undated) DEVICE — BANDAGE MATRIX HK LOOP 4IN 5YD

## (undated) DEVICE — BANDAGE ELAS SOFTWRAP ST 6X5YD

## (undated) DEVICE — GOWN SURGICAL X-LARGE

## (undated) DEVICE — HOOK STAY ELAS 5MM 8EA/PK

## (undated) DEVICE — ADHESIVE DERMABOND ADVANCED

## (undated) DEVICE — KIT RETR PERI/GYN W/O STAYS

## (undated) DEVICE — BIT DRILL NL GOLD TIP2.5X216MM

## (undated) DEVICE — TRAY CYSTO BASIN OMC

## (undated) DEVICE — PACK CYSTOSCOPY III SIRUS

## (undated) DEVICE — SEE MEDLINE ITEM 157165

## (undated) DEVICE — ALCOHOL 70% ISOP RUBBING 4OZ

## (undated) DEVICE — SEE MEDLINE ITEM 152622

## (undated) DEVICE — DRESSING TRANS 4X4 TEGADERM

## (undated) DEVICE — PAD CAST SPECIALIST STRL 6

## (undated) DEVICE — GLOVE PROTEXIS LTX MICRO 8

## (undated) DEVICE — SEE L#120831

## (undated) DEVICE — BANDAGE MATRIX HK LOOP 2IN 5YD

## (undated) DEVICE — DRAPE EXTREMITY HVY DTY REINF

## (undated) DEVICE — YANKAUER OPEN TIP W/O VENT

## (undated) DEVICE — SET Y-TYPE TUR IRRIGATION

## (undated) DEVICE — IMPLANTABLE DEVICE
Type: IMPLANTABLE DEVICE | Site: FINGER | Status: NON-FUNCTIONAL
Removed: 2024-12-10

## (undated) DEVICE — BIT DRILL AO SPEEDGUIDE 2X135M

## (undated) DEVICE — SUTURE MONOCRYL 1 CT-1

## (undated) DEVICE — CORD BIPOLAR 12 FOOT

## (undated) DEVICE — BANDAGE ELAS SOFTWRAP ST 3X5YD

## (undated) DEVICE — DRAPE STERI-DRAPE 1000 17X11IN

## (undated) DEVICE — BLADE #15 STERILE CARBON

## (undated) DEVICE — SEE MEDLINE ITEM 146347

## (undated) DEVICE — SEE MEDLINE ITEM 157117

## (undated) DEVICE — UNDERPAD ULTRASORB 300LB 30X36

## (undated) DEVICE — FORCEP STRAIGHT DISP

## (undated) DEVICE — SUT 4-0 ETHILON 18 PS-2

## (undated) DEVICE — STOCKINET 4INX48

## (undated) DEVICE — TRAY SKIN SCRUB DRY PREMIUM

## (undated) DEVICE — DRAPE THREE-QTR REINF 53X77IN

## (undated) DEVICE — SUT VICRYL PLUS 2-0 CT1 18

## (undated) DEVICE — PAD CAST SPECIALIST STRL 3

## (undated) DEVICE — TRAY FOLEY 16FR INFECTION CONT

## (undated) DEVICE — JELLY KY LUBRICATING 5G PACKET

## (undated) DEVICE — DRESSING GAUZE XEROFORM 5X9

## (undated) DEVICE — SET CYSTO IRRIGATION UNIV SPIK

## (undated) DEVICE — SPONGE GAUZE 16PLY 4X4

## (undated) DEVICE — POST HOFF3 30 DEG ANG 11MM

## (undated) DEVICE — GUIDEWIRE FIXOS UNTHRD 2.0X150
Type: IMPLANTABLE DEVICE | Site: LEG | Status: NON-FUNCTIONAL
Removed: 2020-09-18

## (undated) DEVICE — STAPLER SKIN PROXIMATE WIDE

## (undated) DEVICE — SEE L#133928

## (undated) DEVICE — DRAPE HAND STERILE